# Patient Record
Sex: FEMALE | Race: WHITE | Employment: UNEMPLOYED | URBAN - METROPOLITAN AREA
[De-identification: names, ages, dates, MRNs, and addresses within clinical notes are randomized per-mention and may not be internally consistent; named-entity substitution may affect disease eponyms.]

---

## 2019-04-29 ENCOUNTER — APPOINTMENT (OUTPATIENT)
Dept: CT IMAGING | Age: 69
DRG: 853 | End: 2019-04-29
Payer: COMMERCIAL

## 2019-04-29 ENCOUNTER — APPOINTMENT (OUTPATIENT)
Dept: GENERAL RADIOLOGY | Age: 69
DRG: 853 | End: 2019-04-29
Payer: COMMERCIAL

## 2019-04-29 ENCOUNTER — HOSPITAL ENCOUNTER (INPATIENT)
Age: 69
LOS: 21 days | Discharge: HOSPICE | DRG: 853 | End: 2019-05-20
Attending: EMERGENCY MEDICINE | Admitting: INTERNAL MEDICINE
Payer: COMMERCIAL

## 2019-04-29 DIAGNOSIS — A41.9 SEPTICEMIA (HCC): ICD-10-CM

## 2019-04-29 DIAGNOSIS — I95.9 HYPOTENSION, UNSPECIFIED HYPOTENSION TYPE: ICD-10-CM

## 2019-04-29 DIAGNOSIS — T68.XXXA HYPOTHERMIA, INITIAL ENCOUNTER: ICD-10-CM

## 2019-04-29 DIAGNOSIS — E11.11 DIABETIC KETOACIDOSIS WITH COMA ASSOCIATED WITH TYPE 2 DIABETES MELLITUS (HCC): ICD-10-CM

## 2019-04-29 DIAGNOSIS — M62.82 NON-TRAUMATIC RHABDOMYOLYSIS: ICD-10-CM

## 2019-04-29 DIAGNOSIS — E86.0 DEHYDRATION: ICD-10-CM

## 2019-04-29 DIAGNOSIS — J96.00 ACUTE RESPIRATORY FAILURE, UNSPECIFIED WHETHER WITH HYPOXIA OR HYPERCAPNIA (HCC): Primary | ICD-10-CM

## 2019-04-29 DIAGNOSIS — J18.9 PNEUMONIA DUE TO ORGANISM: ICD-10-CM

## 2019-04-29 DIAGNOSIS — R41.82 ALTERED MENTAL STATUS, UNSPECIFIED ALTERED MENTAL STATUS TYPE: ICD-10-CM

## 2019-04-29 DIAGNOSIS — I46.9 CARDIAC ARREST (HCC): ICD-10-CM

## 2019-04-29 PROBLEM — E87.20 ACIDOSIS: Status: ACTIVE | Noted: 2019-04-29

## 2019-04-29 PROBLEM — E11.10 DKA (DIABETIC KETOACIDOSES): Status: ACTIVE | Noted: 2019-04-29

## 2019-04-29 PROBLEM — E11.10 DKA, TYPE 2, NOT AT GOAL (HCC): Status: ACTIVE | Noted: 2019-04-29

## 2019-04-29 LAB
A/G RATIO: 0.7 (ref 1.1–2.2)
ALBUMIN SERPL-MCNC: 3.4 G/DL (ref 3.4–5)
ALP BLD-CCNC: 180 U/L (ref 40–129)
ALT SERPL-CCNC: 6 U/L (ref 10–40)
ANION GAP SERPL CALCULATED.3IONS-SCNC: 29 MMOL/L (ref 3–16)
ANION GAP SERPL CALCULATED.3IONS-SCNC: 39 MMOL/L (ref 3–16)
ANISOCYTOSIS: ABNORMAL
AST SERPL-CCNC: 12 U/L (ref 15–37)
BACTERIA: ABNORMAL /HPF
BANDED NEUTROPHILS RELATIVE PERCENT: 25 % (ref 0–7)
BASE EXCESS ARTERIAL: -16 (ref -3–3)
BASE EXCESS ARTERIAL: -16 (ref -3–3)
BASE EXCESS ARTERIAL: -24.2 MMOL/L (ref -3–3)
BASOPHILS ABSOLUTE: 0 K/UL (ref 0–0.2)
BASOPHILS RELATIVE PERCENT: 0 %
BILIRUB SERPL-MCNC: <0.2 MG/DL (ref 0–1)
BILIRUBIN URINE: NEGATIVE
BLOOD, URINE: ABNORMAL
BUN BLDV-MCNC: 50 MG/DL (ref 7–20)
BUN BLDV-MCNC: 52 MG/DL (ref 7–20)
CALCIUM IONIZED: 1.22 MMOL/L (ref 1.12–1.32)
CALCIUM SERPL-MCNC: 8.2 MG/DL (ref 8.3–10.6)
CALCIUM SERPL-MCNC: 9.7 MG/DL (ref 8.3–10.6)
CARBOXYHEMOGLOBIN ARTERIAL: 0.8 % (ref 0–1.5)
CHLORIDE BLD-SCNC: 80 MMOL/L (ref 99–110)
CHLORIDE BLD-SCNC: 96 MMOL/L (ref 99–110)
CLARITY: ABNORMAL
CO2: 11 MMOL/L (ref 21–32)
CO2: 7 MMOL/L (ref 21–32)
COLOR: YELLOW
CREAT SERPL-MCNC: 1.7 MG/DL (ref 0.6–1.2)
CREAT SERPL-MCNC: 1.9 MG/DL (ref 0.6–1.2)
EKG ATRIAL RATE: 75 BPM
EKG DIAGNOSIS: NORMAL
EKG P AXIS: 64 DEGREES
EKG P-R INTERVAL: 142 MS
EKG Q-T INTERVAL: 452 MS
EKG QRS DURATION: 112 MS
EKG QTC CALCULATION (BAZETT): 504 MS
EKG R AXIS: 52 DEGREES
EKG T AXIS: 54 DEGREES
EKG VENTRICULAR RATE: 75 BPM
EOSINOPHILS ABSOLUTE: 0.3 K/UL (ref 0–0.6)
EOSINOPHILS RELATIVE PERCENT: 1 %
EPITHELIAL CELLS, UA: ABNORMAL /HPF
GFR AFRICAN AMERICAN: 32
GFR AFRICAN AMERICAN: 36
GFR NON-AFRICAN AMERICAN: 26
GFR NON-AFRICAN AMERICAN: 30
GLOBULIN: 5.2 G/DL
GLUCOSE BLD-MCNC: 526 MG/DL (ref 70–99)
GLUCOSE BLD-MCNC: 544 MG/DL (ref 70–99)
GLUCOSE BLD-MCNC: 613 MG/DL (ref 70–99)
GLUCOSE BLD-MCNC: 636 MG/DL (ref 70–99)
GLUCOSE BLD-MCNC: 916 MG/DL (ref 70–99)
GLUCOSE BLD-MCNC: >600 MG/DL (ref 70–99)
GLUCOSE URINE: >=1000 MG/DL
HCO3 ARTERIAL: 10.7 MMOL/L (ref 21–29)
HCO3 ARTERIAL: 11.7 MMOL/L (ref 21–29)
HCO3 ARTERIAL: 6.9 MMOL/L (ref 21–29)
HCT VFR BLD CALC: 35 % (ref 36–48)
HCT VFR BLD CALC: 38.4 % (ref 36–48)
HEMOGLOBIN, ART, EXTENDED: 5.9 G/DL (ref 12–16)
HEMOGLOBIN: 11.5 G/DL (ref 12–16)
HEMOGLOBIN: 11.8 G/DL (ref 12–16)
HEMOGLOBIN: 13.4 GM/DL (ref 12–16)
INR BLD: 1.25 (ref 0.86–1.14)
KETONES, URINE: >=80 MG/DL
LACTATE: 1.7 MMOL/L (ref 0.4–2)
LACTIC ACID: 2.3 MMOL/L (ref 0.4–2)
LACTIC ACID: 2.3 MMOL/L (ref 0.4–2)
LEUKOCYTE ESTERASE, URINE: NEGATIVE
LYMPHOCYTES ABSOLUTE: 3.1 K/UL (ref 1–5.1)
LYMPHOCYTES RELATIVE PERCENT: 10 %
MACROCYTES: ABNORMAL
MAGNESIUM: 1.8 MG/DL (ref 1.8–2.4)
MAGNESIUM: 2.7 MG/DL (ref 1.8–2.4)
MCH RBC QN AUTO: 28.2 PG (ref 26–34)
MCH RBC QN AUTO: 28.2 PG (ref 26–34)
MCHC RBC AUTO-ENTMCNC: 30.1 G/DL (ref 31–36)
MCHC RBC AUTO-ENTMCNC: 33.8 G/DL (ref 31–36)
MCV RBC AUTO: 83.4 FL (ref 80–100)
MCV RBC AUTO: 93.8 FL (ref 80–100)
METAMYELOCYTES RELATIVE PERCENT: 1 %
METHEMOGLOBIN ARTERIAL: 1.3 %
MICROSCOPIC EXAMINATION: YES
MONOCYTES ABSOLUTE: 2.2 K/UL (ref 0–1.3)
MONOCYTES RELATIVE PERCENT: 7 %
NEUTROPHILS ABSOLUTE: 25.5 K/UL (ref 1.7–7.7)
NEUTROPHILS RELATIVE PERCENT: 56 %
NITRITE, URINE: NEGATIVE
O2 CONTENT ARTERIAL: 7 ML/DL
O2 SAT, ARTERIAL: 85.4 %
O2 SAT, ARTERIAL: 94 % (ref 93–100)
O2 SAT, ARTERIAL: 94 % (ref 93–100)
O2 THERAPY: ABNORMAL
OVALOCYTES: ABNORMAL
PCO2 ARTERIAL: 21.1 MM HG (ref 35–45)
PCO2 ARTERIAL: 30.6 MM HG (ref 35–45)
PCO2 ARTERIAL: 38.1 MMHG (ref 35–45)
PDW BLD-RTO: 13.6 % (ref 12.4–15.4)
PDW BLD-RTO: 14.8 % (ref 12.4–15.4)
PERFORMED ON: ABNORMAL
PH ARTERIAL: 6.87 (ref 7.35–7.45)
PH ARTERIAL: 7.19 (ref 7.35–7.45)
PH ARTERIAL: 7.31 (ref 7.35–7.45)
PH UA: 5.5 (ref 5–8)
PLATELET # BLD: 708 K/UL (ref 135–450)
PLATELET # BLD: 767 K/UL (ref 135–450)
PMV BLD AUTO: 7.1 FL (ref 5–10.5)
PMV BLD AUTO: 8.4 FL (ref 5–10.5)
PO2 ARTERIAL: 61.9 MMHG (ref 75–108)
PO2 ARTERIAL: 74.5 MM HG (ref 75–108)
PO2 ARTERIAL: 85.5 MM HG (ref 75–108)
POC HEMATOCRIT: 39 % (ref 36–48)
POC POTASSIUM: 2.5 MMOL/L (ref 3.5–5.1)
POC SAMPLE TYPE: ABNORMAL
POC SAMPLE TYPE: ABNORMAL
POC SODIUM: 138 MMOL/L (ref 136–145)
POIKILOCYTES: ABNORMAL
POLYCHROMASIA: ABNORMAL
POTASSIUM REFLEX MAGNESIUM: 2.6 MMOL/L (ref 3.5–5.1)
POTASSIUM SERPL-SCNC: 5.1 MMOL/L (ref 3.5–5.1)
PRO-BNP: 2186 PG/ML (ref 0–124)
PROTEIN UA: 100 MG/DL
PROTHROMBIN TIME: 14.2 SEC (ref 9.8–13)
RBC # BLD: 4.09 M/UL (ref 4–5.2)
RBC # BLD: 4.2 M/UL (ref 4–5.2)
RBC UA: ABNORMAL /HPF (ref 0–2)
SODIUM BLD-SCNC: 126 MMOL/L (ref 136–145)
SODIUM BLD-SCNC: 136 MMOL/L (ref 136–145)
SPECIFIC GRAVITY UA: 1.02 (ref 1–1.03)
TCO2 ARTERIAL: 11 MMOL/L
TCO2 ARTERIAL: 13 MMOL/L
TCO2 ARTERIAL: 8 MMOL/L
TOTAL CK: 279 U/L (ref 26–192)
TOTAL PROTEIN: 8.6 G/DL (ref 6.4–8.2)
TROPONIN: 0.08 NG/ML
TROPONIN: <0.01 NG/ML
URINE TYPE: ABNORMAL
UROBILINOGEN, URINE: 0.2 E.U./DL
WBC # BLD: 31.1 K/UL (ref 4–11)
WBC # BLD: 4.8 K/UL (ref 4–11)
WBC UA: ABNORMAL /HPF (ref 0–5)
YEAST: PRESENT /HPF

## 2019-04-29 PROCEDURE — 4500000026 HC ED CRITICAL CARE PROCEDURE

## 2019-04-29 PROCEDURE — 94750 HC PULMONARY COMPLIANCE STUDY: CPT

## 2019-04-29 PROCEDURE — 94770 HC ETCO2 MONITOR DAILY: CPT

## 2019-04-29 PROCEDURE — 2580000003 HC RX 258: Performed by: INTERNAL MEDICINE

## 2019-04-29 PROCEDURE — 84295 ASSAY OF SERUM SODIUM: CPT

## 2019-04-29 PROCEDURE — 99291 CRITICAL CARE FIRST HOUR: CPT

## 2019-04-29 PROCEDURE — 6370000000 HC RX 637 (ALT 250 FOR IP): Performed by: EMERGENCY MEDICINE

## 2019-04-29 PROCEDURE — 82330 ASSAY OF CALCIUM: CPT

## 2019-04-29 PROCEDURE — 36600 WITHDRAWAL OF ARTERIAL BLOOD: CPT

## 2019-04-29 PROCEDURE — 83605 ASSAY OF LACTIC ACID: CPT

## 2019-04-29 PROCEDURE — 0BH18EZ INSERTION OF ENDOTRACHEAL AIRWAY INTO TRACHEA, VIA NATURAL OR ARTIFICIAL OPENING ENDOSCOPIC: ICD-10-PCS | Performed by: INTERNAL MEDICINE

## 2019-04-29 PROCEDURE — 82550 ASSAY OF CK (CPK): CPT

## 2019-04-29 PROCEDURE — 85025 COMPLETE CBC W/AUTO DIFF WBC: CPT

## 2019-04-29 PROCEDURE — 82947 ASSAY GLUCOSE BLOOD QUANT: CPT

## 2019-04-29 PROCEDURE — 99291 CRITICAL CARE FIRST HOUR: CPT | Performed by: INTERNAL MEDICINE

## 2019-04-29 PROCEDURE — 96361 HYDRATE IV INFUSION ADD-ON: CPT

## 2019-04-29 PROCEDURE — 72125 CT NECK SPINE W/O DYE: CPT

## 2019-04-29 PROCEDURE — 93005 ELECTROCARDIOGRAM TRACING: CPT | Performed by: EMERGENCY MEDICINE

## 2019-04-29 PROCEDURE — C9113 INJ PANTOPRAZOLE SODIUM, VIA: HCPCS | Performed by: INTERNAL MEDICINE

## 2019-04-29 PROCEDURE — 83880 ASSAY OF NATRIURETIC PEPTIDE: CPT

## 2019-04-29 PROCEDURE — 36415 COLL VENOUS BLD VENIPUNCTURE: CPT

## 2019-04-29 PROCEDURE — 85027 COMPLETE CBC AUTOMATED: CPT

## 2019-04-29 PROCEDURE — 84484 ASSAY OF TROPONIN QUANT: CPT

## 2019-04-29 PROCEDURE — 96365 THER/PROPH/DIAG IV INF INIT: CPT

## 2019-04-29 PROCEDURE — 2700000000 HC OXYGEN THERAPY PER DAY

## 2019-04-29 PROCEDURE — 96375 TX/PRO/DX INJ NEW DRUG ADDON: CPT

## 2019-04-29 PROCEDURE — 2500000003 HC RX 250 WO HCPCS: Performed by: PHYSICIAN ASSISTANT

## 2019-04-29 PROCEDURE — 94002 VENT MGMT INPAT INIT DAY: CPT

## 2019-04-29 PROCEDURE — 2500000003 HC RX 250 WO HCPCS: Performed by: INTERNAL MEDICINE

## 2019-04-29 PROCEDURE — 36592 COLLECT BLOOD FROM PICC: CPT

## 2019-04-29 PROCEDURE — 6360000002 HC RX W HCPCS: Performed by: PHYSICIAN ASSISTANT

## 2019-04-29 PROCEDURE — 2580000003 HC RX 258: Performed by: EMERGENCY MEDICINE

## 2019-04-29 PROCEDURE — 96368 THER/DIAG CONCURRENT INF: CPT

## 2019-04-29 PROCEDURE — 99292 CRITICAL CARE ADDL 30 MIN: CPT

## 2019-04-29 PROCEDURE — 36620 INSERTION CATHETER ARTERY: CPT

## 2019-04-29 PROCEDURE — 6360000002 HC RX W HCPCS: Performed by: INTERNAL MEDICINE

## 2019-04-29 PROCEDURE — 82803 BLOOD GASES ANY COMBINATION: CPT

## 2019-04-29 PROCEDURE — 81001 URINALYSIS AUTO W/SCOPE: CPT

## 2019-04-29 PROCEDURE — 51702 INSERT TEMP BLADDER CATH: CPT

## 2019-04-29 PROCEDURE — 96367 TX/PROPH/DG ADDL SEQ IV INF: CPT

## 2019-04-29 PROCEDURE — 71045 X-RAY EXAM CHEST 1 VIEW: CPT

## 2019-04-29 PROCEDURE — 83735 ASSAY OF MAGNESIUM: CPT

## 2019-04-29 PROCEDURE — 70450 CT HEAD/BRAIN W/O DYE: CPT

## 2019-04-29 PROCEDURE — 6370000000 HC RX 637 (ALT 250 FOR IP): Performed by: INTERNAL MEDICINE

## 2019-04-29 PROCEDURE — 85014 HEMATOCRIT: CPT

## 2019-04-29 PROCEDURE — 2000000000 HC ICU R&B

## 2019-04-29 PROCEDURE — 2500000003 HC RX 250 WO HCPCS: Performed by: EMERGENCY MEDICINE

## 2019-04-29 PROCEDURE — 2580000003 HC RX 258

## 2019-04-29 PROCEDURE — 87040 BLOOD CULTURE FOR BACTERIA: CPT

## 2019-04-29 PROCEDURE — 92950 HEART/LUNG RESUSCITATION CPR: CPT

## 2019-04-29 PROCEDURE — 74176 CT ABD & PELVIS W/O CONTRAST: CPT

## 2019-04-29 PROCEDURE — 2580000003 HC RX 258: Performed by: PHYSICIAN ASSISTANT

## 2019-04-29 PROCEDURE — C9113 INJ PANTOPRAZOLE SODIUM, VIA: HCPCS | Performed by: PHYSICIAN ASSISTANT

## 2019-04-29 PROCEDURE — 85610 PROTHROMBIN TIME: CPT

## 2019-04-29 PROCEDURE — 93010 ELECTROCARDIOGRAM REPORT: CPT | Performed by: INTERNAL MEDICINE

## 2019-04-29 PROCEDURE — 94761 N-INVAS EAR/PLS OXIMETRY MLT: CPT

## 2019-04-29 PROCEDURE — 6360000002 HC RX W HCPCS: Performed by: EMERGENCY MEDICINE

## 2019-04-29 PROCEDURE — 5A1955Z RESPIRATORY VENTILATION, GREATER THAN 96 CONSECUTIVE HOURS: ICD-10-PCS | Performed by: INTERNAL MEDICINE

## 2019-04-29 PROCEDURE — 84132 ASSAY OF SERUM POTASSIUM: CPT

## 2019-04-29 PROCEDURE — 80053 COMPREHEN METABOLIC PANEL: CPT

## 2019-04-29 RX ORDER — SODIUM CHLORIDE 0.9 % (FLUSH) 0.9 %
10 SYRINGE (ML) INJECTION EVERY 12 HOURS SCHEDULED
Status: DISCONTINUED | OUTPATIENT
Start: 2019-04-29 | End: 2019-05-20 | Stop reason: HOSPADM

## 2019-04-29 RX ORDER — SODIUM CHLORIDE 9 MG/ML
INJECTION, SOLUTION INTRAVENOUS
Status: COMPLETED
Start: 2019-04-29 | End: 2019-04-29

## 2019-04-29 RX ORDER — SUCCINYLCHOLINE CHLORIDE 20 MG/ML
INJECTION INTRAMUSCULAR; INTRAVENOUS DAILY PRN
Status: COMPLETED | OUTPATIENT
Start: 2019-04-29 | End: 2019-04-29

## 2019-04-29 RX ORDER — ACETAMINOPHEN 650 MG/1
650 SUPPOSITORY RECTAL EVERY 4 HOURS PRN
Status: DISCONTINUED | OUTPATIENT
Start: 2019-04-29 | End: 2019-05-20 | Stop reason: HOSPADM

## 2019-04-29 RX ORDER — PROPOFOL 10 MG/ML
INJECTION, EMULSION INTRAVENOUS
Status: DISPENSED
Start: 2019-04-29 | End: 2019-04-30

## 2019-04-29 RX ORDER — PROPOFOL 10 MG/ML
20 INJECTION, EMULSION INTRAVENOUS
Status: DISCONTINUED | OUTPATIENT
Start: 2019-04-29 | End: 2019-05-03

## 2019-04-29 RX ORDER — MAGNESIUM SULFATE 1 G/100ML
1 INJECTION INTRAVENOUS PRN
Status: DISCONTINUED | OUTPATIENT
Start: 2019-04-29 | End: 2019-04-30

## 2019-04-29 RX ORDER — LORAZEPAM 2 MG/ML
1 INJECTION INTRAMUSCULAR ONCE
Status: COMPLETED | OUTPATIENT
Start: 2019-04-29 | End: 2019-04-29

## 2019-04-29 RX ORDER — POTASSIUM CHLORIDE 29.8 MG/ML
20 INJECTION INTRAVENOUS PRN
Status: DISCONTINUED | OUTPATIENT
Start: 2019-04-29 | End: 2019-04-30

## 2019-04-29 RX ORDER — PROPOFOL 10 MG/ML
10 INJECTION, EMULSION INTRAVENOUS ONCE
Status: DISCONTINUED | OUTPATIENT
Start: 2019-04-29 | End: 2019-04-29 | Stop reason: ALTCHOICE

## 2019-04-29 RX ORDER — SODIUM CHLORIDE 9 MG/ML
INJECTION, SOLUTION INTRAVENOUS CONTINUOUS
Status: DISCONTINUED | OUTPATIENT
Start: 2019-04-29 | End: 2019-04-30

## 2019-04-29 RX ORDER — SODIUM CHLORIDE 0.9 % (FLUSH) 0.9 %
10 SYRINGE (ML) INJECTION PRN
Status: DISCONTINUED | OUTPATIENT
Start: 2019-04-29 | End: 2019-05-20 | Stop reason: HOSPADM

## 2019-04-29 RX ORDER — ETOMIDATE 2 MG/ML
INJECTION INTRAVENOUS DAILY PRN
Status: COMPLETED | OUTPATIENT
Start: 2019-04-29 | End: 2019-04-29

## 2019-04-29 RX ORDER — 0.9 % SODIUM CHLORIDE 0.9 %
2000 INTRAVENOUS SOLUTION INTRAVENOUS ONCE
Status: COMPLETED | OUTPATIENT
Start: 2019-04-29 | End: 2019-04-29

## 2019-04-29 RX ORDER — ONDANSETRON 2 MG/ML
4 INJECTION INTRAMUSCULAR; INTRAVENOUS EVERY 6 HOURS PRN
Status: DISCONTINUED | OUTPATIENT
Start: 2019-04-29 | End: 2019-04-30

## 2019-04-29 RX ADMIN — SODIUM CHLORIDE 2000 ML: 9 INJECTION, SOLUTION INTRAVENOUS at 13:04

## 2019-04-29 RX ADMIN — DEXTROSE MONOHYDRATE 40 MCG/MIN: 50 INJECTION, SOLUTION INTRAVENOUS at 20:20

## 2019-04-29 RX ADMIN — VASOPRESSIN 0.04 UNITS/MIN: 20 INJECTION INTRAVENOUS at 20:20

## 2019-04-29 RX ADMIN — SODIUM CHLORIDE 8 MG/HR: 9 INJECTION, SOLUTION INTRAVENOUS at 15:32

## 2019-04-29 RX ADMIN — PROPOFOL 10 MCG/KG/MIN: 10 INJECTION, EMULSION INTRAVENOUS at 22:13

## 2019-04-29 RX ADMIN — POTASSIUM CHLORIDE 20 MEQ: 29.8 INJECTION, SOLUTION INTRAVENOUS at 21:59

## 2019-04-29 RX ADMIN — SUCCINYLCHOLINE CHLORIDE 100 MG: 20 INJECTION, SOLUTION INTRAMUSCULAR; INTRAVENOUS at 13:32

## 2019-04-29 RX ADMIN — SODIUM CHLORIDE 80 MG: 9 INJECTION, SOLUTION INTRAVENOUS at 15:00

## 2019-04-29 RX ADMIN — ACETAMINOPHEN 650 MG: 650 SUPPOSITORY RECTAL at 20:29

## 2019-04-29 RX ADMIN — DEXTROSE MONOHYDRATE 5 MCG/MIN: 50 INJECTION, SOLUTION INTRAVENOUS at 14:20

## 2019-04-29 RX ADMIN — POTASSIUM CHLORIDE 20 MEQ: 29.8 INJECTION, SOLUTION INTRAVENOUS at 23:14

## 2019-04-29 RX ADMIN — EPINEPHRINE 1 MG: 0.1 INJECTION, SOLUTION ENDOTRACHEAL; INTRACARDIAC; INTRAVENOUS at 13:27

## 2019-04-29 RX ADMIN — SODIUM CHLORIDE 4 UNITS/HR: 9 INJECTION, SOLUTION INTRAVENOUS at 14:50

## 2019-04-29 RX ADMIN — POTASSIUM CHLORIDE 20 MEQ: 29.8 INJECTION, SOLUTION INTRAVENOUS at 20:58

## 2019-04-29 RX ADMIN — Medication 2000 ML: at 13:04

## 2019-04-29 RX ADMIN — LORAZEPAM 1 MG: 2 INJECTION, SOLUTION INTRAMUSCULAR; INTRAVENOUS at 16:48

## 2019-04-29 RX ADMIN — SODIUM CHLORIDE: 9 INJECTION, SOLUTION INTRAVENOUS at 20:23

## 2019-04-29 RX ADMIN — VANCOMYCIN HYDROCHLORIDE 1000 MG: 1 INJECTION, POWDER, LYOPHILIZED, FOR SOLUTION INTRAVENOUS at 15:08

## 2019-04-29 RX ADMIN — POTASSIUM BICARBONATE 20 MEQ: 782 TABLET, EFFERVESCENT ORAL at 22:10

## 2019-04-29 RX ADMIN — PROPOFOL 30 MCG/KG/MIN: 10 INJECTION, EMULSION INTRAVENOUS at 15:06

## 2019-04-29 RX ADMIN — SODIUM CHLORIDE 8 MG/HR: 9 INJECTION, SOLUTION INTRAVENOUS at 21:22

## 2019-04-29 RX ADMIN — SODIUM BICARBONATE 50 MEQ: 84 INJECTION, SOLUTION INTRAVENOUS at 15:02

## 2019-04-29 RX ADMIN — ETOMIDATE 20 MG: 2 INJECTION INTRAVENOUS at 13:32

## 2019-04-29 RX ADMIN — PIPERACILLIN SODIUM,TAZOBACTAM SODIUM 3.38 G: 3; .375 INJECTION, POWDER, FOR SOLUTION INTRAVENOUS at 15:08

## 2019-04-29 RX ADMIN — EPINEPHRINE 1 MG: 0.1 INJECTION, SOLUTION ENDOTRACHEAL; INTRACARDIAC; INTRAVENOUS at 13:24

## 2019-04-29 ASSESSMENT — PULMONARY FUNCTION TESTS
PIF_VALUE: 29
PIF_VALUE: 24
PIF_VALUE: 28
PIF_VALUE: 23
PIF_VALUE: 33
PIF_VALUE: 36
PIF_VALUE: 32
PIF_VALUE: 24

## 2019-04-29 ASSESSMENT — PAIN SCALES - GENERAL: PAINLEVEL_OUTOF10: 0

## 2019-04-29 NOTE — ED PROVIDER NOTES
[facility] Emergency Department    CHIEF COMPLAINT  Altered Mental Status (pt found down at home unresponsive. unknown downtime. Last seen normal 10 am. posterior ekg changes per EMS. )      HISTORY OF PRESENT ILLNESS  Nury Mirza is a 81 y.o. female who presents to the ED found down and unresponsive. Patient observed lying in bed. Patient brought in by squad. According to family patient stopped taking diabetes medications several weeks ago. States that she discontinued them because she did not like the way they made her feel. Was complaining of some shortness of breath last night to family. Lives at home alone. Found down on the ground by her son today. Does appear to have dried emesis to cheek. She is not following commands. Cold to touch. No other complaints, modifying factors or associated symptoms. Nursing notes reviewed.    Past Medical History:   Diagnosis Date    Acute renal failure (ARF) ([address] 8/20/2015    Asthma     CAD (coronary artery disease)     MI in 2013    Diabetes mellitus ([address]     Hypertension     Pneumonia     Not sure when    Seizures ([address]     She goes numb, doctor told her they are seizures    Unspecified cerebral artery occlusion with cerebral infarction     Pt reports having a lot of mini-strokes; first one was 2002; last one was a couple years ago     Past Surgical History:   Procedure Laterality Date    BLADDER REPAIR      BREAST LUMPECTOMY Left     BREAST SURGERY       Family History   Problem Relation Age of Onset    Cancer Mother     Diabetes Father     Heart Disease Father     High Blood Pressure Father     High Cholesterol Father     Cancer Sister      Social History     Socioeconomic History    Marital status:      Spouse name: Not on file    Number of children: Not on file    Years of education: Not on file    Highest education level: Not on file   Occupational History    Not on file   Social Needs    Financial resource strain: Not on file    Food insecurity:     Worry: Not on file     Inability: Not on file    Transportation needs:     Medical: Not on file     Non-medical: Not on file   Tobacco Use    Smoking status: Never Smoker    Smokeless tobacco: Never Used   Substance and Sexual Activity    Alcohol use: No     Comment: occasional    Drug use: No    Sexual activity: Never   Lifestyle    Physical activity:     Days per week: Not on file     Minutes per session: Not on file    Stress: Not on file   Relationships    Social connections:     Talks on phone: Not on file     Gets together: Not on file     Attends Scientologist service: Not on file     Active member of club or organization: Not on file     Attends meetings of clubs or organizations: Not on file     Relationship status: Not on file    Intimate partner violence:     Fear of current or ex partner: Not on file     Emotionally abused: Not on file     Physically abused: Not on file     Forced sexual activity: Not on file   Other Topics Concern    Not on file   Social History Narrative    Not on file     Current Facility-Administered Medications   Medication Dose Route Frequency Provider Last Rate Last Dose    0.9 % sodium chloride bolus  2,000 mL Intravenous Once Ish Mejia, DO         Current Outpatient Medications   Medication Sig Dispense Refill    ondansetron (ZOFRAN ODT) 4 MG disintegrating tablet Take 1 tablet by mouth every 8 hours as needed for Nausea or Vomiting 20 tablet 0    omeprazole (PRILOSEC) 10 MG capsule Take 10 mg by mouth daily      lisinopril (PRINIVIL;ZESTRIL) 10 MG tablet Take 10 mg by mouth daily.  metFORMIN (GLUCOPHAGE) 1000 MG tablet   Take 1,000 mg by mouth daily (with breakfast)       PARoxetine (PAXIL) 30 MG tablet Take 30 mg by mouth every morning.  amitriptyline (ELAVIL) 50 MG tablet Take 63 mg by mouth nightly       atorvastatin (LIPITOR) 40 MG tablet Take 40 mg by mouth daily.       insulin detemir (LEVEMIR) 100 UNIT/ML injection vial   Inject 63 Units into the skin nightly        Allergies   Allergen Reactions    Aspirin Nausea And Vomiting       REVIEW OF SYSTEMS  10 systems reviewed, pertinent positives per HPI otherwise noted to be negative    PHYSICAL EXAM  BP (!) 72/48   Pulse 74   Resp 16   SpO2 100%   GENERAL APPEARANCE: Awake. While patient alert she does not appear to have great insight onto what is going on. Has no ability to follow commands. HEAD: Normocephalic. Atraumatic. No hematomas, lesions, lacerations or abrasions. Negative for schmidt signs or raccoons eyes. EYES: PERRL. EOM's grossly intact. ENT: Mucous membranes are dry. Emesis noted to face. NECK: Supple. HEART: RRR. No murmurs. LUNGS: Respirations unlabored. CTAB. Good air exchange. Speaking comfortably in full sentences. No wheezing, rhonchi, rales. ABDOMEN: Soft. Non-distended. Non-tender. No guarding or rebound. No midline pulsatile mass. EXTREMITIES: No peripheral edema. Moves all extremities equally. All extremities neurovascularly intact. SKIN: Cool and dry. No acute rashes. NEUROLOGICAL: Patient awake although again does not follow commands and appears altered. RADIOLOGY  Xr Chest Portable    Result Date: 4/29/2019  EXAMINATION: SINGLE XRAY VIEW OF THE CHEST 4/29/2019 1:45 pm COMPARISON: 1 hour ago HISTORY: ORDERING SYSTEM PROVIDED HISTORY: post intubation TECHNOLOGIST PROVIDED HISTORY: Reason for exam:->post intubation Ordering Physician Provided Reason for Exam: ETT TUBE AND NG PLACEMENT Acuity: Acute Type of Exam: Initial FINDINGS: An endotracheal tube is been placed, with tip projecting 3 cm cephalad to the pernell. A nasogastric tube extends below the left hemidiaphragm. Cardiac silhouette is normal.  Hilar contours are stable. There is patchy opacity in the left base. No pneumothorax is identified. Supportive tubing projects in normal position.  Left basilar airspace disease, pneumonia versus aspiration pneumonitis. There may be a component of pleural effusion. Xr Chest Portable    Result Date: 4/29/2019  EXAMINATION: SINGLE XRAY VIEW OF THE CHEST 4/29/2019 12:56 pm COMPARISON: None. HISTORY: ORDERING SYSTEM PROVIDED HISTORY: fall TECHNOLOGIST PROVIDED HISTORY: Reason for exam:->fall Ordering Physician Provided Reason for Exam: altered mental status Acuity: Acute Type of Exam: Initial FINDINGS: The cardiomediastinal silhouette is of normal size. There is atelectasis at the left lung base. There is question of small left pleural effusion. There is no pneumothorax. There is no acute osseous abnormality. Atelectasis at the left lung base. Question of small left pleural effusion. Procedure Note:  Orotracheal Intubation  Indication: Cardiac Arrest  Consent: Adriane Lombard or their surrogate had an opportunity to ask questions, and the risks, benefits, and alternatives were discussed. Procedure: The patient was preoxygenated with 100% oxygen and had O2 saturations what were in the % range. Following preoxygenation, the patient was given 20mg etomidate & 100mg succincylcholine. A  4 glidescope blade was inserted into the patients mouth after fasciculations were noted and a grade 1 view of the glottis was obtained. An 8.0 ET tube was then visualized passing between the cords and was secured at 21cm at the lip. The patient had condensation in the ET tube, CO2 capnogroaphic color change and had bilateral breath sounds appreciated. Post intubation CXR ordered and interpreted by radiology showed successful intubation. No immediate complications    Procedure Note:  Central Venous Access  Indication: Septic shock  The risk and benefit of a central line placement has been explained to the patient. Adriane Lombard or their surrogate had an opportunity to ask questions, and the risks, benefits, and alternatives were discussed. A written consent was obtained.       The skin over the right femoral vein was cleansed with ChloraPrep and draped in the usual sterile fashion. After infiltration of 1.0mL 1.0% lidocaine without epinephrine for anesthesia, an 18-gauge needle was inserted under ultrasound guidance while aspirating with a 10mL syringe. After a flash of dark venous blood returned, the right femoral vein was accessed with a sterile guidewire through the needle. The needle was then removed over the guidwire. A small skin incision was made with a #11 scalpel over the guidewire. An 8.5 Emirati subcutaneous dilator was then inserted and withdrawn. Using Seldinger technique, the normal saline pre-flushed 7-Emirati triple lumen catheter was inserted over the guidewire and each port accessed for free aspiration and flushed with saline to confirm placement within the venous lumen. The catheter was then secured to the skin using sutures to prevent dislodgement. The area was then recleansed with ChloraPrep and dressed with an antibiotic-infused disc and a sterile occlusive dressing. Patient tolerated procedure well without immediate complications. ED COURSE   I have evaluated this patient in collaboration with Dr. Laura Devine. Pain control was not required while here in the emergency department. Triage vitals with blood pressure 72/48. POC glucose > 600. Given 1L IVF bolus. Placed on bear hugger as core temp was in high 80s. CBC w/leukocytosis at 31.1 w/ a 25% bandemia. Lactic acid 2.3. Blood cx x2 pending. given additional 1L IVF bolus surpassing 30mL/kg bolus and empirically initiated on vanc and zosyn. Patient coded shortly into visit and regained pulse s/p several rounds of ACLS. Intubated and central line placed. Troponin <0.01. VBG w/pH 6.8. Given 50mEq Bicarb. BNP approximately 2200. . CMP significant for DKA and LOPEZ. Decreased Na and Cl levels w/acutely elevated BUN and Cr. At 52 and 1.9 w/GFR 26.   Glucose 916 w/anion gap of 39 in addition to acidosis and ketonuria. Gr cath placed w/10-20 WBC and 1+bacteria noted to U/A. Initial CXR w/L lung atelectasis w/questionable left pleural effusion. Additional imaging and hospitalist consult pending. Please refer to Dr. Sonia Beard documentation regarding ED course, evaluation, treatment, medical decision making, and disposition for this patient. A discussion was had with Mrs. Valerio's family regarding unresponsive state, ED findings, and recommendations for admission. All questions were answered. Family in agreeement. One hour of critical care time spent, not including any separately billable procedures.     MDM  Results for orders placed or performed during the hospital encounter of 04/29/19   CBC Auto Differential   Result Value Ref Range    WBC 31.1 (H) 4.0 - 11.0 K/uL    RBC 4.09 4.00 - 5.20 M/uL    Hemoglobin 11.5 (L) 12.0 - 16.0 g/dL    Hematocrit 38.4 36.0 - 48.0 %    MCV 93.8 80.0 - 100.0 fL    MCH 28.2 26.0 - 34.0 pg    MCHC 30.1 (L) 31.0 - 36.0 g/dL    RDW 14.8 12.4 - 15.4 %    Platelets 781 (H) 290 - 450 K/uL    MPV 8.4 5.0 - 10.5 fL    Neutrophils % 56.0 %    Lymphocytes % 10.0 %    Monocytes % 7.0 %    Eosinophils % 1.0 %    Basophils % 0.0 %    Neutrophils # 25.5 (H) 1.7 - 7.7 K/uL    Lymphocytes # 3.1 1.0 - 5.1 K/uL    Monocytes # 2.2 (H) 0.0 - 1.3 K/uL    Eosinophils # 0.3 0.0 - 0.6 K/uL    Basophils # 0.0 0.0 - 0.2 K/uL    Bands Relative 25 (H) 0 - 7 %    Metamyelocytes Relative 1 (A) %    Anisocytosis Occasional (A)     Macrocytes Occasional (A)     Polychromasia Occasional (A)     Poikilocytes Occasional (A)     Ovalocytes Occasional (A)    Comprehensive Metabolic Panel   Result Value Ref Range    Sodium 126 (L) 136 - 145 mmol/L    Potassium 5.1 3.5 - 5.1 mmol/L    Chloride 80 (L) 99 - 110 mmol/L    CO2 7 (LL) 21 - 32 mmol/L    Anion Gap 39 (H) 3 - 16    Glucose 916 (HH) 70 - 99 mg/dL    BUN 52 (H) 7 - 20 mg/dL    CREATININE 1.9 (H) 0.6 - 1.2 mg/dL    GFR Non-African American 26 (A) >60    GFR  American 32 (A) >60    Calcium 9.7 8.3 - 10.6 mg/dL    Total Protein 8.6 (H) 6.4 - 8.2 g/dL    Alb 3.4 3.4 - 5.0 g/dL    Albumin/Globulin Ratio 0.7 (L) 1.1 - 2.2    Total Bilirubin <0.2 0.0 - 1.0 mg/dL    Alkaline Phosphatase 180 (H) 40 - 129 U/L    ALT 6 (L) 10 - 40 U/L    AST 12 (L) 15 - 37 U/L    Globulin 5.2 g/dL   Urinalysis   Result Value Ref Range    Color, UA Yellow Straw/Yellow    Clarity, UA SL CLOUDY (A) Clear    Glucose, Ur >=1000 (A) Negative mg/dL    Bilirubin Urine Negative Negative    Ketones, Urine >=80 (AA) Negative mg/dL    Specific Gravity, UA 1.025 1.005 - 1.030    Blood, Urine MODERATE (A) Negative    pH, UA 5.5 5.0 - 8.0    Protein,  (A) Negative mg/dL    Urobilinogen, Urine 0.2 <2.0 E.U./dL    Nitrite, Urine Negative Negative    Leukocyte Esterase, Urine Negative Negative    Microscopic Examination YES     Urine Type Not Specified    Troponin   Result Value Ref Range    Troponin <0.01 <0.01 ng/mL   Brain Natriuretic Peptide   Result Value Ref Range    Pro-BNP 2,186 (H) 0 - 124 pg/mL   Magnesium   Result Value Ref Range    Magnesium 2.70 (H) 1.80 - 2.40 mg/dL   CK   Result Value Ref Range    Total  (H) 26 - 192 U/L   Blood gas, arterial   Result Value Ref Range    pH, Arterial 6.874 (LL) 7.350 - 7.450    pCO2, Arterial 38.1 35.0 - 45.0 mmHg    pO2, Arterial 61.9 (L) 75.0 - 108.0 mmHg    HCO3, Arterial 6.9 (L) 21.0 - 29.0 mmol/L    Base Excess, Arterial -24.2 (L) -3.0 - 3.0 mmol/L    Hemoglobin, Art, Extended 5.9 (L) 12.0 - 16.0 g/dL    O2 Sat, Arterial 85.4 (L) >92 %    Carboxyhgb, Arterial 0.8 0.0 - 1.5 %    Methemoglobin, Arterial 1.3 <1.5 %    TCO2, Arterial 8.0 Not Established mmol/L    O2 Content, Arterial 7 Not Established mL/dL    O2 Therapy Unknown    Microscopic Urinalysis   Result Value Ref Range    WBC, UA 10-20 (A) 0 - 5 /HPF    RBC, UA 3-5 (A) 0 - 2 /HPF    Epi Cells 3-5 /HPF    Bacteria, UA 1+ (A) /HPF    Yeast, UA Present (A) /HPF   Lactic Acid, Plasma   Result Value Ref Range    Lactic Acid 2.3 (H) 0.4 - 2.0 mmol/L   Protime-INR   Result Value Ref Range    Protime 14.2 (H) 9.8 - 13.0 sec    INR 1.25 (H) 0.86 - 1.14   Lactic acid, plasma   Result Value Ref Range    Lactic Acid 2.3 (H) 0.4 - 2.0 mmol/L   Basic Metabolic Panel w/ Reflex to MG   Result Value Ref Range    Sodium 136 136 - 145 mmol/L    Potassium reflex Magnesium 2.6 (LL) 3.5 - 5.1 mmol/L    Chloride 96 (L) 99 - 110 mmol/L    CO2 11 (LL) 21 - 32 mmol/L    Anion Gap 29 (H) 3 - 16    Glucose 613 (HH) 70 - 99 mg/dL    BUN 50 (H) 7 - 20 mg/dL    CREATININE 1.7 (H) 0.6 - 1.2 mg/dL    GFR Non-African American 30 (A) >60    GFR  36 (A) >60    Calcium 8.2 (L) 8.3 - 10.6 mg/dL   Magnesium   Result Value Ref Range    Magnesium 1.80 1.80 - 2.40 mg/dL   POCT Glucose   Result Value Ref Range    POC Glucose >600 (AA) 70 - 99 mg/dl    Performed on ACCU-CHEK    POCT Arterial   Result Value Ref Range    POC Sodium 138 136 - 145 mmol/L    POC Potassium 2.5 (LL) 3.5 - 5.1 mmol/L    POC Glucose 636 (HH) 70 - 99 mg/dl    Calcium, Ion 1.22 1.12 - 1.32 mmol/L    pH, Arterial 7.192 (LL) 7.350 - 7.450    pCO2, Arterial 30.6 (L) 35.0 - 45.0 mm Hg    pO2, Arterial 85.5 75.0 - 108.0 mm Hg    HCO3, Arterial 11.7 (L) 21.0 - 29.0 mmol/L    Base Excess, Arterial -16 (L) -3 - 3    O2 Sat, Arterial 94 93 - 100 %    TCO2, Arterial 13 Not Established mmol/L    Lactate 1.70 0.40 - 2.00 mmol/L    POC Hematocrit 39.0 36.0 - 48.0 %    Hemoglobin 13.4 12.0 - 16.0 gm/dL    Sample Type ART     Performed on SEE BELOW    EKG 12 Lead   Result Value Ref Range    Ventricular Rate 75 BPM    Atrial Rate 75 BPM    P-R Interval 142 ms    QRS Duration 112 ms    Q-T Interval 452 ms    QTc Calculation (Bazett) 504 ms    P Axis 64 degrees    R Axis 52 degrees    T Axis 54 degrees    Diagnosis       Normal sinus rhythmProlonged QTAbnormal ECGWhen compared with ECG of 09-JUL-2015 18:10,QRS axis Shifted leftST elevation now present in Lateral leadsT wave inversion no longer evident in Anterior leadsConfirmed by [facility] SUKUMAR GRIMES, Tony Moreno ([id number] on 4/29/2019 4:49:54 PM       I spoke with Dr. Sandro Espino who spoke with nephrology and hospitalist. We thoroughly discussed the history, physical exam, laboratory and imaging studies, as well as, emergency department course. Based upon that discussion, we've decided to admit Nina Martinez to the hospital    Final Impression  1. Acute respiratory failure, unspecified whether with hypoxia or hypercapnia ([address]    2. Cardiac arrest ([address]    3. Pneumonia due to organism    4. Dehydration    5. Altered mental status, unspecified altered mental status type    6. Hypothermia, initial encounter    7. Hypotension, unspecified hypotension type    8. Diabetic ketoacidosis with coma associated with type 2 diabetes mellitus ([address]    9. Non-traumatic rhabdomyolysis    10. Septicemia ([address]      Blood pressure (!) 96/55, pulse 124, temperature 96.3 °F (35.7 °C), temperature source Core, resp. rate (!) 35, weight 105 lb 2.6 oz (47.7 kg), SpO2 100 %, not currently breastfeeding. DISPOSITION  Patient was admitted to the ICU in critical condition.          Mitch Haynes  04/29/19 [address] [place] [address]  04/29/19 2050

## 2019-04-29 NOTE — ED NOTES
Spoke to [facility] cardiology RN-tracey @[id number] re: possible STEMI enroute, & squad EKG was shown to [id number]  @1229 [id number] @bedside upon pt's arrival and deemed not a STEMI     Elliot Rascon  04/29/19 1300

## 2019-04-29 NOTE — ED PROVIDER NOTES
of acute hemorrhage, mass or extra-axial fluid collection. ORBITS: The visualized portion of the orbits demonstrate no acute abnormality. SINUSES: The visualized paranasal sinuses and mastoid air cells demonstrate no acute abnormality. SOFT TISSUES/SKULL:  No acute abnormality of the visualized skull or soft tissues. Motion degraded study with no acute intracranial abnormality. Ct Cervical Spine Wo Contrast    Result Date: 4/29/2019  EXAMINATION: CT OF THE CERVICAL SPINE WITHOUT CONTRAST 4/29/2019 4:41 pm TECHNIQUE: CT of the cervical spine was performed without the administration of intravenous contrast. Multiplanar reformatted images are provided for review. Dose modulation, iterative reconstruction, and/or weight based adjustment of the mA/kV was utilized to reduce the radiation dose to as low as reasonably achievable. COMPARISON: None. HISTORY: ORDERING SYSTEM PROVIDED HISTORY: fall TECHNOLOGIST PROVIDED HISTORY: Ordering Physician Provided Reason for Exam: Unresponsive patient, blood suctioned from patient, no other history given Acuity: Acute Type of Exam: Initial FINDINGS: BONES/ALIGNMENT: There is a mild degenerative anterolisthesis at C6-C7. Vertebral body alignment is otherwise normal.  Cervical vertebral body heights are normal.  There is no evidence of an acute fracture. DEGENERATIVE CHANGES: Multilevel degenerative disc disease and facet arthropathy. There is a mixed protrusion on the left at C5-C6 contributing to a moderate severe nerve root canal stenosis. There are moderate to severe bilateral nerve root canal stenoses at C3-C4. SOFT TISSUES: There is no prevertebral soft tissue swelling. There are endotracheal and nasogastric tubes. Multilevel degenerative changes with no acute abnormality of the cervical spine.      Xr Chest Portable    Result Date: 4/29/2019  EXAMINATION: SINGLE XRAY VIEW OF THE CHEST 4/29/2019 1:45 pm COMPARISON: 1 hour ago HISTORY: ORDERING SYSTEM PROVIDED HISTORY: post intubation TECHNOLOGIST PROVIDED HISTORY: Reason for exam:->post intubation Ordering Physician Provided Reason for Exam: ETT TUBE AND NG PLACEMENT Acuity: Acute Type of Exam: Initial FINDINGS: An endotracheal tube is been placed, with tip projecting 3 cm cephalad to the pernell. A nasogastric tube extends below the left hemidiaphragm. Cardiac silhouette is normal.  Hilar contours are stable. There is patchy opacity in the left base. No pneumothorax is identified. Supportive tubing projects in normal position. Left basilar airspace disease, pneumonia versus aspiration pneumonitis. There may be a component of pleural effusion. Xr Chest Portable    Result Date: 4/29/2019  EXAMINATION: SINGLE XRAY VIEW OF THE CHEST 4/29/2019 12:56 pm COMPARISON: None. HISTORY: ORDERING SYSTEM PROVIDED HISTORY: fall TECHNOLOGIST PROVIDED HISTORY: Reason for exam:->fall Ordering Physician Provided Reason for Exam: altered mental status Acuity: Acute Type of Exam: Initial FINDINGS: The cardiomediastinal silhouette is of normal size. There is atelectasis at the left lung base. There is question of small left pleural effusion. There is no pneumothorax. There is no acute osseous abnormality. Atelectasis at the left lung base. Question of small left pleural effusion. Ct Chest Abdomen Pelvis Wo Contrast    Result Date: 4/29/2019  EXAMINATION: CT OF THE CHEST, ABDOMEN, AND PELVIS WITHOUT CONTRAST 4/29/2019 2:20 pm TECHNIQUE: CT of the chest, abdomen and pelvis was performed without the administration of intravenous contrast. Multiplanar reformatted images are provided for review. Dose modulation, iterative reconstruction, and/or weight based adjustment of the mA/kV was utilized to reduce the radiation dose to as low as reasonably achievable.  COMPARISON: 07/16/2016 HISTORY: ORDERING SYSTEM PROVIDED HISTORY: Unresponsive TECHNOLOGIST PROVIDED HISTORY: Reason for exam:->Unresponsive Additional Contrast?->None Ordering Physician Provided Reason for Exam: Unresponsive patient, blood suctioned from patient, no other history given Acuity: Acute Type of Exam: Initial FINDINGS: Chest: Mediastinum: Endotracheal tube noted with its tip terminating in the mid trachea. Cardiac chambers grossly unremarkable. Thoracic aorta is normal in caliber on this noncontrast scan. No mediastinal lymphadenopathy identified. There is diffuse mural thickening esophagus. Esophagus otherwise unremarkable. Nasogastric tube in place. Visualized thyroid unremarkable. Lungs/pleura: There is extensive consolidation seen within the posterior left upper lobe inferiorly, and throughout the left lower lobe. Some of that consolidation has a nodular appearance, especially in the left costophrenic angle. The right lung is clear. Soft Tissues/Bones: No osteolytic or osteoblastic bone lesions. No acute bony abnormalities detected. Abdomen/Pelvis: Organs: Evaluation of the solid abdominal viscera is limited without intravenous contrast.  In addition, there is streak artifact generated by the patient's arms at her side, and there is respiratory motion artifact. That said, no gross hepatic abnormality is identified. The spleen, adrenals, and pancreas are unremarkable on this noncontrast scan. No acute renal abnormalities are identified. GI/Bowel: Mild diverticulosis of the large bowel is present without CT evidence of diverticulitis. Large bowel otherwise unremarkable. Appendix not well visualized but no asymmetric pericecal inflammation is seen. No small bowel abnormalities are found. Nasogastric tube is found with its tip terminating in the gastric body. The stomach and duodenal sweep are otherwise unremarkable. Pelvis: Gr catheter present within urinary bladder. No free pelvic fluid found. Uterus and adnexa regions unremarkable. Peritoneum/Retroperitoneum: A right femoral venous catheter is in place.  However, its tip terminates within the sacral venous branch vessel rather than the common femoral vein. No retroperitoneal lymphadenopathy is identified. The thoracic aorta is normal in caliber. Bones/Soft Tissues: No osteolytic or osteoblastic bone lesions are identified. No acute bony abnormalities are detected. Dense consolidation within the inferior aspect of left upper lobe and throughout the left lower lobe, most compatible with pneumonia and/or aspiration. That should be followed to resolution, especially given the nodular morphology within portions of the consolidation. Diffuse mural thickening of the esophagus. Correlate with clinical evidence of esophagitis. The tip of the right femoral venous catheter is malpositioned within a sacral vein. That should be repositioned for more optimal placement. This is a 72-year-old female presenting after being found on the floor. Patient was found to be in diabetic ketoacidosis requiring aggressive IV fluid resuscitation and insulin drip. Patient was started on broad-spectrum antibiotics for aspiration pneumonia. During her emergency department stay patient had cardiac arrest and required ACLS protocol including CPR and medications including epinephrine, bicarbonate, atropine. Patient was given additional bicarb for her severe acidosis. Patient was intubated and central line was placed. Please see LYNNE Chou note for procedural notes. I did speak with nephrology as well as critical care physician and hospitalist for admission. Due to the immediate potential for life-threatening deterioration due to dehydration, cardiac arrest, diabetic ketoacidosis, pneumonia, I spent 95 minutes providing critical care. This time is excluding time spent performing procedures.          Yuni Guillen DO  04/29/19 2007

## 2019-04-29 NOTE — ED NOTES
Bedside report to icu, RN at this time. Family at bedside.  Patient transported on vent via respiratory with iv infusing at this time     Joaquin White RN  04/29/19 7691

## 2019-04-29 NOTE — ED NOTES
Called nephrology consult @0729  Re: acute renal failure per [email or web address] [address] [place] [address] Road  04/29/19 8612

## 2019-04-29 NOTE — ED NOTES
Called critical care/pulm consult @3769  Re: admit.  DKA, respiratory failure per Marilyn Ramirez spoke to [address] [facility]  04/29/19 6720

## 2019-04-29 NOTE — ED NOTES
[email or web address]  Re: admit. Cardiac arrest. DKA.  needs ICU per [email or web address]  04/29/19 7823

## 2019-04-29 NOTE — PROGRESS NOTES
04/29/19 1351   Vent Information   $Ventilation $Initial Day   Vent Type 840   Vent Mode AC/VC   Rate Set 16 bmp   Peak Flow 1 L/min   FiO2  5 %   Sensitivity 3   PEEP/CPAP 5   Cuff Pressure (cm H2O) 30 cm H2O   Humidification Source HME   Vent Patient Data   Peak Inspiratory Pressure 24 cmH2O   Mean Airway Pressure 11 cmH20   Rate Measured 16 br/min   Vt Exhaled 551 mL   Minute Volume 7.19 Liters   I:E Ratio 1:2.1   Cough/Sputum   Sputum How Obtained Endotracheal   Cough Productive   Sputum Amount Small   Sputum Color Other (Comment)  (black)   Tenacity Thick   Spontaneous Breathing Trial (SBT) RT Doc   Pulse 86   SpO2 100 %   Breath Sounds   Right Upper Lobe Rhonchi   Right Middle Lobe Rhonchi   Right Lower Lobe Rhonchi   Left Upper Lobe Rhonchi   Left Lower Lobe Rhonchi   Additional Respiratory  Assessments   Resp 16   End Tidal CO2 19 (%)   Alarm Settings   High Pressure Alarm 50 cmH2O   Low Minute Volume Alarm 2 L/min   High Respiratory Rate 45 br/min   Low Exhaled Vt  200 mL   Patient Observation   Observations   (ambu @ bedside)

## 2019-04-30 ENCOUNTER — APPOINTMENT (OUTPATIENT)
Dept: GENERAL RADIOLOGY | Age: 69
DRG: 853 | End: 2019-04-30
Payer: COMMERCIAL

## 2019-04-30 LAB
ANION GAP SERPL CALCULATED.3IONS-SCNC: 11 MMOL/L (ref 3–16)
ANION GAP SERPL CALCULATED.3IONS-SCNC: 13 MMOL/L (ref 3–16)
ANION GAP SERPL CALCULATED.3IONS-SCNC: 16 MMOL/L (ref 3–16)
ANION GAP SERPL CALCULATED.3IONS-SCNC: 18 MMOL/L (ref 3–16)
ANION GAP SERPL CALCULATED.3IONS-SCNC: 19 MMOL/L (ref 3–16)
BANDED NEUTROPHILS RELATIVE PERCENT: 64 % (ref 0–7)
BASE EXCESS ARTERIAL: -10 (ref -3–3)
BASE EXCESS ARTERIAL: -12.3 MMOL/L (ref -3–3)
BASE EXCESS ARTERIAL: -15 (ref -3–3)
BASE EXCESS ARTERIAL: -15 (ref -3–3)
BASE EXCESS ARTERIAL: -16 (ref -3–3)
BASE EXCESS ARTERIAL: -17 (ref -3–3)
BASE EXCESS ARTERIAL: -9 (ref -3–3)
BASOPHILS ABSOLUTE: 0 K/UL (ref 0–0.2)
BASOPHILS RELATIVE PERCENT: 0 %
BUN BLDV-MCNC: 49 MG/DL (ref 7–20)
BUN BLDV-MCNC: 50 MG/DL (ref 7–20)
BUN BLDV-MCNC: 52 MG/DL (ref 7–20)
CALCIUM IONIZED: 1.15 MMOL/L (ref 1.12–1.32)
CALCIUM IONIZED: 1.18 MMOL/L (ref 1.12–1.32)
CALCIUM IONIZED: 1.25 MMOL/L (ref 1.12–1.32)
CALCIUM IONIZED: 1.28 MMOL/L (ref 1.12–1.32)
CALCIUM SERPL-MCNC: 7.9 MG/DL (ref 8.3–10.6)
CALCIUM SERPL-MCNC: 8.1 MG/DL (ref 8.3–10.6)
CALCIUM SERPL-MCNC: 8.3 MG/DL (ref 8.3–10.6)
CALCIUM SERPL-MCNC: 8.3 MG/DL (ref 8.3–10.6)
CALCIUM SERPL-MCNC: 8.4 MG/DL (ref 8.3–10.6)
CARBOXYHEMOGLOBIN ARTERIAL: 0.3 % (ref 0–1.5)
CHLORIDE BLD-SCNC: 104 MMOL/L (ref 99–110)
CHLORIDE BLD-SCNC: 108 MMOL/L (ref 99–110)
CHLORIDE BLD-SCNC: 108 MMOL/L (ref 99–110)
CHLORIDE BLD-SCNC: 109 MMOL/L (ref 99–110)
CHLORIDE BLD-SCNC: 111 MMOL/L (ref 99–110)
CO2: 10 MMOL/L (ref 21–32)
CO2: 11 MMOL/L (ref 21–32)
CO2: 12 MMOL/L (ref 21–32)
CO2: 14 MMOL/L (ref 21–32)
CO2: 16 MMOL/L (ref 21–32)
CREAT SERPL-MCNC: 1.9 MG/DL (ref 0.6–1.2)
CREAT SERPL-MCNC: 2 MG/DL (ref 0.6–1.2)
CREAT SERPL-MCNC: 2 MG/DL (ref 0.6–1.2)
CREAT SERPL-MCNC: 2.1 MG/DL (ref 0.6–1.2)
CREAT SERPL-MCNC: 2.3 MG/DL (ref 0.6–1.2)
EOSINOPHILS ABSOLUTE: 0 K/UL (ref 0–0.6)
EOSINOPHILS RELATIVE PERCENT: 0 %
GFR AFRICAN AMERICAN: 26
GFR AFRICAN AMERICAN: 28
GFR AFRICAN AMERICAN: 30
GFR AFRICAN AMERICAN: 30
GFR AFRICAN AMERICAN: 32
GFR NON-AFRICAN AMERICAN: 21
GFR NON-AFRICAN AMERICAN: 23
GFR NON-AFRICAN AMERICAN: 25
GFR NON-AFRICAN AMERICAN: 25
GFR NON-AFRICAN AMERICAN: 26
GLUCOSE BLD-MCNC: 102 MG/DL (ref 70–99)
GLUCOSE BLD-MCNC: 120 MG/DL (ref 70–99)
GLUCOSE BLD-MCNC: 143 MG/DL (ref 70–99)
GLUCOSE BLD-MCNC: 155 MG/DL (ref 70–99)
GLUCOSE BLD-MCNC: 168 MG/DL (ref 70–99)
GLUCOSE BLD-MCNC: 176 MG/DL (ref 70–99)
GLUCOSE BLD-MCNC: 176 MG/DL (ref 70–99)
GLUCOSE BLD-MCNC: 182 MG/DL (ref 70–99)
GLUCOSE BLD-MCNC: 237 MG/DL (ref 70–99)
GLUCOSE BLD-MCNC: 272 MG/DL (ref 70–99)
GLUCOSE BLD-MCNC: 319 MG/DL (ref 70–99)
GLUCOSE BLD-MCNC: 405 MG/DL (ref 70–99)
GLUCOSE BLD-MCNC: 407 MG/DL (ref 70–99)
GLUCOSE BLD-MCNC: 408 MG/DL (ref 70–99)
GLUCOSE BLD-MCNC: 418 MG/DL (ref 70–99)
GLUCOSE BLD-MCNC: 433 MG/DL (ref 70–99)
GLUCOSE BLD-MCNC: 484 MG/DL (ref 70–99)
GLUCOSE BLD-MCNC: 486 MG/DL (ref 70–99)
HCO3 ARTERIAL: 10.7 MMOL/L (ref 21–29)
HCO3 ARTERIAL: 10.9 MMOL/L (ref 21–29)
HCO3 ARTERIAL: 11 MMOL/L (ref 21–29)
HCO3 ARTERIAL: 14.6 MMOL/L (ref 21–29)
HCO3 ARTERIAL: 15.7 MMOL/L (ref 21–29)
HCO3 ARTERIAL: 9.2 MMOL/L (ref 21–29)
HCO3 ARTERIAL: 9.8 MMOL/L (ref 21–29)
HCT VFR BLD CALC: 32 % (ref 36–48)
HEMOGLOBIN, ART, EXTENDED: 11.7 G/DL (ref 12–16)
HEMOGLOBIN: 10.4 GM/DL (ref 12–16)
HEMOGLOBIN: 10.4 GM/DL (ref 12–16)
HEMOGLOBIN: 11 GM/DL (ref 12–16)
HEMOGLOBIN: 11.2 G/DL (ref 12–16)
HEMOGLOBIN: 9.6 GM/DL (ref 12–16)
LACTATE: 1.45 MMOL/L (ref 0.4–2)
LACTATE: 2.3 MMOL/L (ref 0.4–2)
LACTATE: 3.36 MMOL/L (ref 0.4–2)
LACTATE: 3.54 MMOL/L (ref 0.4–2)
LACTATE: 5.24 MMOL/L (ref 0.4–2)
LACTIC ACID: 5.1 MMOL/L (ref 0.4–2)
LV EF: 55 %
LVEF MODALITY: NORMAL
LYMPHOCYTES ABSOLUTE: 0.6 K/UL (ref 1–5.1)
LYMPHOCYTES RELATIVE PERCENT: 8 %
MAGNESIUM: 1.6 MG/DL (ref 1.8–2.4)
MAGNESIUM: 2.3 MG/DL (ref 1.8–2.4)
MCH RBC QN AUTO: 28.7 PG (ref 26–34)
MCHC RBC AUTO-ENTMCNC: 34.9 G/DL (ref 31–36)
MCV RBC AUTO: 82.2 FL (ref 80–100)
METAMYELOCYTES RELATIVE PERCENT: 1 %
METHEMOGLOBIN ARTERIAL: 0.4 %
MONOCYTES ABSOLUTE: 0.5 K/UL (ref 0–1.3)
MONOCYTES RELATIVE PERCENT: 6 %
NEUTROPHILS ABSOLUTE: 6.8 K/UL (ref 1.7–7.7)
NEUTROPHILS RELATIVE PERCENT: 20 %
O2 CONTENT ARTERIAL: 16 ML/DL
O2 SAT, ARTERIAL: 89 % (ref 93–100)
O2 SAT, ARTERIAL: 92 % (ref 93–100)
O2 SAT, ARTERIAL: 96 % (ref 93–100)
O2 SAT, ARTERIAL: 97 % (ref 93–100)
O2 SAT, ARTERIAL: 97.3 %
O2 THERAPY: ABNORMAL
PCO2 ARTERIAL: 16.9 MM HG (ref 35–45)
PCO2 ARTERIAL: 17.4 MM HG (ref 35–45)
PCO2 ARTERIAL: 18.6 MM HG (ref 35–45)
PCO2 ARTERIAL: 19.2 MM HG (ref 35–45)
PCO2 ARTERIAL: 20 MMHG (ref 35–45)
PCO2 ARTERIAL: 23.2 MM HG (ref 35–45)
PCO2 ARTERIAL: 23.4 MM HG (ref 35–45)
PDW BLD-RTO: 13.5 % (ref 12.4–15.4)
PERFORMED ON: ABNORMAL
PH ARTERIAL: 7.34 (ref 7.35–7.45)
PH ARTERIAL: 7.36 (ref 7.35–7.45)
PH ARTERIAL: 7.37 (ref 7.35–7.45)
PH ARTERIAL: 7.41 (ref 7.35–7.45)
PH ARTERIAL: 7.43 (ref 7.35–7.45)
PLATELET # BLD: 569 K/UL (ref 135–450)
PMV BLD AUTO: 7.4 FL (ref 5–10.5)
PO2 ARTERIAL: 57.2 MM HG (ref 75–108)
PO2 ARTERIAL: 62.9 MM HG (ref 75–108)
PO2 ARTERIAL: 77.1 MM HG (ref 75–108)
PO2 ARTERIAL: 79 MM HG (ref 75–108)
PO2 ARTERIAL: 81 MM HG (ref 75–108)
PO2 ARTERIAL: 83.5 MM HG (ref 75–108)
PO2 ARTERIAL: 95.4 MMHG (ref 75–108)
POC HEMATOCRIT: 28 % (ref 36–48)
POC HEMATOCRIT: 31 % (ref 36–48)
POC HEMATOCRIT: 31 % (ref 36–48)
POC HEMATOCRIT: 32 % (ref 36–48)
POC POTASSIUM: 3.6 MMOL/L (ref 3.5–5.1)
POC POTASSIUM: 3.7 MMOL/L (ref 3.5–5.1)
POC POTASSIUM: 3.8 MMOL/L (ref 3.5–5.1)
POC POTASSIUM: 4.6 MMOL/L (ref 3.5–5.1)
POC POTASSIUM: 4.6 MMOL/L (ref 3.5–5.1)
POC POTASSIUM: 5.1 MMOL/L (ref 3.5–5.1)
POC POTASSIUM: 5.3 MMOL/L (ref 3.5–5.1)
POC SAMPLE TYPE: ABNORMAL
POC SODIUM: 139 MMOL/L (ref 136–145)
POC SODIUM: 139 MMOL/L (ref 136–145)
POC SODIUM: 142 MMOL/L (ref 136–145)
POC SODIUM: 142 MMOL/L (ref 136–145)
POC SODIUM: 146 MMOL/L (ref 136–145)
POTASSIUM REFLEX MAGNESIUM: 3.9 MMOL/L (ref 3.5–5.1)
POTASSIUM REFLEX MAGNESIUM: 4.7 MMOL/L (ref 3.5–5.1)
POTASSIUM REFLEX MAGNESIUM: 5.2 MMOL/L (ref 3.5–5.1)
POTASSIUM REFLEX MAGNESIUM: 5.4 MMOL/L (ref 3.5–5.1)
POTASSIUM SERPL-SCNC: 3.8 MMOL/L (ref 3.5–5.1)
PROMYELOCYTES PERCENT: 1 %
RBC # BLD: 3.9 M/UL (ref 4–5.2)
RBC # BLD: NORMAL 10*6/UL
SODIUM BLD-SCNC: 133 MMOL/L (ref 136–145)
SODIUM BLD-SCNC: 134 MMOL/L (ref 136–145)
SODIUM BLD-SCNC: 137 MMOL/L (ref 136–145)
SODIUM BLD-SCNC: 138 MMOL/L (ref 136–145)
SODIUM BLD-SCNC: 138 MMOL/L (ref 136–145)
TCO2 ARTERIAL: 10 MMOL/L
TCO2 ARTERIAL: 10 MMOL/L
TCO2 ARTERIAL: 11 MMOL/L
TCO2 ARTERIAL: 11.7 MMOL/L
TCO2 ARTERIAL: 12 MMOL/L
TCO2 ARTERIAL: 15 MMOL/L
TCO2 ARTERIAL: 16 MMOL/L
WBC # BLD: 7.9 K/UL (ref 4–11)

## 2019-04-30 PROCEDURE — 6370000000 HC RX 637 (ALT 250 FOR IP): Performed by: FAMILY MEDICINE

## 2019-04-30 PROCEDURE — 94770 HC ETCO2 MONITOR DAILY: CPT

## 2019-04-30 PROCEDURE — 83605 ASSAY OF LACTIC ACID: CPT

## 2019-04-30 PROCEDURE — 83735 ASSAY OF MAGNESIUM: CPT

## 2019-04-30 PROCEDURE — 37799 UNLISTED PX VASCULAR SURGERY: CPT

## 2019-04-30 PROCEDURE — 6360000002 HC RX W HCPCS: Performed by: INTERNAL MEDICINE

## 2019-04-30 PROCEDURE — 94750 HC PULMONARY COMPLIANCE STUDY: CPT

## 2019-04-30 PROCEDURE — 6360000002 HC RX W HCPCS

## 2019-04-30 PROCEDURE — 93005 ELECTROCARDIOGRAM TRACING: CPT | Performed by: INTERNAL MEDICINE

## 2019-04-30 PROCEDURE — 36556 INSERT NON-TUNNEL CV CATH: CPT | Performed by: NURSE PRACTITIONER

## 2019-04-30 PROCEDURE — 2580000003 HC RX 258: Performed by: INTERNAL MEDICINE

## 2019-04-30 PROCEDURE — 36592 COLLECT BLOOD FROM PICC: CPT

## 2019-04-30 PROCEDURE — 2580000003 HC RX 258: Performed by: NURSE PRACTITIONER

## 2019-04-30 PROCEDURE — 82947 ASSAY GLUCOSE BLOOD QUANT: CPT

## 2019-04-30 PROCEDURE — 6370000000 HC RX 637 (ALT 250 FOR IP): Performed by: INTERNAL MEDICINE

## 2019-04-30 PROCEDURE — 99223 1ST HOSP IP/OBS HIGH 75: CPT | Performed by: INTERNAL MEDICINE

## 2019-04-30 PROCEDURE — 94761 N-INVAS EAR/PLS OXIMETRY MLT: CPT

## 2019-04-30 PROCEDURE — 94003 VENT MGMT INPAT SUBQ DAY: CPT

## 2019-04-30 PROCEDURE — 2580000003 HC RX 258: Performed by: FAMILY MEDICINE

## 2019-04-30 PROCEDURE — 82803 BLOOD GASES ANY COMBINATION: CPT

## 2019-04-30 PROCEDURE — G0480 DRUG TEST DEF 1-7 CLASSES: HCPCS

## 2019-04-30 PROCEDURE — 2500000003 HC RX 250 WO HCPCS: Performed by: INTERNAL MEDICINE

## 2019-04-30 PROCEDURE — 85025 COMPLETE CBC W/AUTO DIFF WBC: CPT

## 2019-04-30 PROCEDURE — 85014 HEMATOCRIT: CPT

## 2019-04-30 PROCEDURE — 93306 TTE W/DOPPLER COMPLETE: CPT

## 2019-04-30 PROCEDURE — 36415 COLL VENOUS BLD VENIPUNCTURE: CPT

## 2019-04-30 PROCEDURE — 2000000000 HC ICU R&B

## 2019-04-30 PROCEDURE — 99291 CRITICAL CARE FIRST HOUR: CPT | Performed by: INTERNAL MEDICINE

## 2019-04-30 PROCEDURE — 71045 X-RAY EXAM CHEST 1 VIEW: CPT

## 2019-04-30 PROCEDURE — 82693 ASSAY OF ETHYLENE GLYCOL: CPT

## 2019-04-30 PROCEDURE — C9113 INJ PANTOPRAZOLE SODIUM, VIA: HCPCS | Performed by: INTERNAL MEDICINE

## 2019-04-30 PROCEDURE — 2580000003 HC RX 258

## 2019-04-30 PROCEDURE — 2700000000 HC OXYGEN THERAPY PER DAY

## 2019-04-30 PROCEDURE — 82330 ASSAY OF CALCIUM: CPT

## 2019-04-30 PROCEDURE — 84295 ASSAY OF SERUM SODIUM: CPT

## 2019-04-30 PROCEDURE — 2500000003 HC RX 250 WO HCPCS: Performed by: NURSE PRACTITIONER

## 2019-04-30 PROCEDURE — 84132 ASSAY OF SERUM POTASSIUM: CPT

## 2019-04-30 PROCEDURE — 02HV33Z INSERTION OF INFUSION DEVICE INTO SUPERIOR VENA CAVA, PERCUTANEOUS APPROACH: ICD-10-PCS | Performed by: INTERNAL MEDICINE

## 2019-04-30 PROCEDURE — 80048 BASIC METABOLIC PNL TOTAL CA: CPT

## 2019-04-30 RX ORDER — DEXTROSE AND SODIUM CHLORIDE 5; .45 G/100ML; G/100ML
INJECTION, SOLUTION INTRAVENOUS
Status: DISPENSED
Start: 2019-04-30 | End: 2019-05-01

## 2019-04-30 RX ORDER — DEXTROSE AND SODIUM CHLORIDE 5; .45 G/100ML; G/100ML
INJECTION, SOLUTION INTRAVENOUS CONTINUOUS
Status: DISCONTINUED | OUTPATIENT
Start: 2019-04-30 | End: 2019-04-30

## 2019-04-30 RX ORDER — HALOPERIDOL 5 MG/ML
1 INJECTION INTRAMUSCULAR
Status: DISCONTINUED | OUTPATIENT
Start: 2019-04-30 | End: 2019-04-30

## 2019-04-30 RX ORDER — SODIUM CHLORIDE, SODIUM GLUCONATE, SODIUM ACETATE, POTASSIUM CHLORIDE AND MAGNESIUM CHLORIDE 526; 502; 368; 37; 30 MG/100ML; MG/100ML; MG/100ML; MG/100ML; MG/100ML
125 INJECTION, SOLUTION INTRAVENOUS CONTINUOUS
Status: DISCONTINUED | OUTPATIENT
Start: 2019-04-30 | End: 2019-05-01

## 2019-04-30 RX ORDER — HEPARIN SODIUM 5000 [USP'U]/ML
5000 INJECTION, SOLUTION INTRAVENOUS; SUBCUTANEOUS 2 TIMES DAILY
Status: DISCONTINUED | OUTPATIENT
Start: 2019-04-30 | End: 2019-05-07

## 2019-04-30 RX ORDER — CARBOXYMETHYLCELLULOSE SODIUM 10 MG/ML
1 GEL OPHTHALMIC 3 TIMES DAILY
Status: DISCONTINUED | OUTPATIENT
Start: 2019-04-30 | End: 2019-05-09

## 2019-04-30 RX ORDER — DOPAMINE HYDROCHLORIDE 160 MG/100ML
INJECTION, SOLUTION INTRAVENOUS
Status: DISPENSED
Start: 2019-04-30 | End: 2019-04-30

## 2019-04-30 RX ORDER — SODIUM CHLORIDE 9 MG/ML
INJECTION, SOLUTION INTRAVENOUS
Status: COMPLETED
Start: 2019-04-30 | End: 2019-04-30

## 2019-04-30 RX ORDER — PANTOPRAZOLE SODIUM 40 MG/10ML
40 INJECTION, POWDER, LYOPHILIZED, FOR SOLUTION INTRAVENOUS 2 TIMES DAILY
Status: DISCONTINUED | OUTPATIENT
Start: 2019-04-30 | End: 2019-05-20 | Stop reason: HOSPADM

## 2019-04-30 RX ORDER — DEXTROSE MONOHYDRATE 25 G/50ML
12.5 INJECTION, SOLUTION INTRAVENOUS PRN
Status: DISCONTINUED | OUTPATIENT
Start: 2019-04-30 | End: 2019-05-09

## 2019-04-30 RX ORDER — DOPAMINE HYDROCHLORIDE 160 MG/100ML
20 INJECTION, SOLUTION INTRAVENOUS CONTINUOUS
Status: DISCONTINUED | OUTPATIENT
Start: 2019-04-30 | End: 2019-05-01

## 2019-04-30 RX ORDER — 0.9 % SODIUM CHLORIDE 0.9 %
2000 INTRAVENOUS SOLUTION INTRAVENOUS ONCE
Status: COMPLETED | OUTPATIENT
Start: 2019-04-30 | End: 2019-04-30

## 2019-04-30 RX ORDER — CHLORHEXIDINE GLUCONATE 0.12 MG/ML
15 RINSE ORAL 2 TIMES DAILY
Status: DISCONTINUED | OUTPATIENT
Start: 2019-04-30 | End: 2019-05-09

## 2019-04-30 RX ADMIN — POTASSIUM BICARBONATE 20 MEQ: 782 TABLET, EFFERVESCENT ORAL at 00:48

## 2019-04-30 RX ADMIN — MEROPENEM 1 G: 1 INJECTION, POWDER, FOR SOLUTION INTRAVENOUS at 21:28

## 2019-04-30 RX ADMIN — HALOPERIDOL LACTATE 1 MG: 5 INJECTION INTRAMUSCULAR at 04:21

## 2019-04-30 RX ADMIN — CARBOXYMETHYLCELLULOSE SODIUM 1 DROP: 10 GEL OPHTHALMIC at 14:15

## 2019-04-30 RX ADMIN — SODIUM BICARBONATE: 84 INJECTION, SOLUTION INTRAVENOUS at 19:25

## 2019-04-30 RX ADMIN — VASOPRESSIN 0.04 UNITS/MIN: 20 INJECTION INTRAVENOUS at 02:07

## 2019-04-30 RX ADMIN — MUPIROCIN: 20 OINTMENT TOPICAL at 11:55

## 2019-04-30 RX ADMIN — SODIUM CHLORIDE 1000 ML: 9 INJECTION, SOLUTION INTRAVENOUS at 11:07

## 2019-04-30 RX ADMIN — MEROPENEM 1 G: 1 INJECTION, POWDER, FOR SOLUTION INTRAVENOUS at 09:37

## 2019-04-30 RX ADMIN — DOPAMINE HYDROCHLORIDE 2.5 MCG/KG/MIN: 160 INJECTION, SOLUTION INTRAVENOUS at 01:17

## 2019-04-30 RX ADMIN — PROPOFOL 60 MCG/KG/MIN: 10 INJECTION, EMULSION INTRAVENOUS at 22:03

## 2019-04-30 RX ADMIN — POTASSIUM CHLORIDE 20 MEQ: 29.8 INJECTION, SOLUTION INTRAVENOUS at 00:50

## 2019-04-30 RX ADMIN — VASOPRESSIN 0.04 UNITS/MIN: 20 INJECTION INTRAVENOUS at 09:32

## 2019-04-30 RX ADMIN — MAGNESIUM SULFATE HEPTAHYDRATE 1 G: 1 INJECTION, SOLUTION INTRAVENOUS at 00:53

## 2019-04-30 RX ADMIN — DEXTROSE AND SODIUM CHLORIDE: 5; 450 INJECTION, SOLUTION INTRAVENOUS at 17:16

## 2019-04-30 RX ADMIN — SODIUM CHLORIDE 19.12 UNITS/HR: 9 INJECTION, SOLUTION INTRAVENOUS at 04:55

## 2019-04-30 RX ADMIN — ACETAMINOPHEN 650 MG: 650 SUPPOSITORY RECTAL at 00:48

## 2019-04-30 RX ADMIN — SODIUM CHLORIDE 10.74 UNITS/HR: 9 INJECTION, SOLUTION INTRAVENOUS at 11:53

## 2019-04-30 RX ADMIN — HALOPERIDOL LACTATE 1 MG: 5 INJECTION INTRAMUSCULAR at 01:00

## 2019-04-30 RX ADMIN — MAGNESIUM SULFATE HEPTAHYDRATE 1 G: 1 INJECTION, SOLUTION INTRAVENOUS at 02:07

## 2019-04-30 RX ADMIN — MUPIROCIN: 20 OINTMENT TOPICAL at 21:19

## 2019-04-30 RX ADMIN — PROPOFOL 60 MCG/KG/MIN: 10 INJECTION, EMULSION INTRAVENOUS at 17:17

## 2019-04-30 RX ADMIN — Medication 10 ML: at 09:00

## 2019-04-30 RX ADMIN — DEXTROSE MONOHYDRATE 45 MCG/MIN: 50 INJECTION, SOLUTION INTRAVENOUS at 02:07

## 2019-04-30 RX ADMIN — Medication 15 ML: at 21:19

## 2019-04-30 RX ADMIN — Medication 15 ML: at 09:00

## 2019-04-30 RX ADMIN — DEXTROSE MONOHYDRATE 55 MCG/MIN: 50 INJECTION, SOLUTION INTRAVENOUS at 09:17

## 2019-04-30 RX ADMIN — SODIUM BICARBONATE: 84 INJECTION, SOLUTION INTRAVENOUS at 12:15

## 2019-04-30 RX ADMIN — SODIUM CHLORIDE: 9 INJECTION, SOLUTION INTRAVENOUS at 03:45

## 2019-04-30 RX ADMIN — SODIUM CHLORIDE 250 ML: 9 INJECTION, SOLUTION INTRAVENOUS at 21:46

## 2019-04-30 RX ADMIN — CARBOXYMETHYLCELLULOSE SODIUM 1 DROP: 10 GEL OPHTHALMIC at 21:22

## 2019-04-30 RX ADMIN — VASOPRESSIN 0.04 UNITS/MIN: 20 INJECTION INTRAVENOUS at 17:23

## 2019-04-30 RX ADMIN — DEXTROSE MONOHYDRATE 25 MCG/MIN: 50 INJECTION, SOLUTION INTRAVENOUS at 17:06

## 2019-04-30 RX ADMIN — HALOPERIDOL LACTATE 1 MG: 5 INJECTION INTRAMUSCULAR at 02:36

## 2019-04-30 RX ADMIN — PANTOPRAZOLE SODIUM 40 MG: 40 INJECTION, POWDER, FOR SOLUTION INTRAVENOUS at 21:45

## 2019-04-30 RX ADMIN — SODIUM CHLORIDE 1.26 UNITS/HR: 9 INJECTION, SOLUTION INTRAVENOUS at 13:12

## 2019-04-30 RX ADMIN — POTASSIUM CHLORIDE 20 MEQ: 29.8 INJECTION, SOLUTION INTRAVENOUS at 06:16

## 2019-04-30 RX ADMIN — POTASSIUM CHLORIDE 20 MEQ: 29.8 INJECTION, SOLUTION INTRAVENOUS at 02:40

## 2019-04-30 RX ADMIN — CARBOXYMETHYLCELLULOSE SODIUM 1 DROP: 10 GEL OPHTHALMIC at 09:00

## 2019-04-30 RX ADMIN — HEPARIN SODIUM 5000 UNITS: 5000 INJECTION INTRAVENOUS; SUBCUTANEOUS at 12:00

## 2019-04-30 RX ADMIN — HEPARIN SODIUM 5000 UNITS: 5000 INJECTION INTRAVENOUS; SUBCUTANEOUS at 22:01

## 2019-04-30 RX ADMIN — POTASSIUM CHLORIDE 20 MEQ: 29.8 INJECTION, SOLUTION INTRAVENOUS at 05:01

## 2019-04-30 RX ADMIN — Medication 10 ML: at 21:19

## 2019-04-30 ASSESSMENT — PULMONARY FUNCTION TESTS
PIF_VALUE: 32
PIF_VALUE: 26
PIF_VALUE: 32
PIF_VALUE: 24
PIF_VALUE: 33
PIF_VALUE: 25
PIF_VALUE: 33
PIF_VALUE: 29
PIF_VALUE: 28
PIF_VALUE: 27
PIF_VALUE: 30
PIF_VALUE: 30
PIF_VALUE: 28
PIF_VALUE: 27
PIF_VALUE: 30
PIF_VALUE: 31
PIF_VALUE: 29
PIF_VALUE: 29
PIF_VALUE: 28
PIF_VALUE: 30
PIF_VALUE: 33
PIF_VALUE: 24
PIF_VALUE: 31
PIF_VALUE: 31
PIF_VALUE: 28
PIF_VALUE: 32
PIF_VALUE: 30
PIF_VALUE: 31
PIF_VALUE: 27
PIF_VALUE: 25
PIF_VALUE: 27
PIF_VALUE: 24

## 2019-04-30 ASSESSMENT — PAIN SCALES - GENERAL: PAINLEVEL_OUTOF10: 0

## 2019-04-30 NOTE — PLAN OF CARE
Problem: Nutrition  Goal: Optimal nutrition therapy  Outcome: Ongoing   Nutrition Problem: Inadequate energy intake  Intervention: Food and/or Nutrient Delivery: Continue NPO, Start Tube Feeding(when medically appropriate, initiate EN)  Nutritional Goals:  Tolerate most appropriate form of nutrition therapy this admission

## 2019-04-30 NOTE — PROGRESS NOTES
Pulmonary & Critical Care Inpatient Progress Note   Vikas Headley MD     REASON FOR TODAY'S VISIT:  Assistance with critical care management    SUBJECTIVE:   Remains critically ill on vent support, multiple vasopressor medications, and an insulin gtt     Scheduled Meds:   mupirocin   Nasal BID    carboxymethylcellulose PF  1 drop Both Eyes TID    chlorhexidine  15 mL Mouth/Throat BID    meropenem  1 g Intravenous Q12H    heparin (porcine)  5,000 Units Subcutaneous BID    pantoprazole  40 mg Intravenous BID    sodium chloride flush  10 mL Intravenous 2 times per day       Continuous Infusions:   dextrose 5 % and 0.45 % NaCl      DOPamine 4 mcg/kg/min (04/30/19 1645)    electrolyte-A      insulin (HUMAN R) non-weight based infusion 3.24 Units/hr (04/30/19 1600)    sodium bicarbonate infusion      norepinephrine 25 mcg/min (04/30/19 1706)    propofol 60 mcg/kg/min (04/30/19 1717)    vasopressin (Septic Shock) infusion 0.04 Units/min (04/30/19 1723)       PRN Meds:  dextrose, sodium chloride flush, magnesium hydroxide, acetaminophen    ALLERGIES:  Patient is allergic to aspirin. Objective:   PHYSICAL EXAM:  BP (!) 85/48   Pulse 117   Temp 100.6 °F (38.1 °C)   Resp 23   Ht 5' 1.02\" (1.55 m)   Wt 105 lb 2.6 oz (47.7 kg)   SpO2 97%   BMI 19.85 kg/m²    Physical Exam   Constitutional: She appears well-developed and well-nourished. No distress. HENT:   Head: Normocephalic and atraumatic. Mouth/Throat: Oropharynx is clear and moist. No oropharyngeal exudate. Neck: Neck supple. No JVD present. Cardiovascular: Normal heart sounds. Exam reveals no gallop and no friction rub. No murmur heard. Pulmonary/Chest: Effort normal. She has no wheezes. She has no rales. Equal chest rise and expansion bilaterally   Abdominal: Soft. Bowel sounds are normal. She exhibits no distension. There is no tenderness. Musculoskeletal: She exhibits no edema. Lymphadenopathy:     She has no cervical adenopathy. Neurological: A cranial nerve deficit is present. Intubated, sedated   Skin: Skin is warm and dry. No rash noted. She is not diaphoretic. Data Reviewed:   LABS:  CBC:  Recent Labs     04/29/19  1236  04/29/19  2110 04/30/19  0417 04/30/19  0900 04/30/19  1135 04/30/19  1559   WBC 31.1*  --  4.8 7.9  --   --   --    HGB 11.5*   < > 11.8* 11.2* 10.4* 11.0* 9.6*   HCT 38.4  --  35.0* 32.0*  --   --   --    MCV 93.8  --  83.4 82.2  --   --   --    *  --  708* 569*  --   --   --     < > = values in this interval not displayed. BMP:  Recent Labs     04/30/19  0417 04/30/19  1135 04/30/19  1555    138 134*   K 3.8 4.7 5.4*    111* 109   CO2 12* 11* 14*   BUN 52* 52* 50*   CREATININE 2.0* 2.0* 2.1*     LIVER PROFILE:   Recent Labs     04/29/19  1236   AST 12*   ALT 6*   BILITOT <0.2   ALKPHOS 180*     PT/INR:  Recent Labs     04/29/19  1400   PROTIME 14.2*   INR 1.25*     APTT: No results for input(s): APTT in the last 72 hours. UA:  Recent Labs     04/29/19  1246   COLORU Yellow   PHUR 5.5   WBCUA 10-20*   RBCUA 3-5*   YEAST Present*   BACTERIA 1+*   CLARITYU SL CLOUDY*   SPECGRAV 1.025   LEUKOCYTESUR Negative   UROBILINOGEN 0.2   BILIRUBINUR Negative   BLOODU MODERATE*   GLUCOSEU >=1000*     Recent Labs     04/30/19  1135 04/30/19  1559   PHART 7.343* 7.406   PIF4NSY 16.9* 23.2*   PO2ART 81.0 83.5       Vent Information  $Ventilation: $Subsequent Day  Vent Type: 840  Vent Mode: AC/VC  Vt Ordered: 500 mL  Rate Set: 16 bmp  Peak Flow: 90 L/min  Pressure Support: 0 cmH20  FiO2 : 60 %  Sensitivity: 3  PEEP/CPAP: 5  Cuff Pressure (cm H2O): 30 cm H2O  Humidification Source: HME    CXR personally reviewed, left sided infiltrate           Assessment:     1. Acute resp failure, hypoxic              -left sided pneumonia  2. Acute hyperglycemia, DKA  3. Acute encephalopathy              -metabolic, infectious   4. LOPEZ with metabolic acidosis  5. Hypovolemic and septic shock  6.  Questionable

## 2019-04-30 NOTE — CONSULTS
Nephrology Consult Note  [email or web address]        Reason for Consult: LOPEZ and metabolic acidosis    HISTORY OF PRESENT ILLNESS:      The patient is a 81 y.o. female with significant past medical history of CAD, DM, HTN and seizures who presents after a cardiac arrest.    Most information was obtained from the patient's daughter, review of EHR and talking to the care team as the patient is sedated and intubated and unable to provide any history. Apparently, the patient has been feeling down the past few days and had refused eating and taking her medications. She started complaining of some sob the day prior to admission. Yesterday, she her son found her unresponsive on the floor and called EMS. CPR was initiated and was brought in to the hospital. On examination, she is on 3 pressors. Received about 2.5L of IVF on admission. Intubated on vent with FiO2 of 60% with PEEP of 5. She is being treated for DKA. Serum creatinine on admission was 1.9mg/dL. She had 1.5L of urine out on admission but has not made much since last night. Past Medical History:        Diagnosis Date    Acute renal failure (ARF) ([facility] [address] 8/20/2015    Asthma     CAD (coronary artery disease)     MI in 2013    Diabetes mellitus ([facility] [address]     Hypertension     Pneumonia     Not sure when    Seizures [facility])     She goes numb, doctor told her they are seizures    Unspecified cerebral artery occlusion with cerebral infarction     Pt reports having a lot of mini-strokes; first one was 2002; last one was a couple years ago       Past Surgical History:        Procedure Laterality Date    BLADDER REPAIR      BREAST LUMPECTOMY Left     BREAST SURGERY         Current Medications:    No current facility-administered medications on file prior to encounter.       Current Outpatient Medications on File Prior to Encounter   Medication Sig Dispense Refill    ondansetron (ZOFRAN ODT) 4 MG disintegrating tablet Take 1 tablet by mouth every 8 hours as needed for Nausea or Vomiting 20 tablet 0    omeprazole (PRILOSEC) 10 MG capsule Take 10 mg by mouth daily      lisinopril (PRINIVIL;ZESTRIL) 10 MG tablet Take 10 mg by mouth daily.  metFORMIN (GLUCOPHAGE) 1000 MG tablet   Take 1,000 mg by mouth daily (with breakfast)       PARoxetine (PAXIL) 30 MG tablet Take 30 mg by mouth every morning.  amitriptyline (ELAVIL) 50 MG tablet Take 63 mg by mouth nightly       atorvastatin (LIPITOR) 40 MG tablet Take 40 mg by mouth daily.       insulin detemir (LEVEMIR) 100 UNIT/ML injection vial   Inject 63 Units into the skin nightly          Allergies:  Aspirin    Social History:    Social History     Socioeconomic History    Marital status:      Spouse name: Not on file    Number of children: Not on file    Years of education: Not on file    Highest education level: Not on file   Occupational History    Not on file   Social Needs    Financial resource strain: Not on file    Food insecurity:     Worry: Not on file     Inability: Not on file    Transportation needs:     Medical: Not on file     Non-medical: Not on file   Tobacco Use    Smoking status: Never Smoker    Smokeless tobacco: Never Used   Substance and Sexual Activity    Alcohol use: No     Comment: occasional    Drug use: No    Sexual activity: Never   Lifestyle    Physical activity:     Days per week: Not on file     Minutes per session: Not on file    Stress: Not on file   Relationships    Social connections:     Talks on phone: Not on file     Gets together: Not on file     Attends Nondenominational service: Not on file     Active member of club or organization: Not on file     Attends meetings of clubs or organizations: Not on file     Relationship status: Not on file    Intimate partner violence:     Fear of current or ex partner: Not on file     Emotionally abused: Not on file     Physically abused: Not on file     Forced sexual activity: Not on file   Other Topics Concern    Not on file Social History Narrative    Not on file       Family History:       Problem Relation Age of Onset    Cancer Mother     Diabetes Father     Heart Disease Father     High Blood Pressure Father     High Cholesterol Father     Cancer Sister          REVIEW OF SYSTEMS:    Review of Systems   Unable to obtain      PHYSICAL EXAM:    Vitals:    BP (!) 85/48   Pulse 126   Temp 99.6 °F (37.6 °C) (Core)   Resp (!) 38   Ht 5' 1.02\" (1.55 m)   Wt 105 lb 2.6 oz (47.7 kg)   SpO2 95%   BMI 19.85 kg/m²   I/O last 3 completed shifts: In: 2477.9 [I.V.:2450.9; Other:27]  Out: 2270 [Urine:1570; Emesis/NG output:700]  No intake/output data recorded. Physical Exam:  Physical Exam   Constitutional: She is sedated and intubated. HENT:   Head: Normocephalic and atraumatic. Eyes: Conjunctivae are normal. No scleral icterus. Neck: No tracheal deviation present. No thyromegaly present. Cardiovascular: Regular rhythm. tachycardic   Pulmonary/Chest: She is intubated. Equal chest expansion, coarse breath sounds bilateral   Abdominal: Soft. She exhibits no distension. There is no tenderness. Musculoskeletal: She exhibits no edema. Skin: Skin is warm. Vitals reviewed.       DATA:    Recent Labs     04/29/19  1236 04/29/19 2007 04/29/19  2110 04/30/19  0417   WBC 31.1*  --  4.8 7.9   HGB 11.5* 13.4 11.8* 11.2*   HCT 38.4  --  35.0* 32.0*   MCV 93.8  --  83.4 82.2   *  --  708* 569*     Recent Labs     04/29/19 2013 04/30/19  0024 04/30/19  0417    137 138   K 2.6* 3.9 3.8   CL 96* 108 108   CO2 11* 10* 12*   GLUCOSE 613* 484* 405*   MG 1.80 1.60* 2.30   BUN 50* 49* 52*   CREATININE 1.7* 1.9* 2.0*   LABGLOM 30* 26* 25*   GFRAA 36* 32* 30*           IMPRESSION/RECOMMENDATIONS:    Ms. Thiago Hatfield is a 81year old female with PMHx of CAD, DM, HTN and seizures who presents after a cardiac arrest.    Acute Kidney Injury.  - Likely ATN in the setting of cardiac arrest.  - Last serum creatinine in 07/2016 was <0.5mg/dL. - Urinalysis with blood and protein. - Will order a renal ultrasound. - Hypotensive on 3 vasopressors. Clinically hypovolemic, will give IVF resuscitation, 2L NS IV bolus and continue with 125cc/hr mIVF. - Please keep MAP >65mmHg to ensure renal/organ perfusion.  - Please avoid any nephrotoxic agents such as NSAIDs or IV contrast unless deemed necessary.  - BMP q4H for now. - No need for emergent HD at this time but discussed with the daughter that she might need it in the next 12-24H. Severe Anion Gap Metabolic Acidosis. - Likely from DKA and lactic acidosis. R/O toxic alcohol ingestion.  - Will check methanol and ethylene glycol.  - Continue management of DKA per ICU team.  - Vasopressor/IVF for BP support. - May need RRT if worsening. Anemia.  - Had coffee-ground emesis on admission.  - Plans per primary service. S/P Cardiac Arrest.    DKA. - On IVF and insulin drip per protocol. Critical care time=45 minutes. Thank you for allowing me to participate in the care of this patient. Please do not hesitate to contact me at [phone] if with questions.     Suri Jacobs MD  4/30/2019  [facility]

## 2019-04-30 NOTE — PROGRESS NOTES
04/30/19 1557   Vent Information   Vent Type 840   Vent Mode AC/VC   Vt Ordered 500 mL   Rate Set 16 bmp   Peak Flow 90 L/min   Pressure Support 0 cmH20   FiO2  60 %   Sensitivity 3   PEEP/CPAP 5   Cuff Pressure (cm H2O) 30 cm H2O   Humidification Source HME   Vent Patient Data   Peak Inspiratory Pressure 31 cmH2O   Mean Airway Pressure 12 cmH20   Rate Measured 24 br/min   Vt Exhaled 391 mL   Minute Volume 10.5 Liters   I:E Ratio 1:4.00   Plateau Pressure 21 GBD88   Static Compliance 24 mL/cmH2O   Dynamic Compliance 15 mL/cmH2O   Cough/Sputum   Sputum How Obtained Suctioned;Endotracheal   Cough Productive   Frequency Occasional   Sputum Amount Small   Sputum Color Cloudy;Dark red   Tenacity Thick   Spontaneous Breathing Trial (SBT) RT Doc   Pulse 115   Breath Sounds   Right Upper Lobe Diminished   Right Middle Lobe Diminished   Right Lower Lobe Diminished   Left Upper Lobe Diminished   Left Lower Lobe Diminished   Additional Respiratory  Assessments   Resp 24   End Tidal CO2 17 (%)   Position Semi-Umanzor's   Alarm Settings   High Pressure Alarm 50 cmH2O   Low Minute Volume Alarm 2 L/min   Apnea (secs) 20 secs   High Respiratory Rate 45 br/min   Low Exhaled Vt  200 mL   ETT (adult)   Placement Date/Time: 04/29/19 1340   Preoxygenation: Yes  Type: Cuffed  Tube Size: 8 mm  Placement Verified By[de-identified] Capnometry  Secured at: 23 cm  Measured From: Lips  Cuff Pressure: 30 cm H2O   Secured at 22 cm   Measured From Lips   ET Placement Left   Secured By Commercial tube saavedra   Site Condition Dry   Cuff Pressure 30 cm H2O

## 2019-04-30 NOTE — PROGRESS NOTES
04/30/19 1156   Vent Information   Vent Type 840   Vent Mode AC/VC   Vt Ordered 500 mL   Rate Set 16 bmp   Peak Flow 90 L/min   Pressure Support 0 cmH20   FiO2  60 %   Sensitivity 3   PEEP/CPAP 5   Humidification Source HME   Vent Patient Data   Peak Inspiratory Pressure 32 cmH2O   Mean Airway Pressure 14 cmH20   Rate Measured 34 br/min   Vt Exhaled 460 mL   Minute Volume 15.8 Liters   I:E Ratio 1:2.10   Cough/Sputum   Sputum How Obtained Suctioned;Endotracheal   Cough Productive   Frequency Occasional   Sputum Amount Small   Sputum Color Dark red   Tenacity Thick   Spontaneous Breathing Trial (SBT) RT Doc   Pulse 121   Breath Sounds   Right Upper Lobe Rhonchi   Right Middle Lobe Diminished   Right Lower Lobe Diminished   Left Upper Lobe Rhonchi   Left Lower Lobe Diminished   Additional Respiratory  Assessments   Resp 29   End Tidal CO2 15 (%)   Position Semi-Umanzor's   Alarm Settings   High Pressure Alarm 50 cmH2O   Low Minute Volume Alarm 2 L/min   Apnea (secs) 20 secs   High Respiratory Rate 45 br/min   Low Exhaled Vt  200 mL   ETT (adult)   Placement Date/Time: 04/29/19 1340   Preoxygenation: Yes  Type: Cuffed  Tube Size: 8 mm  Placement Verified By[de-identified] Capnometry  Secured at: 23 cm  Measured From: Lips  Cuff Pressure: 30 cm H2O   Secured at 22 cm   Measured From [address] By Commercial tube saavedra   Site Condition Dry   Cuff Pressure 30 cm H2O

## 2019-04-30 NOTE — CONSULTS
[address] [facility]  [phone]      Attending Physician: Alysia Samson MD  Reason for Consultation/Chief Complaint: Cardiac arrest, ECG changes    Subjective   History of Present Illness:  King Wilfred is a 81 y.o. patient who presented to the hospital with complaints of cardaic arrest. History extremly limited due to patient critical illness. Pt reportedly found unconscious by son and brought in. Daughter bedside states that pt lives with son who is a paranoid schizophrenic. They mostly sit around house all day. Pt is on disability and did not get paperwork back in time and therfore lost medical card and has been off all meds for several months including insulin. Had recent death in family and at  Saturday and exposed to family with possible URI. All weekend pt c/o fatigue, tiredness. difficulty walking.  night son went to check on patient and stated she 'fell out of bed\" and EMS was called. Denied any cardiac hx or reports of CP         Past Medical History:   has a past medical history of Acute renal failure (ARF) ([address] Asthma, CAD (coronary artery disease), Diabetes mellitus ([address] Hypertension, Pneumonia, Seizures ([address] and Unspecified cerebral artery occlusion with cerebral infarction. Surgical History:   has a past surgical history that includes Breast surgery; bladder repair; and Breast lumpectomy (Left). Social History:   reports that she has never smoked. She has never used smokeless tobacco. She reports that she does not drink alcohol or use drugs. Family History:  family history includes Cancer in her mother and sister; Diabetes in her father; Heart Disease in her father; High Blood Pressure in her father; High Cholesterol in her father. Home Medications:  Were reviewed and are listed in nursing record and/or below  Prior to Admission medications    Medication Sig Start Date End Date Taking?  Authorizing Provider   ondansetron (ZOFRAN ODT) 4 MG disintegrating tablet Take 1 tablet by mouth every 8 hours as needed for Nausea or Vomiting 7/17/16   Trace Alba MD   omeprazole (PRILOSEC) 10 MG capsule Take 10 mg by mouth daily    Historical Provider, MD   lisinopril (PRINIVIL;ZESTRIL) 10 MG tablet Take 10 mg by mouth daily. Historical Provider, MD   metFORMIN (GLUCOPHAGE) 1000 MG tablet   Take 1,000 mg by mouth daily (with breakfast)     Historical Provider, MD   PARoxetine (PAXIL) 30 MG tablet Take 30 mg by mouth every morning. Historical Provider, MD   amitriptyline (ELAVIL) 50 MG tablet Take 63 mg by mouth nightly     Historical Provider, MD   atorvastatin (LIPITOR) 40 MG tablet Take 40 mg by mouth daily. Historical Provider, MD   insulin detemir (LEVEMIR) 100 UNIT/ML injection vial   Inject 63 Units into the skin nightly     Historical Provider, MD        CURRENT Medications:    haloperidol lactate (HALDOL) injection 1 mg Q1H PRN   DOPamine (INTROPIN) 400 mg in dextrose 5 % 250 mL infusion Continuous   mupirocin (BACTROBAN) 2 % ointment BID   carboxymethylcellulose PF (THERATEARS) 1 % ophthalmic gel 1 drop TID   chlorhexidine (PERIDEX) 0.12 % solution 15 mL BID   norepinephrine (LEVOPHED) 16 mg in dextrose 5 % 250 mL infusion Continuous   insulin regular (HUMULIN R;NOVOLIN R) 100 Units in sodium chloride 0.9 % 100 mL infusion Titrated   pantoprazole (PROTONIX) 80 mg in sodium chloride 0.9 % 100 mL infusion Continuous   propofol 1000 MG/100ML injection Titrated   sodium chloride flush 0.9 % injection 10 mL 2 times per day   sodium chloride flush 0.9 % injection 10 mL PRN   magnesium hydroxide (MILK OF MAGNESIA) 400 MG/5ML suspension 30 mL Daily PRN   0.9 % sodium chloride infusion Continuous   acetaminophen (TYLENOL) suppository 650 mg Q4H PRN   vasopressin 20 Units in dextrose 5 % 100 mL infusion Continuous       Allergies:  Aspirin     Review of Systems:   A 14 point review of symptoms completed.  Pertinent positives identified in the HPI, all other review of symptoms negative as below.       Objective   PHYSICAL EXAM:    Vitals:    04/30/19 0700   BP:    Pulse: 126   Resp: (!) 38   Temp:    SpO2:     Weight: 105 lb 2.6 oz (47.7 kg)         General Appearance:  sedated, no distress, appears stated age   Head:  Normocephalic, without obvious abnormality, atraumatic   Eyes:  PERRL, conjunctiva/corneas clear   Nose: Nares normal, no drainage or sinus tenderness   Throat: Lips, mucosa, and tongue normal   Neck: Supple, symmetrical, trachea midline, no adenopathy, thyroid: not enlarged, symmetric, no tenderness/mass/nodules, no carotid bruit or JVD   Lungs:   Clear to auscultation bilaterally, respirations unlabored   Chest Wall:  No deformity or tenderness   Heart:  Regular rate and rhythm, S1, S2 normal, no murmur, rub or gallop   Abdomen:   Soft, non-tender, bowel sounds active all four quadrants,  no masses, no organomegaly   Extremities: Extremities normal, atraumatic, no cyanosis or edema   Pulses: 2+ and symmetric   Skin: Skin color, texture, turgor normal, no rashes or lesions   Pysch: [place]IN   Neurologic: Sedated, nonpurposeful movt to exam        Labs   CBC: Lab Results   Component Value Date    WBC 7.9 04/30/2019    RBC 3.90 04/30/2019    HGB 11.2 04/30/2019    HCT 32.0 04/30/2019    MCV 82.2 04/30/2019    RDW 13.5 04/30/2019     04/30/2019     CMP:  Lab Results   Component Value Date     04/30/2019    K 3.8 04/30/2019    K 3.9 04/30/2019     04/30/2019    CO2 12 04/30/2019    BUN 52 04/30/2019    CREATININE 2.0 04/30/2019    GFRAA 30 04/30/2019    AGRATIO 0.7 04/29/2019    LABGLOM 25 04/30/2019    GLUCOSE 405 04/30/2019    PROT 8.6 04/29/2019    CALCIUM 8.3 04/30/2019    BILITOT <0.2 04/29/2019    ALKPHOS 180 04/29/2019    AST 12 04/29/2019    ALT 6 04/29/2019     PT/INR:  No results found for: PTINR  HgBA1c:  Lab Results   Component Value Date    LABA1C 5.3 08/20/2015     Lab Results   Component Value Date    CKTOTAL 279 (H) 2019    TROPONINI 0.08 (H) 2019         Cardiac Data     Last EK2019 NSR with NSST changes, no ischemic ST elevation, QTc 504  +moyer waves  2019 sinus tach at 125, qtc 513    Echo:    Stress Test:    Cath:    Studies:   CXR  Atelectasis at the left lung base.       Question of small left pleural effusion. CXR   Supportive devices are stable.       Persisting consolidations in the left lung base, consistent with pneumonia.               CT AP  Dense consolidation within the inferior aspect of left upper lobe and   throughout the left lower lobe, most compatible with pneumonia and/or   aspiration.  That should be followed to resolution, especially given the   nodular morphology within portions of the consolidation.       Diffuse mural thickening of the esophagus.  Correlate with clinical evidence   of esophagitis.       The tip of the right femoral venous catheter is malpositioned within a sacral   vein.  That should be repositioned for more optimal placement. Assessment and Plan      1. Cardiac arrest: unable to find any strips or mention of arresting rhythm  2. Prolonged QT:   3. Hypokalemia: improved  4. Elevated Trops: likely severe supply/demand mismatch  5. Hypothermia: resolved    Per GI and IM  Acute resp failure and PNA  Hyperglycemia  LOPEZ  Acute GIB- per notes but stable H/H      PLAN  1. Primary event unlikely cardiac. Given no doccumentation of initial event, hold AMio for now  2. ECHO pending   3. Keep K>4 and mag >2  4. Suggest change levo to pam to reduce tachycardia  5. Reduce pressor burden as tolerated, consider CVP monitoring for IV fluid needs   -? If needs extra fluids based on reported I/o of only +200mL  6. Avoid QTc prolonging meds.      Critical care: 36min        Patient Active Problem List   Diagnosis    Asthma    Hypertension    Diabetes mellitus ([facility]    Nausea & vomiting    Abdominal pain    [facility]    Hyperglycemia    DKA (diabetic ketoacidoses) (HCC)    Acidosis    [facility]    DKA, type 2, not at goal [facility])           Thank you for allowing us to participate in the care of Leander Meyer. Please call me with any questions [phone]. Mattie Ricks MD, [address] [facility]  [address]  [phone] [facility]  [phone] [address] [place] [address]  4/30/2019 7:54 AM    I will address the patient's cardiac risk factors and adjusted pharmacologic treatment as needed. In addition, I have reinforced the need for patient directed risk factor modification. All questions and concerns were addressed to the patient/family. Alternatives to my treatment were discussed. The note was completed using EMR. Every effort was made to ensure accuracy; however, inadvertent computerized transcription errors may be present.

## 2019-04-30 NOTE — PROGRESS NOTES
04/30/19 0845   Vent Information   $Ventilation $Subsequent Day   Vent Type 840   Vent Mode AC/VC   Vt Ordered 500 mL   Rate Set 16 bmp   Peak Flow 90 L/min   Pressure Support 0 cmH20   FiO2  60 %   Sensitivity 3   PEEP/CPAP 5   Cuff Pressure (cm H2O) 30 cm H2O   Humidification Source HME   Vent Patient Data   Peak Inspiratory Pressure 26 cmH2O   Mean Airway Pressure 14 cmH20   Rate Measured 30 br/min   Vt Exhaled 487 mL   Minute Volume 14.3 Liters   I:E Ratio 1:2.00   Plateau Pressure 21 [id number]   Static Compliance 30 mL/cmH2O   Dynamic Compliance 23 mL/cmH2O   Spontaneous Breathing Trial (SBT) RT Doc   Pulse 124   SpO2 94 %   Additional Respiratory  Assessments   Resp (!) 37   End Tidal CO2 12 (%)   Position Semi-Umanzor's   Alarm Settings   High Pressure Alarm 50 cmH2O   Low Minute Volume Alarm 2 L/min   Apnea (secs) 20 secs   High Respiratory Rate 45 br/min   Low Exhaled Vt  200 mL   ETT (adult)   Placement Date/Time: 04/29/19 1340   Preoxygenation: Yes  Type: Cuffed  Tube Size: 8 mm  Placement Verified By[de-identified] Capnometry  Secured at: 23 cm  Measured From: Lips  Cuff Pressure: 30 cm H2O   Secured at 22 cm   Measured From Lips   ET Placement Right   Secured By Commercial tube saavedra   Site Condition Dry   Cuff Pressure 0 cm H2O

## 2019-04-30 NOTE — PROGRESS NOTES
Message sent to Dr Stephen Mast due to pts increased RR    Pt is breathing 39-41 per minute, for the past 30 mins. ABG CO2 is 17, base excess is -15.7. Unable to sedate any further due to MAP of 59-62 on Levo and Vaso. Any suggestions? thank you    Order for Haldol 1 mg iv.  Q1h PRN     Ilir Corado RN

## 2019-04-30 NOTE — H&P
Hospital Medicine History & Physical      PCP: Baltazar Barriga PA-C    Date of Admission: 4/29/2019    Date of Service: Pt seen/examined on 4/29    In ICU   Admission/inpatient with more than 2 midnights stay    Chief Complaint:  S/P cardiac arrest, CPR, s/p intubation      History Of Present Illness:     81 y.o. female who presented to [facility] with DKA due to noncompliance  And severe metabolic acidosis, led to cardiac arrest, s/p CPR,    Pt is  currently pt is intubated, and remains be on Vent  In ICU     Past Medical History:          Diagnosis Date    Acute renal failure (ARF) ([facility] 8/20/2015    Asthma     CAD (coronary artery disease)     MI in 2013    Diabetes mellitus ([facility]     Hypertension     Pneumonia     Not sure when    Seizures ([facility]     She goes numb, doctor told her they are seizures    Unspecified cerebral artery occlusion with cerebral infarction     Pt reports having a lot of mini-strokes; first one was 2002; last one was a couple years ago       Past Surgical History:          Procedure Laterality Date    BLADDER REPAIR      BREAST LUMPECTOMY Left     BREAST SURGERY         Medications Prior to Admission:      Prior to Admission medications    Medication Sig Start Date End Date Taking? Authorizing Provider   ondansetron (ZOFRAN ODT) 4 MG disintegrating tablet Take 1 tablet by mouth every 8 hours as needed for Nausea or Vomiting 7/17/16   Roberto Soares MD   omeprazole (PRILOSEC) 10 MG capsule Take 10 mg by mouth daily    Historical Provider, MD   lisinopril (PRINIVIL;ZESTRIL) 10 MG tablet Take 10 mg by mouth daily. Historical Provider, MD   metFORMIN (GLUCOPHAGE) 1000 MG tablet   Take 1,000 mg by mouth daily (with breakfast)     Historical Provider, MD   PARoxetine (PAXIL) 30 MG tablet Take 30 mg by mouth every morning.     Historical Provider, MD   amitriptyline (ELAVIL) 50 MG tablet Take 63 mg by mouth nightly     Historical Provider, MD   atorvastatin (LIPITOR) Recent Labs     04/29/19  1236 04/29/19 2007 04/29/19 2110   WBC 31.1*  --  4.8   HGB 11.5* 13.4 11.8*   HCT 38.4  --  35.0*   *  --  708*     Recent Labs     04/29/19  1236 04/29/19 2013   * 136   K 5.1 2.6*   CL 80* 96*   CO2 7* 11*   BUN 52* 50*   CREATININE 1.9* 1.7*   CALCIUM 9.7 8.2*     Recent Labs     04/29/19  1236   AST 12*   ALT 6*   BILITOT <0.2   ALKPHOS 180*     Recent Labs     04/29/19  1400   INR 1.25*     Recent Labs     04/29/19  1236   CKTOTAL 279*   TROPONINI <0.01       Urinalysis:      Lab Results   Component Value Date    NITRU Negative 04/29/2019    WBCUA 10-20 04/29/2019    BACTERIA 1+ 04/29/2019    RBCUA 3-5 04/29/2019    BLOODU MODERATE 04/29/2019    SPECGRAV 1.025 04/29/2019    GLUCOSEU >=1000 04/29/2019       Radiology:       EKG:  I have reviewed the EKG with the following interpretation:     No ST elevation or depression    CT CHEST ABDOMEN PELVIS WO CONTRAST   Final Result   Dense consolidation within the inferior aspect of left upper lobe and   throughout the left lower lobe, most compatible with pneumonia and/or   aspiration. That should be followed to resolution, especially given the   nodular morphology within portions of the consolidation. Diffuse mural thickening of the esophagus. Correlate with clinical evidence   of esophagitis. The tip of the right femoral venous catheter is malpositioned within a sacral   vein. That should be repositioned for more optimal placement. CT CERVICAL SPINE WO CONTRAST   Final Result   Multilevel degenerative changes with no acute abnormality of the cervical   spine. CT HEAD WO CONTRAST   Final Result   Motion degraded study with no acute intracranial abnormality. XR CHEST PORTABLE   Final Result   Supportive tubing projects in normal position. Left basilar airspace disease, pneumonia versus aspiration pneumonitis. There may be a component of pleural effusion.          XR CHEST PORTABLE Final Result   Atelectasis at the left lung base. Question of small left pleural effusion. ASSESSMENT, plan  DKA due to noncompliance  and severe metabolic acidosis, led to cardiac arrest, s/p CPR, currently pt is intubated, sedated. On Domanine drip bto maintain MAP above 65    Cont on IV hydration  With Frequent  BMP, cont on Insulin drip protocol    Per CXR possible PNA- Pt on broad spectrum Abx,  On Zosyn,   cont f/u with Blood cultures. Hospiatist service  Will Follow up with Cardiology and Pulmonology consult     C/Neida Flores 1106 Problems    Diagnosis Date Noted    Acute renal failure (ARF) ([address] [N17.9] 08/20/2015     Priority: High     Class: Acute    DKA (diabetic ketoacidoses) ([address] [E13.10] 04/29/2019    Acidosis [E87.2] 04/29/2019    Cardiac arrest ([address] [I46.9] 04/29/2019    DKA, type 2, not at goal [facility]) [E11.10] 04/29/2019               DVT Prophylaxis:  On Lovenox sq  Diet:  NPO   Code Status: Full Code    PT/OT Eval Status:        Electronically signed by Cayetano Villa MD on 4/29/2019 at 10:04 PM    Thank you Jannet Harry PA-C for the opportunity to be involved in this patient's care. If you have any questions or concerns please feel free to contact me .

## 2019-04-30 NOTE — PROGRESS NOTES
04/30/19 1925   Vent Information   Vent Type 840   Vent Mode AC/VC   Vt Ordered 500 mL   Rate Set 16 bmp   Peak Flow 70 L/min   Pressure Support 0 cmH20   FiO2  50 %   Sensitivity 3   PEEP/CPAP 5   Cuff Pressure (cm H2O) 28 cm H2O   Humidification Source HME   Vent Patient Data   Peak Inspiratory Pressure 24 cmH2O   Mean Airway Pressure 11 cmH20   Rate Measured 22 br/min   Vt Exhaled 483 mL   Minute Volume 10.1 Liters   I:E Ratio 1:2.90   Plateau Pressure 21 [id number]   Static Compliance 30 mL/cmH2O   Dynamic Compliance 25 mL/cmH2O   Cough/Sputum   Sputum How Obtained Endotracheal;Suctioned   Cough Productive   Sputum Amount Small   Sputum Color Creamy   Tenacity Thick   Spontaneous Breathing Trial (SBT) RT Doc   Pulse 114   Breath Sounds   Right Upper Lobe Clear   Right Middle Lobe Clear   Right Lower Lobe Diminished   Left Upper Lobe Clear   Left Lower Lobe Diminished   Additional Respiratory  Assessments   Resp 26   End Tidal CO2 15 (%)   Alarm Settings   High Pressure Alarm 50 cmH2O   Low Minute Volume Alarm 2 L/min   High Respiratory Rate 45 br/min   ETT (adult)   Placement Date/Time: 04/29/19 1340   Preoxygenation: Yes  Type: Cuffed  Tube Size: 8 mm  Placement Verified By[de-identified] Capnometry  Secured at: 23 cm  Measured From: Lips  Cuff Pressure: 30 cm H2O   Secured at 22 cm   Measured From [address] By Commercial tube saavedra   Site Condition Dry

## 2019-04-30 NOTE — PROGRESS NOTES
Pt transported to [address] [facility] room 224 on vent from ER. Has Prop, fent , love and insulin infusing via fem line. NGT verified, CT all read.  Mepilex placed on sacrum, Julieth Arevalo RN

## 2019-04-30 NOTE — PROGRESS NOTES
Nutrition Assessment (Enteral Nutrition)    Type and Reason for Visit: Initial(New Vent )    Nutrition Recommendations:   1. Monitor for improvements in hemodynamic status and ability to initiate nutrition support when medically appropriate   2. Check TG  3. Monitor NG output, BG and anion gap, other pertinent labs, bowel habits, wt changes, and clinical progress    Nutrition Assessment: 80 y/o female admitted with DKA and cardiac arrest. Intubated in the ICU. Sedated on propofol at 13 ml/hr (343 kcals from lipids). Hypotensive on high dose levophed at 55 mcg and vaso at .04. IVF of bicarb to be started. Pt also with GIB. Pt to remain NPO today as discussed on MDR. Malnutrition Assessment:  · Malnutrition Status: At risk for malnutrition    Nutrition Risk Level: High    Nutrition Needs:  · Estimated Daily Total Kcal: 5255-2829  · Estimated Daily Protein (g): 58-96 gm   · Estimated Daily Fluid (ml/day): 1 mL/kcal     Nutrition Diagnosis:   · Problem: Inadequate energy intake  · Etiology: related to Impaired respiratory function-inability to consume food, Insufficient energy/nutrient consumption     Signs and symptoms:  as evidenced by Intubation, NPO status due to medical condition    Objective Information:  · Nutrition-Focused Physical Findings: NG in place to suction with brown coffee ground output. Ordered on insulin gtt  · Wound Type: None  · Current Nutrition Therapies:  · Oral Diet Orders: NPO   · Oral Diet intake: NPO  · Oral Nutrition Supplement (ONS) Orders: None  · ONS intake: NPO  · Tube Feeding (TF) Recommendation:   · Feeding Route: Nasogastric  · Goal TF & Flush Provides: When medically appropriate, consider EN initiation. Recommend Glucerna 1.5 at goal of 45 mL/hr to provide 900 mL TV, 1350 kcals, 75 gm protein, 683 mL free fluid. Fluid mgmt per nephro. · Additional Calories: Propofol calories as noted above.    · Anthropometric Measures:  · Ht: 5' 1.02\" (155 cm)   · Current Body Wt: 105 lb (47.6 kg)(bedscale )   · Usual Body Wt: ([place] )  · Ideal Body Wt: 105 lb (47.6 kg),  · BMI Classification: BMI 18.5 - 24.9 Normal Weight    Nutrition Interventions:   Continue NPO, Start Tube Feeding(when medically appropriate, initiate EN)  Continued Inpatient Monitoring    Nutrition Evaluation:   · Evaluation: Goals set   · Goals: Tolerate most appropriate form of nutrition therapy this admission   · Monitoring: Nutrition Progression, TF Intake, TF Tolerance, Weight, Pertinent Labs      Electronically signed by Brenda Green.  Neena Borrego RD, LD on 4/30/19 at 10:55 AM    Contact Number: [phone]

## 2019-04-30 NOTE — PROGRESS NOTES
Dr Melissa Cruz bedside to eval patient. Asked for Insulin gtt to be increased to 5U/hr now, then  Recheck glucose and if less than 100 decrease to keep at 5U/ hr for another hour. Also gave verbal for PO potassium replacement per NGT. Have IV currently infusing for critical K of 2.6. HGB stable, though dark output was noted from NGT.

## 2019-04-30 NOTE — PROGRESS NOTES
04/29/19 2106   Vent Information   Vent Type 840   Vent Mode AC/VC   Vt Ordered 500 mL   Rate Set 16 bmp   Peak Flow 60 L/min   Pressure Support 0 cmH20   FiO2  50 %   Sensitivity 3   PEEP/CPAP 5   Cuff Pressure (cm H2O) 28 cm H2O   Humidification Source HME   Vent Patient Data   Peak Inspiratory Pressure 24 cmH2O   Mean Airway Pressure 16 cmH20   Rate Measured 33 br/min   Vt Exhaled 501 mL   Minute Volume 16.6 Liters   I:E Ratio 1:1.30   Plateau Pressure 24 [id number]   Static Compliance 26.4 mL/cmH2O   Dynamic Compliance 26.4 mL/cmH2O   Cough/Sputum   Sputum How Obtained Endotracheal   Cough Non-productive   Spontaneous Breathing Trial (SBT) RT Doc   Pulse 130   SpO2 90 %   Breath Sounds   Right Upper Lobe Clear   Right Middle Lobe Clear   Right Lower Lobe Diminished   Left Upper Lobe Clear   Left Lower Lobe Diminished   Additional Respiratory  Assessments   Resp 29   End Tidal CO2 18 (%)   Alarm Settings   High Pressure Alarm 50 cmH2O   Low Minute Volume Alarm 2 L/min   High Respiratory Rate 45 br/min   Low Exhaled Vt  200 mL   ETT (adult)   Placement Date/Time: 04/29/19 1340   Preoxygenation: Yes  Type: Cuffed  Tube Size: 8 mm  Placement Verified By[de-identified] Capnometry  Secured at: 23 cm  Measured From: Lips  Cuff Pressure: 30 cm H2O   Secured at 22 cm   Measured From Lips   ET Placement Right   Secured By Commercial tube saavedra   Site Condition Dry   Cuff Pressure 32 cm H2O

## 2019-04-30 NOTE — PROCEDURES
Central Line Placement Procedure Note    Indication: vascular access, central venous monitoring and centrally administered medications    Consent: The family members were counseled regarding the procedure, its indications, risks, potential complications and alternatives, and any questions were answered. Consent was obtained to proceed. Procedure: The patient was positioned appropriately and the skin over the right internal jugular vein was prepped with Chloraprep and draped in a sterile fashion. Full sterile field including gown, gloves, mask, cap were utilized. Local anesthesia was obtained by infiltration using 1% Lidocaine without epinephrine. A large bore needle was used to identify the vein under ultrasound guidance. A guide wire was then inserted into the vein through the needle. A triple lumen catheter was then inserted into the vessel over the guide wire using the Seldinger technique. All ports showed good, free flowing blood return and were flushed with saline solution. The catheter was then securely fastened to the skin with sutures and with an adhesive dressing and covered with a sterile dressing. A post procedure X-ray was ordered and showed good line position. The patient tolerated the procedure well. Complications: None    Yvonne Dobson, APRN-CNP

## 2019-04-30 NOTE — PROGRESS NOTES
04/29/19 2356   Vent Information   Vent Type 840   Vent Mode AC/VC   Vt Ordered 500 mL   Rate Set 16 bmp   Peak Flow 60 L/min   Pressure Support 0 cmH20   FiO2  50 %   Sensitivity 3   PEEP/CPAP 5   Cuff Pressure (cm H2O) 30 cm H2O   Vent Patient Data   Peak Inspiratory Pressure 36 cmH2O   Mean Airway Pressure 17 cmH20   Rate Measured 35 br/min   Vt Exhaled 398 mL   Minute Volume 16.2 Liters   I:E Ratio 1.30:1   Cough/Sputum   Sputum How Obtained Oral tracheal;Suctioned   Cough Productive   Sputum Amount Moderate   Sputum Color Brown   Spontaneous Breathing Trial (SBT) RT Doc   Pulse 126   Breath Sounds   Right Upper Lobe Clear   Right Middle Lobe Clear   Right Lower Lobe Diminished   Left Upper Lobe Clear   Left Lower Lobe Diminished   Additional Respiratory  Assessments   Resp (!) 33   End Tidal CO2 12 (%)   Alarm Settings   High Pressure Alarm 50 cmH2O   Low Minute Volume Alarm 2 L/min   High Respiratory Rate 45 br/min   ETT (adult)   Placement Date/Time: 04/29/19 1340   Preoxygenation: Yes  Type: Cuffed  Tube Size: 8 mm  Placement Verified By[de-identified] Capnometry  Secured at: 23 cm  Measured From: Lips  Cuff Pressure: 30 cm H2O   Secured at 22 cm   Measured From Lips   ET Placement Left   Secured By Commercial tube saavedra   Site Condition Edema

## 2019-04-30 NOTE — CONSULTS
insulin (HUMAN R) non-weight based infusion 5 Units/hr (04/29/19 2140)    pantoprozole (PROTONIX) infusion 8 mg/hr (04/29/19 2122)    propofol 15 mcg/kg/min (04/29/19 2233)    sodium chloride 150 mL/hr at 04/29/19 2023    vasopressin (Septic Shock) infusion 0.04 Units/min (04/29/19 2020)       PRN Meds:   sodium chloride flush, magnesium hydroxide, ondansetron, acetaminophen, potassium chloride, magnesium sulfate, sodium phosphate IVPB **OR** sodium phosphate IVPB   Inpatient consult to Critical Care  Consult performed by: Shannan Finch MD  Consult ordered by: Jeremiah Seaman DO        ALLERGIES:  Patient is allergic to aspirin. REVIEW OF SYSTEMS:  Review of Systems   Unable to perform ROS: Intubated       Objective:   PHYSICAL EXAM:  BP (!) 85/48   Pulse 133   Temp 101.3 °F (38.5 °C) (Core)   Resp (!) 31   Wt 105 lb 2.6 oz (47.7 kg)   SpO2 95%   BMI 19.87 kg/m²    Physical Exam   Constitutional: She appears well-developed and well-nourished. No distress. HENT:   Head: Normocephalic and atraumatic. Mouth/Throat: Oropharynx is clear and moist. No oropharyngeal exudate. Neck: Neck supple. No JVD present. Cardiovascular: Normal heart sounds. Exam reveals no gallop and no friction rub. No murmur heard. Pulmonary/Chest: Effort normal. She has no wheezes. She has no rales. Equal chest rise and expansion bilaterally   Abdominal: Soft. Bowel sounds are normal. She exhibits no distension. There is no tenderness. Musculoskeletal: She exhibits no edema. Lymphadenopathy:     She has no cervical adenopathy. Neurological: A cranial nerve deficit is present. Intubated, sedated   Skin: Skin is warm and dry. No rash noted. She is not diaphoretic.           Data Reviewed:   LABS:  CBC:   Recent Labs     04/29/19  1236 04/29/19 2007 04/29/19 2110   WBC 31.1*  --  4.8   HGB 11.5* 13.4 11.8*   HCT 38.4  --  35.0*   MCV 93.8  --  83.4   *  --  708*     BMP:   Recent Labs     04/29/19  1236 04/29/19 2013   * 136   K 5.1 2.6*   CL 80* 96*   CO2 7* 11*   BUN 52* 50*   CREATININE 1.9* 1.7*     LIVER PROFILE:   Recent Labs     04/29/19  1236   AST 12*   ALT 6*   BILITOT <0.2   ALKPHOS 180*     PT/INR:   Recent Labs     04/29/19  1400   PROTIME 14.2*   INR 1.25*     APTT:No results for input(s): APTT in the last 72 hours. UA:  Recent Labs     04/29/19  1246   COLORU Yellow   PHUR 5.5   WBCUA 10-20*   RBCUA 3-5*   YEAST Present*   BACTERIA 1+*   CLARITYU SL CLOUDY*   SPECGRAV 1.025   LEUKOCYTESUR Negative   UROBILINOGEN 0.2   BILIRUBINUR Negative   BLOODU MODERATE*   GLUCOSEU >=1000*     Recent Labs     04/29/19 2007 04/29/19  2122   PHART 7.192* 7.314*   XDI6UCL 30.6* 21.1*   PO2ART 85.5 74.5*       Vent Information  $Ventilation: $Initial Day  Vent Type: 840  Vent Mode: AC/VC  Vt Ordered: 500 mL  Rate Set: 16 bmp  Peak Flow: 60 L/min  Pressure Support: 0 cmH20  FiO2 : 50 %  Sensitivity: 3  PEEP/CPAP: 5  Cuff Pressure (cm H2O): 28 cm H2O  Humidification Source: [facility]    CT chest personally reviewed, left sided airspace disease           Assessment:     1. Acute resp failure, hypoxic   -left sided pneumonia  2. Acute hyperglycemia   3. Acute encephalopathy   -metabolic, infectious   4. LOPEZ with metabolic acidosis  5. Acute GI bleed     Plan:      -Continue vent support, trend ABGs and CXRs daily   -Titrate sedation as needed  -IVF, insulin gtt  -Empiric broad atb, follow up on blood cultures  -Nephrology consulted, monitor urine output  -Titrate levophed support, echo in AM  -On protonix gtt, monitor H/H     Due to life threatening respiratory failure, hemodynamic instability this patient is critically ill.  Total critical care time involved in her care was 31 mins     Jessy Banuelos MD

## 2019-04-30 NOTE — CARE COORDINATION
CM went to bedside. Patient undergoing a procedure at this time. Currently on vent. Chart review completed. Patient a 81year old female, admitted for DKA. Currently in ICU level of care on vent. Per documentation patient had stopped medications weeks ago due to side effects. It does not appear that financial would be a barrier as patient has prescription coverage through [facility]. CM will follow and do initial assessment when patient medically stable.   Saadia Julien RN

## 2019-04-30 NOTE — PROGRESS NOTES
Hospitalist Progress Note      PCP: Deloris Medina PA-C    Date of Admission: 4/29/2019    Chief Complaint: pt became unresponsive    Hospital Course: Reviewed H and P    Subjective: Pt remains intubated, sedated on pressors. Receiving new central line. BS improving on insulin gtt. Medications:  Reviewed    Infusion Medications    DOPamine 3 mcg/kg/min (04/30/19 4059)    electrolyte-A      IV infusion builder 75 mL/hr at 04/30/19 1215    insulin (HUMAN R) non-weight based infusion 3.24 Units/hr (04/30/19 1600)    norepinephrine 55 mcg/min (04/30/19 0917)    propofol 40 mcg/kg/min (04/30/19 0944)    vasopressin (Septic Shock) infusion 0.04 Units/min (04/30/19 0932)     Scheduled Medications    mupirocin   Nasal BID    carboxymethylcellulose PF  1 drop Both Eyes TID    chlorhexidine  15 mL Mouth/Throat BID    meropenem  1 g Intravenous Q12H    heparin (porcine)  5,000 Units Subcutaneous BID    pantoprazole  40 mg Intravenous BID    sodium chloride flush  10 mL Intravenous 2 times per day     PRN Meds: dextrose, sodium chloride flush, magnesium hydroxide, acetaminophen      Intake/Output Summary (Last 24 hours) at 4/30/2019 1705  Last data filed at 4/30/2019 1400  Gross per 24 hour   Intake 3083.9 ml   Output 2570 ml   Net 513.9 ml       Physical Exam Performed:    BP (!) 85/48   Pulse 117   Temp 100.6 °F (38.1 °C)   Resp 23   Ht 5' 1.02\" (1.55 m)   Wt 105 lb 2.6 oz (47.7 kg)   SpO2 97%   BMI 19.85 kg/m²     General appearance: Intubated and sedated. HEENT: Conjunctivae/corneas clear. Neck: Supple  Respiratory: Vent sounds  Cardiovascular: tachycardic  Abdomen: Soft, non-tender, non-distended with normal bowel sounds. Musculoskeletal: No edema. Skin: Skin color, texture, turgor normal.  No rashes or lesions.   Neurologic:  Nonfocal  Capillary Refill: Brisk,< 3 seconds   Peripheral Pulses: +2 palpable, equal bilaterally       Labs:   Recent Labs     04/29/19  1236  04/29/19  2110 Final Result   Motion degraded study with no acute intracranial abnormality. XR CHEST PORTABLE   Final Result   Supportive tubing projects in normal position. Left basilar airspace disease, pneumonia versus aspiration pneumonitis. There may be a component of pleural effusion. XR CHEST PORTABLE   Final Result   Atelectasis at the left lung base. Question of small left pleural effusion. Assessment/Plan:    Active Hospital Problems    Diagnosis Date Noted    Acute renal failure (ARF) ([facility] [N17.9] 08/20/2015     Priority: High     Class: Acute    Acute respiratory failure (HCC) [J96.00]     Prolonged Q-T interval on ECG [R94.31]     Hypokalemia [E87.6]     Elevated troponin [R74.8]     DKA (diabetic ketoacidoses) ([facility] [facility] [E13.10] 04/29/2019    Acidosis [E87.2] 04/29/2019    [facility] arrest ([facility] [I46.9] 04/29/2019    DKA, type 2, not at goal [facility]) [E11.10] 04/29/2019     82 yo female with h/o CAD, DM2, HTN, depression admitted after being found unresponsive with acute respiratory failure due to pneumonia, DKA, metabolic encephalopathy, LOPEZ, metabolic acidosis, acute GI bleed. 1. Acute respiratory failure secondary to aspiration pneumonia - required intubation and sedation. Continue on merrem started 4/30. Blood Cx neg thus far. Pulm following. 2. DKA - pt without diabetes medications for weeks if not months. Unsure of BS at home. Last A1c on record is 5.3 in 2015. Currently on insulin gtt. Glucose on arrival 916.    3. Anion gap Metabolic acidosis and LOPEZ - secondary to above. Following BMPs. Nephrology following. Cr 2.1. IVF. Likely ATN. 4. Possible cardiac arrest? - troponin elevated but unsure if due to demand and ischemia. Follow trop. Cardiology following. ECHO. 5. Hematemesis - on admission. PPI. Hbg stable. 6. Hypotension - likely due to dehydration and severe acidosis - remains on pressors.      DVT Prophylaxis: heparin  Diet: Diet NPO Effective Now  Code Status: Full Code    PT/OT Eval Status: on hold    Dispo - ICU care    Ira Heredia MD

## 2019-05-01 ENCOUNTER — APPOINTMENT (OUTPATIENT)
Dept: GENERAL RADIOLOGY | Age: 69
DRG: 853 | End: 2019-05-01
Payer: COMMERCIAL

## 2019-05-01 LAB
ALBUMIN SERPL-MCNC: 1.2 G/DL (ref 3.4–5)
ANION GAP SERPL CALCULATED.3IONS-SCNC: 12 MMOL/L (ref 3–16)
ANION GAP SERPL CALCULATED.3IONS-SCNC: 13 MMOL/L (ref 3–16)
ANION GAP SERPL CALCULATED.3IONS-SCNC: 14 MMOL/L (ref 3–16)
ANION GAP SERPL CALCULATED.3IONS-SCNC: 15 MMOL/L (ref 3–16)
ANION GAP SERPL CALCULATED.3IONS-SCNC: 15 MMOL/L (ref 3–16)
BASE EXCESS ARTERIAL: -4.9 MMOL/L (ref -3–3)
BUN BLDV-MCNC: 48 MG/DL (ref 7–20)
BUN BLDV-MCNC: 50 MG/DL (ref 7–20)
BUN BLDV-MCNC: 50 MG/DL (ref 7–20)
BUN BLDV-MCNC: 51 MG/DL (ref 7–20)
BUN BLDV-MCNC: 51 MG/DL (ref 7–20)
CALCIUM SERPL-MCNC: 6.8 MG/DL (ref 8.3–10.6)
CALCIUM SERPL-MCNC: 7.1 MG/DL (ref 8.3–10.6)
CALCIUM SERPL-MCNC: 7.4 MG/DL (ref 8.3–10.6)
CALCIUM SERPL-MCNC: 7.5 MG/DL (ref 8.3–10.6)
CALCIUM SERPL-MCNC: 7.8 MG/DL (ref 8.3–10.6)
CARBOXYHEMOGLOBIN ARTERIAL: 0.3 % (ref 0–1.5)
CHLORIDE BLD-SCNC: 102 MMOL/L (ref 99–110)
CHLORIDE BLD-SCNC: 104 MMOL/L (ref 99–110)
CHLORIDE BLD-SCNC: 95 MMOL/L (ref 99–110)
CHLORIDE BLD-SCNC: 96 MMOL/L (ref 99–110)
CHLORIDE BLD-SCNC: 98 MMOL/L (ref 99–110)
CO2: 17 MMOL/L (ref 21–32)
CO2: 18 MMOL/L (ref 21–32)
CO2: 19 MMOL/L (ref 21–32)
CO2: 19 MMOL/L (ref 21–32)
CO2: 20 MMOL/L (ref 21–32)
CREAT SERPL-MCNC: 2.1 MG/DL (ref 0.6–1.2)
CREAT SERPL-MCNC: 2.2 MG/DL (ref 0.6–1.2)
CREAT SERPL-MCNC: 2.3 MG/DL (ref 0.6–1.2)
EKG ATRIAL RATE: 125 BPM
EKG DIAGNOSIS: NORMAL
EKG P AXIS: 57 DEGREES
EKG P-R INTERVAL: 126 MS
EKG Q-T INTERVAL: 356 MS
EKG QRS DURATION: 88 MS
EKG QTC CALCULATION (BAZETT): 513 MS
EKG R AXIS: 46 DEGREES
EKG T AXIS: 34 DEGREES
EKG VENTRICULAR RATE: 125 BPM
GFR AFRICAN AMERICAN: 26
GFR AFRICAN AMERICAN: 27
GFR AFRICAN AMERICAN: 28
GFR NON-AFRICAN AMERICAN: 21
GFR NON-AFRICAN AMERICAN: 22
GFR NON-AFRICAN AMERICAN: 23
GLUCOSE BLD-MCNC: 111 MG/DL (ref 70–99)
GLUCOSE BLD-MCNC: 137 MG/DL (ref 70–99)
GLUCOSE BLD-MCNC: 141 MG/DL (ref 70–99)
GLUCOSE BLD-MCNC: 156 MG/DL (ref 70–99)
GLUCOSE BLD-MCNC: 156 MG/DL (ref 70–99)
GLUCOSE BLD-MCNC: 160 MG/DL (ref 70–99)
GLUCOSE BLD-MCNC: 171 MG/DL (ref 70–99)
GLUCOSE BLD-MCNC: 186 MG/DL (ref 70–99)
GLUCOSE BLD-MCNC: 187 MG/DL (ref 70–99)
GLUCOSE BLD-MCNC: 192 MG/DL (ref 70–99)
GLUCOSE BLD-MCNC: 194 MG/DL (ref 70–99)
GLUCOSE BLD-MCNC: 204 MG/DL (ref 70–99)
GLUCOSE BLD-MCNC: 208 MG/DL (ref 70–99)
HCO3 ARTERIAL: 18.1 MMOL/L (ref 21–29)
HCT VFR BLD CALC: 27.5 % (ref 36–48)
HEMOGLOBIN, ART, EXTENDED: 10.3 G/DL (ref 12–16)
HEMOGLOBIN: 9.7 G/DL (ref 12–16)
MAGNESIUM: 2 MG/DL (ref 1.8–2.4)
MCH RBC QN AUTO: 28.3 PG (ref 26–34)
MCHC RBC AUTO-ENTMCNC: 35.3 G/DL (ref 31–36)
MCV RBC AUTO: 80 FL (ref 80–100)
METHEMOGLOBIN ARTERIAL: 0.7 %
O2 CONTENT ARTERIAL: 14 ML/DL
O2 SAT, ARTERIAL: 98 %
O2 THERAPY: ABNORMAL
PCO2 ARTERIAL: 27.2 MMHG (ref 35–45)
PDW BLD-RTO: 14 % (ref 12.4–15.4)
PERFORMED ON: ABNORMAL
PH ARTERIAL: 7.44 (ref 7.35–7.45)
PHOSPHORUS: 1.1 MG/DL (ref 2.5–4.9)
PLATELET # BLD: 383 K/UL (ref 135–450)
PMV BLD AUTO: 7.6 FL (ref 5–10.5)
PO2 ARTERIAL: 118.6 MMHG (ref 75–108)
POC SAMPLE TYPE: ABNORMAL
POTASSIUM SERPL-SCNC: 4.5 MMOL/L (ref 3.5–5.1)
POTASSIUM SERPL-SCNC: 4.5 MMOL/L (ref 3.5–5.1)
POTASSIUM SERPL-SCNC: 4.7 MMOL/L (ref 3.5–5.1)
POTASSIUM SERPL-SCNC: 4.7 MMOL/L (ref 3.5–5.1)
POTASSIUM SERPL-SCNC: 5 MMOL/L (ref 3.5–5.1)
RBC # BLD: 3.43 M/UL (ref 4–5.2)
REPORT: NORMAL
SODIUM BLD-SCNC: 130 MMOL/L (ref 136–145)
SODIUM BLD-SCNC: 133 MMOL/L (ref 136–145)
SODIUM BLD-SCNC: 134 MMOL/L (ref 136–145)
TCO2 ARTERIAL: 18.9 MMOL/L
TRIGL SERPL-MCNC: 864 MG/DL (ref 0–150)
WBC # BLD: 13.7 K/UL (ref 4–11)

## 2019-05-01 PROCEDURE — 80048 BASIC METABOLIC PNL TOTAL CA: CPT

## 2019-05-01 PROCEDURE — 93010 ELECTROCARDIOGRAM REPORT: CPT | Performed by: INTERNAL MEDICINE

## 2019-05-01 PROCEDURE — 6370000000 HC RX 637 (ALT 250 FOR IP): Performed by: FAMILY MEDICINE

## 2019-05-01 PROCEDURE — 3609010900 HC BRONCHOSCOPY THERAPUTIC ASPIRATION INITIAL: Performed by: INTERNAL MEDICINE

## 2019-05-01 PROCEDURE — C9113 INJ PANTOPRAZOLE SODIUM, VIA: HCPCS | Performed by: INTERNAL MEDICINE

## 2019-05-01 PROCEDURE — 0B9H8ZX DRAINAGE OF LUNG LINGULA, VIA NATURAL OR ARTIFICIAL OPENING ENDOSCOPIC, DIAGNOSTIC: ICD-10-PCS | Performed by: INTERNAL MEDICINE

## 2019-05-01 PROCEDURE — 2580000003 HC RX 258: Performed by: INTERNAL MEDICINE

## 2019-05-01 PROCEDURE — 2700000000 HC OXYGEN THERAPY PER DAY

## 2019-05-01 PROCEDURE — 2500000003 HC RX 250 WO HCPCS: Performed by: INTERNAL MEDICINE

## 2019-05-01 PROCEDURE — 82803 BLOOD GASES ANY COMBINATION: CPT

## 2019-05-01 PROCEDURE — 94761 N-INVAS EAR/PLS OXIMETRY MLT: CPT

## 2019-05-01 PROCEDURE — 87077 CULTURE AEROBIC IDENTIFY: CPT

## 2019-05-01 PROCEDURE — 87070 CULTURE OTHR SPECIMN AEROBIC: CPT

## 2019-05-01 PROCEDURE — 87205 SMEAR GRAM STAIN: CPT

## 2019-05-01 PROCEDURE — 87206 SMEAR FLUORESCENT/ACID STAI: CPT

## 2019-05-01 PROCEDURE — 94770 HC ETCO2 MONITOR DAILY: CPT

## 2019-05-01 PROCEDURE — 82947 ASSAY GLUCOSE BLOOD QUANT: CPT

## 2019-05-01 PROCEDURE — 86403 PARTICLE AGGLUT ANTBDY SCRN: CPT

## 2019-05-01 PROCEDURE — 88305 TISSUE EXAM BY PATHOLOGIST: CPT

## 2019-05-01 PROCEDURE — 2580000003 HC RX 258: Performed by: FAMILY MEDICINE

## 2019-05-01 PROCEDURE — 6360000002 HC RX W HCPCS: Performed by: INTERNAL MEDICINE

## 2019-05-01 PROCEDURE — 87015 SPECIMEN INFECT AGNT CONCNTJ: CPT

## 2019-05-01 PROCEDURE — 6370000000 HC RX 637 (ALT 250 FOR IP): Performed by: INTERNAL MEDICINE

## 2019-05-01 PROCEDURE — 71045 X-RAY EXAM CHEST 1 VIEW: CPT

## 2019-05-01 PROCEDURE — 2000000000 HC ICU R&B

## 2019-05-01 PROCEDURE — 84478 ASSAY OF TRIGLYCERIDES: CPT

## 2019-05-01 PROCEDURE — 94640 AIRWAY INHALATION TREATMENT: CPT

## 2019-05-01 PROCEDURE — 88312 SPECIAL STAINS GROUP 1: CPT

## 2019-05-01 PROCEDURE — 99291 CRITICAL CARE FIRST HOUR: CPT | Performed by: INTERNAL MEDICINE

## 2019-05-01 PROCEDURE — 80069 RENAL FUNCTION PANEL: CPT

## 2019-05-01 PROCEDURE — 31624 DX BRONCHOSCOPE/LAVAGE: CPT | Performed by: INTERNAL MEDICINE

## 2019-05-01 PROCEDURE — 37799 UNLISTED PX VASCULAR SURGERY: CPT

## 2019-05-01 PROCEDURE — 87102 FUNGUS ISOLATION CULTURE: CPT

## 2019-05-01 PROCEDURE — 2580000003 HC RX 258

## 2019-05-01 PROCEDURE — 87186 SC STD MICRODIL/AGAR DIL: CPT

## 2019-05-01 PROCEDURE — 3609010800 HC BRONCHOSCOPY ALVEOLAR LAVAGE: Performed by: INTERNAL MEDICINE

## 2019-05-01 PROCEDURE — 87116 MYCOBACTERIA CULTURE: CPT

## 2019-05-01 PROCEDURE — 94003 VENT MGMT INPAT SUBQ DAY: CPT

## 2019-05-01 PROCEDURE — 88112 CYTOPATH CELL ENHANCE TECH: CPT

## 2019-05-01 PROCEDURE — 2709999900 HC NON-CHARGEABLE SUPPLY: Performed by: INTERNAL MEDICINE

## 2019-05-01 PROCEDURE — 83735 ASSAY OF MAGNESIUM: CPT

## 2019-05-01 PROCEDURE — 94750 HC PULMONARY COMPLIANCE STUDY: CPT

## 2019-05-01 PROCEDURE — 31645 BRNCHSC W/THER ASPIR 1ST: CPT | Performed by: INTERNAL MEDICINE

## 2019-05-01 PROCEDURE — 85027 COMPLETE CBC AUTOMATED: CPT

## 2019-05-01 RX ORDER — SODIUM CHLORIDE 9 MG/ML
INJECTION, SOLUTION INTRAVENOUS
Status: COMPLETED
Start: 2019-05-01 | End: 2019-05-01

## 2019-05-01 RX ORDER — ACETYLCYSTEINE 200 MG/ML
600 SOLUTION ORAL; RESPIRATORY (INHALATION) 4 TIMES DAILY
Status: COMPLETED | OUTPATIENT
Start: 2019-05-01 | End: 2019-05-02

## 2019-05-01 RX ORDER — MAGNESIUM HYDROXIDE 1200 MG/15ML
LIQUID ORAL CONTINUOUS PRN
Status: COMPLETED | OUTPATIENT
Start: 2019-05-01 | End: 2019-05-01

## 2019-05-01 RX ORDER — INSULIN GLARGINE 100 [IU]/ML
40 INJECTION, SOLUTION SUBCUTANEOUS DAILY
Status: DISCONTINUED | OUTPATIENT
Start: 2019-05-01 | End: 2019-05-03

## 2019-05-01 RX ORDER — FENTANYL CITRATE 50 UG/ML
25 INJECTION, SOLUTION INTRAMUSCULAR; INTRAVENOUS
Status: DISCONTINUED | OUTPATIENT
Start: 2019-05-01 | End: 2019-05-07

## 2019-05-01 RX ORDER — SODIUM CHLORIDE 9 MG/ML
INJECTION, SOLUTION INTRAVENOUS CONTINUOUS
Status: DISCONTINUED | OUTPATIENT
Start: 2019-05-01 | End: 2019-05-04

## 2019-05-01 RX ORDER — ACETAMINOPHEN 160 MG/5ML
650 SOLUTION ORAL EVERY 4 HOURS PRN
Status: DISCONTINUED | OUTPATIENT
Start: 2019-05-01 | End: 2019-05-20 | Stop reason: HOSPADM

## 2019-05-01 RX ORDER — IPRATROPIUM BROMIDE AND ALBUTEROL SULFATE 2.5; .5 MG/3ML; MG/3ML
1 SOLUTION RESPIRATORY (INHALATION) EVERY 4 HOURS
Status: DISCONTINUED | OUTPATIENT
Start: 2019-05-01 | End: 2019-05-10

## 2019-05-01 RX ADMIN — VASOPRESSIN 0.04 UNITS/MIN: 20 INJECTION INTRAVENOUS at 01:32

## 2019-05-01 RX ADMIN — IPRATROPIUM BROMIDE AND ALBUTEROL SULFATE 1 AMPULE: .5; 3 SOLUTION RESPIRATORY (INHALATION) at 23:26

## 2019-05-01 RX ADMIN — PANTOPRAZOLE SODIUM 40 MG: 40 INJECTION, POWDER, FOR SOLUTION INTRAVENOUS at 21:13

## 2019-05-01 RX ADMIN — MEROPENEM 1 G: 1 INJECTION, POWDER, FOR SOLUTION INTRAVENOUS at 07:51

## 2019-05-01 RX ADMIN — ACETYLCYSTEINE 600 MG: 200 SOLUTION ORAL; RESPIRATORY (INHALATION) at 12:34

## 2019-05-01 RX ADMIN — CARBOXYMETHYLCELLULOSE SODIUM 1 DROP: 10 GEL OPHTHALMIC at 21:14

## 2019-05-01 RX ADMIN — MUPIROCIN: 20 OINTMENT TOPICAL at 21:14

## 2019-05-01 RX ADMIN — PROPOFOL 60 MCG/KG/MIN: 10 INJECTION, EMULSION INTRAVENOUS at 07:27

## 2019-05-01 RX ADMIN — SODIUM CHLORIDE 250 ML: 9 INJECTION, SOLUTION INTRAVENOUS at 09:55

## 2019-05-01 RX ADMIN — Medication 10 ML: at 07:51

## 2019-05-01 RX ADMIN — INSULIN LISPRO 3 UNITS: 100 INJECTION, SOLUTION INTRAVENOUS; SUBCUTANEOUS at 17:00

## 2019-05-01 RX ADMIN — DEXTROSE MONOHYDRATE 22 MCG/MIN: 50 INJECTION, SOLUTION INTRAVENOUS at 23:26

## 2019-05-01 RX ADMIN — INSULIN GLARGINE 40 UNITS: 100 INJECTION, SOLUTION SUBCUTANEOUS at 09:32

## 2019-05-01 RX ADMIN — SODIUM CHLORIDE: 9 INJECTION, SOLUTION INTRAVENOUS at 13:30

## 2019-05-01 RX ADMIN — MUPIROCIN: 20 OINTMENT TOPICAL at 07:52

## 2019-05-01 RX ADMIN — ACETYLCYSTEINE 600 MG: 200 SOLUTION ORAL; RESPIRATORY (INHALATION) at 16:59

## 2019-05-01 RX ADMIN — SODIUM CHLORIDE 2.88 UNITS/HR: 9 INJECTION, SOLUTION INTRAVENOUS at 04:57

## 2019-05-01 RX ADMIN — SODIUM BICARBONATE: 84 INJECTION, SOLUTION INTRAVENOUS at 06:39

## 2019-05-01 RX ADMIN — FENTANYL CITRATE 50 MCG/HR: 50 INJECTION INTRAVENOUS at 09:32

## 2019-05-01 RX ADMIN — CARBOXYMETHYLCELLULOSE SODIUM 1 DROP: 10 GEL OPHTHALMIC at 07:48

## 2019-05-01 RX ADMIN — CARBOXYMETHYLCELLULOSE SODIUM 1 DROP: 10 GEL OPHTHALMIC at 14:30

## 2019-05-01 RX ADMIN — PANTOPRAZOLE SODIUM 40 MG: 40 INJECTION, POWDER, FOR SOLUTION INTRAVENOUS at 07:48

## 2019-05-01 RX ADMIN — INSULIN LISPRO 6 UNITS: 100 INJECTION, SOLUTION INTRAVENOUS; SUBCUTANEOUS at 11:33

## 2019-05-01 RX ADMIN — ACETYLCYSTEINE 600 MG: 200 SOLUTION ORAL; RESPIRATORY (INHALATION) at 20:23

## 2019-05-01 RX ADMIN — IPRATROPIUM BROMIDE AND ALBUTEROL SULFATE 1 AMPULE: .5; 3 SOLUTION RESPIRATORY (INHALATION) at 16:59

## 2019-05-01 RX ADMIN — Medication 15 ML: at 21:14

## 2019-05-01 RX ADMIN — ACETAMINOPHEN 650 MG: 650 SOLUTION ORAL at 17:02

## 2019-05-01 RX ADMIN — PROPOFOL 60 MCG/KG/MIN: 10 INJECTION, EMULSION INTRAVENOUS at 03:13

## 2019-05-01 RX ADMIN — VASOPRESSIN 0.04 UNITS/MIN: 20 INJECTION INTRAVENOUS at 15:42

## 2019-05-01 RX ADMIN — VASOPRESSIN 0.04 UNITS/MIN: 20 INJECTION INTRAVENOUS at 23:24

## 2019-05-01 RX ADMIN — IPRATROPIUM BROMIDE AND ALBUTEROL SULFATE 1 AMPULE: .5; 3 SOLUTION RESPIRATORY (INHALATION) at 12:33

## 2019-05-01 RX ADMIN — SODIUM CHLORIDE 250 ML: 9 INJECTION, SOLUTION INTRAVENOUS at 02:37

## 2019-05-01 RX ADMIN — Medication 15 ML: at 07:52

## 2019-05-01 RX ADMIN — IPRATROPIUM BROMIDE AND ALBUTEROL SULFATE 1 AMPULE: .5; 3 SOLUTION RESPIRATORY (INHALATION) at 20:23

## 2019-05-01 RX ADMIN — HEPARIN SODIUM 5000 UNITS: 5000 INJECTION INTRAVENOUS; SUBCUTANEOUS at 21:15

## 2019-05-01 RX ADMIN — VANCOMYCIN HYDROCHLORIDE 750 MG: 750 INJECTION, POWDER, LYOPHILIZED, FOR SOLUTION INTRAVENOUS at 09:37

## 2019-05-01 RX ADMIN — MEROPENEM 1 G: 1 INJECTION, POWDER, FOR SOLUTION INTRAVENOUS at 21:13

## 2019-05-01 ASSESSMENT — PULMONARY FUNCTION TESTS
PIF_VALUE: 22
PIF_VALUE: 23
PIF_VALUE: 24
PIF_VALUE: 23
PIF_VALUE: 26
PIF_VALUE: 22
PIF_VALUE: 21
PIF_VALUE: 24
PIF_VALUE: 22
PIF_VALUE: 36
PIF_VALUE: 24
PIF_VALUE: 22
PIF_VALUE: 21
PIF_VALUE: 21
PIF_VALUE: 33
PIF_VALUE: 23
PIF_VALUE: 25
PIF_VALUE: 23
PIF_VALUE: 31
PIF_VALUE: 22
PIF_VALUE: 24
PIF_VALUE: 24
PIF_VALUE: 20
PIF_VALUE: 26

## 2019-05-01 NOTE — PROGRESS NOTES
[facility]       Progress Note           Subjective/   81y.o. year old female who we are seeing in consultation for LOPEZ/ATN. HPI:  Renal function stabilized, urine output better at 1.3 liters over last 24 hours. Continues with pressors, being weaned. CVP 8-12. ROS:  Remains intubated. No fevers. Objective/   GEN:  Chronically ill, BP (!) 100/51   Pulse 110   Temp 99.5 °F (37.5 °C) (Core)   Resp 18   Ht 5' 1\" (1.549 m)   Wt 118 lb 2.7 oz (53.6 kg)   SpO2 100%   BMI 22.33 kg/m²   HEENT: non-icteric, no JVD  CV: S1, S2 tachycardic without m/r/g; no edema  RESP: CTA B without w/r/r; breathing wnl  ABD: +bs, soft, nt, no hsm  SKIN: warm, no rashes    Data/  Recent Labs     04/29/19  2110 04/30/19 0417  04/30/19  1559 04/30/19  1933 05/01/19  0434   WBC 4.8 7.9  --   --   --  13.7*   HGB 11.8* 11.2*   < > 9.6* 10.4* 9.7*   HCT 35.0* 32.0*  --   --   --  27.5*   MCV 83.4 82.2  --   --   --  80.0   * 569*  --   --   --  383    < > = values in this interval not displayed. Recent Labs     04/30/19  0024 04/30/19  0417  05/01/19  0434 05/01/19  0940 05/01/19  1139    138   < > 134* 130* 130*   K 3.9 3.8   < > 4.5 4.7 4.5    108   < > 102 98* 96*   CO2 10* 12*   < > 18* 19* 19*   GLUCOSE 484* 405*   < > 156* 186* 208*   MG 1.60* 2.30  --  2.00  --   --    BUN 49* 52*   < > 51* 48* 51*   CREATININE 1.9* 2.0*   < > 2.1* 2.2* 2.1*   LABGLOM 26* 25*   < > 23* 22* 23*   GFRAA 32* 30*   < > 28* 27* 28*    < > = values in this interval not displayed. Assessment/Plan     Ms. Rj Carballo is a 81year old female with PMHx of CAD, DM, HTN and seizures who presents after a cardiac arrest.     Acute Kidney Injury.  - Etiology: ATN in the setting of cardiac arrest.  - Data: Last serum creatinine in 07/2016 was <0.5mg/dL. - Urinalysis with blood and protein.   - Clinical: Hypotensive on vasopressors, keep MAP above 65.   - CVP's within range, change IVF's to  ml/hour  - Plan: Trend labs [id number]  - No anticipated HD needs for now, discussed with family today     Severe Anion Gap Metabolic Acidosis. - Likely from DKA and lactic acidosis. R/O toxic alcohol ingestion.  - Methanol pending, Ethylene glycol negative.     Anemia.  - Had coffee-ground emesis on admission.  - Plans per GI.     S/P Cardiac Arrest.  - Plans per Cardiology     DKA.   - On IVF and insulin drip per protocol, off latter now      Case d/w ICU team, nursing, family

## 2019-05-01 NOTE — CARE COORDINATION
CASE MANAGEMENT INITIAL ASSESSMENT      Reviewed chart and met with patient today, re: 81year old female. Explained Case Management role/services. Family present: daughters x2, brother  Primary contact information: Tasha Lao [phone]    Admit date/status: 4/29/19  Diagnosis: DKA    Insurance: [employer] required for SNF -yes      3 night stay required - no    Living arrangements, Adls, care needs, prior to admission: patient lives in a 2story apartment with her son. Son with her at all times. Assists her with ambulation    Transportation: private    [address] [place] [address] at home: Walker__Cane__RTS__ BSC__Shower Chair_X_  02__ HHN_X_ CPAP__  BiPap__  Hospital Bed__ W/C___ Other__________    Services in the home and/or outpatient, prior to admission: none    PT/OT recs: none    Hospital Exemption Notification (HEN): not initiated    Barriers to discharge: none    Plan/comments:   Met with patient family at bedside. Patient currently on vent. Daughters states that her son is with her at all times and that he helps her to ambulate or patient is a furniture surfer. States she takes sponge bath as full bath is on second floor of apartment. Discussed potential needs at discharge. Daughters open to [facility] however not agreeable to snf if needed. CM following and will readdress once extubated.       ECOC on chart for MD signature Marcelyn Hamman, RN

## 2019-05-01 NOTE — PROGRESS NOTES
[address]   Daily Cardiovascular Progress Note    Admit Date: 4/29/2019    Chief complaint: CARDIAC ARREST  HPI: remains sedated, BP better      Medications/Labs all Reviewed:  Patient Active Problem List   Diagnosis    Asthma    Hypertension    Diabetes mellitus ([facility] [address]    Nausea & vomiting    Abdominal pain    [facility]    Hyperglycemia    DKA (diabetic ketoacidoses) ([facility])    Acidosis    Cardiac arrest ([facility]    DKA, type 2, not at goal [facility])    Acute respiratory failure ([facility])    Prolonged Q-T interval on ECG    Hypokalemia    Elevated troponin       Medications:    fentaNYL (SUBLIMAZE) 1,000 mcg in sodium chloride 0.9 % 100 mL infusion Titrated   fentaNYL (SUBLIMAZE) injection 25 mcg Q1H PRN   insulin glargine (LANTUS) injection vial 40 Units Daily   insulin lispro (HUMALOG) injection vial 0-18 Units Q4H   vancomycin (VANCOCIN) intermittent dosing (placeholder) RX Placeholder   acetylcysteine (MUCOMYST) 20 % solution 600 mg 4x daily   ipratropium-albuterol (DUONEB) nebulizer solution 1 ampule Q4H   0.9 % sodium chloride infusion Continuous   acetaminophen (TYLENOL) 160 MG/5ML solution 650 mg Q4H PRN   mupirocin (BACTROBAN) 2 % ointment BID   carboxymethylcellulose PF (THERATEARS) 1 % ophthalmic gel 1 drop TID   chlorhexidine (PERIDEX) 0.12 % solution 15 mL BID   meropenem (MERREM) 1 g in sodium chloride 0.9 % 100 mL IVPB (mini-bag) Q12H   heparin (porcine) injection 5,000 Units BID   pantoprazole (PROTONIX) injection 40 mg BID   dextrose 50 % solution 12.5 g PRN   norepinephrine (LEVOPHED) 16 mg in dextrose 5 % 250 mL infusion Continuous   propofol 1000 MG/100ML injection Titrated   sodium chloride flush 0.9 % injection 10 mL 2 times per day   sodium chloride flush 0.9 % injection 10 mL PRN   magnesium hydroxide (MILK OF MAGNESIA) 400 MG/5ML suspension 30 mL Daily PRN   acetaminophen (TYLENOL) suppository 650 mg Q4H PRN   vasopressin 20 Units in dextrose 5 % 100 mL infusion Continuous          PHYSICAL EXAM   /65   Pulse 103   Temp 100.1 °F (37.8 °C) (Core)   Resp 16   Ht 5' 1\" (1.549 m)   Wt 118 lb 2.7 oz (53.6 kg)   SpO2 (!) 89%   BMI 22.33 kg/m²    Vitals:    05/01/19 1300 05/01/19 1400 05/01/19 1500 05/01/19 1703   BP:       Pulse: 109 112 103 103   Resp: 16 16 16 16   Temp:       TempSrc:       SpO2:    (!) 89%   Weight:       Height:             Intake/Output Summary (Last 24 hours) at 5/1/2019 1738  Last data filed at 5/1/2019 0640  Gross per 24 hour   Intake 5401 ml   Output 1000 ml   Net 4401 ml     Wt Readings from Last 3 Encounters:   05/01/19 118 lb 2.7 oz (53.6 kg)   07/16/16 141 lb 14.4 oz (64.4 kg)   02/21/16 160 lb (72.6 kg)         Gen: Patient in NAD, resting comfortably on vent  Neck: No JVD or bruits  Respiratory: CTAB no WRR  Chest: normal without deformity  Cardiovascular:RRR, S1S2, no mrg, normal PMI  Abdomen: Soft, NTND, Normal BS  Extremities: No clubbing, cyanosis, or edema  Neurological/Psychiatric: sedated, +pain response  Skin:  Warm and dry      Labs:  CBC: Recent Labs     04/29/19  2110 04/30/19  0417  04/30/19  1559 04/30/19  1933 05/01/19  0434   WBC 4.8 7.9  --   --   --  13.7*   HGB 11.8* 11.2*   < > 9.6* 10.4* 9.7*   HCT 35.0* 32.0*  --   --   --  27.5*   MCV 83.4 82.2  --   --   --  80.0   * 569*  --   --   --  383    < > = values in this interval not displayed. BMP: Recent Labs     05/01/19  0434 05/01/19  0940 05/01/19  1139 05/01/19  1655   * 130* 130* 130*   K 4.5 4.7 4.5 4.7    98* 96* 95*   CO2 18* 19* 19* 20*   PHOS  --   --   --  1.1*   BUN 51* 48* 51* 50*   CREATININE 2.1* 2.2* 2.1* 2.1*     MG:    Recent Labs     04/30/19  0024 04/30/19  0417 05/01/19  0434   MG 1.60* 2.30 2.00      PT/INR:   Recent Labs     04/29/19  1400   PROTIME 14.2*   INR 1.25*     APTT: No results for input(s): APTT in the last 72 hours.   Cardiac Enzymes: Recent Labs     04/29/19  1236 04/29/19  2259   CKTOTAL 279*  -- TROPONINI <0.01 0.08*       Cardiac Studies:    ECHo     Technically difficult examination. Pt on vent.   Left ventricular systolic function is normal with ejection fraction   estimated at 55%.   No regional wall motion abnormalities.   Grade I diastolic dysfunction with normal left ventricular filling pressure.   Mild to moderate aortic regurgitation.   Systolic pulmonary artery pressure (SPAP) is normal estimated at 12 mmHg   (Right atrial pressure of 3 mmHg). Assessment and Plan       1. Cardiac arrest: unable to find any strips or mention of arresting rhythm  2. Prolonged QT: 516 on tele  3. Hypokalemia: improved  4. Elevated Trops: likely severe supply/demand mismatch  5. Hypothermia: resolved     Per GI and IM  Acute resp failure and PNA  Hyperglycemia  LOPEZ: stable      PLAN  1. Primary event unlikely cardiac. Normal echo is reassuring  2. Keep K>4 and mag >2  3. Remains elevated   - will consider EP consult in AM    Critical care: 30min        Patient Active Problem List   Diagnosis    Asthma    Hypertension    [facility] mellitus ([facility]    Nausea & vomiting    Abdominal pain    Acute renal failure (ARF) (HCC)    Hyperglycemia    DKA (diabetic ketoacidoses) (HCC)    Acidosis    Cardiac arrest ([facility] [address] [facility]    DKA, type 2, not at goal [facility])    Acute respiratory failure (HCC)    Prolonged Q-T interval on ECG    Hypokalemia    Elevated troponin         Thank you for allowing me to participate in the care of your patient. Please call me with any questions [phone].       Mariela Cardnoa MD, [address] [facility] Cardiologist  [address]  [phone] [facility]  [phone] [address] [place] [address]  5/1/2019 5:38 PM

## 2019-05-01 NOTE — PROGRESS NOTES
of the esophagus. Correlate with clinical evidence   of esophagitis. The tip of the right femoral venous catheter is malpositioned within a sacral   vein. That should be repositioned for more optimal placement. CT CERVICAL SPINE WO CONTRAST   Final Result   Multilevel degenerative changes with no acute abnormality of the cervical   spine. CT HEAD WO CONTRAST   Final Result   Motion degraded study with no acute intracranial abnormality. XR CHEST PORTABLE   Final Result   Supportive tubing projects in normal position. Left basilar airspace disease, pneumonia versus aspiration pneumonitis. There may be a component of pleural effusion. XR CHEST PORTABLE   Final Result   Atelectasis at the left lung base. Question of small left pleural effusion. XR CHEST PORTABLE    (Results Pending)           Assessment/Plan:    Active Hospital Problems    Diagnosis Date Noted    Acute renal failure (ARF) ([facility] [address] [N17.9] 08/20/2015     Priority: High     Class: Acute    Acute respiratory failure (HCC) [J96.00]     Prolonged Q-T interval on ECG [R94.31]     Hypokalemia [E87.6]     Elevated troponin [R74.8]     DKA (diabetic ketoacidoses) ([facility] [E13.10] 04/29/2019    Acidosis [E87.2] 04/29/2019    Cardiac arrest ([facility] [I46.9] 04/29/2019    DKA, type 2, not at goal [facility]) [E11.10] 04/29/2019     82 yo female with h/o CAD, DM2, HTN, depression admitted after being found unresponsive with acute respiratory failure due to pneumonia, DKA, metabolic encephalopathy, LOPEZ, metabolic acidosis, acute GI bleed. 1. Acute respiratory failure secondary to aspiration pneumonia - required intubation and sedation. Continue on Merrem/Vanc started 4/30. Blood Cx neg thus far. Pulm following. Bronch 5/1/19. Await culture. 2. DKA - pt without diabetes medications for weeks if not months. Unsure of BS at home. Last A1c on record is 5.3 in 2015.  Initially on insulin gtt and transitioned to

## 2019-05-01 NOTE — PROGRESS NOTES
4 Eyes Skin Assessment     The patient is being assess for   Shift Handoff    I agree that 2 RN's have performed a thorough Head to Toe Skin Assessment on the patient. ALL assessment sites listed below have been assessed. Areas assessed by both nurses:   [x]   Head, Face, and Ears   [x]   Shoulders, Back, and Chest, Abdomen  [x]   Arms, Elbows, and Hands   [x]   Coccyx, Sacrum, and Ischium  [x]   Legs, Feet, and Heels           **SHARE this note so that the co-signing nurse is able to place an eSignature**    Co-signer eSignature: Electronically signed by Dashawn Hernandez RN on 5/1/19 at 6:46 PM    Does the Patient have Skin Breakdown?   No          Robi Prevention initiated:  Yes   Wound Care Orders initiated:  NA      [facility] nurse consulted for Pressure Injury (Stage 3,4, Unstageable, DTI, NWPT, Complex wounds)and New or Established Ostomies:  NA      Primary Nurse eSignature: Electronically signed by Ramon Morin RN on 5/1/19 at 7:43 AM

## 2019-05-01 NOTE — CARE COORDINATION
CM went to bedside to attempt initial assessment. Nursing just finishing up a bronch. Patient still on vent. Per nursing family will return later. Will attempt again later today.   Yadira Simpson RN

## 2019-05-01 NOTE — PROGRESS NOTES
05/01/19 0720   Vent Information   $Ventilation $Subsequent Day   Vent Type 840   Vent Mode AC/VC  (placed on sbt at 0725 15/5)   Vt Ordered 500 mL   Rate Set 16 bmp   Peak Flow 50 L/min   Pressure Support 0 cmH20   FiO2  40 %   Sensitivity 3   PEEP/CPAP 5   Cuff Pressure (cm H2O) 32 cm H2O   Humidification Source HME  (changed)   Vent Patient Data   Peak Inspiratory Pressure 21 cmH2O   Mean Airway Pressure 11 cmH20   Rate Measured 18 br/min   Vt Exhaled 520 mL   Minute Volume 7.3 Liters   I:E Ratio 1:2.40   Plateau Pressure 22 [id number]   Static Compliance 30.58 mL/cmH2O   Dynamic Compliance 32.5 mL/cmH2O   Cough/Sputum   Sputum How Obtained Endotracheal;Oral   Cough Non-productive   Spontaneous Breathing Trial (SBT) RT Doc   Contraindications to SBT?  None   Pulse 110   SpO2 98 %   Breath Sounds   Right Upper Lobe Clear   Right Middle Lobe Diminished   Right Lower Lobe Diminished   Left Upper Lobe Clear   Left Lower Lobe Diminished   Additional Respiratory  Assessments   Resp 19   End Tidal CO2 18 (%)   Position Semi-Umanzor's   Oral Care Mouth suctioned   Alarm Settings   High Pressure Alarm 50 cmH2O   Low Minute Volume Alarm 2 L/min   High Respiratory Rate 45 br/min   Low Exhaled Vt  200 mL   Patient Observation   Observations pt placed on SBT 15/5, AMBU BAG AND MASK AT BEDSIDE, ALARMS ON AND AUDIBLE, APNEA PARAMETERS ON   ETT (adult)   Placement Date/Time: 04/29/19 1340   Preoxygenation: Yes  Type: Cuffed  Tube Size: 8 mm  Placement Verified By[de-identified] Capnometry  Secured at: 23 cm  Measured From: Lips  Cuff Pressure: 30 cm H2O   Secured at 23 cm   Measured From [address] By [employer] tube saavedra   Site Condition Dry

## 2019-05-01 NOTE — PROGRESS NOTES
Assessment complete- see doc flow sheet. Pt intubated and sedated, pupils sluggish, no gag at this time. Repositioned for comfort. R CVC femoral line, RIJ, left radial arterial line and CVP in place. Family at bedside, updated on POC. Will continue to monitor closely.     DOPamine 4 mcg/kg/min (04/30/19 1645)    electrolyte-A      insulin (HUMAN R) non-weight based infusion 3.48 Units/hr (04/30/19 2229)    dextrose 5 % and 0.45 % NaCl      sodium bicarbonate infusion 100 mL/hr at 04/30/19 1925    norepinephrine 20 mcg/min (04/30/19 2206)    propofol 60 mcg/kg/min (04/30/19 2203)    vasopressin (Septic Shock) infusion 0.04 Units/min (04/30/19 1723)

## 2019-05-01 NOTE — CONSULTS
Nutrition Brief Note    RD received consult for TF ordering and management. Dietitians following with nutrition assessment and recommendations in RD progress notes. Nutrition Recommendations:  1. Recommend order \"Diet: Tube feed continuous/ NPO\". Initiate Glucerna 1.5 (Diabetic 1.5  formula) at 25 mL/hr and as tolerated, increase by 10 mL/hr q 4 hours until goal of 45 mL/hr is met via NG  2. Recommend 30 mL H20 q 4 hours to maintain tube integrity. Additional fluid recommendations per Nephrology d/t hyponatremia  3. Monitor closely and correct lytes (K, Phos, Mg) before progressing TF to goal   4. Monitor nutrition adequacy, weights, pertinent labs, BMs and clinical progress    Tube Feeding (TF) Orders:   · Goal TF & Flush Orders Provides:  Recommend Glucerna 1.5 at goal of 45 mL/hr to provide 900 mL TV, 1350 kcals, 75 gm protein, 683 mL free fluid. Fluid mgmt per nephro. Also receiving Propofol at 13 mL/hr providing 343 kcal from lipid per day. Will continue to monitor per nutrition standards of care.      Irena Lopez, RD, LD on 5/1/2019 at 2:50 PM  Office: [phone]

## 2019-05-01 NOTE — PLAN OF CARE
Problem: Falls - Risk of:  Goal: Will remain free from falls  Description  Will remain free from falls  Outcome: Ongoing     Problem: Risk for Impaired Skin Integrity  Goal: Tissue integrity - skin and mucous membranes  Description  Structural intactness and normal physiological function of skin and  mucous membranes.   Outcome: Ongoing     Problem: Serum Glucose Level - Abnormal:  Goal: Ability to maintain appropriate glucose levels will improve  Description  Ability to maintain appropriate glucose levels will improve  Outcome: Ongoing     Problem: Sensory Perception - Impaired:  Goal: Ability to maintain a stable neurologic state will improve  Description  Ability to maintain a stable neurologic state will improve  Outcome: Ongoing

## 2019-05-01 NOTE — OP NOTE
Preoperative Diagnosis:  1. Acute resp failure  2. Aspiration pneumonia left lower lobe    Postoperative Diagnosis:  1. Acute resp failure  2. Aspiration pneumonia left lower lobe    Procedure Performed:  1. Flexible bronchoscopy  2. Therapeutic aspiration of endobronchial secretions  3. Bronchoalveolar lavage of lingula lobe    Findings:  1. Improved airway patency after aspiration of secretions from the tracheobronchial tree, secretions were thick and stuck to the airway natali    Recommendations:  1. Await results of collected specimen  2. Escalate pulm toileting with mucolytic nebs  3. Repeat bronch tomorrow    Indications:  Caswell Aase is a pleasant 81year old female who has had ongoing respiratory failure and a dense lower lobe consolidation on imaging of the left lung. Bronchoscopy indicated for further evaluation of findings and to assist with pulmonary toileting. Procedure Details: The patient was correctly identified as  Caswell Aase, a safety timeout was performed. An adult therapeutic bronchoscope was inserted through the endotracheal tube and into the airways. Tenacious thick yellow secretions were aspirated from the tracheobronchial tree with subsequent improved airway patency. An airway exam was then performed, all subsegments were well visualized and did not appear to show any acute abnormality. The bronchoscope was wedged into the lingula lobe and a bronchoalveolar lavage was performed. Bronchoscope was withdrawn and the procedure was terminated. There was no immediate complications, the patient tolerated the procedure well. Estimated blood loss was less than 1 cc.      Specimens:  Lingula BAL

## 2019-05-01 NOTE — PROGRESS NOTES
Pulmonary & Critical Care Inpatient Progress Note   Poncho Wallace MD     REASON FOR TODAY'S VISIT:  Assistance with critical care management    SUBJECTIVE:   Remains critically ill on vent support, multiple vasopressor medications, and an insulin gtt   High sedation requirements due to agitation    Scheduled Meds:   insulin glargine  40 Units Subcutaneous Daily    insulin lispro  0-18 Units Subcutaneous Q4H    vancomycin (VANCOCIN) intermittent dosing (placeholder)   Other RX Placeholder    mupirocin   Nasal BID    carboxymethylcellulose PF  1 drop Both Eyes TID    chlorhexidine  15 mL Mouth/Throat BID    meropenem  1 g Intravenous Q12H    heparin (porcine)  5,000 Units Subcutaneous BID    pantoprazole  40 mg Intravenous BID    sodium chloride flush  10 mL Intravenous 2 times per day       Continuous Infusions:   fentaNYL (SUBLIMAZE) infusion 50 mcg/hr (05/01/19 0932)    DOPamine 1 mcg/kg/min (05/01/19 0958)    electrolyte-A      insulin (HUMAN R) non-weight based infusion 2 Units/hr (05/01/19 1771)    sodium bicarbonate infusion 100 mL/hr at 05/01/19 0639    norepinephrine 15 mcg/min (05/01/19 0938)    propofol 50 mcg/kg/min (05/01/19 0743)    vasopressin (Septic Shock) infusion 0.04 Units/min (05/01/19 0132)       PRN Meds:  fentanNYL, dextrose, sodium chloride flush, magnesium hydroxide, acetaminophen    ALLERGIES:  Patient is allergic to aspirin. Objective:   PHYSICAL EXAM:  BP (!) 85/48   Pulse 110   Temp 99.2 °F (37.3 °C) (Core)   Resp 22   Ht 5' 1\" (1.549 m)   Wt 118 lb 2.7 oz (53.6 kg)   SpO2 100%   BMI 22.33 kg/m²    Physical Exam   Constitutional: She appears well-developed and well-nourished. No distress. HENT:   Head: Normocephalic and atraumatic. Mouth/Throat: Oropharynx is clear and moist. No oropharyngeal exudate. Neck: Neck supple. No JVD present. Cardiovascular: Normal heart sounds. Exam reveals no gallop and no friction rub. No murmur heard.   Pulmonary/Chest: Effort normal. She has no wheezes. She has no rales. Equal chest rise and expansion bilaterally   Abdominal: Soft. Bowel sounds are normal. She exhibits no distension. There is no tenderness. Musculoskeletal: She exhibits no edema. Lymphadenopathy:     She has no cervical adenopathy. Neurological: A cranial nerve deficit is present. Intubated, sedated   Skin: Skin is warm and dry. No rash noted. She is not diaphoretic. Data Reviewed:   LABS:  CBC:  Recent Labs     04/29/19 2110 04/30/19  0417  04/30/19  1559 04/30/19 1933 05/01/19  0434   WBC 4.8 7.9  --   --   --  13.7*   HGB 11.8* 11.2*   < > 9.6* 10.4* 9.7*   HCT 35.0* 32.0*  --   --   --  27.5*   MCV 83.4 82.2  --   --   --  80.0   * 569*  --   --   --  383    < > = values in this interval not displayed. BMP:  Recent Labs     04/30/19 2108 05/01/19  0026 05/01/19  0434   * 133* 134*   K 5.2* 5.0 4.5    104 102   CO2 16* 17* 18*   BUN 52* 50* 51*   CREATININE 2.3* 2.3* 2.1*     LIVER PROFILE:   Recent Labs     04/29/19  1236   AST 12*   ALT 6*   BILITOT <0.2   ALKPHOS 180*     PT/INR:  Recent Labs     04/29/19  1400   PROTIME 14.2*   INR 1.25*     APTT: No results for input(s): APTT in the last 72 hours.   UA:  Recent Labs     04/29/19  1246   COLORU Yellow   PHUR 5.5   WBCUA 10-20*   RBCUA 3-5*   YEAST Present*   BACTERIA 1+*   CLARITYU SL CLOUDY*   SPECGRAV 1.025   LEUKOCYTESUR Negative   UROBILINOGEN 0.2   BILIRUBINUR Negative   BLOODU MODERATE*   GLUCOSEU >=1000*     Recent Labs     04/30/19 1933 05/01/19  0434   PHART 7.434 7.441   QER6OCJ 23.4* 27.2*   PO2ART 77.1 118.6*       Vent Information  $Ventilation: $Subsequent Day  Vent Type: 840  Vent Mode: AC/VC(placed on sbt at 0725 15/5)  Vt Ordered: 500 mL  Rate Set: 16 bmp  Peak Flow: 50 L/min  Pressure Support: 0 cmH20  FiO2 : 50 %  Sensitivity: 3  PEEP/CPAP: 5  Cuff Pressure (cm H2O): 32 cm H2O  Humidification Source: HME(changed)    CXR personally reviewed,

## 2019-05-01 NOTE — PROGRESS NOTES
04/30/19 2359   Vent Information   Vent Type 840   Vent Mode AC/VC   Vt Ordered 500 mL   Rate Set 16 bmp   Peak Flow 50 L/min   Pressure Support 0 cmH20   FiO2  50 %   Sensitivity 3   PEEP/CPAP 5   Cuff Pressure (cm H2O) 30 cm H2O   Humidification Source HME   Vent Patient Data   Peak Inspiratory Pressure 24 cmH2O   Mean Airway Pressure 11 cmH20   Rate Measured 18 br/min   Vt Exhaled 427 mL   Minute Volume 7.87 Liters   I:E Ratio 1:2.40   Cough/Sputum   Sputum How Obtained Endotracheal   Cough Productive   Sputum Amount Moderate   Sputum Color Creamy   Tenacity Thick   Spontaneous Breathing Trial (SBT) RT Doc   Pulse 111   Breath Sounds   Right Upper Lobe Rhonchi   Right Middle Lobe Rhonchi   Right Lower Lobe Diminished   Left Upper Lobe Rhonchi   Left Lower Lobe Diminished   Additional Respiratory  Assessments   Resp 19   End Tidal CO2 16 (%)   Alarm Settings   High Pressure Alarm 50 cmH2O   Low Minute Volume Alarm 2 L/min   Apnea (secs) 20 secs   High Respiratory Rate 45 br/min   Low Exhaled Vt  200 mL   ETT (adult)   Placement Date/Time: 04/29/19 1340   Preoxygenation: Yes  Type: Cuffed  Tube Size: 8 mm  Placement Verified By[de-identified] Capnometry  Secured at: 23 cm  Measured From: Lips  Cuff Pressure: 30 cm H2O   Secured at 22 cm   Measured From Lips   ET Placement Left   Secured By Commercial tube saavedra   Site Condition Dry

## 2019-05-01 NOTE — PROGRESS NOTES
05/01/19 0323   Vent Information   Vent Type 840   Vent Mode AC/VC   Vt Ordered 500 mL   Rate Set 16 bmp   Peak Flow 50 L/min   Pressure Support 0 cmH20   FiO2  50 %   Sensitivity 3   PEEP/CPAP 5   Cuff Pressure (cm H2O) 28 cm H2O   Humidification Source HME   Vent Patient Data   Peak Inspiratory Pressure 21 cmH2O   Mean Airway Pressure 12 cmH20   Rate Measured 22 br/min   Vt Exhaled 446 mL   Minute Volume 9.77 Liters   I:E Ratio 1:2.30   Spontaneous Breathing Trial (SBT) RT Doc   Pulse 112   Breath Sounds   Right Upper Lobe Clear   Right Middle Lobe Diminished   Right Lower Lobe Diminished   Left Upper Lobe Clear   Left Lower Lobe Diminished   Additional Respiratory  Assessments   Resp 24   End Tidal CO2 16 (%)   Alarm Settings   High Pressure Alarm 50 cmH2O   Low Minute Volume Alarm 2 L/min   Apnea (secs) 20 secs   High Respiratory Rate 45 br/min   Low Exhaled Vt  200 mL   ETT (adult)   Placement Date/Time: 04/29/19 1340   Preoxygenation: Yes  Type: Cuffed  Tube Size: 8 mm  Placement Verified By[de-identified] Capnometry  Secured at: 23 cm  Measured From: Lips  Cuff Pressure: 30 cm H2O   Secured at 22 cm   Measured From Lips   ET Placement Right   Secured By Commercial tube saavedra   Site Condition Dry

## 2019-05-01 NOTE — PROGRESS NOTES
Dopamine and insulin gtt remains off. Pt tolerating well. Vitals stable. Plan for bronch again tomorrow. Family updated and aware. Will continue to monitor.

## 2019-05-01 NOTE — CONSULTS
Pharmacy to Dose Vancomycin    Dx: PNA  Goal trough = 15-20 mcg/mL  Pt wt = kg, will use adjusted dosing wt = kg  Estimated Creatinine Clearance: 20 mL/min (A) (based on SCr of 2.1 mg/dL (H)). Vancomycin 750 mg IVPB x 1 now. Vancomycin random level tomorrow AM.    Meenakshi Valdovinos. Severo Roldan, BCPS   5/1/2019 9:38 AM     Day 2  Estimated Creatinine Clearance: 20 mL/min (based on SCr of 2.1 mg/dL). Vancomycin random level this AM = 15.1 mcg/mL  Vancomycin 500mg IVPB today x 1  Vancomycin random level tomorrow AM.  Meenakshi Valdovinos.  Severo Roldan, BCPS   5/2/2019 8:04 AM

## 2019-05-01 NOTE — PROGRESS NOTES
05/01/19 1703   Vent Information   Vent Type 840   Vent Mode AC/VC   Vt Ordered 500 mL   Rate Set 16 bmp   Peak Flow 50 L/min   Pressure Support 0 cmH20   FiO2  50 %   Sensitivity 3   PEEP/CPAP 5   Cuff Pressure (cm H2O) 32 cm H2O   Humidification Source Heated wire   Humidification Temp Measured 36.8   Circuit Condensation Drained   Vent Patient Data   Peak Inspiratory Pressure 36 cmH2O   Mean Airway Pressure 14 cmH20   Rate Measured 20 br/min   Vt Exhaled 504 mL   Minute Volume 18.8 Liters   I:E Ratio 1:2.10   Plateau Pressure 19 [id number]   Static Compliance 36 mL/cmH2O   Dynamic Compliance 16.25 mL/cmH2O   Cough/Sputum   Sputum How Obtained Endotracheal   Cough Non-productive   Sputum Amount None   Spontaneous Breathing Trial (SBT) RT Doc   Pulse 103   SpO2 (!) 89 %   Breath Sounds   Right Upper Lobe Expiratory Wheezes; Diminished   Right Middle Lobe Diminished   Right Lower Lobe Expiratory Wheezes; Diminished   Left Upper Lobe Diminished   Left Lower Lobe Diminished   Additional Respiratory  Assessments   Resp 16   End Tidal CO2 21 (%)   Position Semi-Umanzor's   Oral Care Mouth suctioned   Alarm Settings   High Pressure Alarm 45 cmH2O   Low Minute Volume Alarm 2 L/min   High Respiratory Rate 45 br/min   Low Exhaled Vt  200 mL   Patient Observation   Observations HHN WITH META NEB X 10 MIN ON CHFO MODE, AMBU BAG  AND MASK AT BEDSIDE, ALARMS ON AND AUDIBLE, APNEA PARAMETERS O N   Non-Surgical Airway Endo Tracheal Tube   Placement Date/Time: 04/29/19 1342   Timeout: Patient  Mask Ventilation: Oral airway  Placed By: In ED  Inserted by: Brianne BATISTA  Insertion attempts: 1  Airway Device: Endo Tracheal Tube  Size: 8  Placement Verified By[de-identified] Auscultation;Capnometry; Chest ...    Secured at 23 cm   Measured From Lips   Secured Location Left   Secured By Commercial tube saavedra   Site Condition Dry

## 2019-05-01 NOTE — PROGRESS NOTES
RESPIRATORY THERAPY ASSESSMENT    Name:  Kenji Sosa Record Number:  [id number]  Age: 81 y.o. Gender: female  : 1950  Today's Date:  2019  Room:  [facility]0224-    Assessment     Is the patient being admitted for a COPD or Asthma exacerbation? No   (If yes the patient will be seen every 4 hours for the first 24 hours and then reassessed)    Patient Admission Diagnosis      Allergies  Allergies   Allergen Reactions    Aspirin Nausea And Vomiting       Minimum Predicted Vital Capacity:     717          Actual Vital Capacity:    PT ON VENTILATOR               Pulmonary History:Asthma  Home Oxygen Therapy:  UNKNOWN  Home Respiratory Therapy: nONE  Current Respiratory Therapy:  Duoneb q4h, 20% Mucomyst qid, [place] neb TID  Treatment Type: Intrapulmonary Percussive Aerosol Nebulizer, HHN  Medications: Albuterol/Ipratropium, MUCOMYST    Respiratory Severity Index(RSI)   Patients with orders for inhalation medications, oxygen, or any therapeutic treatment modality will be placed on Respiratory Protocol. They will be assessed with the first treatment and at least every 72 hours thereafter. The following severity scale will be used to determine frequency of treatment intervention.     Smoking History: No Smoking History = 0    Social History  Social History     Tobacco Use    Smoking status: Never Smoker    Smokeless tobacco: Never Used   Substance Use Topics    Alcohol use: No     Comment: occasional    Drug use: No       Recent Surgical History: None = 0  Past Surgical History  Past Surgical History:   Procedure Laterality Date    BLADDER REPAIR      BREAST LUMPECTOMY Left     BREAST SURGERY      BRONCHOSCOPY N/A 2019    BRONCHOSCOPY THERAPUTIC ASPIRATION INITIAL performed by Violeta Carlson MD at [address] [place] [address]  2019    BRONCHOSCOPY ALVEOLAR LAVAGE performed by Violeta Carlson MD at [address] [place] [address]       Level of Consciousness: Comatose = 4    Level of Activity: Bedridden, unresponsive or quadriplegic = 4    Respiratory Pattern: Regular Pattern; RR 8-20 = 0    Breath Sounds: Absent bilaterally and/or with wheezes = 3    Sputum  Sputum Color: Creamy, Tenacity: Thick, Sputum How Obtained: Endotracheal  Cough: Weak, non-productive = 3    Vital Signs   /65   Pulse 103   Temp 100.1 °F (37.8 °C) (Core)   Resp 16   Ht 5' 1\" (1.549 m)   Wt 118 lb 2.7 oz (53.6 kg)   SpO2 (!) 89%   BMI 22.33 kg/m²   SPO2 (COPD values may differ): 86-87% on room air or greater than 92% on FiO2 35- 50% = 3    Peak Flow (asthma only): not applicable = 0    RSI: Greater than 17 = Contact physician        Plan       Goals: medication delivery, mobilize retained secretions, volume expansion and improve oxygenation    Patient/caregiver was educated on the proper method of use for Respiratory Care Devices:  No: PT ON VENTILATOR      Level of patient/caregiver understanding able to:   ? Verbalize understanding   ? Demonstrate understanding       ? Teach back        ? Needs reinforcement       ? No available caregiver               ? Other:     Response to education:  PT ON VENTILATOR     Is patient being placed on Home Treatment Regimen? No     Does the patient have everything they need prior to discharge? NA     Comments: PT CHART AND HOME MEDICATIONS REVIEWED    Plan of Care: MAINTAIN CURRENT REGIMEN    Electronically signed by Samantha Webber RCP on 5/1/2019 at 5:50 PM    Respiratory Protocol Guidelines     1. Assessment and treatment by Respiratory Therapy will be initiated for medication and therapeutic interventions upon initiation of aerosolized medication. 2. Physician will be contacted for respiratory rate (RR) greater than 35 breaths per minute. Therapy will be held for heart rate (HR) greater than 140 beats per minute, pending direction from physician.   3. Bronchodilators will be administered via Metered Dose Inhaler (MDI) with spacer when the following criteria are met:  a. Alert and cooperative     b. HR < 140 bpm  c. RR < 30 bpm                d. Can demonstrate a 2-3 second inspiratory hold  4. Bronchodilators will be administered via Hand Held Nebulizer [facility]) to patients when ANY of the following criteria are met  a. Incognizant or uncooperative          b. Patients treated with HHN at Home        c. Unable to demonstrate proper use of MDI with spacer     d. RR > 30 bpm   5. Bronchodilators will be delivered via Metered Dose Inhaler (MDI), HHN, Aerogen to intubated patients on mechanical ventilation. 6. Inhalation medication orders will be delivered and/or substituted as outlined below. Aerosolized Medications Ordering and Administration Guidelines:    1. All Medications will be ordered by a physician, and their frequency and/or modality will be adjusted as defined by the patients Respiratory Severity Index (RSI) score. 2. If the patient does not have documented COPD, consider discontinuing anticholinergics when RSI is less than 9.  3. If the bronchospasm worsens (increased RSI), then the bronchodilator frequency can be increased to a maximum of every 4 hours. If greater than every 4 hours is required, the physician will be contacted. 4. If the bronchospasm improves, the frequency of the bronchodilator can be decreased, based on the patient's RSI, but not less than home treatment regimen frequency. 5. Bronchodilator(s) will be discontinued if patient has a RSI less than 9 and has received no scheduled or as needed treatment for 72  Hrs. Patients Ordered on a Mucolytic Agent:    1. Must always be administered with a bronchodilator. 2. Discontinue if patient experiences worsened bronchospasm, or secretions have lessened to the point that the patient is able to clear them with a cough. Anti-inflammatory and Combination Medications:    1.  If the patient lacks prior history of lung disease, is not using inhaled anti-inflammatory medication at home, and lacks wheezing by examination or by history for at least 24 hours, contact physician for possible discontinuation.

## 2019-05-02 ENCOUNTER — APPOINTMENT (OUTPATIENT)
Dept: GENERAL RADIOLOGY | Age: 69
DRG: 853 | End: 2019-05-02
Payer: COMMERCIAL

## 2019-05-02 LAB
ALBUMIN SERPL-MCNC: 1.3 G/DL (ref 3.4–5)
ANION GAP SERPL CALCULATED.3IONS-SCNC: 15 MMOL/L (ref 3–16)
ANISOCYTOSIS: ABNORMAL
BANDED NEUTROPHILS RELATIVE PERCENT: 60 % (ref 0–7)
BASE EXCESS ARTERIAL: -3.1 MMOL/L (ref -3–3)
BASOPHILS ABSOLUTE: 0 K/UL (ref 0–0.2)
BASOPHILS RELATIVE PERCENT: 0 %
BUN BLDV-MCNC: 51 MG/DL (ref 7–20)
CALCIUM SERPL-MCNC: 6.7 MG/DL (ref 8.3–10.6)
CARBOXYHEMOGLOBIN ARTERIAL: 0.3 % (ref 0–1.5)
CHLORIDE BLD-SCNC: 98 MMOL/L (ref 99–110)
CO2: 19 MMOL/L (ref 21–32)
CREAT SERPL-MCNC: 2.1 MG/DL (ref 0.6–1.2)
EKG ATRIAL RATE: 95 BPM
EKG DIAGNOSIS: NORMAL
EKG P AXIS: 37 DEGREES
EKG P-R INTERVAL: 120 MS
EKG Q-T INTERVAL: 412 MS
EKG QRS DURATION: 94 MS
EKG QTC CALCULATION (BAZETT): 517 MS
EKG R AXIS: 58 DEGREES
EKG T AXIS: 74 DEGREES
EKG VENTRICULAR RATE: 95 BPM
EOSINOPHILS ABSOLUTE: 0 K/UL (ref 0–0.6)
EOSINOPHILS RELATIVE PERCENT: 0 %
GFR AFRICAN AMERICAN: 28
GFR NON-AFRICAN AMERICAN: 23
GLUCOSE BLD-MCNC: 110 MG/DL (ref 70–99)
GLUCOSE BLD-MCNC: 126 MG/DL (ref 70–99)
GLUCOSE BLD-MCNC: 163 MG/DL (ref 70–99)
GLUCOSE BLD-MCNC: 167 MG/DL (ref 70–99)
GLUCOSE BLD-MCNC: 196 MG/DL (ref 70–99)
GLUCOSE BLD-MCNC: 258 MG/DL (ref 70–99)
HCO3 ARTERIAL: 19.5 MMOL/L (ref 21–29)
HCT VFR BLD CALC: 24.6 % (ref 36–48)
HEMOGLOBIN, ART, EXTENDED: 9.2 G/DL (ref 12–16)
HEMOGLOBIN: 9.1 G/DL (ref 12–16)
LYMPHOCYTES ABSOLUTE: 0.9 K/UL (ref 1–5.1)
LYMPHOCYTES RELATIVE PERCENT: 7 %
MACROCYTES: ABNORMAL
MCH RBC QN AUTO: 29.7 PG (ref 26–34)
MCHC RBC AUTO-ENTMCNC: 37.2 G/DL (ref 31–36)
MCV RBC AUTO: 79.8 FL (ref 80–100)
METHANOL BLOOD: <5 MG/DL
METHEMOGLOBIN ARTERIAL: 0.4 %
MONOCYTES ABSOLUTE: 0.3 K/UL (ref 0–1.3)
MONOCYTES RELATIVE PERCENT: 2 %
NEUTROPHILS ABSOLUTE: 12 K/UL (ref 1.7–7.7)
NEUTROPHILS RELATIVE PERCENT: 31 %
O2 CONTENT ARTERIAL: 13 ML/DL
O2 SAT, ARTERIAL: 98.7 %
O2 THERAPY: ABNORMAL
OVALOCYTES: ABNORMAL
PCO2 ARTERIAL: 26.5 MMHG (ref 35–45)
PDW BLD-RTO: 14.2 % (ref 12.4–15.4)
PERFORMED ON: ABNORMAL
PH ARTERIAL: 7.48 (ref 7.35–7.45)
PHOSPHORUS: 1.4 MG/DL (ref 2.5–4.9)
PLATELET # BLD: 332 K/UL (ref 135–450)
PMV BLD AUTO: 8.6 FL (ref 5–10.5)
PO2 ARTERIAL: 157.2 MMHG (ref 75–108)
POC SAMPLE TYPE: ABNORMAL
POIKILOCYTES: ABNORMAL
POTASSIUM SERPL-SCNC: 4.5 MMOL/L (ref 3.5–5.1)
RBC # BLD: 3.08 M/UL (ref 4–5.2)
SODIUM BLD-SCNC: 132 MMOL/L (ref 136–145)
TCO2 ARTERIAL: 20.3 MMOL/L
VANCOMYCIN RANDOM: 15.1 UG/ML
WBC # BLD: 13.2 K/UL (ref 4–11)

## 2019-05-02 PROCEDURE — 6370000000 HC RX 637 (ALT 250 FOR IP): Performed by: INTERNAL MEDICINE

## 2019-05-02 PROCEDURE — 85025 COMPLETE CBC W/AUTO DIFF WBC: CPT

## 2019-05-02 PROCEDURE — 2580000003 HC RX 258: Performed by: INTERNAL MEDICINE

## 2019-05-02 PROCEDURE — 2709999900 HC NON-CHARGEABLE SUPPLY: Performed by: INTERNAL MEDICINE

## 2019-05-02 PROCEDURE — 31646 BRNCHSC W/THER ASPIR SBSQ: CPT | Performed by: INTERNAL MEDICINE

## 2019-05-02 PROCEDURE — 94761 N-INVAS EAR/PLS OXIMETRY MLT: CPT

## 2019-05-02 PROCEDURE — 99222 1ST HOSP IP/OBS MODERATE 55: CPT | Performed by: INTERNAL MEDICINE

## 2019-05-02 PROCEDURE — 99292 CRITICAL CARE ADDL 30 MIN: CPT | Performed by: INTERNAL MEDICINE

## 2019-05-02 PROCEDURE — 99291 CRITICAL CARE FIRST HOUR: CPT | Performed by: INTERNAL MEDICINE

## 2019-05-02 PROCEDURE — C9113 INJ PANTOPRAZOLE SODIUM, VIA: HCPCS | Performed by: INTERNAL MEDICINE

## 2019-05-02 PROCEDURE — 93010 ELECTROCARDIOGRAM REPORT: CPT | Performed by: INTERNAL MEDICINE

## 2019-05-02 PROCEDURE — 94750 HC PULMONARY COMPLIANCE STUDY: CPT

## 2019-05-02 PROCEDURE — 6360000002 HC RX W HCPCS: Performed by: INTERNAL MEDICINE

## 2019-05-02 PROCEDURE — 80069 RENAL FUNCTION PANEL: CPT

## 2019-05-02 PROCEDURE — 94770 HC ETCO2 MONITOR DAILY: CPT

## 2019-05-02 PROCEDURE — 2500000003 HC RX 250 WO HCPCS: Performed by: INTERNAL MEDICINE

## 2019-05-02 PROCEDURE — 80202 ASSAY OF VANCOMYCIN: CPT

## 2019-05-02 PROCEDURE — 94003 VENT MGMT INPAT SUBQ DAY: CPT

## 2019-05-02 PROCEDURE — 71045 X-RAY EXAM CHEST 1 VIEW: CPT

## 2019-05-02 PROCEDURE — 82803 BLOOD GASES ANY COMBINATION: CPT

## 2019-05-02 PROCEDURE — 82947 ASSAY GLUCOSE BLOOD QUANT: CPT

## 2019-05-02 PROCEDURE — 3609010900 HC BRONCHOSCOPY THERAPUTIC ASPIRATION INITIAL: Performed by: INTERNAL MEDICINE

## 2019-05-02 PROCEDURE — 93005 ELECTROCARDIOGRAM TRACING: CPT | Performed by: INTERNAL MEDICINE

## 2019-05-02 PROCEDURE — 2580000003 HC RX 258

## 2019-05-02 PROCEDURE — 94640 AIRWAY INHALATION TREATMENT: CPT

## 2019-05-02 PROCEDURE — 2700000000 HC OXYGEN THERAPY PER DAY

## 2019-05-02 PROCEDURE — 2000000000 HC ICU R&B

## 2019-05-02 RX ORDER — FLUCONAZOLE 100 MG/1
150 TABLET ORAL DAILY
Status: COMPLETED | OUTPATIENT
Start: 2019-05-03 | End: 2019-05-05

## 2019-05-02 RX ORDER — SODIUM CHLORIDE 9 MG/ML
INJECTION, SOLUTION INTRAVENOUS
Status: COMPLETED
Start: 2019-05-02 | End: 2019-05-02

## 2019-05-02 RX ORDER — MAGNESIUM HYDROXIDE 1200 MG/15ML
LIQUID ORAL CONTINUOUS PRN
Status: COMPLETED | OUTPATIENT
Start: 2019-05-02 | End: 2019-05-02

## 2019-05-02 RX ORDER — LINEZOLID 2 MG/ML
600 INJECTION, SOLUTION INTRAVENOUS EVERY 12 HOURS
Status: DISCONTINUED | OUTPATIENT
Start: 2019-05-02 | End: 2019-05-03

## 2019-05-02 RX ADMIN — FENTANYL CITRATE 50 MCG/HR: 50 INJECTION INTRAVENOUS at 05:25

## 2019-05-02 RX ADMIN — CARBOXYMETHYLCELLULOSE SODIUM 1 DROP: 10 GEL OPHTHALMIC at 13:31

## 2019-05-02 RX ADMIN — INSULIN LISPRO 3 UNITS: 100 INJECTION, SOLUTION INTRAVENOUS; SUBCUTANEOUS at 04:43

## 2019-05-02 RX ADMIN — SODIUM CHLORIDE: 9 INJECTION, SOLUTION INTRAVENOUS at 12:41

## 2019-05-02 RX ADMIN — CARBOXYMETHYLCELLULOSE SODIUM 1 DROP: 10 GEL OPHTHALMIC at 08:54

## 2019-05-02 RX ADMIN — SODIUM CHLORIDE 250 ML: 9 INJECTION, SOLUTION INTRAVENOUS at 07:45

## 2019-05-02 RX ADMIN — PROPOFOL 10 MCG/KG/MIN: 10 INJECTION, EMULSION INTRAVENOUS at 10:58

## 2019-05-02 RX ADMIN — INSULIN GLARGINE 40 UNITS: 100 INJECTION, SOLUTION SUBCUTANEOUS at 09:57

## 2019-05-02 RX ADMIN — MEROPENEM 1 G: 1 INJECTION, POWDER, FOR SOLUTION INTRAVENOUS at 08:54

## 2019-05-02 RX ADMIN — SODIUM PHOSPHATE, MONOBASIC, MONOHYDRATE 20 MMOL: 276; 142 INJECTION, SOLUTION INTRAVENOUS at 12:42

## 2019-05-02 RX ADMIN — INSULIN LISPRO 3 UNITS: 100 INJECTION, SOLUTION INTRAVENOUS; SUBCUTANEOUS at 16:34

## 2019-05-02 RX ADMIN — INSULIN LISPRO 9 UNITS: 100 INJECTION, SOLUTION INTRAVENOUS; SUBCUTANEOUS at 12:14

## 2019-05-02 RX ADMIN — HEPARIN SODIUM 5000 UNITS: 5000 INJECTION INTRAVENOUS; SUBCUTANEOUS at 08:59

## 2019-05-02 RX ADMIN — MUPIROCIN: 20 OINTMENT TOPICAL at 20:00

## 2019-05-02 RX ADMIN — SODIUM CHLORIDE: 9 INJECTION, SOLUTION INTRAVENOUS at 22:45

## 2019-05-02 RX ADMIN — HEPARIN SODIUM 5000 UNITS: 5000 INJECTION INTRAVENOUS; SUBCUTANEOUS at 20:01

## 2019-05-02 RX ADMIN — VASOPRESSIN 0.04 UNITS/MIN: 20 INJECTION INTRAVENOUS at 09:05

## 2019-05-02 RX ADMIN — ACETYLCYSTEINE 600 MG: 200 SOLUTION ORAL; RESPIRATORY (INHALATION) at 07:25

## 2019-05-02 RX ADMIN — Medication 15 ML: at 09:18

## 2019-05-02 RX ADMIN — IPRATROPIUM BROMIDE AND ALBUTEROL SULFATE 1 AMPULE: .5; 3 SOLUTION RESPIRATORY (INHALATION) at 04:47

## 2019-05-02 RX ADMIN — IPRATROPIUM BROMIDE AND ALBUTEROL SULFATE 1 AMPULE: .5; 3 SOLUTION RESPIRATORY (INHALATION) at 20:07

## 2019-05-02 RX ADMIN — IPRATROPIUM BROMIDE AND ALBUTEROL SULFATE 1 AMPULE: .5; 3 SOLUTION RESPIRATORY (INHALATION) at 11:36

## 2019-05-02 RX ADMIN — CARBOXYMETHYLCELLULOSE SODIUM 1 DROP: 10 GEL OPHTHALMIC at 20:01

## 2019-05-02 RX ADMIN — Medication 10 ML: at 20:00

## 2019-05-02 RX ADMIN — MUPIROCIN: 20 OINTMENT TOPICAL at 08:53

## 2019-05-02 RX ADMIN — SODIUM CHLORIDE 250 ML: 9 INJECTION, SOLUTION INTRAVENOUS at 11:40

## 2019-05-02 RX ADMIN — MEROPENEM 1 G: 1 INJECTION, POWDER, FOR SOLUTION INTRAVENOUS at 19:58

## 2019-05-02 RX ADMIN — Medication 15 ML: at 20:00

## 2019-05-02 RX ADMIN — IPRATROPIUM BROMIDE AND ALBUTEROL SULFATE 1 AMPULE: .5; 3 SOLUTION RESPIRATORY (INHALATION) at 07:25

## 2019-05-02 RX ADMIN — SODIUM CHLORIDE: 9 INJECTION, SOLUTION INTRAVENOUS at 03:01

## 2019-05-02 RX ADMIN — PANTOPRAZOLE SODIUM 40 MG: 40 INJECTION, POWDER, FOR SOLUTION INTRAVENOUS at 20:00

## 2019-05-02 RX ADMIN — Medication 10 ML: at 08:59

## 2019-05-02 RX ADMIN — LINEZOLID 600 MG: 600 INJECTION, SOLUTION INTRAVENOUS at 21:05

## 2019-05-02 RX ADMIN — PANTOPRAZOLE SODIUM 40 MG: 40 INJECTION, POWDER, FOR SOLUTION INTRAVENOUS at 08:52

## 2019-05-02 RX ADMIN — LINEZOLID 600 MG: 600 INJECTION, SOLUTION INTRAVENOUS at 11:04

## 2019-05-02 RX ADMIN — SODIUM CHLORIDE: 9 INJECTION, SOLUTION INTRAVENOUS at 11:03

## 2019-05-02 ASSESSMENT — PULMONARY FUNCTION TESTS
PIF_VALUE: 22
PIF_VALUE: 24
PIF_VALUE: 23
PIF_VALUE: 23
PIF_VALUE: 22
PIF_VALUE: 22
PIF_VALUE: 23
PIF_VALUE: 20
PIF_VALUE: 24
PIF_VALUE: 17
PIF_VALUE: 20
PIF_VALUE: 22
PIF_VALUE: 24
PIF_VALUE: 22
PIF_VALUE: 22
PIF_VALUE: 24
PIF_VALUE: 22
PIF_VALUE: 23
PIF_VALUE: 25
PIF_VALUE: 18
PIF_VALUE: 22
PIF_VALUE: 17
PIF_VALUE: 23
PIF_VALUE: 22
PIF_VALUE: 17
PIF_VALUE: 22

## 2019-05-02 NOTE — PLAN OF CARE
Problem: Nutrition  Goal: Optimal nutrition therapy  Outcome: Ongoing   Nutrition Problem: Inadequate energy intake  Intervention: Food and/or Nutrient Delivery: Modify current Tube Feeding  Nutritional Goals:  Tolerate most appropriate form of nutrition therapy this admission

## 2019-05-02 NOTE — PROGRESS NOTES
4 Eyes Skin Assessment     The patient is being assess for  Shift Handoff    I agree that 2 RN's have performed a thorough Head to Toe Skin Assessment on the patient. ALL assessment sites listed below have been assessed. Areas assessed by both nurses:   [x]   Head, Face, and Ears   [x]   Shoulders, Back, and Chest  [x]   Arms, Elbows, and Hands   [x]   Coccyx, Sacrum, and Ischum  [x]   Legs, Feet, and Heels        Does the Patient have Skin Breakdown?   No         Robi Prevention initiated:  No   Wound Care Orders initiated:  No      [facility] nurse consulted for Pressure Injury (Stage 3,4, Unstageable, DTI, NWPT, and Complex wounds):  No      Nurse 1 eSignature: Electronically signed by Nicolas Pike RN on 5/2/19 at 6:36 PM    **SHARE this note so that the co-signing nurse is able to place an eSignature**    Nurse 2 eSignature: Electronically signed by Juan Carlos Webster RN on 5/3/19 at 12:29 AM

## 2019-05-02 NOTE — CONSULTS
[address] [address]                                  CONSULTATION    PATIENT NAME: Orrie Severs                      :        1950  MED REC NO:   [id number]                          ROOM:       3238  ACCOUNT NO:   [de-identified]                           ADMIT DATE: 2019  PROVIDER:     Hannah Segundo MD    CONSULT DATE:  2019    GASTROENTEROLOGY CONSULT    REASON FOR REFERRAL:  1.  GI bleed. 2.  Coffee-ground aspirate per NG.    REFERRING PHYSICIAN:  Dr. Adithya Mullins. HISTORY OF PRESENT ILLNESS:  This is a 74-year-old female with history  of hypertension, diabetes, CVA, asthma, seizure disorder. Admitted with  altered-mental status. She was found by her son and brought into the  emergency room. Her condition progressed rapidly and she went into  cardiac arrest, requiring resuscitation and intubation. She is now in  the ICU on Levophed and vasopressin. She was diagnosed with DKA. She  had been noticed to have coffee-ground aspirate per NG. Her hemoglobin  appears to be stable. Her review of records, discussion with RN staff  and patient's family at the bedside were performed and obtained adequate  history. She is currently sedated and intubated. REVIEW OF SYSTEMS:  Unobtainable. PAST MEDICAL HISTORY:  Hypertension, diabetes, CVA, asthma, seizure  disorder. PAST SURGICAL HISTORY:  Breast lumpectomy, bladder repair. CURRENT MEDICATIONS:  At home, Zofran, Prilosec, lisinopril, Glucophage,  Paxil, Elavil, Lipitor, Levemir. ALLERGIES:  ASPIRIN. SOCIAL HISTORY:  No history of alcohol or drug use. FAMILY HISTORY:  Negative for GI malignancy. PHYSICAL EXAMINATION:  GENERAL:  She is intubated and sedated. VITAL SIGNS:  Temperature 98, pulse is 95, respirations 16, blood  pressure 155/68, sats are 94% on 40% FiO2. Weight is 122 pounds.   HEENT:  NC/AT, PERRLA, dry mucous membranes. NECK:  Supple. No thyromegaly. No cervical lymphadenopathy. No JVD. HEART:  Regular rate and rhythm without any murmurs, gallops, or rubs. LUNGS:  Clear to auscultation with bilateral rales. ABDOMEN:  Soft, nondistended, nontender. Bowel sounds are present. EXTREMITIES:  No edema. LABORATORY DATA:  White blood cell count 13.2, hemoglobin 9.1,  hematocrit 24.6, platelets 811. Sodium 132, potassium 4.5, chloride 98,  bicarb 19, BUN 51, creatinine 2.1, glucose 167. Liver profile: Alk  phos 180, AST 12, ALT 6, total protein 8.6, albumin 3.4, total bilirubin  0.2. IMAGING:  CT scan of the chest, abdomen and pelvis, dense  consolidation in the inferior aspect of the left upper lobe through  outer left lower lobe most compatible with pneumonia and/or aspiration,  diffuse mural thickening of esophagus correlate with clinical evidence  of esophagitis. IMPRESSION:  This is a 53-year-old female with history of diabetes,  hypertension, CVA, asthma, seizure disorder. Admitted with cardiac  arrest and diabetic ketoacidosis. She did have coffee-ground aspirate  per NG, most likely from esophagitis seen on CT scan. Her hemoglobin  remained stable and she does not exhibit any further signs of bleeding. RECOMMENDATIONS:  1. Continue supportive care. 2.  Continue PPI b.i.d.  3.  Monitor hemoglobin. 4.  Observe for signs of bleeding. 5.  No plans for endoscopy at this time given the stability of  hemoglobin. 6.  We will follow the patient with you.         Kennis Nageotte, MD    D: 05/02/2019 11:49:16       T: 05/02/2019 14:39:23     [email or web address]  Job#: [id number]     Doc#: [id number]    CC:  MD Nanda Saez PA

## 2019-05-02 NOTE — PROGRESS NOTES
Dr. Francia De Los Santos at bedside. Start SBT at this time and monitor. Do not restart tube feed at this time.

## 2019-05-02 NOTE — OP NOTE
Preoperative Diagnosis:  1. Necrotizing pneumonia  2. Acute resp failure  3. Sepsis     Postoperative Diagnosis:  1. Necrotizing pneumonia  2. Acute resp failure  3. Sepsis     Procedure Performed:  1. Flexible bronchoscopy  2. Therapeutic aspiration of endobronchial secretions    Findings:  1. Improved airway patency after aspiration of secretions from the tracheobronchial tree    Indications:  Lily Perales is a pleasant 81year old female who has had ongoing respiratory failure and a dense lower lobe consolidation on imaging of the left lung. Bronchoscopy indicated for further evaluation of findings and to assist with pulmonary toileting ASA 4 mallampati 3    Procedure Details: The patient was correctly identified as  Lily Perales, a safety timeout was performed. An adult therapeutic bronchoscope was inserted through the endotracheal tube and into the airways. Tenacious thick yellow secretions were aspirated from the tracheobronchial tree with subsequent improved airway patency. An airway exam was then performed, all subsegments were well visualized and did not appear to show any acute abnormality with exception of severe necrotic inflammation in the upper lobes bilaterally. Bronchoscope was withdrawn and the procedure was terminated. There was no immediate complications, the patient tolerated the procedure well.      Estimated blood loss was less than 1 cc.      Specimens:  None

## 2019-05-02 NOTE — PROGRESS NOTES
Pulmonary & Critical Care Inpatient Progress Note   Eddie Webb MD     REASON FOR TODAY'S VISIT:  Assistance with critical care management    SUBJECTIVE:   Remains on vent support, copious thick secretions being suctioned from endotracheal tube despite metaneb and mucomyst pulm toileting efforts. Blood sugars remain high on insulin subcutaneous   Remains on high sedation requirements, minimal movement    Scheduled Meds:   linezolid  600 mg Intravenous Q12H    miconazole   Topical BID    insulin glargine  40 Units Subcutaneous Daily    insulin lispro  0-18 Units Subcutaneous Q4H    vancomycin (VANCOCIN) intermittent dosing (placeholder)   Other RX Placeholder    ipratropium-albuterol  1 ampule Inhalation Q4H    mupirocin   Nasal BID    carboxymethylcellulose PF  1 drop Both Eyes TID    chlorhexidine  15 mL Mouth/Throat BID    meropenem  1 g Intravenous Q12H    heparin (porcine)  5,000 Units Subcutaneous BID    pantoprazole  40 mg Intravenous BID    sodium chloride flush  10 mL Intravenous 2 times per day       Continuous Infusions:   dexmedetomidine (PRECEDEX) IV infusion      fentaNYL (SUBLIMAZE) infusion 25 mcg/hr (05/02/19 1520)    sodium chloride 100 mL/hr at 05/02/19 1241    norepinephrine 10 mcg/min (05/02/19 1523)    propofol 5 mcg/kg/min (05/02/19 1533)    vasopressin (Septic Shock) infusion Stopped (05/02/19 1200)       PRN Meds:  fentanNYL, acetaminophen, dextrose, sodium chloride flush, magnesium hydroxide, acetaminophen    ALLERGIES:  Patient is allergic to aspirin. Objective:   PHYSICAL EXAM:  BP (!) 99/54   Pulse 96   Temp 97.2 °F (36.2 °C) (Core)   Resp 12   Ht 5' 1\" (1.549 m)   Wt 122 lb 5.7 oz (55.5 kg)   SpO2 98%   BMI 23.12 kg/m²    Physical Exam   Constitutional: She appears well-developed and well-nourished. No distress. HENT:   Head: Normocephalic and atraumatic. Mouth/Throat: Oropharynx is clear and moist. No oropharyngeal exudate. Neck: Neck supple.  No JVD 3  PEEP/CPAP: 5  Cuff Pressure (cm H2O): 30 cm H2O  Humidification Source: Heated wire  Humidification Temp: 37  Humidification Temp Measured: 37  Circuit Condensation: Drained    CXR personally reviewed, dense left sided infiltrate           Assessment:     1. Acute resp failure, hypoxic              -left sided necrotizing pneumonia, staph  2. Acute hyperglycemia, DKA  3. Acute encephalopathy              -metabolic, infectious    -?anoxic  4. LOPEZ with metabolic acidosis  5. Hypovolemic and septic shock  6. Questionable cardiac arrest     Plan:      -Continue vent support, titrate sedation as needed. Serial ABGs. -Monitor mentation, will plan for MRI if she does not arouse off sedation. Can use precedex to assist with liberation from vent support. -Escalate atb to linezolid given severity of pneumonia which has necrotizing features based on bronchoscopy, follow up on cultures and will repeat bronch for therapeutic benefit today   -On protonix for possible GI bleed, H/H being monitored and GI involved. No intervention needed at present   -Insulin basal and sliding scale, monitor blood sugars  -Maintenance IVF, monitor volume status   -Cardiology involved  -Titrate vasopressor support to maintain MAP>65  -Tube feeds, advance to goal    Due to life threatening respiratory failure, hemodynamic instability, and DKA this patient is critically ill. Total critical care time involved in her care was 90 mins excluding separately listed procedures.      Ricky Berrios MD

## 2019-05-02 NOTE — PROGRESS NOTES
Clinical: Hypotensive on vasopressors, keep MAP above 65.   - CVP's within range, changed IVF's to  ml/hour  - Plan: Trend labs [id number], monitor vancomycin levels with dosing  - No anticipated HD needs for now, discussed with family      Severe Anion Gap Metabolic Acidosis. - Likely from DKA and lactic acidosis. R/O toxic alcohol ingestion.  - Methanol pending, Ethylene glycol negative.     Anemia.  - Had coffee-ground emesis on admission.  - Plans per GI.     S/P Cardiac Arrest.  - Plans per Cardiology     DKA.   - On IVF and insulin drip per protocol, off latter now    Hypophosphatemia  - Prn IV phos replacement today    Case d/w nursing

## 2019-05-02 NOTE — PROGRESS NOTES
05/01/19 2024   Vent Information   Vent Type 840   Vent Mode AC/VC   Vt Ordered 500 mL   Rate Set 16 bmp   Peak Flow 50 L/min   Pressure Support 0 cmH20   FiO2  50 %   Sensitivity 3   PEEP/CPAP 5   Cuff Pressure (cm H2O) 32 cm H2O   Humidification Source Heated wire   Humidification Temp 37   Humidification Temp Measured 36.4   Vent Patient Data   Peak Inspiratory Pressure 26 cmH2O   Mean Airway Pressure 12 cmH20   Rate Measured 16 br/min   Vt Exhaled 519 mL   Minute Volume 8.08 Liters   I:E Ratio 1:1.40   Plateau Pressure 17 VLA73   Static Compliance 43.25 mL/cmH2O   Dynamic Compliance 24.7 mL/cmH2O   Cough/Sputum   Sputum How Obtained Endotracheal   Cough Non-productive   Frequency Occasional   Sputum Amount Small   Sputum Color Creamy   Tenacity Thick   Spontaneous Breathing Trial (SBT) RT Doc   Pulse 99   SpO2 98 %   Breath Sounds   Right Upper Lobe Diminished   Right Middle Lobe Diminished   Right Lower Lobe Diminished   Left Upper Lobe Diminished   Left Lower Lobe Diminished   Additional Respiratory  Assessments   Resp 17   End Tidal CO2 21 (%)   Position Semi-Umanzor's   Alarm Settings   High Pressure Alarm 45 cmH2O   Low Minute Volume Alarm 2 L/min   High Respiratory Rate 45 br/min   Low Exhaled Vt  200 mL   ETT (adult)   Placement Date/Time: 04/29/19 1340   Preoxygenation: Yes  Type: Cuffed  Tube Size: 8 mm  Placement Verified By[de-identified] Capnometry  Secured at: 23 cm  Measured From: Lips  Cuff Pressure: 30 cm H2O   Secured at 22 cm   Measured From Lips   ET Placement Right   Secured By Commercial tube saavedra   Site Condition Dry done

## 2019-05-02 NOTE — PROGRESS NOTES
Nutrition Assessment (Enteral Nutrition)    Type and Reason for Visit: Reassess    Nutrition Recommendations:   1. Recommend order \"Diet: Tube feed continuous/ NPO\". Initiate Glucerna 1.5 (Diabetic 1.5 formula) at 10 mL/hr and as tolerated, increase by 10 mL/hr q 4 hours until goal of 45 mL/hr is met via N/G  2. Recommend 30 mL H20 flush q 4 hours. Monitor IVF infusion, Na labs and need for adjustments in water flush  3. Ensure head of bed is at least 30 - 45 degrees during continuous feeding. 4. Recommend replacing phos. 5. Monitor TF tolerance (abd distention, bowel habits, N/V, cramping)  6. Check TG q 3 days while on diprivan infusion  7. Monitor nutrition adequacy, pertinent labs, bowel habits, wt changes, and clinical progress      Nutrition Assessment: Follow up: Pt remains intubated in the ICU. Sedation lightened, propofol infusing at 3.2 mL/hr (85 lipid calories). Requiring decreased vasopressor support. Levo at 8 mcg. TF initiated 5/1 and currently infusing at 15 mL/hr. TF orders with goal rate placed 5/1. Will monitor. Note,  on 5/1, repeat TG pending. Malnutrition Assessment:  · Malnutrition Status:  At risk for malnutrition    Nutrition Risk Level: High    Nutrition Needs:  · Estimated Daily Total Kcal: 4016-4725  · Estimated Daily Protein (g): 58-96 gm   · Estimated Daily Fluid (ml/day): 1 mL/kcal     Nutrition Diagnosis:   · Problem: Inadequate energy intake  · Etiology: related to Impaired respiratory function-inability to consume food, Insufficient energy/nutrient consumption     Signs and symptoms:  as evidenced by Intubation, NPO status due to medical condition    Objective Information:  · Nutrition-Focused Physical Findings: BM 5/1  · Wound Type: None  · Current Nutrition Therapies:  · Oral Diet Orders: NPO   · Oral Diet intake: NPO  · Oral Nutrition Supplement (ONS) Orders: None  · ONS intake: NPO  · Tube Feeding (TF) Orders:   · Feeding Route: Nasogastric  · Formula: 1.5

## 2019-05-02 NOTE — PROGRESS NOTES
05/01/19 2326   Vent Information   Vent Type 840   Vent Mode AC/VC   Vt Ordered 500 mL   Rate Set 16 bmp   Peak Flow 50 L/min   Pressure Support 0 cmH20   FiO2  50 %   Sensitivity 3   PEEP/CPAP 5   Cuff Pressure (cm H2O) 30 cm H2O   Humidification Source Heated wire   Humidification Temp 37   Humidification Temp Measured 36.9   Vent Patient Data   Peak Inspiratory Pressure 25 cmH2O   Mean Airway Pressure 12 cmH20   Rate Measured 16 br/min   Vt Exhaled 519 mL   Minute Volume 8.33 Liters   I:E Ratio 1:2.40   Cough/Sputum   Sputum How Obtained Endotracheal   Cough Non-productive   Spontaneous Breathing Trial (SBT) RT Doc   Pulse 97   Breath Sounds   Right Upper Lobe Diminished   Right Middle Lobe Diminished   Right Lower Lobe Diminished   Left Upper Lobe Diminished   Left Lower Lobe Diminished   Additional Respiratory  Assessments   Resp 16   End Tidal CO2 20 (%)   Position Left Side   Alarm Settings   High Pressure Alarm 45 cmH2O   Low Minute Volume Alarm 2 L/min   High Respiratory Rate 45 br/min   Low Exhaled Vt  200 mL   ETT (adult)   Placement Date/Time: 04/29/19 1340   Preoxygenation: Yes  Type: Cuffed  Tube Size: 8 mm  Placement Verified By[de-identified] Capnometry  Secured at: 23 cm  Measured From: Lips  Cuff Pressure: 30 cm H2O   Secured at 22 cm   Measured From [address] By Commercial tube saavedra   Site Condition Dry

## 2019-05-02 NOTE — PLAN OF CARE
Problem: Falls - Risk of:  Goal: Will remain free from falls  Description  Will remain free from falls  Outcome: Ongoing     Pt free from falls this shift. Fall precautions in place at all times. Maintain bed in lowest position, wheels locked, personal items and call light  within reach. Encourage acceptance with assistance. Pt able and agreeable to contact staff for assistance/safety appropriately throughout shift. Problem: Risk for Impaired Skin Integrity  Goal: Tissue integrity - skin and mucous membranes  Description  Structural intactness and normal physiological function of skin and  mucous membranes. Outcome: Ongoing  Skin assessment complete. Pt at risk for skin breakdown. See Robi score. Pt remains on bedrest. Unable to reposition self in bed. Heels elevated off bed. Will continue to turn and reposition patient every two hours and as needed. Will continue to keep patient clean and dry, applying skin care cream as needed. Pillows used for positioning. Will continue to monitor and assess for skin breakdown. Problem: Restraint Use - Nonviolent/Non-Self-Destructive Behavior:  Goal: Absence of restraint indications  Description  Absence of restraint indications  Outcome: Ongoing    Patient still demonstrating indications of the need to be restrained at this time. Visual safety checks performed every hour, and restraint monitoring performed every two hours. Patient has no signs of injuries from restraints at this time. Educated pt on the need for the restraints, no evidence of learning. Will continue to monitor.       Problem: Sensory Perception - Impaired:  Goal: Ability to maintain a stable neurologic state will improve  Description  Ability to maintain a stable neurologic state will improve  Outcome: Ongoing

## 2019-05-02 NOTE — CONSULTS
Full consult dictated    81year old female with history of HTN, DM, CVA, asthma, Seizure do, admitted with cardiac arrest and DKA. She was also noted to have coffee ground aspirate per NG but Hgb is stable    Recommendation  1. Continue supportive care  2. PPI BID  3. Monitor Hgb  4. Observe for signs of bleeding  5. No plans for endoscopy at this time  6.  Will follow

## 2019-05-02 NOTE — PROGRESS NOTES
Shift hand off done at bedside with night shift RN. Gtts checked. Patient turned & repositioned.  Victorino Ferris

## 2019-05-03 ENCOUNTER — APPOINTMENT (OUTPATIENT)
Dept: GENERAL RADIOLOGY | Age: 69
DRG: 853 | End: 2019-05-03
Payer: COMMERCIAL

## 2019-05-03 ENCOUNTER — APPOINTMENT (OUTPATIENT)
Dept: MRI IMAGING | Age: 69
DRG: 853 | End: 2019-05-03
Payer: COMMERCIAL

## 2019-05-03 LAB
AMMONIA: 38 UMOL/L (ref 11–51)
ANION GAP SERPL CALCULATED.3IONS-SCNC: 12 MMOL/L (ref 3–16)
ATYPICAL LYMPHOCYTE RELATIVE PERCENT: 1 % (ref 0–6)
BANDED NEUTROPHILS RELATIVE PERCENT: 29 % (ref 0–7)
BASE EXCESS ARTERIAL: -4.5 MMOL/L (ref -3–3)
BASOPHILS ABSOLUTE: 0 K/UL (ref 0–0.2)
BASOPHILS RELATIVE PERCENT: 0 %
BUN BLDV-MCNC: 45 MG/DL (ref 7–20)
CALCIUM SERPL-MCNC: 6.5 MG/DL (ref 8.3–10.6)
CARBOXYHEMOGLOBIN ARTERIAL: 0.1 % (ref 0–1.5)
CHLORIDE BLD-SCNC: 102 MMOL/L (ref 99–110)
CO2: 19 MMOL/L (ref 21–32)
CREAT SERPL-MCNC: 2 MG/DL (ref 0.6–1.2)
CULTURE, RESPIRATORY: ABNORMAL
EOSINOPHILS ABSOLUTE: 0.3 K/UL (ref 0–0.6)
EOSINOPHILS RELATIVE PERCENT: 3 %
ESTIMATED AVERAGE GLUCOSE: 421.1 MG/DL
GFR AFRICAN AMERICAN: 30
GFR NON-AFRICAN AMERICAN: 25
GLUCOSE BLD-MCNC: 101 MG/DL (ref 70–99)
GLUCOSE BLD-MCNC: 105 MG/DL (ref 70–99)
GLUCOSE BLD-MCNC: 106 MG/DL (ref 70–99)
GLUCOSE BLD-MCNC: 118 MG/DL (ref 70–99)
GLUCOSE BLD-MCNC: 134 MG/DL (ref 70–99)
GLUCOSE BLD-MCNC: 60 MG/DL (ref 70–99)
GLUCOSE BLD-MCNC: 65 MG/DL (ref 70–99)
GLUCOSE BLD-MCNC: 77 MG/DL (ref 70–99)
GLUCOSE BLD-MCNC: 85 MG/DL (ref 70–99)
GRAM STAIN RESULT: ABNORMAL
HBA1C MFR BLD: 16.3 %
HCO3 ARTERIAL: 17.9 MMOL/L (ref 21–29)
HCT VFR BLD CALC: 22.1 % (ref 36–48)
HEMOGLOBIN, ART, EXTENDED: 8.3 G/DL (ref 12–16)
HEMOGLOBIN: 7.9 G/DL (ref 12–16)
LYMPHOCYTES ABSOLUTE: 2.3 K/UL (ref 1–5.1)
LYMPHOCYTES RELATIVE PERCENT: 20 %
MAGNESIUM: 1.5 MG/DL (ref 1.8–2.4)
MAGNESIUM: 2 MG/DL (ref 1.8–2.4)
MCH RBC QN AUTO: 28.7 PG (ref 26–34)
MCHC RBC AUTO-ENTMCNC: 35.6 G/DL (ref 31–36)
MCV RBC AUTO: 80.5 FL (ref 80–100)
METHEMOGLOBIN ARTERIAL: 0.7 %
MONOCYTES ABSOLUTE: 0.3 K/UL (ref 0–1.3)
MONOCYTES RELATIVE PERCENT: 3 %
NEUTROPHILS ABSOLUTE: 7.9 K/UL (ref 1.7–7.7)
NEUTROPHILS RELATIVE PERCENT: 44 %
NUCLEATED RED BLOOD CELLS: 1 /100 WBC
O2 CONTENT ARTERIAL: 11 ML/DL
O2 SAT, ARTERIAL: 96.8 %
O2 THERAPY: ABNORMAL
ORGANISM: ABNORMAL
ORGANISM: ABNORMAL
PCO2 ARTERIAL: 24.2 MMHG (ref 35–45)
PDW BLD-RTO: 14.1 % (ref 12.4–15.4)
PERFORMED ON: ABNORMAL
PERFORMED ON: NORMAL
PH ARTERIAL: 7.49 (ref 7.35–7.45)
PHOSPHORUS: 1.5 MG/DL (ref 2.5–4.9)
PHOSPHORUS: 3.1 MG/DL (ref 2.5–4.9)
PLATELET # BLD: 250 K/UL (ref 135–450)
PLATELET SLIDE REVIEW: ADEQUATE
PMV BLD AUTO: 7.7 FL (ref 5–10.5)
PO2 ARTERIAL: 87.4 MMHG (ref 75–108)
POTASSIUM SERPL-SCNC: 3.1 MMOL/L (ref 3.5–5.1)
POTASSIUM SERPL-SCNC: 3.8 MMOL/L (ref 3.5–5.1)
RBC # BLD: 2.74 M/UL (ref 4–5.2)
RBC # BLD: NORMAL 10*6/UL
SODIUM BLD-SCNC: 133 MMOL/L (ref 136–145)
TCO2 ARTERIAL: 18.7 MMOL/L
TRIGL SERPL-MCNC: 301 MG/DL (ref 0–150)
WBC # BLD: 10.8 K/UL (ref 4–11)

## 2019-05-03 PROCEDURE — 71045 X-RAY EXAM CHEST 1 VIEW: CPT

## 2019-05-03 PROCEDURE — 99291 CRITICAL CARE FIRST HOUR: CPT | Performed by: INTERNAL MEDICINE

## 2019-05-03 PROCEDURE — 94003 VENT MGMT INPAT SUBQ DAY: CPT

## 2019-05-03 PROCEDURE — 94761 N-INVAS EAR/PLS OXIMETRY MLT: CPT

## 2019-05-03 PROCEDURE — 2580000003 HC RX 258: Performed by: INTERNAL MEDICINE

## 2019-05-03 PROCEDURE — 6360000002 HC RX W HCPCS: Performed by: INTERNAL MEDICINE

## 2019-05-03 PROCEDURE — 94770 HC ETCO2 MONITOR DAILY: CPT

## 2019-05-03 PROCEDURE — 82803 BLOOD GASES ANY COMBINATION: CPT

## 2019-05-03 PROCEDURE — C9113 INJ PANTOPRAZOLE SODIUM, VIA: HCPCS | Performed by: INTERNAL MEDICINE

## 2019-05-03 PROCEDURE — 2700000000 HC OXYGEN THERAPY PER DAY

## 2019-05-03 PROCEDURE — 6370000000 HC RX 637 (ALT 250 FOR IP): Performed by: INTERNAL MEDICINE

## 2019-05-03 PROCEDURE — 94750 HC PULMONARY COMPLIANCE STUDY: CPT

## 2019-05-03 PROCEDURE — 84478 ASSAY OF TRIGLYCERIDES: CPT

## 2019-05-03 PROCEDURE — 94640 AIRWAY INHALATION TREATMENT: CPT

## 2019-05-03 PROCEDURE — 83036 HEMOGLOBIN GLYCOSYLATED A1C: CPT

## 2019-05-03 PROCEDURE — 2580000003 HC RX 258

## 2019-05-03 PROCEDURE — 2500000003 HC RX 250 WO HCPCS: Performed by: FAMILY MEDICINE

## 2019-05-03 PROCEDURE — 2500000003 HC RX 250 WO HCPCS: Performed by: INTERNAL MEDICINE

## 2019-05-03 PROCEDURE — 70551 MRI BRAIN STEM W/O DYE: CPT

## 2019-05-03 PROCEDURE — 83735 ASSAY OF MAGNESIUM: CPT

## 2019-05-03 PROCEDURE — 85025 COMPLETE CBC W/AUTO DIFF WBC: CPT

## 2019-05-03 PROCEDURE — 82140 ASSAY OF AMMONIA: CPT

## 2019-05-03 PROCEDURE — 84100 ASSAY OF PHOSPHORUS: CPT

## 2019-05-03 PROCEDURE — 84132 ASSAY OF SERUM POTASSIUM: CPT

## 2019-05-03 PROCEDURE — 2000000000 HC ICU R&B

## 2019-05-03 PROCEDURE — 6370000000 HC RX 637 (ALT 250 FOR IP): Performed by: FAMILY MEDICINE

## 2019-05-03 PROCEDURE — 36592 COLLECT BLOOD FROM PICC: CPT

## 2019-05-03 PROCEDURE — 80048 BASIC METABOLIC PNL TOTAL CA: CPT

## 2019-05-03 RX ORDER — POTASSIUM CHLORIDE 29.8 MG/ML
20 INJECTION INTRAVENOUS PRN
Status: DISCONTINUED | OUTPATIENT
Start: 2019-05-03 | End: 2019-05-20 | Stop reason: HOSPADM

## 2019-05-03 RX ORDER — MODAFINIL 200 MG/1
200 TABLET ORAL ONCE
Status: COMPLETED | OUTPATIENT
Start: 2019-05-03 | End: 2019-05-03

## 2019-05-03 RX ORDER — INSULIN GLARGINE 100 [IU]/ML
20 INJECTION, SOLUTION SUBCUTANEOUS DAILY
Status: DISCONTINUED | OUTPATIENT
Start: 2019-05-04 | End: 2019-05-06

## 2019-05-03 RX ORDER — VECURONIUM BROMIDE 1 MG/ML
10 INJECTION, POWDER, LYOPHILIZED, FOR SOLUTION INTRAVENOUS ONCE
Status: COMPLETED | OUTPATIENT
Start: 2019-05-03 | End: 2019-05-03

## 2019-05-03 RX ORDER — SODIUM CHLORIDE 9 MG/ML
INJECTION, SOLUTION INTRAVENOUS
Status: COMPLETED
Start: 2019-05-03 | End: 2019-05-03

## 2019-05-03 RX ORDER — SODIUM CHLORIDE 9 MG/ML
INJECTION, SOLUTION INTRAVENOUS
Status: DISPENSED
Start: 2019-05-03 | End: 2019-05-03

## 2019-05-03 RX ORDER — MAGNESIUM SULFATE 1 G/100ML
1 INJECTION INTRAVENOUS PRN
Status: DISCONTINUED | OUTPATIENT
Start: 2019-05-03 | End: 2019-05-20 | Stop reason: HOSPADM

## 2019-05-03 RX ORDER — INSULIN GLARGINE 100 [IU]/ML
30 INJECTION, SOLUTION SUBCUTANEOUS DAILY
Status: DISCONTINUED | OUTPATIENT
Start: 2019-05-04 | End: 2019-05-03

## 2019-05-03 RX ADMIN — DEXTROSE MONOHYDRATE 12.5 G: 25 INJECTION, SOLUTION INTRAVENOUS at 16:49

## 2019-05-03 RX ADMIN — IPRATROPIUM BROMIDE AND ALBUTEROL SULFATE 1 AMPULE: .5; 3 SOLUTION RESPIRATORY (INHALATION) at 20:03

## 2019-05-03 RX ADMIN — IPRATROPIUM BROMIDE AND ALBUTEROL SULFATE 1 AMPULE: .5; 3 SOLUTION RESPIRATORY (INHALATION) at 08:12

## 2019-05-03 RX ADMIN — SODIUM CHLORIDE: 9 INJECTION, SOLUTION INTRAVENOUS at 08:56

## 2019-05-03 RX ADMIN — SODIUM CHLORIDE 3 G: 900 INJECTION INTRAVENOUS at 20:01

## 2019-05-03 RX ADMIN — CARBOXYMETHYLCELLULOSE SODIUM 1 DROP: 10 GEL OPHTHALMIC at 20:11

## 2019-05-03 RX ADMIN — POTASSIUM CHLORIDE 20 MEQ: 29.8 INJECTION, SOLUTION INTRAVENOUS at 06:51

## 2019-05-03 RX ADMIN — SODIUM CHLORIDE 3 G: 900 INJECTION INTRAVENOUS at 10:16

## 2019-05-03 RX ADMIN — MUPIROCIN: 20 OINTMENT TOPICAL at 08:25

## 2019-05-03 RX ADMIN — Medication 15 ML: at 20:06

## 2019-05-03 RX ADMIN — MODAFINIL 200 MG: 200 TABLET ORAL at 09:27

## 2019-05-03 RX ADMIN — PANTOPRAZOLE SODIUM 40 MG: 40 INJECTION, POWDER, FOR SOLUTION INTRAVENOUS at 20:00

## 2019-05-03 RX ADMIN — DEXTROSE MONOHYDRATE 12.5 G: 25 INJECTION, SOLUTION INTRAVENOUS at 07:57

## 2019-05-03 RX ADMIN — IPRATROPIUM BROMIDE AND ALBUTEROL SULFATE 1 AMPULE: .5; 3 SOLUTION RESPIRATORY (INHALATION) at 23:48

## 2019-05-03 RX ADMIN — MAGNESIUM SULFATE HEPTAHYDRATE 1 G: 1 INJECTION, SOLUTION INTRAVENOUS at 07:47

## 2019-05-03 RX ADMIN — PANTOPRAZOLE SODIUM 40 MG: 40 INJECTION, POWDER, FOR SOLUTION INTRAVENOUS at 08:42

## 2019-05-03 RX ADMIN — IPRATROPIUM BROMIDE AND ALBUTEROL SULFATE 1 AMPULE: .5; 3 SOLUTION RESPIRATORY (INHALATION) at 11:25

## 2019-05-03 RX ADMIN — CARBOXYMETHYLCELLULOSE SODIUM 1 DROP: 10 GEL OPHTHALMIC at 13:59

## 2019-05-03 RX ADMIN — IPRATROPIUM BROMIDE AND ALBUTEROL SULFATE 1 AMPULE: .5; 3 SOLUTION RESPIRATORY (INHALATION) at 16:35

## 2019-05-03 RX ADMIN — Medication 10 ML: at 08:34

## 2019-05-03 RX ADMIN — IPRATROPIUM BROMIDE AND ALBUTEROL SULFATE 1 AMPULE: .5; 3 SOLUTION RESPIRATORY (INHALATION) at 00:23

## 2019-05-03 RX ADMIN — VECURONIUM BROMIDE 10 MG: 1 INJECTION, POWDER, LYOPHILIZED, FOR SOLUTION INTRAVENOUS at 15:06

## 2019-05-03 RX ADMIN — MUPIROCIN: 20 OINTMENT TOPICAL at 20:06

## 2019-05-03 RX ADMIN — HEPARIN SODIUM 5000 UNITS: 5000 INJECTION INTRAVENOUS; SUBCUTANEOUS at 20:02

## 2019-05-03 RX ADMIN — CARBOXYMETHYLCELLULOSE SODIUM 1 DROP: 10 GEL OPHTHALMIC at 08:42

## 2019-05-03 RX ADMIN — FLUCONAZOLE 150 MG: 100 TABLET ORAL at 08:43

## 2019-05-03 RX ADMIN — SODIUM CHLORIDE: 9 INJECTION, SOLUTION INTRAVENOUS at 20:05

## 2019-05-03 RX ADMIN — MICONAZOLE NITRATE: 2 POWDER TOPICAL at 08:25

## 2019-05-03 RX ADMIN — Medication 10 ML: at 20:14

## 2019-05-03 RX ADMIN — POTASSIUM CHLORIDE 20 MEQ: 29.8 INJECTION, SOLUTION INTRAVENOUS at 07:28

## 2019-05-03 RX ADMIN — MICONAZOLE NITRATE: 2 POWDER TOPICAL at 20:06

## 2019-05-03 RX ADMIN — SODIUM CHLORIDE 250 ML: 9 INJECTION, SOLUTION INTRAVENOUS at 20:15

## 2019-05-03 RX ADMIN — Medication 15 ML: at 08:25

## 2019-05-03 RX ADMIN — SODIUM PHOSPHATE, MONOBASIC, MONOHYDRATE 20 MMOL: 276; 142 INJECTION, SOLUTION INTRAVENOUS at 08:56

## 2019-05-03 RX ADMIN — MAGNESIUM SULFATE HEPTAHYDRATE 1 G: 1 INJECTION, SOLUTION INTRAVENOUS at 06:44

## 2019-05-03 RX ADMIN — HEPARIN SODIUM 5000 UNITS: 5000 INJECTION INTRAVENOUS; SUBCUTANEOUS at 08:44

## 2019-05-03 RX ADMIN — DEXTROSE MONOHYDRATE 4 MCG/MIN: 50 INJECTION, SOLUTION INTRAVENOUS at 05:38

## 2019-05-03 RX ADMIN — IPRATROPIUM BROMIDE AND ALBUTEROL SULFATE 1 AMPULE: .5; 3 SOLUTION RESPIRATORY (INHALATION) at 04:02

## 2019-05-03 ASSESSMENT — PULMONARY FUNCTION TESTS
PIF_VALUE: 16
PIF_VALUE: 23
PIF_VALUE: 28
PIF_VALUE: 26
PIF_VALUE: 18
PIF_VALUE: 15
PIF_VALUE: 16
PIF_VALUE: 17
PIF_VALUE: 24
PIF_VALUE: 17
PIF_VALUE: 22
PIF_VALUE: 23
PIF_VALUE: 16
PIF_VALUE: 40
PIF_VALUE: 45
PIF_VALUE: 22
PIF_VALUE: 22
PIF_VALUE: 25
PIF_VALUE: 17
PIF_VALUE: 16
PIF_VALUE: 24
PIF_VALUE: 24
PIF_VALUE: 16
PIF_VALUE: 25
PIF_VALUE: 16
PIF_VALUE: 24
PIF_VALUE: 26
PIF_VALUE: 15
PIF_VALUE: 16
PIF_VALUE: 19
PIF_VALUE: 24
PIF_VALUE: 25
PIF_VALUE: 25
PIF_VALUE: 16

## 2019-05-03 NOTE — PROGRESS NOTES
05/03/19 0359   Vent Information   Vent Type 840   Vent Mode AC/VC   Vt Ordered 500 mL   Peak Flow 50 L/min   Pressure Support 10 cmH20   FiO2  30 %   Sensitivity 3   PEEP/CPAP 5   Cuff Pressure (cm H2O) 30 cm H2O   Humidification Source Heated wire   Humidification Temp Measured 37   Circuit Condensation Drained   Vent Patient Data   Peak Inspiratory Pressure 16 cmH2O   Mean Airway Pressure 10 cmH20   Rate Measured 33 br/min   Vt Exhaled 343 mL   Minute Volume 10.6 Liters   I:E Ratio 1:1.40   Cough/Sputum   Sputum How Obtained Endotracheal;Suctioned   Cough Productive   Sputum Amount Small   Sputum Color Cloudy; Yellow;Red   Tenacity Thick   Spontaneous Breathing Trial (SBT) RT Doc   Pulse 109   SpO2 99 %   Breath Sounds   Right Upper Lobe Rhonchi   Right Middle Lobe Diminished   Right Lower Lobe Diminished   Left Upper Lobe Rhonchi   Left Lower Lobe Diminished   Additional Respiratory  Assessments   Resp (!) 32   End Tidal CO2 18 (%)   Position Semi-Umanzor's   Alarm Settings   High Pressure Alarm 45 cmH2O   Low Minute Volume Alarm 2 L/min   High Respiratory Rate 45 br/min   Low Exhaled Vt  200 mL   ETT (adult)   Placement Date/Time: 04/29/19 1340   Preoxygenation: Yes  Type: Cuffed  Tube Size: 8 mm  Placement Verified By[de-identified] Capnometry  Secured at: 23 cm  Measured From: Lips  Cuff Pressure: 30 cm H2O   Secured at 22 cm   Measured From Lips   ET Placement Right   Secured By Commercial tube saavedra   Site Condition Dry   Cuff Pressure 30 cm H2O

## 2019-05-03 NOTE — PROGRESS NOTES
0800 BS 65. 1/2 amp D50 given per prn order. BS recheck 134. Will continue to monitor. 2 wedding rings removed from right hand 4th finger and class ring removed from left hand middle finger d/t swelling and placed in cup & placed in lock box in room.  Cornell Fang

## 2019-05-03 NOTE — PROGRESS NOTES
05/03/19 0818   Vent Information   $Ventilation $Subsequent Day   Vent Type 840   Vent Mode CPAP   Vt Ordered 500 mL   Rate Set 16 bmp   Peak Flow 60 L/min   Pressure Support 10 cmH20   FiO2  30 %   Sensitivity 3   PEEP/CPAP 5   Cuff Pressure (cm H2O) 30 cm H2O   Humidification Source Heated wire   Humidification Temp 35.6   Vent Patient Data   Peak Inspiratory Pressure 19 cmH2O   Mean Airway Pressure 11 cmH20   Rate Measured 39 br/min   Vt Exhaled 347 mL   Minute Volume 11.9 Liters   I:E Ratio 1:1.60   Cough/Sputum   Sputum How Obtained Endotracheal   Cough Productive   Sputum Amount Large   Sputum Color Creamy; Red   Tenacity Thick   Spontaneous Breathing Trial (SBT) RT Doc   Pulse 110   SpO2 100 %   Breath Sounds   Right Upper Lobe Rhonchi   Right Middle Lobe Rhonchi   Right Lower Lobe Rhonchi   Left Upper Lobe Rhonchi   Left Lower Lobe Rhonchi   Additional Respiratory  Assessments   Resp (!) 42   End Tidal CO2 18 (%)   Alarm Settings   High Pressure Alarm 45 cmH2O   Low Minute Volume Alarm 2 L/min   High Respiratory Rate 45 br/min   Patient Observation   Observations   (ambu @ bedside)   ETT (adult)   Placement Date/Time: 04/29/19 1340   Preoxygenation: Yes  Type: Cuffed  Tube Size: 8 mm  Placement Verified By[de-identified] Capnometry  Secured at: 23 cm  Measured From: Lips  Cuff Pressure: 30 cm H2O   Secured at 23 cm   Measured From [address] By Commercial tube saavedra   Site Condition Dry   Cuff Pressure 30 cm H2O

## 2019-05-03 NOTE — PROGRESS NOTES
Patient only opens eyes to command. Gags at times on ETT. Daughter at bedside. Will continue to monitor.  Arjun Martinez

## 2019-05-03 NOTE — PROGRESS NOTES
Attempting to wean Levo & turned off for MRI. MAP & B/P dropped, MAP to 59 & B/p 79/47. Levo restarted. RT at bedside & transport at bedside.  Desean Mccloud

## 2019-05-03 NOTE — PROGRESS NOTES
[facility]       Progress Note           Subjective/   81y.o. year old female who we are seeing in consultation for LOPEZ/ATN. HPI:  Pt remains intubated and failed SBT this am, has been off of sedation  On levo at 2    Last 24h uop 1.2l , 725 ml this am    ROS:  Remains intubated. +fevers. Objective/   GEN:  Chronically ill, BP 96/68   Pulse 106   Temp 98.3 °F (36.8 °C) (Core)   Resp 23   Ht 5' 1\" (1.549 m)   Wt 122 lb 5.7 oz (55.5 kg)   SpO2 100%   BMI 23.12 kg/m²   HEENT: intubated   CV: S1, S2 without m/r/g; some edema on her UE  RESP: CTA B without w/r/r; breathing wnl  ABD: +bs, soft, nt, no hsm  SKIN: warm, no rashes    Data/  Recent Labs     05/01/19  0434 05/02/19  0406 05/03/19  0400   WBC 13.7* 13.2* 10.8   HGB 9.7* 9.1* 7.9*   HCT 27.5* 24.6* 22.1*   MCV 80.0 79.8* 80.5    332 250     Recent Labs     05/01/19  0434  05/01/19  1655 05/02/19  0406 05/03/19  0400   *   < > 130* 132* 133*   K 4.5   < > 4.7 4.5 3.1*      < > 95* 98* 102   CO2 18*   < > 20* 19* 19*   GLUCOSE 156*   < > 192* 167* 85   PHOS  --   --  1.1* 1.4* 1.5*   MG 2.00  --   --   --  1.50*   BUN 51*   < > 50* 51* 45*   CREATININE 2.1*   < > 2.1* 2.1* 2.0*   LABGLOM 23*   < > 23* 23* 25*   GFRAA 28*   < > 28* 28* 30*    < > = values in this interval not displayed. Assessment/Plan     Ms. Edita Orozco is a 81year old female with PMHx of CAD, DM, HTN and seizures who presents after a cardiac arrest.     Acute Kidney Injury.  - Etiology: ATN in the setting of cardiac arrest/sepsis. - Data: Last serum creatinine in 07/2016 was <0.5mg/dL. - Urinalysis with blood and protein. - Clinical: Hypotensive on vasopressors, keep MAP above 65.   - CVP's within range, cont IVF's to  ml/hour  - serial BMPs  - No anticipated HD needs for now, discussed with family      Severe Anion Gap Metabolic Acidosis. - Likely from DKA and lactic acidosis.  No toxic alcohol ingestion.  - Methanol, Ethylene glycol negative.     Anemia.  - Had coffee-ground emesis on admission.  - Plans per GI.     S/P Cardiac Arrest.  - Plans per Cardiology     DKA.   - On IVF and insulin drip per protocol, off latter now    Hypophosphatemia  - Prn IV phos replacement today    Hypomagnesemia    On prn replacement    Encephalopathy   MRI brain planned for 5/3    Case d/w nursing

## 2019-05-03 NOTE — CONSULTS
Patient Name: Florin Weathers  Date of admission: 4/29/2019 12:30 PM  Patient's age: 81 y.o., 1950  Admission Dx: DKA, type 2, not at goal [facility]) [E11.10]  DKA, type 2, not at goal [facility]) [E11.10]    Reason for Consult:  Prolonged Qt  Requesting Physician: Brice Figueroa MD  Primary Care physician: Von Botello PA-C      History Obtained From:  patient, electronic medical record, adult daughter    HISTORY OF PRESENT ILLNESS:      I was asked to see patient for prolonged Qtc. Patient is intubated and sedated. History obtained from daughter on bed side. Patient was admitted for loc with ?cardiac arrest(I could not confirm). Patient has been intubated since admission. According to daughter she has h/o dizziness and non compliance with medication    The patient is not able to give detailed information about the events of hospitalization due to their underlying medical illness. The majority of the information is taken from the chart, medical staff, and available family. Past Medical History:   has a past medical history of Acute renal failure (ARF) ([facility] [address] Asthma, CAD (coronary artery disease), Diabetes mellitus ([address] Hypertension, Pneumonia, Seizures ([address] and Unspecified cerebral artery occlusion with cerebral infarction. Past Surgical History:   has a past surgical history that includes Breast surgery; bladder repair; Breast lumpectomy (Left); bronchoscopy (N/A, 5/1/2019); bronchoscopy (5/1/2019); and bronchoscopy (N/A, 5/2/2019). Home Medications:    Prior to Admission medications    Medication Sig Start Date End Date Taking? Authorizing Provider   ondansetron (ZOFRAN ODT) 4 MG disintegrating tablet Take 1 tablet by mouth every 8 hours as needed for Nausea or Vomiting 7/17/16   Jennifer Escalante MD   omeprazole (PRILOSEC) 10 MG capsule Take 10 mg by mouth daily    Historical Provider, MD   lisinopril (PRINIVIL;ZESTRIL) 10 MG tablet Take 10 mg by mouth daily. Historical Provider, MD   metFORMIN (GLUCOPHAGE) 1000 MG tablet   Take 1,000 mg by mouth daily (with breakfast)     Historical Provider, MD   PARoxetine (PAXIL) 30 MG tablet Take 30 mg by mouth every morning. Historical Provider, MD   amitriptyline (ELAVIL) 50 MG tablet Take 25 mg by mouth nightly     Historical Provider, MD   atorvastatin (LIPITOR) 40 MG tablet Take 40 mg by mouth daily. Historical Provider, MD   insulin detemir (LEVEMIR) 100 UNIT/ML injection vial   Inject 63 Units into the skin nightly     Historical Provider, MD       Allergies:  Aspirin    Social History:   reports that she has never smoked. She has never used smokeless tobacco. She reports that she does not drink alcohol or use drugs. Family History: family history includes Cancer in her mother and sister; Diabetes in her father; Heart Disease in her father; High Blood Pressure in her father; High Cholesterol in her father. REVIEW OF SYSTEMS:  Unable to obtain      PHYSICAL EXAM:      Vital Signs:           Blood pressure (!) 132/92, pulse 110, temperature 99.1 °F (37.3 °C), temperature source Core, resp. rate 25, height 5' 1\" (1.549 m), weight 122 lb 5.7 oz (55.5 kg), SpO2 100 %, not currently breastfeeding. Admission Weight:  Weight: 120 lb (54.4 kg)      I/O:     Intake/Output Summary (Last 24 hours) at 5/3/2019 1731  Last data filed at 5/3/2019 1400  Gross per 24 hour   Intake 1175.44 ml   Output 1975 ml   Net -799.56 ml       Vent Settings:  Vent Information  Ventilation: Subsequent Day  Vent Type: 840  Vent Mode: AC/VC  Vt Ordered: 500 mL  Rate Set: 16 bmp  Peak Flow: 60 L/min  Pressure Support: 0 cmH20  FiO2 : 30 %  Sensitivity: 3  PEEP/CPAP: 5  Cuff Pressure (cm H2O): 30 cm H2O  Humidification Source: Heated wire  Humidification Temp: 36.5  Humidification Temp Measured: 37  Circuit Condensation: Drained     Constitutional and General Appearance: Patient intubated, sedated  HEENT: PERRL, no cervical lymphadenopathy.  No masses palpable. Normal oral mucosa  Respiratory:  · Normal excursion and expansion without use of accessory muscles  · Resp Auscultation: Normal breath sounds without dullness or wheezing  Cardiovascular:  · The apical impulse is not displaced  · Heart tones are crisp and normal. regular S1 and S2.  · Jugular venous pulsation Normal  · The carotid upstroke is normal in amplitude and contour without delay or bruit  · Peripheral pulses are symmetrical and full  Abdomen:  · No masses or tenderness  · Bowel sounds present  Extremities:  ·  No Cyanosis or Clubbing  ·  Lower extremity edema: No  · Skin: Warm and dry  Neurological:Intubated, sedated  DATA:    ECG: EKG: normal sinus rhythm, prolonged QT interval.  ECHO: Conclusions      Summary   Technically difficult examination. Pt on vent.   Left ventricular systolic function is normal with ejection fraction   estimated at 55%.   No regional wall motion abnormalities.   Grade I diastolic dysfunction with normal left ventricular filling pressure.   Mild to moderate aortic regurgitation.   Systolic pulmonary artery pressure (SPAP) is normal estimated at 12 mmHg   (Right atrial pressure of 3 mmHg).     Signature      ------------------------------------------------------------------   Electronically signed by Jesus Graves MD (Interpreting   physician) on 04/30/2019 at 01:32 PM  LABS:   CBC:   Recent Labs     05/01/19  0434 05/02/19  0406 05/03/19  0400   WBC 13.7* 13.2* 10.8   HGB 9.7* 9.1* 7.9*   HCT 27.5* 24.6* 22.1*    332 250     BMP:   Recent Labs     05/01/19  1655 05/02/19  0406 05/03/19  0400 05/03/19  1023   * 132* 133*  --    K 4.7 4.5 3.1* 3.8   CO2 20* 19* 19*  --    BUN 50* 51* 45*  --    CREATININE 2.1* 2.1* 2.0*  --    LABGLOM 23* 23* 25*  --    GLUCOSE 192* 167* 85  --      BNP: No results for input(s): BNP in the last 72 hours. PT/INR: No results for input(s): PROTIME, INR in the last 72 hours. APTT:No results for input(s):  APTT in the last 72 hours. CARDIAC ENZYMES:No results for input(s): CKMB, CKMBINDEX, TROPONINI in the last 72 hours. Invalid input(s): CKTOTAL;3  FASTING LIPID PANEL:  Lab Results   Component Value Date    TRIG 301 05/03/2019     LIVER PROFILE:No results for input(s): AST, ALT, ALB in the last 72 hours. IMPRESSION:    Active Problems:    Acute renal failure (ARF) (HCC)    DKA (diabetic ketoacidoses) (HCC)    Acidosis    Cardiac arrest (HCC)    DKA, type 2, not at goal [facility])    Acute respiratory failure ([facility])    Prolonged Q-T interval on ECG    Hypokalemia    Elevated troponin  Resolved Problems:    * No resolved hospital problems. *      RECOMMENDATIONS:  1. Prolonged Qtc acquired vs congenital  -EKG from 7/9/15 with prolonged Qtc  -not sure if she was on any medication which can prolong Qtc at that time  -No family h/o of sudden cardiac death  -Not sure if patient had unexplained syncope in the past  -I will recommend to avoid all Qtc prolonging medications  -Keep magnesium >2 and potassium 4 or higher        -can consider long term monitoring with implantable loop recorder as out patient to r/o arrhythmia        -if Qtc does not improve(correct electrolytes, acidosis, renal function) will recommend outpatient genetic testing    2. Respiratory failure and pneumonia   -plan per pulmonary and primary tea   -patient remains intubated and sedated    3. Diabetes   -HgA1c    EP will sign off but remains available if needed. Call once patient extubated and stable for discharge for loop recorder insertion. Thank you for asking me to assist in the care of this patient. Please contact me if you have any questions regarding her evaluation. All questions and concerns were addressed to the patient/family. Alternatives to my treatment were discussed. The note was completed using EMR. Every effort was made to ensure accuracy; however, inadvertent computerized transcription errors may be present.      Discussed with family and Nurse. CHANEL Cuevas F.A.C.C.   Electrophysiology  [facility], [place], operated by [facility]  [phone] [facility]  [phone] [address] [place] [address]  5/3/2019  5:31 PM

## 2019-05-03 NOTE — PROGRESS NOTES
PROGRESS NOTE  S:69 yrs Patient  admitted on 4/29/2019 with DKA, type 2, not at goal [facility]) [E11.10]  DKA, type 2, not at goal [facility]) [E11.10] . Today she remains on Vent. Hgb declined but no signs of bleeding    Exam:   Vitals:    05/03/19 1200   BP: 92/60   Pulse: 111   Resp: 22   Temp: 98.9 °F (37.2 °C)   SpO2: 100%      General appearance: intubated, sedated  HEENT: Oropharynx clear, no lesions  Neck: no adenopathy and supple, symmetrical, trachea midline  Lungs: clear to auscultation bilaterally  Heart: regular rate and rhythm, S1, S2 normal, no murmur, click, rub or gallop  Abdomen: soft, non-tender; bowel sounds normal; no masses,  no organomegaly  Extremities: extremities normal, atraumatic, no cyanosis or edema     Medications: Reviewed    Labs:  CBC:   Recent Labs     05/01/19  0434 05/02/19  0406 05/03/19  0400   WBC 13.7* 13.2* 10.8   HGB 9.7* 9.1* 7.9*   HCT 27.5* 24.6* 22.1*   MCV 80.0 79.8* 80.5    332 250     BMP:   Recent Labs     05/01/19  1655 05/02/19  0406 05/03/19  0400 05/03/19  1023   * 132* 133*  --    K 4.7 4.5 3.1* 3.8   CL 95* 98* 102  --    CO2 20* 19* 19*  --    PHOS 1.1* 1.4* 1.5*  --    BUN 50* 51* 45*  --    CREATININE 2.1* 2.1* 2.0*  --      Impression: 81year old female with history of HTN, DM, CVA, asthma, Seizure do, admitted with cardiac arrest and DKA. She was also noted to have coffee ground aspirate per NG but Hgb is stable    Recommendation:  1. Continue supportive care  2. Monitor Hgb  3. Continue PPI BID  4. Observe for signs of bleeding  5. Advance tubefeeds per NG  6.  Will follow      Nataliya Collins MD  1:51 PM 5/3/2019

## 2019-05-03 NOTE — PLAN OF CARE
Problem: Falls - Risk of:  Goal: Absence of physical injury  Description  Absence of physical injury  Outcome: Ongoing     Problem: Risk for Impaired Skin Integrity  Goal: Tissue integrity - skin and mucous membranes  Description  Structural intactness and normal physiological function of skin and  mucous membranes. Outcome: Ongoing     Problem: Nutrition  Goal: Optimal nutrition therapy  Outcome: Ongoing     Problem: Serum Glucose Level - Abnormal:  Goal: Ability to maintain appropriate glucose levels will improve  Description  Ability to maintain appropriate glucose levels will improve  Outcome: Ongoing  Monitor for s/s hypoglycemia &hyperglycemia & notify MD if present. Insulin coverage & accu checks as ordered. Tube feedig as ordered.      Problem: Restraint Use - Nonviolent/Non-Self-Destructive Behavior:  Goal: Absence of restraint-related injury  Description  Absence of restraint-related injury  Outcome: Ongoing

## 2019-05-03 NOTE — PROGRESS NOTES
Hospitalist Progress Note      PCP: Duran Gan PA-C    Date of Admission: 4/29/2019    Chief Complaint: pt became unresponsive    Hospital Course: Reviewed H and P    Subjective: Advancing TF. Remains intubated. Weaning off pressors. Medications:  Reviewed    Infusion Medications    sodium chloride      sodium chloride      sodium chloride 100 mL/hr at 05/03/19 0856    norepinephrine 1 mcg/min (05/03/19 1420)     Scheduled Medications    ampicillin-sulbactam  3 g Intravenous Q12H    vecuronium  10 mg Intravenous Once    [START ON 5/4/2019] insulin glargine  30 Units Subcutaneous Daily    miconazole   Topical BID    fluconazole  150 mg Oral Daily    insulin lispro  0-18 Units Subcutaneous Q4H    ipratropium-albuterol  1 ampule Inhalation Q4H    mupirocin   Nasal BID    carboxymethylcellulose PF  1 drop Both Eyes TID    chlorhexidine  15 mL Mouth/Throat BID    heparin (porcine)  5,000 Units Subcutaneous BID    pantoprazole  40 mg Intravenous BID    sodium chloride flush  10 mL Intravenous 2 times per day     PRN Meds: magnesium sulfate, potassium chloride, fentanNYL, acetaminophen, dextrose, sodium chloride flush, magnesium hydroxide, acetaminophen      Intake/Output Summary (Last 24 hours) at 5/3/2019 1442  Last data filed at 5/3/2019 1400  Gross per 24 hour   Intake 1175.44 ml   Output 1975 ml   Net -799.56 ml       Physical Exam Performed:    BP (!) 92/56   Pulse 106   Temp 99.1 °F (37.3 °C) (Core)   Resp (!) 32   Ht 5' 1\" (1.549 m)   Wt 122 lb 5.7 oz (55.5 kg)   SpO2 100%   BMI 23.12 kg/m²     General appearance: Intubated and sedated. HEENT: Conjunctivae/corneas clear. Neck: Supple  Respiratory: Vent sounds  Cardiovascular: tachycardic  Abdomen: Soft, non-tender, non-distended with normal bowel sounds. Musculoskeletal: No edema. Skin: Skin color, texture, turgor normal.  No rashes or lesions.   Neurologic:  Nonfocal  Capillary Refill: Brisk,< 3 seconds   Peripheral Pulses: +2 palpable, equal bilaterally       Labs:   Recent Labs     05/01/19  0434 05/02/19  0406 05/03/19  0400   WBC 13.7* 13.2* 10.8   HGB 9.7* 9.1* 7.9*   HCT 27.5* 24.6* 22.1*    332 250     Recent Labs     05/01/19  1655 05/02/19  0406 05/03/19  0400 05/03/19  1023   * 132* 133*  --    K 4.7 4.5 3.1* 3.8   CL 95* 98* 102  --    CO2 20* 19* 19*  --    BUN 50* 51* 45*  --    CREATININE 2.1* 2.1* 2.0*  --    CALCIUM 6.8* 6.7* 6.5*  --    PHOS 1.1* 1.4* 1.5*  --      No results for input(s): AST, ALT, BILIDIR, BILITOT, ALKPHOS in the last 72 hours. No results for input(s): INR in the last 72 hours. No results for input(s): Shadia Daniella in the last 72 hours. Urinalysis:      Lab Results   Component Value Date    NITRU Negative 04/29/2019    WBCUA 10-20 04/29/2019    BACTERIA 1+ 04/29/2019    RBCUA 3-5 04/29/2019    BLOODU MODERATE 04/29/2019    SPECGRAV 1.025 04/29/2019    GLUCOSEU >=1000 04/29/2019       Radiology:  XR CHEST PORTABLE   Final Result   Bilateral lower lobe airspace disease, left greater than right, increased   compared to prior         XR CHEST PORTABLE   Final Result   Stable appearance of the chest with dense opacification in the left mid and   lower lung zone. XR CHEST PORTABLE   Final Result   1. No significant interval change in the left-sided opacity reflecting   pneumonia better characterized on the CT of the thorax from 04/29/2019.   2. Support devices as described above. XR CHEST PORTABLE   Final Result   Catheter in good position. No postprocedure pneumothorax. XR CHEST PORTABLE   Final Result   Supportive devices are stable. Persisting consolidations in the left lung base, consistent with pneumonia. CT CHEST ABDOMEN PELVIS WO CONTRAST   Final Result   Dense consolidation within the inferior aspect of left upper lobe and   throughout the left lower lobe, most compatible with pneumonia and/or   aspiration.   That should be followed to resolution, especially given the   nodular morphology within portions of the consolidation. Diffuse mural thickening of the esophagus. Correlate with clinical evidence   of esophagitis. The tip of the right femoral venous catheter is malpositioned within a sacral   vein. That should be repositioned for more optimal placement. CT CERVICAL SPINE WO CONTRAST   Final Result   Multilevel degenerative changes with no acute abnormality of the cervical   spine. CT HEAD WO CONTRAST   Final Result   Motion degraded study with no acute intracranial abnormality. XR CHEST PORTABLE   Final Result   Supportive tubing projects in normal position. Left basilar airspace disease, pneumonia versus aspiration pneumonitis. There may be a component of pleural effusion. XR CHEST PORTABLE   Final Result   Atelectasis at the left lung base. Question of small left pleural effusion. MRI BRAIN WO CONTRAST    (Results Pending)           Assessment/Plan:    Active Hospital Problems    Diagnosis Date Noted    [facility] [N17.9] 08/20/2015     Priority: High     Class: Acute    Acute respiratory failure (HCC) [J96.00]     Prolonged Q-T interval on ECG [R94.31]     Hypokalemia [E87.6]     Elevated troponin [R74.8]     DKA (diabetic ketoacidoses) ([facility] [E13.10] 04/29/2019    Acidosis [E87.2] 04/29/2019    Cardiac arrest ([facility] [I46.9] 04/29/2019    DKA, type 2, not at goal [facility]) [E11.10] 04/29/2019     82 yo female with h/o CAD, DM2, HTN, depression admitted after being found unresponsive with acute respiratory failure due to pneumonia, DKA, metabolic encephalopathy, LOPEZ, metabolic acidosis, acute GI bleed. 1. Acute respiratory failure secondary to aspiration pneumonia and associated septic shock- POA. Required intubation and sedation. Initially on Merrem/Linezolid and changed to unasyn. Abx started 4/30. Blood Cx neg thus far. Pulm following. Bronch 5/1/19, 5/2/19 - necrotizing features of PNA. Respiratory culture with staph. 2. DKA - pt without diabetes medications for weeks if not months. Unsure of BS at home. Last A1c on record is 5.3 in 2015. New A1c 16. Initially on insulin gtt and transitioned to lantus daily, SSI. Monitor lantus dose with presumed CKD as having some hypoglycemia due to stacking. Glucose on arrival 916 now controlled. Started on TF. 3. Anion gap Metabolic acidosis and LOPEZ - secondary to above. Following BMPs. Nephrology following. Cr 2.0. IVF. Likely ATN. Slowly improving. No need for HD at this time. 4. Possible cardiac arrest? - troponin elevated but more likely due to demand and ischemia. Follow trop. Cardiology following. ECHO 55%, grade 1 DD, mild to mod AR. EP consulted for prolonged Qtc. 5. Hematemesis and anemia- on admission. PPI. Hbg stable. GI consult. Conservative management at this time. 6. Hypotension - likely due to dehydration, infection, and severe acidosis - remains on pressors. Weaning as tolerates. 7. Vaginal candidiasis - fluconazole x 3 doses. DVT Prophylaxis: heparin  Diet: DIET TUBE FEED CONTINUOUS/CYCLIC NPO; Diabetic 1.5 (Glucerna 1.5);  Nasogastric; Continuous; 25; 45; 20  Code Status: Full Code    PT/OT Eval Status: on hold    Dispo - ICU care    Janelle Johnston MD

## 2019-05-03 NOTE — PROGRESS NOTES
05/03/19 1641   Vent Information   Vent Type 840   Vent Mode AC/VC   Vt Ordered 500 mL   Rate Set 16 bmp   Peak Flow 60 L/min   Pressure Support 0 cmH20   FiO2  30 %   Sensitivity 3   PEEP/CPAP 5   Cuff Pressure (cm H2O) 30 cm H2O   Humidification Source Heated wire   Humidification Temp 36.5   Vent Patient Data   Peak Inspiratory Pressure 40 cmH2O   Mean Airway Pressure 15 cmH20   Rate Measured 30 br/min   Vt Exhaled 624 mL   Minute Volume 17.2 Liters   I:E Ratio 1:1.40   Plateau Pressure 20 QNR12   Static Compliance 42 mL/cmH2O   Dynamic Compliance 18 mL/cmH2O   Cough/Sputum   Sputum How Obtained Endotracheal   Cough Non-productive;None   Sputum Amount None   Sputum Color None   Tenacity None   Spontaneous Breathing Trial (SBT) RT Doc   Pulse 115   SpO2 100 %   Breath Sounds   Right Upper Lobe Diminished   Right Middle Lobe Diminished   Right Lower Lobe Diminished   Left Upper Lobe Diminished   Left Lower Lobe Diminished   Additional Respiratory  Assessments   Resp (!) 0   End Tidal CO2 12 (%)   Alarm Settings   High Pressure Alarm 45 cmH2O   Low Minute Volume Alarm 2 L/min   High Respiratory Rate 45 br/min   Patient Observation   Observations   (ambu @ bedside)   ETT (adult)   Placement Date/Time: 04/29/19 1340   Preoxygenation: Yes  Type: Cuffed  Tube Size: 8 mm  Placement Verified By[de-identified] Capnometry  Secured at: 23 cm  Measured From: Lips  Cuff Pressure: 30 cm H2O   Secured at 23 cm   Measured From [address] [place] [address],[address] By Commercial tube saavedra   Site Condition Dry   Cuff Pressure 30 cm H2O

## 2019-05-03 NOTE — PROGRESS NOTES
315pm patient to MRI for MRI head, back to unit 4 15 pm. Blood sugar done 60, & 1/2 amp D50 given per protocol. Levo continues to maintain MAP 65. Levo at 3. Will continue to monitor.  Adri Garcia

## 2019-05-03 NOTE — PROGRESS NOTES
05/02/19 2000   Vent Information   Vent Type 840   Vent Mode CPAP   Vt Ordered 500 mL   Peak Flow 50 L/min   Pressure Support 10 cmH20   FiO2  40 %   Sensitivity 3   PEEP/CPAP 5   Cuff Pressure (cm H2O) 30 cm H2O   Humidification Source Heated wire   Humidification Temp Measured 36.3   Circuit Condensation Drained   Vent Patient Data   Peak Inspiratory Pressure 18 cmH2O   Mean Airway Pressure 9.8 cmH20   Rate Measured 27 br/min   Vt Exhaled 364 mL   Minute Volume 9.78 Liters   I:E Ratio 1:1.70   Cough/Sputum   Sputum How Obtained None   Cough None   Sputum Amount None   Sputum Color None   Tenacity None   Spontaneous Breathing Trial (SBT) RT Doc   Pulse 101   SpO2 100 %   Breath Sounds   Right Upper Lobe Clear   Right Middle Lobe Clear   Right Lower Lobe Clear   Left Upper Lobe Clear   Left Lower Lobe Clear   Additional Respiratory  Assessments   Resp 25   End Tidal CO2 21 (%)   Position Semi-Umanzor's   Oral Care Mouth moisturizer; Lip moisturizer applied;Mouth swabbed;Mouth suctioned   Alarm Settings   High Pressure Alarm 45 cmH2O   Low Minute Volume Alarm 2 L/min   High Respiratory Rate 45 br/min   Low Exhaled Vt  200 mL   ETT (adult)   Placement Date/Time: 04/29/19 1340   Preoxygenation: Yes  Type: Cuffed  Tube Size: 8 mm  Placement Verified By[de-identified] Capnometry  Secured at: 23 cm  Measured From: Lips  Cuff Pressure: 30 cm H2O   Secured at 22 cm   Measured From Lips   Secured By Commercial tube saavedra   Site Condition Dry   Cuff Pressure 30 cm H2O

## 2019-05-03 NOTE — PROGRESS NOTES
Hospitalist Progress Note      PCP: Vaughn Camp PA-C    Date of Admission: 4/29/2019    Chief Complaint: pt became unresponsive    Hospital Course: Reviewed H and P    Subjective: Pt remains intubated, sedated on pressors. Family at bedside. Good UOP yesterday but less overnight. Nurse reports yeast in groin folds and vaginal area. Medications:  Reviewed    Infusion Medications    dexmedetomidine (PRECEDEX) IV infusion      fentaNYL (SUBLIMAZE) infusion Stopped (05/02/19 1700)    sodium chloride 100 mL/hr at 05/02/19 1241    norepinephrine 8 mcg/min (05/02/19 1836)    propofol Stopped (05/02/19 1700)    vasopressin (Septic Shock) infusion Stopped (05/02/19 1200)     Scheduled Medications    linezolid  600 mg Intravenous Q12H    miconazole   Topical BID    [START ON 5/3/2019] fluconazole  150 mg Oral Daily    insulin glargine  40 Units Subcutaneous Daily    insulin lispro  0-18 Units Subcutaneous Q4H    ipratropium-albuterol  1 ampule Inhalation Q4H    mupirocin   Nasal BID    carboxymethylcellulose PF  1 drop Both Eyes TID    chlorhexidine  15 mL Mouth/Throat BID    meropenem  1 g Intravenous Q12H    heparin (porcine)  5,000 Units Subcutaneous BID    pantoprazole  40 mg Intravenous BID    sodium chloride flush  10 mL Intravenous 2 times per day     PRN Meds: fentanNYL, acetaminophen, dextrose, sodium chloride flush, magnesium hydroxide, acetaminophen      Intake/Output Summary (Last 24 hours) at 5/2/2019 2146  Last data filed at 5/2/2019 1800  Gross per 24 hour   Intake 6421.78 ml   Output 648 ml   Net 5773.78 ml       Physical Exam Performed:    BP (!) 99/54   Pulse 101   Temp 98.4 °F (36.9 °C) (Core)   Resp 18   Ht 5' 1\" (1.549 m)   Wt 122 lb 5.7 oz (55.5 kg)   SpO2 100%   BMI 23.12 kg/m²     General appearance: Intubated and sedated. HEENT: Conjunctivae/corneas clear.   Neck: Supple  Respiratory: Vent sounds  Cardiovascular: tachycardic  Abdomen: Soft, non-tender, throughout the left lower lobe, most compatible with pneumonia and/or   aspiration. That should be followed to resolution, especially given the   nodular morphology within portions of the consolidation. Diffuse mural thickening of the esophagus. Correlate with clinical evidence   of esophagitis. The tip of the right femoral venous catheter is malpositioned within a sacral   vein. That should be repositioned for more optimal placement. CT CERVICAL SPINE WO CONTRAST   Final Result   Multilevel degenerative changes with no acute abnormality of the cervical   spine. CT HEAD WO CONTRAST   Final Result   Motion degraded study with no acute intracranial abnormality. XR CHEST PORTABLE   Final Result   Supportive tubing projects in normal position. Left basilar airspace disease, pneumonia versus aspiration pneumonitis. There may be a component of pleural effusion. XR CHEST PORTABLE   Final Result   Atelectasis at the left lung base. Question of small left pleural effusion. XR CHEST PORTABLE    (Results Pending)           Assessment/Plan:    Active Hospital Problems    Diagnosis Date Noted    [facility] ([facility]F) ([facility] [N17.9] 08/20/2015     Priority: High     Class: Acute    Acute respiratory failure (HCC) [J96.00]     Prolonged Q-T interval on ECG [R94.31]     Hypokalemia [E87.6]     Elevated troponin [R74.8]     DKA (diabetic ketoacidoses) ([facility] [E13.10] 04/29/2019    Acidosis [E87.2] 04/29/2019    Cardiac arrest ([facility] [I46.9] 04/29/2019    DKA, type 2, not at goal [facility]) [E11.10] 04/29/2019     80 yo female with h/o CAD, DM2, HTN, depression admitted after being found unresponsive with acute respiratory failure due to pneumonia, DKA, metabolic encephalopathy, LOPEZ, metabolic acidosis, acute GI bleed. 1. Acute respiratory failure secondary to aspiration pneumonia and associated septic shock- POA. Required intubation and sedation.  Continue on Merrem/Linezolid started 4/30. Blood Cx neg thus far. Pulm following. Bronch 5/1/19, 5/2/19 - necrotizing features of PNA. Respiratory culture with staph. 2. DKA - pt without diabetes medications for weeks if not months. Unsure of BS at home. Last A1c on record is 5.3 in 2015. New A1c pending. Initially on insulin gtt and transitioned to lantus 40 units daily, high SSI. Glucose on arrival 916 now controlled. Started on TF. 3. Anion gap Metabolic acidosis and LOPEZ - secondary to above. Following BMPs. Nephrology following. Cr 2.1. IVF. Likely ATN. Slowly improving. No need for HD at this time. 4. Possible cardiac arrest? - troponin elevated but more likely due to demand and ischemia. Follow trop. Cardiology following. ECHO 55%, grade 1 DD, mild to mod AR. EP consulted for prolonged Qtc. 5. Hematemesis and anemia- on admission. PPI. Hbg stable. GI consult. Conservative management at this time. 6. Hypotension - likely due to dehydration, infection, and severe acidosis - remains on pressors. Weaning as tolerates. DVT Prophylaxis: heparin  Diet: DIET TUBE FEED CONTINUOUS/CYCLIC NPO; Diabetic 1.5 (Glucerna 1.5);  Nasogastric; Continuous; 25; 45; 20  Code Status: Full Code    PT/OT Eval Status: on hold    Dispo - ICU care    Sheryl Gilbert MD

## 2019-05-03 NOTE — PROGRESS NOTES
RT at bedside 0820 & patient's RR increased to 40-44 bpm. RT put patient back to assist on vent d/t elevated RR. Will continue to monitor. Patient not responding to simple commands. No sedation at this time. Levo gtt to 4 to maintain b/p.  Victorino Ferris

## 2019-05-03 NOTE — PROGRESS NOTES
05/03/19 1129   Vent Information   Vent Type 840   Vent Mode AC/VC   Vt Ordered 500 mL   Rate Set 16 bmp   Peak Flow 60 L/min   Pressure Support 0 cmH20   FiO2  30 %   Sensitivity 3   PEEP/CPAP 5   Cuff Pressure (cm H2O) 30 cm H2O   Humidification Source Heated wire   Humidification Temp 36.4   Vent Patient Data   Peak Inspiratory Pressure 25 cmH2O   Mean Airway Pressure 15 cmH20   Rate Measured 28 br/min   Vt Exhaled 568 mL   Minute Volume 15.3 Liters   I:E Ratio 1:1.10   Cough/Sputum   Sputum How Obtained Endotracheal   Cough Productive   Sputum Amount Small   Sputum Color Creamy;Brown   Tenacity Thick   Spontaneous Breathing Trial (SBT) RT Doc   Pulse 110   SpO2 100 %   Breath Sounds   Right Upper Lobe Diminished   Right Middle Lobe Diminished   Right Lower Lobe Diminished   Left Upper Lobe Diminished   Left Lower Lobe Diminished   Additional Respiratory  Assessments   Resp 25   End Tidal CO2 18 (%)   Alarm Settings   High Pressure Alarm 45 cmH2O   Low Minute Volume Alarm 2 L/min   High Respiratory Rate 45 br/min   Patient Observation   Observations   (ambu @ bedside)   ETT (adult)   Placement Date/Time: 04/29/19 1340   Preoxygenation: Yes  Type: Cuffed  Tube Size: 8 mm  Placement Verified By[de-identified] Capnometry  Secured at: 23 cm  Measured From: Lips  Cuff Pressure: 30 cm H2O   Secured at 23 cm   Measured From Lips   ET Placement Right   Secured By Commercial tube saavedra   Site Condition Dry   Cuff Pressure 30 cm H2O

## 2019-05-03 NOTE — PROGRESS NOTES
05/03/19 0821   Vent Information   Vent Type 840   Vent Mode AC/VC   Vt Ordered 500 mL   Rate Set 16 bmp   Peak Flow 50 L/min   Pressure Support 0 cmH20   FiO2  30 %   Sensitivity 3   PEEP/CPAP 5   Vent Patient Data   Peak Inspiratory Pressure 22 cmH2O   Mean Airway Pressure 14 cmH20   Rate Measured 23 br/min   Vt Exhaled 337 mL   Minute Volume 12.1 Liters   I:E Ratio 1.30:1   Spontaneous Breathing Trial (SBT) RT Doc   Pulse 108   SpO2 100 %   Additional Respiratory  Assessments   Resp 27   End Tidal CO2 17 (%)   Alarm Settings   High Pressure Alarm 45 cmH2O   Low Minute Volume Alarm 2 L/min   High Respiratory Rate 45 br/min

## 2019-05-03 NOTE — PROGRESS NOTES
05/03/19 0019   Vent Information   Vent Type 840   Vent Mode CPAP   Vt Ordered 500 mL   Peak Flow 50 L/min   Pressure Support 10 cmH20   FiO2  30 %   Sensitivity 3   PEEP/CPAP 5   Cuff Pressure (cm H2O) 30 cm H2O   Humidification Source Heated wire   Humidification Temp Measured 37.1   Circuit Condensation Drained   Vent Patient Data   Peak Inspiratory Pressure 16 cmH2O   Mean Airway Pressure 11 cmH20   Rate Measured 33 br/min   Vt Exhaled 343 mL   Minute Volume 11.6 Liters   I:E Ratio 1:1.30   Cough/Sputum   Sputum How Obtained None   Cough None   Sputum Amount None   Sputum Color None   Tenacity None   Spontaneous Breathing Trial (SBT) RT Doc   Pulse 106   SpO2 100 %   Breath Sounds   Right Upper Lobe Clear   Right Middle Lobe Diminished   Right Lower Lobe Diminished   Left Upper Lobe Clear   Left Lower Lobe Diminished   Additional Respiratory  Assessments   Resp 30   End Tidal CO2 18 (%)   Position Semi-Umanzor's   Alarm Settings   High Pressure Alarm 45 cmH2O   Low Minute Volume Alarm 2 L/min   High Respiratory Rate 45 br/min   Low Exhaled Vt  200 mL   ETT (adult)   Placement Date/Time: 04/29/19 1340   Preoxygenation: Yes  Type: Cuffed  Tube Size: 8 mm  Placement Verified By[de-identified] Capnometry  Secured at: 23 cm  Measured From: Lips  Cuff Pressure: 30 cm H2O   Secured at 22 cm   Measured From [address] By [employer] tube saavedra   Site Condition Dry   Cuff Pressure 30 cm H2O

## 2019-05-03 NOTE — PROGRESS NOTES
05/03/19 0019   Vent Information   Vent Type 840   Vent Mode AC/VC   Vt Ordered 500 mL   Peak Flow 50 L/min   Pressure Support 10 cmH20   FiO2  30 %   Sensitivity 3   PEEP/CPAP 5   Cuff Pressure (cm H2O) 30 cm H2O   Humidification Source Heated wire   Humidification Temp Measured 37.1   Circuit Condensation Drained   Vent Patient Data   Peak Inspiratory Pressure 16 cmH2O   Mean Airway Pressure 11 cmH20   Rate Measured 33 br/min   Vt Exhaled 343 mL   Minute Volume 11.6 Liters   I:E Ratio 1:1.30   Cough/Sputum   Sputum How Obtained None   Cough None   Sputum Amount None   Sputum Color None   Tenacity None   Spontaneous Breathing Trial (SBT) RT Doc   Pulse 106   SpO2 100 %   Breath Sounds   Right Upper Lobe Clear   Right Middle Lobe Diminished   Right Lower Lobe Diminished   Left Upper Lobe Clear   Left Lower Lobe Diminished   Additional Respiratory  Assessments   Resp 30   End Tidal CO2 18 (%)   Position Semi-Umanzor's   Alarm Settings   High Pressure Alarm 45 cmH2O   Low Minute Volume Alarm 2 L/min   High Respiratory Rate 45 br/min   Low Exhaled Vt  200 mL   ETT (adult)   Placement Date/Time: 04/29/19 1340   Preoxygenation: Yes  Type: Cuffed  Tube Size: 8 mm  Placement Verified By[de-identified] Capnometry  Secured at: 23 cm  Measured From: Lips  Cuff Pressure: 30 cm H2O   Secured at 22 cm   Measured From [address] By [employer] tube saavedra   Site Condition Dry   Cuff Pressure 30 cm H2O

## 2019-05-03 NOTE — CARE COORDINATION
Chart review completed. Patient a 81year old female, admitted for DKA. Hospital day 4, currently in ICU level of care on vent. Upon initial assessment writer met with family who stated goal is for patient to return home. Open to [facility] if needed. Patient remains critically ill on vent. CM following , please advise of needs as they arise.   María Elena Lentz RN

## 2019-05-03 NOTE — PROGRESS NOTES
Hospitalist paged, low blood sugar this am- 65, 1/2 amp D50 given, recheck 134. No Tube feeding overnight. Currently holding Lantus, do you want to adjust the dose? AM H&H 7.9 & 22.1. Waiting for return call.  Shaji Vargas

## 2019-05-04 ENCOUNTER — APPOINTMENT (OUTPATIENT)
Dept: GENERAL RADIOLOGY | Age: 69
DRG: 853 | End: 2019-05-04
Payer: COMMERCIAL

## 2019-05-04 LAB
ABO/RH: NORMAL
ANION GAP SERPL CALCULATED.3IONS-SCNC: 16 MMOL/L (ref 3–16)
ANION GAP SERPL CALCULATED.3IONS-SCNC: 17 MMOL/L (ref 3–16)
ANTIBODY SCREEN: NORMAL
BANDED NEUTROPHILS RELATIVE PERCENT: 24 % (ref 0–7)
BASE EXCESS ARTERIAL: -6.1 MMOL/L (ref -3–3)
BASOPHILS ABSOLUTE: 0 K/UL (ref 0–0.2)
BASOPHILS RELATIVE PERCENT: 0 %
BLOOD BANK DISPENSE STATUS: NORMAL
BLOOD BANK PRODUCT CODE: NORMAL
BLOOD CULTURE, ROUTINE: NORMAL
BPU ID: NORMAL
BUN BLDV-MCNC: 39 MG/DL (ref 7–20)
BUN BLDV-MCNC: 42 MG/DL (ref 7–20)
CALCIUM SERPL-MCNC: 6.1 MG/DL (ref 8.3–10.6)
CALCIUM SERPL-MCNC: 6.4 MG/DL (ref 8.3–10.6)
CARBOXYHEMOGLOBIN ARTERIAL: 0.3 % (ref 0–1.5)
CHLORIDE BLD-SCNC: 106 MMOL/L (ref 99–110)
CHLORIDE BLD-SCNC: 108 MMOL/L (ref 99–110)
CO2: 16 MMOL/L (ref 21–32)
CO2: 16 MMOL/L (ref 21–32)
CREAT SERPL-MCNC: 1.5 MG/DL (ref 0.6–1.2)
CREAT SERPL-MCNC: 1.7 MG/DL (ref 0.6–1.2)
CULTURE, BLOOD 2: NORMAL
DESCRIPTION BLOOD BANK: NORMAL
DOHLE BODIES: PRESENT
EOSINOPHILS ABSOLUTE: 0 K/UL (ref 0–0.6)
EOSINOPHILS RELATIVE PERCENT: 0 %
GFR AFRICAN AMERICAN: 36
GFR AFRICAN AMERICAN: 42
GFR NON-AFRICAN AMERICAN: 30
GFR NON-AFRICAN AMERICAN: 35
GLUCOSE BLD-MCNC: 111 MG/DL (ref 70–99)
GLUCOSE BLD-MCNC: 112 MG/DL (ref 70–99)
GLUCOSE BLD-MCNC: 113 MG/DL (ref 70–99)
GLUCOSE BLD-MCNC: 114 MG/DL (ref 70–99)
GLUCOSE BLD-MCNC: 125 MG/DL (ref 70–99)
GLUCOSE BLD-MCNC: 135 MG/DL (ref 70–99)
GLUCOSE BLD-MCNC: 95 MG/DL (ref 70–99)
HCO3 ARTERIAL: 16.3 MMOL/L (ref 21–29)
HCT VFR BLD CALC: 21.4 % (ref 36–48)
HCT VFR BLD CALC: 22.2 % (ref 36–48)
HEMOGLOBIN, ART, EXTENDED: 8 G/DL (ref 12–16)
HEMOGLOBIN: 7.5 G/DL (ref 12–16)
HEMOGLOBIN: 7.7 G/DL (ref 12–16)
LYMPHOCYTES ABSOLUTE: 3.1 K/UL (ref 1–5.1)
LYMPHOCYTES RELATIVE PERCENT: 25 %
MAGNESIUM: 2 MG/DL (ref 1.8–2.4)
MCH RBC QN AUTO: 28.2 PG (ref 26–34)
MCHC RBC AUTO-ENTMCNC: 35 G/DL (ref 31–36)
MCV RBC AUTO: 80.6 FL (ref 80–100)
METHEMOGLOBIN ARTERIAL: 1.1 %
MONOCYTES ABSOLUTE: 0.5 K/UL (ref 0–1.3)
MONOCYTES RELATIVE PERCENT: 4 %
NEUTROPHILS ABSOLUTE: 8.7 K/UL (ref 1.7–7.7)
NEUTROPHILS RELATIVE PERCENT: 47 %
O2 CONTENT ARTERIAL: 11 ML/DL
O2 SAT, ARTERIAL: 98.4 %
O2 THERAPY: ABNORMAL
PCO2 ARTERIAL: 22.3 MMHG (ref 35–45)
PDW BLD-RTO: 14.4 % (ref 12.4–15.4)
PERFORMED ON: ABNORMAL
PERFORMED ON: NORMAL
PH ARTERIAL: 7.48 (ref 7.35–7.45)
PHOSPHORUS: 3.2 MG/DL (ref 2.5–4.9)
PLATELET # BLD: 220 K/UL (ref 135–450)
PLATELET SLIDE REVIEW: ADEQUATE
PMV BLD AUTO: 8.1 FL (ref 5–10.5)
PO2 ARTERIAL: 137.2 MMHG (ref 75–108)
POTASSIUM SERPL-SCNC: 2.9 MMOL/L (ref 3.5–5.1)
POTASSIUM SERPL-SCNC: 3.6 MMOL/L (ref 3.5–5.1)
RBC # BLD: 2.66 M/UL (ref 4–5.2)
RBC # BLD: NORMAL 10*6/UL
SODIUM BLD-SCNC: 138 MMOL/L (ref 136–145)
SODIUM BLD-SCNC: 141 MMOL/L (ref 136–145)
TCO2 ARTERIAL: 17 MMOL/L
TOXIC GRANULATION: PRESENT
WBC # BLD: 12.3 K/UL (ref 4–11)

## 2019-05-04 PROCEDURE — 94640 AIRWAY INHALATION TREATMENT: CPT

## 2019-05-04 PROCEDURE — 71045 X-RAY EXAM CHEST 1 VIEW: CPT

## 2019-05-04 PROCEDURE — 2000000000 HC ICU R&B

## 2019-05-04 PROCEDURE — 99223 1ST HOSP IP/OBS HIGH 75: CPT | Performed by: PSYCHIATRY & NEUROLOGY

## 2019-05-04 PROCEDURE — 80048 BASIC METABOLIC PNL TOTAL CA: CPT

## 2019-05-04 PROCEDURE — 84100 ASSAY OF PHOSPHORUS: CPT

## 2019-05-04 PROCEDURE — 94761 N-INVAS EAR/PLS OXIMETRY MLT: CPT

## 2019-05-04 PROCEDURE — 83735 ASSAY OF MAGNESIUM: CPT

## 2019-05-04 PROCEDURE — 86900 BLOOD TYPING SEROLOGIC ABO: CPT

## 2019-05-04 PROCEDURE — 37799 UNLISTED PX VASCULAR SURGERY: CPT

## 2019-05-04 PROCEDURE — P9016 RBC LEUKOCYTES REDUCED: HCPCS

## 2019-05-04 PROCEDURE — 2580000003 HC RX 258: Performed by: INTERNAL MEDICINE

## 2019-05-04 PROCEDURE — 86901 BLOOD TYPING SEROLOGIC RH(D): CPT

## 2019-05-04 PROCEDURE — 94750 HC PULMONARY COMPLIANCE STUDY: CPT

## 2019-05-04 PROCEDURE — 6370000000 HC RX 637 (ALT 250 FOR IP): Performed by: INTERNAL MEDICINE

## 2019-05-04 PROCEDURE — 2700000000 HC OXYGEN THERAPY PER DAY

## 2019-05-04 PROCEDURE — 6360000002 HC RX W HCPCS: Performed by: INTERNAL MEDICINE

## 2019-05-04 PROCEDURE — C9113 INJ PANTOPRAZOLE SODIUM, VIA: HCPCS | Performed by: INTERNAL MEDICINE

## 2019-05-04 PROCEDURE — 82803 BLOOD GASES ANY COMBINATION: CPT

## 2019-05-04 PROCEDURE — 86923 COMPATIBILITY TEST ELECTRIC: CPT

## 2019-05-04 PROCEDURE — 85025 COMPLETE CBC W/AUTO DIFF WBC: CPT

## 2019-05-04 PROCEDURE — 85014 HEMATOCRIT: CPT

## 2019-05-04 PROCEDURE — 6370000000 HC RX 637 (ALT 250 FOR IP): Performed by: FAMILY MEDICINE

## 2019-05-04 PROCEDURE — 2500000003 HC RX 250 WO HCPCS: Performed by: INTERNAL MEDICINE

## 2019-05-04 PROCEDURE — 86850 RBC ANTIBODY SCREEN: CPT

## 2019-05-04 PROCEDURE — 36430 TRANSFUSION BLD/BLD COMPNT: CPT

## 2019-05-04 PROCEDURE — 85018 HEMOGLOBIN: CPT

## 2019-05-04 PROCEDURE — 94770 HC ETCO2 MONITOR DAILY: CPT

## 2019-05-04 PROCEDURE — 99291 CRITICAL CARE FIRST HOUR: CPT | Performed by: INTERNAL MEDICINE

## 2019-05-04 PROCEDURE — 94003 VENT MGMT INPAT SUBQ DAY: CPT

## 2019-05-04 RX ORDER — 0.9 % SODIUM CHLORIDE 0.9 %
250 INTRAVENOUS SOLUTION INTRAVENOUS ONCE
Status: COMPLETED | OUTPATIENT
Start: 2019-05-04 | End: 2019-05-05

## 2019-05-04 RX ADMIN — IPRATROPIUM BROMIDE AND ALBUTEROL SULFATE 1 AMPULE: .5; 3 SOLUTION RESPIRATORY (INHALATION) at 19:41

## 2019-05-04 RX ADMIN — INSULIN GLARGINE 20 UNITS: 100 INJECTION, SOLUTION SUBCUTANEOUS at 09:46

## 2019-05-04 RX ADMIN — POTASSIUM CHLORIDE 20 MEQ: 29.8 INJECTION, SOLUTION INTRAVENOUS at 06:45

## 2019-05-04 RX ADMIN — FLUCONAZOLE 150 MG: 100 TABLET ORAL at 09:45

## 2019-05-04 RX ADMIN — MUPIROCIN: 20 OINTMENT TOPICAL at 20:38

## 2019-05-04 RX ADMIN — MICONAZOLE NITRATE: 2 POWDER TOPICAL at 09:51

## 2019-05-04 RX ADMIN — Medication 10 ML: at 20:38

## 2019-05-04 RX ADMIN — IPRATROPIUM BROMIDE AND ALBUTEROL SULFATE 1 AMPULE: .5; 3 SOLUTION RESPIRATORY (INHALATION) at 15:26

## 2019-05-04 RX ADMIN — Medication 10 ML: at 09:40

## 2019-05-04 RX ADMIN — PANTOPRAZOLE SODIUM 40 MG: 40 INJECTION, POWDER, FOR SOLUTION INTRAVENOUS at 09:45

## 2019-05-04 RX ADMIN — POTASSIUM CHLORIDE 20 MEQ: 29.8 INJECTION, SOLUTION INTRAVENOUS at 04:43

## 2019-05-04 RX ADMIN — HEPARIN SODIUM 5000 UNITS: 5000 INJECTION INTRAVENOUS; SUBCUTANEOUS at 20:38

## 2019-05-04 RX ADMIN — HEPARIN SODIUM 5000 UNITS: 5000 INJECTION INTRAVENOUS; SUBCUTANEOUS at 09:46

## 2019-05-04 RX ADMIN — MUPIROCIN: 20 OINTMENT TOPICAL at 09:39

## 2019-05-04 RX ADMIN — DEXTROSE MONOHYDRATE 1 MCG/MIN: 50 INJECTION, SOLUTION INTRAVENOUS at 04:39

## 2019-05-04 RX ADMIN — SODIUM CHLORIDE: 9 INJECTION, SOLUTION INTRAVENOUS at 04:40

## 2019-05-04 RX ADMIN — SODIUM CHLORIDE 250 ML: 900 INJECTION, SOLUTION INTRAVENOUS at 19:00

## 2019-05-04 RX ADMIN — Medication 15 ML: at 09:39

## 2019-05-04 RX ADMIN — CARBOXYMETHYLCELLULOSE SODIUM 1 DROP: 10 GEL OPHTHALMIC at 09:45

## 2019-05-04 RX ADMIN — PANTOPRAZOLE SODIUM 40 MG: 40 INJECTION, POWDER, FOR SOLUTION INTRAVENOUS at 20:38

## 2019-05-04 RX ADMIN — IPRATROPIUM BROMIDE AND ALBUTEROL SULFATE 1 AMPULE: .5; 3 SOLUTION RESPIRATORY (INHALATION) at 11:19

## 2019-05-04 RX ADMIN — POTASSIUM CHLORIDE 20 MEQ: 29.8 INJECTION, SOLUTION INTRAVENOUS at 05:42

## 2019-05-04 RX ADMIN — IPRATROPIUM BROMIDE AND ALBUTEROL SULFATE 1 AMPULE: .5; 3 SOLUTION RESPIRATORY (INHALATION) at 03:44

## 2019-05-04 RX ADMIN — Medication 15 ML: at 20:38

## 2019-05-04 RX ADMIN — CARBOXYMETHYLCELLULOSE SODIUM 1 DROP: 10 GEL OPHTHALMIC at 20:49

## 2019-05-04 RX ADMIN — SODIUM CHLORIDE 3 G: 900 INJECTION INTRAVENOUS at 20:38

## 2019-05-04 RX ADMIN — MICONAZOLE NITRATE: 2 POWDER TOPICAL at 20:51

## 2019-05-04 RX ADMIN — IPRATROPIUM BROMIDE AND ALBUTEROL SULFATE 1 AMPULE: .5; 3 SOLUTION RESPIRATORY (INHALATION) at 07:40

## 2019-05-04 RX ADMIN — SODIUM CHLORIDE 3 G: 900 INJECTION INTRAVENOUS at 09:45

## 2019-05-04 RX ADMIN — CARBOXYMETHYLCELLULOSE SODIUM 1 DROP: 10 GEL OPHTHALMIC at 14:53

## 2019-05-04 ASSESSMENT — PULMONARY FUNCTION TESTS
PIF_VALUE: 24
PIF_VALUE: 25
PIF_VALUE: 31
PIF_VALUE: 27
PIF_VALUE: 31
PIF_VALUE: 26
PIF_VALUE: 31
PIF_VALUE: 40
PIF_VALUE: 32
PIF_VALUE: 24
PIF_VALUE: 29
PIF_VALUE: 24
PIF_VALUE: 36
PIF_VALUE: 22
PIF_VALUE: 45
PIF_VALUE: 31
PIF_VALUE: 25
PIF_VALUE: 28
PIF_VALUE: 35
PIF_VALUE: 24
PIF_VALUE: 22
PIF_VALUE: 27
PIF_VALUE: 22
PIF_VALUE: 24

## 2019-05-04 ASSESSMENT — PAIN SCALES - GENERAL
PAINLEVEL_OUTOF10: 0

## 2019-05-04 NOTE — PROGRESS NOTES
This note also relates to the following rows which could not be included:  Vt Ordered - Cannot attach notes to unvalidated device data  Rate Set - Cannot attach notes to unvalidated device data  Peak Flow - Cannot attach notes to unvalidated device data  Pressure Support - Cannot attach notes to unvalidated device data  FiO2  - Cannot attach notes to unvalidated device data  Sensitivity - Cannot attach notes to unvalidated device data  PEEP/CPAP - Cannot attach notes to unvalidated device data  Peak Inspiratory Pressure - Cannot attach notes to unvalidated device data  Mean Airway Pressure - Cannot attach notes to unvalidated device data  Rate Measured - Cannot attach notes to unvalidated device data  Vt Exhaled - Cannot attach notes to unvalidated device data  Minute Volume - Cannot attach notes to unvalidated device data  I:E Ratio - Cannot attach notes to unvalidated device data  Pulse - Cannot attach notes to unvalidated device data  SpO2 - Cannot attach notes to unvalidated device data  Resp - Cannot attach notes to unvalidated device data  End Tidal CO2 - Cannot attach notes to unvalidated device data  High Pressure Alarm - Cannot attach notes to unvalidated device data  Low Minute Volume Alarm - Cannot attach notes to unvalidated device data  High Respiratory Rate - Cannot attach notes to unvalidated device data    @FLONOTEDATA(<SUBSCRIPT> error)@

## 2019-05-04 NOTE — PROGRESS NOTES
Reassessment complete and documented on flowsheets, patient now following verbal commands to squeeze hand. VSS. Repositioned for comfort. Will continue to monitor.

## 2019-05-04 NOTE — PROGRESS NOTES
Assessment complete. Resting in bed. No s/s distress. Family at bedside. CPOT 0 . RASS -2 . Pt opens eyes to verbal stimuli. Pt unable to follow commands. Tele ST. Tolerating vent at current settings. Lines and gtt verified. Will monitor.

## 2019-05-04 NOTE — PROGRESS NOTES
Shift assessment complete and documented on flowsheets. VSS. Four eyes skin assessment and MAR handoff completed with previous RN. Repositioned for comfort. Will continue to monitor.

## 2019-05-04 NOTE — PROGRESS NOTES
05/04/19 1530   Vent Information   Vent Type 840   Vent Mode AC/VC   Vt Ordered 500 mL   Rate Set 16 bmp   Peak Flow 60 L/min   Pressure Support 0 cmH20   FiO2  30 %   Sensitivity 3   PEEP/CPAP 5   Humidification Source Heated wire   Humidification Temp 37   Humidification Temp Measured 38.2   Circuit Condensation Not drained   Vent Patient Data   Peak Inspiratory Pressure 45 cmH2O   Mean Airway Pressure 13 cmH20   Rate Measured 23 br/min   Vt Exhaled 890 mL   Minute Volume 12.4 Liters   I:E Ratio 1:3.20   Plateau Pressure 19 RAR59   Static Compliance 35 mL/cmH2O   Dynamic Compliance 12.5 mL/cmH2O   Cough/Sputum   Sputum How Obtained Spontaneous cough; Suctioned   Cough Productive   Frequency Infrequent   Sputum Amount Small   Sputum Color Brown   Tenacity Thick   Spontaneous Breathing Trial (SBT) RT Doc   Pulse 107   SpO2 100 %   Breath Sounds   Right Upper Lobe Diminished   Right Middle Lobe Diminished   Right Lower Lobe Diminished   Left Upper Lobe Diminished   Left Lower Lobe Diminished   Additional Respiratory  Assessments   Resp 24   End Tidal CO2 17 (%)   Position Semi-Umanzor's   Alarm Settings   High Pressure Alarm 45 cmH2O   Low Minute Volume Alarm 2 L/min   High Respiratory Rate 45 br/min   ETT (adult)   Placement Date/Time: 04/29/19 1340   Preoxygenation: Yes  Type: Cuffed  Tube Size: 8 mm  Placement Verified By[de-identified] Capnometry  Secured at: 23 cm  Measured From: Lips  Cuff Pressure: 30 cm H2O   Secured at 23 cm   Measured From Lips   ET Placement Right   Secured By Commercial tube saavedra   Site Condition Dry

## 2019-05-04 NOTE — PROGRESS NOTES
Hospitalist Progress Note      PCP: Jyoti Bernard PA-C    Date of Admission: 4/29/2019    Chief Complaint: pt became unresponsive    Hospital Course: Reviewed H and P    Subjective: Advancing TF. Remains intubated. Weaning off pressors. Medications:  Reviewed    Infusion Medications    norepinephrine 6 mcg/min (05/04/19 0917)     Scheduled Medications    ampicillin-sulbactam  3 g Intravenous Q12H    insulin glargine  20 Units Subcutaneous Daily    insulin lispro  0-12 Units Subcutaneous Q4H    miconazole   Topical BID    fluconazole  150 mg Oral Daily    ipratropium-albuterol  1 ampule Inhalation Q4H    mupirocin   Nasal BID    carboxymethylcellulose PF  1 drop Both Eyes TID    chlorhexidine  15 mL Mouth/Throat BID    heparin (porcine)  5,000 Units Subcutaneous BID    pantoprazole  40 mg Intravenous BID    sodium chloride flush  10 mL Intravenous 2 times per day     PRN Meds: magnesium sulfate, potassium chloride, fentanNYL, acetaminophen, dextrose, sodium chloride flush, magnesium hydroxide, acetaminophen      Intake/Output Summary (Last 24 hours) at 5/4/2019 1402  Last data filed at 5/4/2019 1215  Gross per 24 hour   Intake 4329 ml   Output 1540 ml   Net 2789 ml       Physical Exam Performed:    BP 99/62   Pulse 108   Temp 98.4 °F (36.9 °C) (Core)   Resp 27   Ht 5' 1\" (1.549 m)   Wt 134 lb 14.7 oz (61.2 kg)   SpO2 100%   BMI 25.49 kg/m²     General appearance: Intubated and sedated. HEENT: Conjunctivae/corneas clear. Neck: Supple  Respiratory: Vent sounds  Cardiovascular: tachycardic  Abdomen: Soft, non-tender, non-distended with normal bowel sounds. Musculoskeletal: No edema. Skin: Skin color, texture, turgor normal.  No rashes or lesions.   Neurologic:  Nonfocal  Capillary Refill: Brisk,< 3 seconds   Peripheral Pulses: +2 palpable, equal bilaterally       Labs:   Recent Labs     05/02/19  0406 05/03/19  0400 05/04/19  0409   WBC 13.2* 10.8 12.3*   HGB 9.1* 7.9* 7.5*   HCT 24.6* 22.1* 21.4*    250 220     Recent Labs     05/02/19  0406 05/03/19  0400 05/03/19  1023 05/03/19  1420 05/04/19  0409   * 133*  --   --  138   K 4.5 3.1* 3.8  --  2.9*   CL 98* 102  --   --  106   CO2 19* 19*  --   --  16*   BUN 51* 45*  --   --  42*   CREATININE 2.1* 2.0*  --   --  1.7*   CALCIUM 6.7* 6.5*  --   --  6.1*   PHOS 1.4* 1.5*  --  3.1 3.2       Urinalysis:      Lab Results   Component Value Date    NITRU Negative 04/29/2019    WBCUA 10-20 04/29/2019    BACTERIA 1+ 04/29/2019    RBCUA 3-5 04/29/2019    BLOODU MODERATE 04/29/2019    SPECGRAV 1.025 04/29/2019    GLUCOSEU >=1000 04/29/2019       Radiology:  XR CHEST PORTABLE   Final Result   1. No significant change. XR CHEST PORTABLE   Final Result   Bilateral lower lobe airspace disease, left greater than right, increased   compared to prior         XR CHEST PORTABLE   Final Result   Stable appearance of the chest with dense opacification in the left mid and   lower lung zone. XR CHEST PORTABLE   Final Result   1. No significant interval change in the left-sided opacity reflecting   pneumonia better characterized on the CT of the thorax from 04/29/2019.   2. Support devices as described above. XR CHEST PORTABLE   Final Result   Catheter in good position. No postprocedure pneumothorax. XR CHEST PORTABLE   Final Result   Supportive devices are stable. Persisting consolidations in the left lung base, consistent with pneumonia. CT CHEST ABDOMEN PELVIS WO CONTRAST   Final Result   Dense consolidation within the inferior aspect of left upper lobe and   throughout the left lower lobe, most compatible with pneumonia and/or   aspiration. That should be followed to resolution, especially given the   nodular morphology within portions of the consolidation. Diffuse mural thickening of the esophagus. Correlate with clinical evidence   of esophagitis.       The tip of the right femoral venous for CNS status evaluation. MRI of brain done and result is pending. 2. DKA - pt without diabetes medications for weeks if not months. Unsure of BS at home. Last A1c on record is 5.3 in 2015. New A1c 16. Initially on insulin gtt and transitioned to lantus daily, SSI. Monitor lantus dose with presumed CKD as having some hypoglycemia due to stacking. Glucose on arrival 916 now controlled. Started on TF. 3. Anion gap Metabolic acidosis and LOPEZ - secondary to above. Following BMPs. Nephrology following. Cr 2.0. IVF. Likely ATN. Slowly improving. No need for HD at this time. 4. Possible cardiac arrest? - troponin elevated but more likely due to demand and ischemia. Follow trop. Cardiology following. ECHO 55%, grade 1 DD, mild to mod AR. EP consulted for prolonged Qtc. 5. Hematemesis and anemia- on admission. PPI. Hbg stable. GI consult. Conservative management at this time. 6. Hypotension - likely due to dehydration, infection, and severe acidosis - remains on pressors. Weaning as tolerates. 7. Vaginal candidiasis - fluconazole x 3 doses. DVT Prophylaxis: heparin  Diet: DIET TUBE FEED CONTINUOUS/CYCLIC NPO; Diabetic 1.5 (Glucerna 1.5);  Nasogastric; Continuous; 25; 45; 20  Code Status: Full Code    PT/OT Eval Status: on hold    Dispo - ICU care    Kasie Kohler MD

## 2019-05-04 NOTE — PROGRESS NOTES
05/04/19 1949   Vent Information   Vent Type 840   Vent Mode AC/VC   Vt Ordered 500 mL   Rate Set 16 bmp   Peak Flow 60 L/min   Pressure Support 0 cmH20   FiO2  30 %   Sensitivity 3   PEEP/CPAP 5   Cuff Pressure (cm H2O) 30 cm H2O   Humidification Source HME   Humidification Temp 36.6   Vent Patient Data   Peak Inspiratory Pressure 27 cmH2O   Mean Airway Pressure 15 cmH20   Rate Measured 26 br/min   Vt Exhaled 508 mL   Minute Volume 13.3 Liters   I:E Ratio 1:2.2   Spontaneous Breathing Trial (SBT) RT Doc   Pulse 107   SpO2 100 %   Breath Sounds   Right Upper Lobe Clear   Right Middle Lobe Clear   Right Lower Lobe Clear;Diminished   Left Upper Lobe Clear   Left Lower Lobe Clear;Diminished   Additional Respiratory  Assessments   Resp 26   End Tidal CO2 16 (%)   Position Semi-Umanzor's   Alarm Settings   High Pressure Alarm 45 cmH2O   Low Minute Volume Alarm 2 L/min   Apnea (secs) 20 secs   High Respiratory Rate 45 br/min   Low Exhaled Vt  200 mL   ETT (adult)   Placement Date/Time: 04/29/19 1340   Preoxygenation: Yes  Type: Cuffed  Tube Size: 8 mm  Placement Verified By[de-identified] Capnometry  Secured at: 23 cm  Measured From: Lips  Cuff Pressure: 30 cm H2O   Secured at 23 cm   Measured From [address] By [employer] tube saavedra   Site Condition Cool;Dry

## 2019-05-04 NOTE — PROGRESS NOTES
g at 05/03/19 1649    norepinephrine (LEVOPHED) 16 mg in dextrose 5 % 250 mL infusion  2 mcg/min Intravenous Continuous Jorge Luis Guillaume MD 5.6 mL/hr at 05/04/19 0917 6 mcg/min at 05/04/19 0367    sodium chloride flush 0.9 % injection 10 mL  10 mL Intravenous 2 times per day Jorge Luis Guillaume MD   10 mL at 05/04/19 0940    sodium chloride flush 0.9 % injection 10 mL  10 mL Intravenous PRN Jorge Luis Guillaume MD        magnesium hydroxide (MILK OF MAGNESIA) 400 MG/5ML suspension 30 mL  30 mL Oral Daily PRN Jorge Luis Guillaume MD        acetaminophen (TYLENOL) suppository 650 mg  650 mg Rectal Q4H PRN Jorge Luis Guillaume MD   650 mg at 04/30/19 0048     Allergies   Allergen Reactions    Aspirin Nausea And Vomiting     Active Problems:    Acute renal failure (ARF) ([facility])    DKA (diabetic ketoacidoses) ([facility])    Acidosis    Cardiac arrest ([facility]    DKA, type 2, not at goal [facility])    Acute respiratory failure ([facility])    Prolonged Q-T interval on ECG    Hypokalemia    Elevated troponin  Resolved Problems:    * No resolved hospital problems. *    Blood pressure 99/62, pulse 108, temperature 98.4 °F (36.9 °C), temperature source Core, resp. rate 27, height 5' 1\" (1.549 m), weight 134 lb 14.7 oz (61.2 kg), SpO2 100 %, not currently breastfeeding. Subjective:  Symptoms:  Stable. Pain:  She reports no pain. Objective:  General Appearance:  Not in pain. Vital signs: (most recent): Blood pressure 99/62, pulse 108, temperature 98.4 °F (36.9 °C), temperature source Core, resp. rate 27, height 5' 1\" (1.549 m), weight 134 lb 14.7 oz (61.2 kg), SpO2 100 %, not currently breastfeeding. Heart: Normal rate. Regular rhythm. S1 normal and S2 normal.    Abdomen: Abdomen is soft. Bowel sounds are normal.   There is no abdominal tenderness. Assessment & Plan  81year old female with history of HTN, DM, CVA, asthma, Seizure do, admitted with cardiac arrest and DKA.  She was also noted to have coffee ground aspirate per NG, resolved.     Recommendation:  1. Continue supportive care  2. Daily H/H  3. Continue PPI BID  4. Observe for signs of bleeding  5. Needs EGD at some point  6.  Will follow    Debbie Tinsley MD       ([phone]) [phone]          Debbie Tinsley MD  5/4/2019

## 2019-05-04 NOTE — PROGRESS NOTES
4 Eyes Skin Assessment     The patient is being assess for   Shift Handoff    I agree that 2 RN's have performed a thorough Head to Toe Skin Assessment on the patient. ALL assessment sites listed below have been assessed. Areas assessed by both nurses:   [x]   Head, Face, and Ears   [x]   Shoulders, Back, and Chest, Abdomen  [x]   Arms, Elbows, and Hands   [x]   Coccyx, Sacrum, and Ischium  [x]   Legs, Feet, and Heels        ****    **SHARE this note so that the co-signing nurse is able to place an eSignature**    Co-signer eSignature: Electronically signed by Robert Dalal RN on 5/4/19 at 7:07 AM    Does the Patient have Skin Breakdown?   Yes          Robi Prevention initiated:  Yes   Wound Care Orders initiated:  No      [facility] nurse consulted for Pressure Injury (Stage 3,4, Unstageable, DTI, NWPT, Complex wounds)and New or Established Ostomies:  No      Primary Nurse eSignature: {Esignature:[id number]}

## 2019-05-04 NOTE — PROGRESS NOTES
05/03/19 2007   Vent Information   Vt Ordered 500 mL   Rate Set 16 bmp   Peak Flow 60 L/min   Pressure Support 0 cmH20   FiO2  30 %   Sensitivity 3   PEEP/CPAP 5   Cuff Pressure (cm H2O) 30 cm H2O   Humidification Temp Measured 37.3   Vent Patient Data   Peak Inspiratory Pressure 25 cmH2O   Mean Airway Pressure 12 cmH20   Rate Measured 17 br/min   Vt Exhaled 592 mL   Minute Volume 9.4 Liters   I:E Ratio 1:2.70   Plateau Pressure 22 HJE96   Static Compliance 35 mL/cmH2O   Dynamic Compliance 30 mL/cmH2O   Cough/Sputum   Sputum How Obtained Endotracheal   Cough Productive   Sputum Amount Small   Sputum Color Creamy   Tenacity Thick   Spontaneous Breathing Trial (SBT) RT Doc   Pulse 105   SpO2 100 %   Breath Sounds   Right Upper Lobe Rhonchi   Right Middle Lobe Diminished   Right Lower Lobe Diminished   Left Upper Lobe Rhonchi   Left Lower Lobe Diminished   Additional Respiratory  Assessments   Resp 22   End Tidal CO2 18 (%)   Alarm Settings   High Pressure Alarm 45 cmH2O   Low Minute Volume Alarm 2 L/min   High Respiratory Rate 45 br/min   ETT (adult)   Placement Date/Time: 04/29/19 1340   Preoxygenation: Yes  Type: Cuffed  Tube Size: 8 mm  Placement Verified By[de-identified] Capnometry  Secured at: 23 cm  Measured From: Lips  Cuff Pressure: 30 cm H2O   Secured at 23 cm   Measured From Lips   ET Placement Left   Secured By Commercial tube saavedra   Site Condition Dry   Cuff Pressure 30 cm H2O

## 2019-05-04 NOTE — PROGRESS NOTES
[facility]       Progress Note           Subjective/   81y.o. year old female who we are seeing in consultation for LOPEZ/ATN. HPI:  Pt remains intubated and failed SBT this am, has been off of sedation  Cr trending down  cvp 9    Last 24h uop 2l     ROS:  Remains intubated. No fevers. Objective/   GEN:  Chronically ill, BP 99/62   Pulse 108   Temp 98.4 °F (36.9 °C) (Core)   Resp 27   Ht 5' 1\" (1.549 m)   Wt 134 lb 14.7 oz (61.2 kg)   SpO2 100%   BMI 25.49 kg/m²   HEENT: intubated   CV: S1, S2 without m/r/g; some edema on her UE  RESP: CTA B without w/r/r; breathing wnl  ABD: +bs, soft, nt, no hsm  SKIN: warm, no rashes    Data/  Recent Labs     05/02/19  0406 05/03/19  0400 05/04/19  0409   WBC 13.2* 10.8 12.3*   HGB 9.1* 7.9* 7.5*   HCT 24.6* 22.1* 21.4*   MCV 79.8* 80.5 80.6    250 220     Recent Labs     05/02/19  0406 05/03/19  0400 05/03/19  1023 05/03/19  1420 05/04/19  0409   * 133*  --   --  138   K 4.5 3.1* 3.8  --  2.9*   CL 98* 102  --   --  106   CO2 19* 19*  --   --  16*   GLUCOSE 167* 85  --   --  111*   PHOS 1.4* 1.5*  --  3.1 3.2   MG  --  1.50*  --  2.00 2.00   BUN 51* 45*  --   --  42*   CREATININE 2.1* 2.0*  --   --  1.7*   LABGLOM 23* 25*  --   --  30*   GFRAA 28* 30*  --   --  36*       Assessment/Plan     Ms. Rj Carballo is a 81year old female with PMHx of CAD, DM, HTN and seizures who presents after a cardiac arrest.     Acute Kidney Injury.  - Etiology: ATN in the setting of cardiac arrest/sepsis. - Data: Last serum creatinine in 07/2016 was <0.5mg/dL. - Urinalysis with blood and protein. - Clinical: Hypotensive on vasopressors, keep MAP above 65.   - CVP's within range  -ok to stop IVF  - serial BMPs      Severe Anion Gap Metabolic Acidosis. - Likely from DKA and lactic acidosis.  No toxic alcohol ingestion.  - Methanol, Ethylene glycol negative.     Anemia.  - Had coffee-ground emesis on admission.  - Plans per GI.     S/P Cardiac Arrest.  - Plans per Cardiology     DKA.   - On IVF and insulin drip per protocol, off latter now    Hypophosphatemia  - Prn IV phos replacement today    Hypomagnesemia    On prn replacement    Encephalopathy   MRI brain 5/3, neurology seeing

## 2019-05-04 NOTE — PROGRESS NOTES
Pulmonary & Critical Care Inpatient Progress Note   Jesusita Sidhu MD     REASON FOR TODAY'S VISIT:  Assistance with critical care management    SUBJECTIVE:   Continued need for vent support due to mentation issues  Tolerated an SBT briefly but became tachypneic    Scheduled Meds:   ampicillin-sulbactam  3 g Intravenous Q12H    insulin glargine  20 Units Subcutaneous Daily    insulin lispro  0-12 Units Subcutaneous Q4H    miconazole   Topical BID    fluconazole  150 mg Oral Daily    ipratropium-albuterol  1 ampule Inhalation Q4H    mupirocin   Nasal BID    carboxymethylcellulose PF  1 drop Both Eyes TID    chlorhexidine  15 mL Mouth/Throat BID    heparin (porcine)  5,000 Units Subcutaneous BID    pantoprazole  40 mg Intravenous BID    sodium chloride flush  10 mL Intravenous 2 times per day       Continuous Infusions:   norepinephrine 6 mcg/min (05/04/19 0917)       PRN Meds:  magnesium sulfate, potassium chloride, fentanNYL, acetaminophen, dextrose, sodium chloride flush, magnesium hydroxide, acetaminophen    ALLERGIES:  Patient is allergic to aspirin. Objective:   PHYSICAL EXAM:  BP 99/62   Pulse 108   Temp 98.4 °F (36.9 °C) (Core)   Resp 27   Ht 5' 1\" (1.549 m)   Wt 134 lb 14.7 oz (61.2 kg)   SpO2 100%   BMI 25.49 kg/m²    Physical Exam   Constitutional: She appears well-developed and well-nourished. No distress. HENT:   Head: Normocephalic and atraumatic. Mouth/Throat: Oropharynx is clear and moist. No oropharyngeal exudate. Neck: Neck supple. No JVD present. Cardiovascular: Normal heart sounds. Exam reveals no gallop and no friction rub. No murmur heard. Pulmonary/Chest: Effort normal. She has no wheezes. She has no rales. Equal chest rise and expansion bilaterally   Abdominal: Soft. Bowel sounds are normal. She exhibits no distension. There is no tenderness. Musculoskeletal: She exhibits no edema. Lymphadenopathy:     She has no cervical adenopathy.    Neurological: A cranial nerve deficit is present. Intubated, sedated   Skin: Skin is warm and dry. No rash noted. She is not diaphoretic. Data Reviewed:   LABS:  CBC:  Recent Labs     05/02/19  0406 05/03/19  0400 05/04/19  0409   WBC 13.2* 10.8 12.3*   HGB 9.1* 7.9* 7.5*   HCT 24.6* 22.1* 21.4*   MCV 79.8* 80.5 80.6    250 220     BMP:  Recent Labs     05/02/19  0406 05/03/19  0400 05/03/19  1023 05/03/19  1420 05/04/19  0409   * 133*  --   --  138   K 4.5 3.1* 3.8  --  2.9*   CL 98* 102  --   --  106   CO2 19* 19*  --   --  16*   PHOS 1.4* 1.5*  --  3.1 3.2   BUN 51* 45*  --   --  42*   CREATININE 2.1* 2.0*  --   --  1.7*     LIVER PROFILE:   No results for input(s): AST, ALT, LIPASE, ALB, BILIDIR, BILITOT, ALKPHOS in the last 72 hours. Invalid input(s): AMYLASE  PT/INR:  No results for input(s): PROTIME, INR in the last 72 hours. APTT: No results for input(s): APTT in the last 72 hours. UA:  No results for input(s): NITRITE, COLORU, PHUR, LABCAST, WBCUA, RBCUA, MUCUS, TRICHOMONAS, YEAST, BACTERIA, CLARITYU, SPECGRAV, LEUKOCYTESUR, UROBILINOGEN, BILIRUBINUR, BLOODU, GLUCOSEU, AMORPHOUS in the last 72 hours. Invalid input(s): KETONESU  Recent Labs     05/03/19  0400 05/04/19  0410   PHART 7.488* 7.482*   [id number] 24.2* 22.3*   PO2ART 87.4 137.2*       Vent Information  $Ventilation: $Subsequent Day  Vent Type: 840  Vent Mode: AC/VC  Vt Ordered: 500 mL  Rate Set: 16 bmp  Peak Flow: 60 L/min  Pressure Support: 0 cmH20  FiO2 : 30 %  Sensitivity: 3  PEEP/CPAP: 5  Cuff Pressure (cm H2O): 30 cm H2O  Humidification Source: Heated wire  Humidification Temp: 37  Humidification Temp Measured: 36.4  Circuit Condensation: Drained    CXR personally reviewed, dense left sided infiltrate           Assessment:     1. Acute resp failure, hypoxic              -left sided necrotizing pneumonia, staph  2. Acute hyperglycemia, DKA  3. Acute encephalopathy              -metabolic, infectious    -?anoxic  4.  LOPEZ with metabolic acidosis  5. Hypovolemic and septic shock  6. Questionable cardiac arrest     Plan:      -Continue vent support, holding sedation with plan to extubate if mentation allows. -MRI completed but read still pending, will ask neurology to provide input  -Atb treatment simplified for MSSA with unasyn, still with significant bandemia/sepsis   -Monitor H/H, repeat later today  -Insulin basal and sliding scale, monitor blood sugars  -Stop IVF  -Cardiology involved  -Titrate vasopressor support to maintain MAP>65  -Tube feeds    Due to life threatening respiratory failure, hemodynamic instability, and DKA this patient is critically ill.  Total critical care time involved in her care was 40 mins    Eddie Webb MD

## 2019-05-04 NOTE — CONSULTS
In patient Neurology consult        [facility] Neurology      Fallon Pedersen MD      Tolu Roberson  1950    Date of Service: 5/4/2019    Referring Physician: Shawn Kim MD      Reason for the consult: acute encephalopathy. HPI:   Most of the history was obtained from chart reviewing and discussion with the patient's nurse and her family as the patient is currently intubated. The patient is a 81y.o. years old female with history of diabetes, hypertension and CAD who was admitted to [facility] about 5 days ago with acute encephalopathy. Unknown duration. She was found by her family at home unresponsive. Degree was severe. No witnessed seizure or convulsion. No triggers or other associated symptoms. Paramedics arrived and she was found to be in DKA and had few minutes of cardiac arrest.  ACLS protocol was performed. She was admitted to [facility]. The patient was initially intubated and sedated in the ICU. She developed multiple complications including respiratory failure with hypoxemia and pneumonia. Sedation was stopped about 2 days ago. The patient remained however unresponsive. Neurology has been consulted. The patient now can open her eyes to voice but does not follow directions. According to nurse, no witnessed seizure or focal weakness. No recent fever. Initial CT head showed no acute stroke. The patient had her MRI brain yesterday which I reviewed today. MRI showed acute left temporal acute DWI restrictions. Other ROS was limited.        Past Medical History:   Diagnosis Date    Acute renal failure (ARF) ([facility] [address] 8/20/2015    Asthma     CAD (coronary artery disease)     MI in 2013    Diabetes mellitus ([facility] [address]     Hypertension     Pneumonia     Not sure when    Seizures [facility])     She goes numb, doctor told her they are seizures    Unspecified cerebral artery occlusion with cerebral infarction     Pt reports having a lot of mini-strokes; first one was 2002; last one was a couple years ago     Family History   Problem Relation Age of Onset    Cancer Mother     Diabetes Father     Heart Disease Father     High Blood Pressure Father     High Cholesterol Father     Cancer Sister      Past Surgical History:   Procedure Laterality Date    BLADDER REPAIR      BREAST LUMPECTOMY Left     BREAST SURGERY      BRONCHOSCOPY N/A 5/1/2019    BRONCHOSCOPY THERAPUTIC ASPIRATION INITIAL performed by Jake Sotomayor MD at [address] [place] [address]  5/1/2019    BRONCHOSCOPY ALVEOLAR LAVAGE performed by Jake Sotomayor MD at [address] [place] [address] N/A 5/2/2019    BRONCHOSCOPY THERAPUTIC ASPIRATION INITIAL performed by Jake Sotomayor MD at [address] [place] [address]     Past Surgical History:   Procedure Laterality Date    BLADDER REPAIR      BREAST LUMPECTOMY Left     BREAST SURGERY      BRONCHOSCOPY N/A 5/1/2019    BRONCHOSCOPY THERAPUTIC ASPIRATION INITIAL performed by Jake Sotomayor MD at [address] [place] [address]  5/1/2019    BRONCHOSCOPY ALVEOLAR LAVAGE performed by Jake Sotomayor MD at [address] [place] [address] N/A 5/2/2019    BRONCHOSCOPY THERAPUTIC ASPIRATION INITIAL performed by Jake Sotomayor MD at [address] [place] [address]     Family History   Problem Relation Age of Onset    Cancer Mother     Diabetes Father     Heart Disease Father     High Blood Pressure Father     High Cholesterol Father     Cancer Sister      Social History     Tobacco Use    Smoking status: Never Smoker    Smokeless tobacco: Never Used   Substance Use Topics    Alcohol use: No     Comment: occasional    Drug use: No     Allergies   Allergen Reactions    Aspirin Nausea And Vomiting     Current Facility-Administered Medications   Medication Dose Route Frequency Provider Last Rate Last Dose    magnesium sulfate 1 g in dextrose 5% 100 mL IVPB  1 g Intravenous PRN Sean Butler MD   Stopped at 05/03/19 0852    potassium chloride 20 mEq/50 mL IVPB (Central Line)  20 sodium chloride flush 0.9 % injection 10 mL  10 mL Intravenous PRN Landry Garcia MD        magnesium hydroxide (MILK OF MAGNESIA) 400 MG/5ML suspension 30 mL  30 mL Oral Daily PRN Landry Garcia MD        acetaminophen (TYLENOL) suppository 650 mg  650 mg Rectal Q4H PRN Landry Garcia MD   650 mg at 04/30/19 0048       ROS: Limited due to intubation. Exam:   Vitals:    05/04/19 0500 05/04/19 0600 05/04/19 0850 05/04/19 1121   BP: 113/69 134/76 99/62    Pulse: 105 107 105 108   Resp: 24 27 25 27   Temp:   98.4 °F (36.9 °C)    TempSrc:   Core    SpO2: 100% 100%  100%   Weight:       Height:         General appearance: intubated  Eye: No icterus  Fundus: Funduscopic examination could not be performed due to patient's poor cooperation and intubation. Neck: supple  Cardiovascular: No lower leg edema with good pulsation. Mental Status: intubated. Open her eyes to voice but does not follow directions  Cranial Nerves:   Pupil: RRR  No gaze preference  OCR: present  Corneal: yes  Cough: yes  Gag: yes  Spontaneous breathing: yes  II: Visual fields: NT due to intubation. Pupils: equal, round, reactive to light  III,IV,VI: No gaze preference. V: Facial sensation: NT due to intubation  VII: Facial symmetry: symmetric   VIII: Hearing: NT due to intubation  IX: Palate elevation: NT due to intubation   XI: Shoulder shrug: NT due to intubation  XII: Tongue movements: NT due to intubation  Musculoskeletal: no motor response  Tone: normal  Reflexes: diminished but symmetric   Planters: mute  Coordination: NT due to intubation.   Sensation and Gait/Posture: NT due to intubation    Data:  LABS:   Lab Results   Component Value Date     05/04/2019    K 2.9 05/04/2019    K 5.2 04/30/2019     05/04/2019    CO2 16 05/04/2019    BUN 42 05/04/2019    CREATININE 1.7 05/04/2019    GFRAA 36 05/04/2019    LABGLOM 30 05/04/2019    GLUCOSE 111 05/04/2019    PHOS 3.2 05/04/2019    MG 2.00 05/04/2019 CALCIUM 6.1 05/04/2019     Lab Results   Component Value Date    WBC 12.3 05/04/2019    RBC 2.66 05/04/2019    HGB 7.5 05/04/2019    HCT 21.4 05/04/2019    MCV 80.6 05/04/2019    RDW 14.4 05/04/2019     05/04/2019     Lab Results   Component Value Date    INR 1.25 (H) 04/29/2019    PROTIME 14.2 (H) 04/29/2019     Neuroimaging and labs reviewed by me and DW her family    Impression:  Acute encephalopathy, severe. Likely multifactorial.  Possible acute metabolic encephalopathy from DKA, hypoxic- ischemic insult and cannot rule out possibility of new stroke. Reviewed MRI of the brain which was somewhat limited and showed abnormal DWI restriction on the left temporal region which can be seen in a patient with focal stroke or focal encephalitis. I wasn't able to review the rest of neuro imaging sequences. DKA  Diabetes, poorly controlled  Acute respiratory failure with hypoxemia  Acute kidney injury  Pneumonia  GIB     Recommendation:  Continue current respiratory support  Insulin sliding scale and blood sugar monitor  Avoid sedation  EEG on Monday  Follow CBC  PPI  Follow MRI results  Aspirin when medically stable  Continue current blood pressure monitor and control  Hydration and follow creatinine  Continue antibiotics  Long discussion with patient's family regarding outcome and prognosis. Would like the patient to be at least off sedation for 3-4 days. Will follow on Monday. Thank you for referring such patient. If you have any questions regarding my consult note, please don't hesitate to call me. Reza Samson MD  [phone]    This dictation was generated by voice recognition computer software.  Although all attempts are made to edit the dictation for accuracy, there may be errors in the  transcription that are not intended

## 2019-05-04 NOTE — PROGRESS NOTES
Pulmonary & Critical Care Inpatient Progress Note   Clint Pina MD     REASON FOR TODAY'S VISIT:  Assistance with critical care management    SUBJECTIVE:   Remains on vent support, tolerated SBT overnight and this AM but mentation still poor. .Sedation held  Decreased secretions from endotracheal tube    Scheduled Meds:   ampicillin-sulbactam  3 g Intravenous Q12H    [START ON 5/4/2019] insulin glargine  20 Units Subcutaneous Daily    insulin lispro  0-12 Units Subcutaneous Q4H    miconazole   Topical BID    fluconazole  150 mg Oral Daily    ipratropium-albuterol  1 ampule Inhalation Q4H    mupirocin   Nasal BID    carboxymethylcellulose PF  1 drop Both Eyes TID    chlorhexidine  15 mL Mouth/Throat BID    heparin (porcine)  5,000 Units Subcutaneous BID    pantoprazole  40 mg Intravenous BID    sodium chloride flush  10 mL Intravenous 2 times per day       Continuous Infusions:   sodium chloride 100 mL/hr at 05/03/19 2005    norepinephrine 4 mcg/min (05/03/19 2018)       PRN Meds:  magnesium sulfate, potassium chloride, fentanNYL, acetaminophen, dextrose, sodium chloride flush, magnesium hydroxide, acetaminophen    ALLERGIES:  Patient is allergic to aspirin. Objective:   PHYSICAL EXAM:  /67   Pulse 105   Temp 97.6 °F (36.4 °C) (Core)   Resp 18   Ht 5' 1\" (1.549 m)   Wt 122 lb 5.7 oz (55.5 kg)   SpO2 100%   BMI 23.12 kg/m²    Physical Exam   Constitutional: She appears well-developed and well-nourished. No distress. HENT:   Head: Normocephalic and atraumatic. Mouth/Throat: Oropharynx is clear and moist. No oropharyngeal exudate. Neck: Neck supple. No JVD present. Cardiovascular: Normal heart sounds. Exam reveals no gallop and no friction rub. No murmur heard. Pulmonary/Chest: Effort normal. She has no wheezes. She has no rales. Equal chest rise and expansion bilaterally   Abdominal: Soft. Bowel sounds are normal. She exhibits no distension. There is no tenderness. Musculoskeletal: She exhibits no edema. Lymphadenopathy:     She has no cervical adenopathy. Neurological: A cranial nerve deficit is present. Intubated, sedated   Skin: Skin is warm and dry. No rash noted. She is not diaphoretic. Data Reviewed:   LABS:  CBC:  Recent Labs     05/01/19  0434 05/02/19  0406 05/03/19  0400   WBC 13.7* 13.2* 10.8   HGB 9.7* 9.1* 7.9*   HCT 27.5* 24.6* 22.1*   MCV 80.0 79.8* 80.5    332 250     BMP:  Recent Labs     05/01/19  1655 05/02/19  0406 05/03/19  0400 05/03/19  1023 05/03/19  1420   * 132* 133*  --   --    K 4.7 4.5 3.1* 3.8  --    CL 95* 98* 102  --   --    CO2 20* 19* 19*  --   --    PHOS 1.1* 1.4* 1.5*  --  3.1   BUN 50* 51* 45*  --   --    CREATININE 2.1* 2.1* 2.0*  --   --      LIVER PROFILE:   No results for input(s): AST, ALT, LIPASE, ALB, BILIDIR, BILITOT, ALKPHOS in the last 72 hours. Invalid input(s): AMYLASE  PT/INR:  No results for input(s): PROTIME, INR in the last 72 hours. APTT: No results for input(s): APTT in the last 72 hours. UA:  No results for input(s): NITRITE, COLORU, PHUR, LABCAST, WBCUA, RBCUA, MUCUS, TRICHOMONAS, YEAST, BACTERIA, CLARITYU, SPECGRAV, LEUKOCYTESUR, UROBILINOGEN, BILIRUBINUR, BLOODU, GLUCOSEU, AMORPHOUS in the last 72 hours. Invalid input(s): KETONESU  Recent Labs     05/02/19  0406 05/03/19  0400   PHART 7.484* 7.488*   [id number] 26.5* 24.2*   PO2ART 157.2* 87.4       Vent Information  $Ventilation: $Subsequent Day  Vent Type: 840  Vent Mode: AC/VC  Vt Ordered: 500 mL  Rate Set: 16 bmp  Peak Flow: 60 L/min  Pressure Support: 0 cmH20  FiO2 : 30 %  Sensitivity: 3  PEEP/CPAP: 5  Cuff Pressure (cm H2O): 30 cm H2O  Humidification Source: Heated wire  Humidification Temp: 36.5  Humidification Temp Measured: 37.3  Circuit Condensation: Drained    CXR personally reviewed, dense left sided infiltrate           Assessment:     1.  Acute resp failure, hypoxic              -left sided necrotizing pneumonia, staph  2. Acute hyperglycemia, DKA  3. Acute encephalopathy              -metabolic, infectious    -?anoxic  4. LOPEZ with metabolic acidosis  5. Hypovolemic and septic shock  6. Questionable cardiac arrest     Plan:      -Continue vent support, holding sedation with plan to extubate if mentation allows. -MRI ordered for today, unclear reason for ongoing mentation issues. Ammonia checked and normal  -Atb treatment simplified for MSSA, continue extended course for necrotizing pneumonia  -Dropping H/H without obvious signs of bleeding, monitor. GI input noted, conservative management at this time  -Insulin basal and sliding scale, monitor blood sugars  -Maintenance IVF, monitor volume status   -Cardiology involved  -Titrate vasopressor support to maintain MAP>65  -Tube feeds    Due to life threatening respiratory failure, hemodynamic instability, and DKA this patient is critically ill.  Total critical care time involved in her care was 40 mins    Aubrie Maki MD

## 2019-05-04 NOTE — PROGRESS NOTES
RESPIRATORY THERAPY ASSESSMENT    Name:  Kenji Sosa Record Number:  [id number]  Age: 81 y.o. Gender: female  : 1950  Today's Date:  2019  Room:  Pending sale to [facility]0224-01    Assessment     Is the patient being admitted for a COPD or Asthma exacerbation? No   (If yes the patient will be seen every 4 hours for the first 24 hours and then reassessed)    Patient Admission Diagnosis      Allergies  Allergies   Allergen Reactions    Aspirin Nausea And Vomiting       Minimum Predicted Vital Capacity:     717         Actual Vital Capacity:      unable              Pulmonary History:Asthma  Home Oxygen Therapy:  unknown  Home Respiratory Therapy:None   Current Respiratory Therapy:  Duoneb Q4H  Treatment Type: HHN  Medications: Albuterol/Ipratropium    Respiratory Severity Index(RSI)   Patients with orders for inhalation medications, oxygen, or any therapeutic treatment modality will be placed on Respiratory Protocol. They will be assessed with the first treatment and at least every 72 hours thereafter. The following severity scale will be used to determine frequency of treatment intervention.     Smoking History: No Smoking History = 0    Social History  Social History     Tobacco Use    Smoking status: Never Smoker    Smokeless tobacco: Never Used   Substance Use Topics    Alcohol use: No     Comment: occasional    Drug use: No       Recent Surgical History: None = 0  Past Surgical History  Past Surgical History:   Procedure Laterality Date    BLADDER REPAIR      BREAST LUMPECTOMY Left     BREAST SURGERY      BRONCHOSCOPY N/A 2019    BRONCHOSCOPY THERAPUTIC ASPIRATION INITIAL performed by Venessa Fuchs MD at  [facility]  2019    BRONCHOSCOPY ALVEOLAR LAVAGE performed by Venessa Fuchs MD at  [facility] N/A 2019    BRONCHOSCOPY THERAPUTIC ASPIRATION INITIAL performed by Venessa Fuchs MD at [address] [place] [address]       Level of Consciousness: Comatose = 4    Level of Activity: Bedridden, unresponsive or quadriplegic = 4    Respiratory Pattern: Regular Pattern; RR 8-20 = 0    Breath Sounds: Diminshed bilaterally and/or crackles = 2    Sputum  Sputum Color: Min John: Thick, Sputum How Obtained: Spontaneous cough, Suctioned  Cough: Strong, productive = 1    Vital Signs   BP 99/62   Pulse 107   Temp 100.2 °F (37.9 °C) (Core)   Resp 24   Ht 5' 1\" (1.549 m)   Wt 134 lb 14.7 oz (61.2 kg)   SpO2 100%   BMI 25.49 kg/m²   SPO2 (COPD values may differ): 88-89% on room air or greater than 92% on FiO2 28- 35% = 2    Peak Flow (asthma only): not applicable = 0    RSI: 97-21 = Q6H or QID and Q4HPRN for dyspnea        Plan       Goals: medication delivery, mobilize retained secretions, volume expansion and improve oxygenation    Patient/caregiver was educated on the proper method of use for Respiratory Care Devices:  Yes      Level of patient/caregiver understanding able to:   ? Verbalize understanding   ? Demonstrate understanding       ? Teach back        ? Needs reinforcement       ? No available caregiver               ? Other:     Response to education:  Good     Is patient being placed on Home Treatment Regimen? No     Does the patient have everything they need prior to discharge? NA     Comments: cont as ordered    Plan of Care: after review of chart will cont as ordered    Electronically signed by Raffaele Ron RCP on 5/4/2019 at 3:35 PM    Respiratory Protocol Guidelines     1. Assessment and treatment by Respiratory Therapy will be initiated for medication and therapeutic interventions upon initiation of aerosolized medication. 2. Physician will be contacted for respiratory rate (RR) greater than 35 breaths per minute. Therapy will be held for heart rate (HR) greater than 140 beats per minute, pending direction from physician.   3. Bronchodilators will be administered via Metered Dose Inhaler (MDI) with spacer when the following criteria are met:  a. Alert and cooperative     b. HR < 140 bpm  c. RR < 30 bpm                d. Can demonstrate a 2-3 second inspiratory hold  4. Bronchodilators will be administered via Hand Held Nebulizer [facility]) to patients when ANY of the following criteria are met  a. Incognizant or uncooperative          b. Patients treated with HHN at Home        c. Unable to demonstrate proper use of MDI with spacer     d. RR > 30 bpm   5. Bronchodilators will be delivered via Metered Dose Inhaler (MDI), HHN, Aerogen to intubated patients on mechanical ventilation. 6. Inhalation medication orders will be delivered and/or substituted as outlined below. Aerosolized Medications Ordering and Administration Guidelines:    1. All Medications will be ordered by a physician, and their frequency and/or modality will be adjusted as defined by the patients Respiratory Severity Index (RSI) score. 2. If the patient does not have documented COPD, consider discontinuing anticholinergics when RSI is less than 9.  3. If the bronchospasm worsens (increased RSI), then the bronchodilator frequency can be increased to a maximum of every 4 hours. If greater than every 4 hours is required, the physician will be contacted. 4. If the bronchospasm improves, the frequency of the bronchodilator can be decreased, based on the patient's RSI, but not less than home treatment regimen frequency. 5. Bronchodilator(s) will be discontinued if patient has a RSI less than 9 and has received no scheduled or as needed treatment for 72  Hrs. Patients Ordered on a Mucolytic Agent:    1. Must always be administered with a bronchodilator. 2. Discontinue if patient experiences worsened bronchospasm, or secretions have lessened to the point that the patient is able to clear them with a cough. Anti-inflammatory and Combination Medications:    1.  If the patient lacks prior history of lung disease, is not using inhaled anti-inflammatory medication at home, and lacks wheezing by examination or by history for at least 24 hours, contact physician for possible discontinuation.

## 2019-05-04 NOTE — PLAN OF CARE
Problem: Falls - Risk of:  Goal: Will remain free from falls  Description  Will remain free from falls  Outcome: Ongoing  Note:   Fall risk assessment complete, fall precautions in place. Fall visuals posted, bed alarm on, bed in lowest position with wheels locked. Patient has been free of falls this shift, will continue to monitor. Goal: Absence of physical injury  Description  Absence of physical injury  5/3/2019 2131 by Sandee Robin RN  Outcome: Ongoing  5/3/2019 1746 by Maurisio Sprague RN  Outcome: Ongoing     Problem: Risk for Impaired Skin Integrity  Goal: Tissue integrity - skin and mucous membranes  Description  Structural intactness and normal physiological function of skin and  mucous membranes.   5/3/2019 2131 by Sandee Robin RN  Outcome: Ongoing  5/3/2019 1746 by Maurisio Sprague RN  Outcome: Ongoing     Problem: Nutrition  Goal: Optimal nutrition therapy  5/3/2019 2131 by Sandee Robin RN  Outcome: Ongoing  5/3/2019 1746 by Maurisio Sprague RN  Outcome: Ongoing     Problem: Serum Glucose Level - Abnormal:  Goal: Ability to maintain appropriate glucose levels will improve  Description  Ability to maintain appropriate glucose levels will improve  5/3/2019 2131 by Sandee Robin RN  Outcome: Ongoing  5/3/2019 1746 by Maurisio Sprague RN  Outcome: Ongoing     Problem: Sensory Perception - Impaired:  Goal: Ability to maintain a stable neurologic state will improve  Description  Ability to maintain a stable neurologic state will improve  Outcome: Ongoing     Problem: Restraint Use - Nonviolent/Non-Self-Destructive Behavior:  Goal: Absence of restraint indications  Description  Absence of restraint indications  Outcome: Ongoing  Goal: Absence of restraint-related injury  Description  Absence of restraint-related injury  5/3/2019 2131 by Sandee Robin RN  Outcome: Ongoing  5/3/2019 1746 by Maurisio Sprague RN  Outcome: Ongoing

## 2019-05-04 NOTE — PROGRESS NOTES
05/04/19 0351   Vent Information   Vt Ordered 500 mL   Rate Set 16 bmp   Peak Flow 60 L/min   Pressure Support 0 cmH20   FiO2  30 %   Sensitivity 3   PEEP/CPAP 5   Cuff Pressure (cm H2O) 30 cm H2O   Humidification Temp Measured 37   Circuit Condensation Drained   Vent Patient Data   Peak Inspiratory Pressure 24 cmH2O   Mean Airway Pressure 13 cmH20   Rate Measured 23 br/min   Vt Exhaled 532 mL   Minute Volume 12 Liters   I:E Ratio 1:2.40   Cough/Sputum   Sputum How Obtained Endotracheal   Cough Productive   Sputum Amount Moderate   Sputum Color Tan   Tenacity Thick   Spontaneous Breathing Trial (SBT) RT Doc   Pulse 100   SpO2 100 %   Breath Sounds   Right Upper Lobe Rhonchi   Right Middle Lobe Rhonchi   Right Lower Lobe Diminished   Left Upper Lobe Rhonchi   Left Lower Lobe Diminished   Additional Respiratory  Assessments   Resp 27   End Tidal CO2 16 (%)   Alarm Settings   High Pressure Alarm 45 cmH2O   Low Minute Volume Alarm 2 L/min   High Respiratory Rate 45 br/min   ETT (adult)   Placement Date/Time: 04/29/19 1340   Preoxygenation: Yes  Type: Cuffed  Tube Size: 8 mm  Placement Verified By[de-identified] Capnometry  Secured at: 23 cm  Measured From: Lips  Cuff Pressure: 30 cm H2O   Secured at 23 cm   Measured From Lips   ET Placement Left   Secured By Commercial tube saavedra   Site Condition Dry

## 2019-05-05 LAB
ANION GAP SERPL CALCULATED.3IONS-SCNC: 14 MMOL/L (ref 3–16)
BANDED NEUTROPHILS RELATIVE PERCENT: 24 % (ref 0–7)
BASE EXCESS ARTERIAL: -4.6 MMOL/L (ref -3–3)
BASOPHILS ABSOLUTE: 0 K/UL (ref 0–0.2)
BASOPHILS RELATIVE PERCENT: 0 %
BUN BLDV-MCNC: 37 MG/DL (ref 7–20)
CALCIUM SERPL-MCNC: 6.9 MG/DL (ref 8.3–10.6)
CARBOXYHEMOGLOBIN ARTERIAL: 0.2 % (ref 0–1.5)
CHLORIDE BLD-SCNC: 110 MMOL/L (ref 99–110)
CO2: 18 MMOL/L (ref 21–32)
CREAT SERPL-MCNC: 1.4 MG/DL (ref 0.6–1.2)
EOSINOPHILS ABSOLUTE: 0 K/UL (ref 0–0.6)
EOSINOPHILS RELATIVE PERCENT: 0 %
GFR AFRICAN AMERICAN: 45
GFR NON-AFRICAN AMERICAN: 37
GLUCOSE BLD-MCNC: 152 MG/DL (ref 70–99)
GLUCOSE BLD-MCNC: 153 MG/DL (ref 70–99)
GLUCOSE BLD-MCNC: 155 MG/DL (ref 70–99)
GLUCOSE BLD-MCNC: 166 MG/DL (ref 70–99)
GLUCOSE BLD-MCNC: 175 MG/DL (ref 70–99)
GLUCOSE BLD-MCNC: 184 MG/DL (ref 70–99)
GLUCOSE BLD-MCNC: 203 MG/DL (ref 70–99)
HCO3 ARTERIAL: 17 MMOL/L (ref 21–29)
HCT VFR BLD CALC: 27.6 % (ref 36–48)
HCT VFR BLD CALC: 28.2 % (ref 36–48)
HEMOGLOBIN, ART, EXTENDED: 10.5 G/DL (ref 12–16)
HEMOGLOBIN: 9.4 G/DL (ref 12–16)
HEMOGLOBIN: 9.7 G/DL (ref 12–16)
LYMPHOCYTES ABSOLUTE: 2.1 K/UL (ref 1–5.1)
LYMPHOCYTES RELATIVE PERCENT: 12 %
MAGNESIUM: 1.9 MG/DL (ref 1.8–2.4)
MCH RBC QN AUTO: 28.5 PG (ref 26–34)
MCHC RBC AUTO-ENTMCNC: 34 G/DL (ref 31–36)
MCV RBC AUTO: 83.8 FL (ref 80–100)
METAMYELOCYTES RELATIVE PERCENT: 2 %
METHEMOGLOBIN ARTERIAL: 0.4 %
MONOCYTES ABSOLUTE: 1.2 K/UL (ref 0–1.3)
MONOCYTES RELATIVE PERCENT: 7 %
NEUTROPHILS ABSOLUTE: 14.3 K/UL (ref 1.7–7.7)
NEUTROPHILS RELATIVE PERCENT: 55 %
NUCLEATED RED BLOOD CELLS: 1 /100 WBC
O2 CONTENT ARTERIAL: 15 ML/DL
O2 SAT, ARTERIAL: 97.5 %
O2 THERAPY: ABNORMAL
PCO2 ARTERIAL: 21.9 MMHG (ref 35–45)
PDW BLD-RTO: 15 % (ref 12.4–15.4)
PERFORMED ON: ABNORMAL
PH ARTERIAL: 7.51 (ref 7.35–7.45)
PHOSPHORUS: 3 MG/DL (ref 2.5–4.9)
PLATELET # BLD: 220 K/UL (ref 135–450)
PLATELET SLIDE REVIEW: ADEQUATE
PMV BLD AUTO: 8.1 FL (ref 5–10.5)
PO2 ARTERIAL: 106.2 MMHG (ref 75–108)
POTASSIUM SERPL-SCNC: 3.3 MMOL/L (ref 3.5–5.1)
RBC # BLD: 3.29 M/UL (ref 4–5.2)
RBC # BLD: NORMAL 10*6/UL
SODIUM BLD-SCNC: 142 MMOL/L (ref 136–145)
TCO2 ARTERIAL: 17.7 MMOL/L
WBC # BLD: 17.7 K/UL (ref 4–11)

## 2019-05-05 PROCEDURE — 6360000002 HC RX W HCPCS: Performed by: INTERNAL MEDICINE

## 2019-05-05 PROCEDURE — 2580000003 HC RX 258: Performed by: INTERNAL MEDICINE

## 2019-05-05 PROCEDURE — 82803 BLOOD GASES ANY COMBINATION: CPT

## 2019-05-05 PROCEDURE — 84100 ASSAY OF PHOSPHORUS: CPT

## 2019-05-05 PROCEDURE — 6370000000 HC RX 637 (ALT 250 FOR IP): Performed by: INTERNAL MEDICINE

## 2019-05-05 PROCEDURE — 2700000000 HC OXYGEN THERAPY PER DAY

## 2019-05-05 PROCEDURE — 94750 HC PULMONARY COMPLIANCE STUDY: CPT

## 2019-05-05 PROCEDURE — 85025 COMPLETE CBC W/AUTO DIFF WBC: CPT

## 2019-05-05 PROCEDURE — 94761 N-INVAS EAR/PLS OXIMETRY MLT: CPT

## 2019-05-05 PROCEDURE — C9113 INJ PANTOPRAZOLE SODIUM, VIA: HCPCS | Performed by: INTERNAL MEDICINE

## 2019-05-05 PROCEDURE — 94640 AIRWAY INHALATION TREATMENT: CPT

## 2019-05-05 PROCEDURE — 85018 HEMOGLOBIN: CPT

## 2019-05-05 PROCEDURE — 99291 CRITICAL CARE FIRST HOUR: CPT | Performed by: INTERNAL MEDICINE

## 2019-05-05 PROCEDURE — 6370000000 HC RX 637 (ALT 250 FOR IP): Performed by: FAMILY MEDICINE

## 2019-05-05 PROCEDURE — 94003 VENT MGMT INPAT SUBQ DAY: CPT

## 2019-05-05 PROCEDURE — 36592 COLLECT BLOOD FROM PICC: CPT

## 2019-05-05 PROCEDURE — 2000000000 HC ICU R&B

## 2019-05-05 PROCEDURE — 85014 HEMATOCRIT: CPT

## 2019-05-05 PROCEDURE — 83735 ASSAY OF MAGNESIUM: CPT

## 2019-05-05 PROCEDURE — 80048 BASIC METABOLIC PNL TOTAL CA: CPT

## 2019-05-05 PROCEDURE — 94770 HC ETCO2 MONITOR DAILY: CPT

## 2019-05-05 RX ADMIN — IPRATROPIUM BROMIDE AND ALBUTEROL SULFATE 1 AMPULE: .5; 3 SOLUTION RESPIRATORY (INHALATION) at 00:30

## 2019-05-05 RX ADMIN — HEPARIN SODIUM 5000 UNITS: 5000 INJECTION INTRAVENOUS; SUBCUTANEOUS at 08:53

## 2019-05-05 RX ADMIN — INSULIN LISPRO 2 UNITS: 100 INJECTION, SOLUTION INTRAVENOUS; SUBCUTANEOUS at 21:42

## 2019-05-05 RX ADMIN — CARBOXYMETHYLCELLULOSE SODIUM 1 DROP: 10 GEL OPHTHALMIC at 21:32

## 2019-05-05 RX ADMIN — Medication 10 ML: at 08:53

## 2019-05-05 RX ADMIN — Medication 15 ML: at 21:43

## 2019-05-05 RX ADMIN — INSULIN LISPRO 2 UNITS: 100 INJECTION, SOLUTION INTRAVENOUS; SUBCUTANEOUS at 09:03

## 2019-05-05 RX ADMIN — MICONAZOLE NITRATE: 2 POWDER TOPICAL at 08:53

## 2019-05-05 RX ADMIN — CARBOXYMETHYLCELLULOSE SODIUM 1 DROP: 10 GEL OPHTHALMIC at 14:31

## 2019-05-05 RX ADMIN — PANTOPRAZOLE SODIUM 40 MG: 40 INJECTION, POWDER, FOR SOLUTION INTRAVENOUS at 21:31

## 2019-05-05 RX ADMIN — INSULIN LISPRO 2 UNITS: 100 INJECTION, SOLUTION INTRAVENOUS; SUBCUTANEOUS at 00:29

## 2019-05-05 RX ADMIN — IPRATROPIUM BROMIDE AND ALBUTEROL SULFATE 1 AMPULE: .5; 3 SOLUTION RESPIRATORY (INHALATION) at 03:39

## 2019-05-05 RX ADMIN — PANTOPRAZOLE SODIUM 40 MG: 40 INJECTION, POWDER, FOR SOLUTION INTRAVENOUS at 08:52

## 2019-05-05 RX ADMIN — INSULIN LISPRO 2 UNITS: 100 INJECTION, SOLUTION INTRAVENOUS; SUBCUTANEOUS at 05:20

## 2019-05-05 RX ADMIN — IPRATROPIUM BROMIDE AND ALBUTEROL SULFATE 1 AMPULE: .5; 3 SOLUTION RESPIRATORY (INHALATION) at 19:05

## 2019-05-05 RX ADMIN — HEPARIN SODIUM 5000 UNITS: 5000 INJECTION INTRAVENOUS; SUBCUTANEOUS at 21:32

## 2019-05-05 RX ADMIN — IPRATROPIUM BROMIDE AND ALBUTEROL SULFATE 1 AMPULE: .5; 3 SOLUTION RESPIRATORY (INHALATION) at 11:58

## 2019-05-05 RX ADMIN — CARBOXYMETHYLCELLULOSE SODIUM 1 DROP: 10 GEL OPHTHALMIC at 08:53

## 2019-05-05 RX ADMIN — SODIUM CHLORIDE 3 G: 900 INJECTION INTRAVENOUS at 21:31

## 2019-05-05 RX ADMIN — Medication 10 ML: at 21:31

## 2019-05-05 RX ADMIN — IPRATROPIUM BROMIDE AND ALBUTEROL SULFATE 1 AMPULE: .5; 3 SOLUTION RESPIRATORY (INHALATION) at 07:18

## 2019-05-05 RX ADMIN — MICONAZOLE NITRATE: 2 POWDER TOPICAL at 21:31

## 2019-05-05 RX ADMIN — FLUCONAZOLE 150 MG: 100 TABLET ORAL at 08:52

## 2019-05-05 RX ADMIN — INSULIN LISPRO 2 UNITS: 100 INJECTION, SOLUTION INTRAVENOUS; SUBCUTANEOUS at 18:06

## 2019-05-05 RX ADMIN — POTASSIUM CHLORIDE 20 MEQ: 29.8 INJECTION, SOLUTION INTRAVENOUS at 05:58

## 2019-05-05 RX ADMIN — IPRATROPIUM BROMIDE AND ALBUTEROL SULFATE 1 AMPULE: .5; 3 SOLUTION RESPIRATORY (INHALATION) at 23:22

## 2019-05-05 RX ADMIN — IPRATROPIUM BROMIDE AND ALBUTEROL SULFATE 1 AMPULE: .5; 3 SOLUTION RESPIRATORY (INHALATION) at 15:49

## 2019-05-05 RX ADMIN — SODIUM CHLORIDE 3 G: 900 INJECTION INTRAVENOUS at 08:52

## 2019-05-05 RX ADMIN — Medication 15 ML: at 08:54

## 2019-05-05 RX ADMIN — INSULIN GLARGINE 20 UNITS: 100 INJECTION, SOLUTION SUBCUTANEOUS at 09:14

## 2019-05-05 RX ADMIN — INSULIN LISPRO 4 UNITS: 100 INJECTION, SOLUTION INTRAVENOUS; SUBCUTANEOUS at 14:33

## 2019-05-05 ASSESSMENT — PULMONARY FUNCTION TESTS
PIF_VALUE: 25
PIF_VALUE: 28
PIF_VALUE: 27
PIF_VALUE: 31
PIF_VALUE: 36
PIF_VALUE: 22
PIF_VALUE: 31
PIF_VALUE: 22
PIF_VALUE: 30
PIF_VALUE: 29
PIF_VALUE: 29
PIF_VALUE: 21
PIF_VALUE: 41
PIF_VALUE: 27
PIF_VALUE: 40
PIF_VALUE: 45
PIF_VALUE: 45
PIF_VALUE: 27
PIF_VALUE: 23
PIF_VALUE: 27
PIF_VALUE: 33
PIF_VALUE: 32
PIF_VALUE: 23
PIF_VALUE: 28
PIF_VALUE: 28
PIF_VALUE: 25
PIF_VALUE: 27
PIF_VALUE: 45

## 2019-05-05 ASSESSMENT — PAIN SCALES - GENERAL
PAINLEVEL_OUTOF10: 0

## 2019-05-05 NOTE — PROGRESS NOTES
05/05/19 1909   Vent Information   Vent Type 840   Vent Mode AC/VC   Vt Ordered 500 mL   Rate Set 16 bmp   Peak Flow 60 L/min   Pressure Support 0 cmH20   FiO2  30 %   Sensitivity 3   PEEP/CPAP 5   Cuff Pressure (cm H2O) 28 cm H2O   Humidification Source Heated wire   Humidification Temp 36.3   Circuit Condensation Drained   Vent Patient Data   Peak Inspiratory Pressure 25 cmH2O   Mean Airway Pressure 16 cmH20   Rate Measured 28 br/min   Vt Exhaled 488 mL   Minute Volume 14.8 Liters   I:E Ratio 1:1.50   Plateau Pressure 25 KOU32   Static Compliance 24 mL/cmH2O   Dynamic Compliance 24 mL/cmH2O   Spontaneous Breathing Trial (SBT) RT Doc   Pulse 107   SpO2 99 %   Breath Sounds   Right Upper Lobe Rhonchi   Right Middle Lobe Diminished   Right Lower Lobe Diminished   Left Upper Lobe Rhonchi   Left Lower Lobe Diminished   Additional Respiratory  Assessments   Resp (!) 41   End Tidal CO2 21 (%)   Alarm Settings   High Pressure Alarm 45 cmH2O   Low Minute Volume Alarm 2 L/min   High Respiratory Rate 45 br/min   Low Exhaled Vt  200 mL   ETT (adult)   Placement Date/Time: 04/29/19 1340   Preoxygenation: Yes  Type: Cuffed  Tube Size: 8 mm  Placement Verified By[de-identified] Capnometry  Secured at: 23 cm  Measured From: Lips  Cuff Pressure: 30 cm H2O   Secured at 23 cm   Measured From [address] sa[address]ra   Site Condition Dry

## 2019-05-05 NOTE — PROGRESS NOTES
[facility]       Progress Note           Subjective/   81y.o. year old female who we are seeing in consultation for LOPEZ/ATN. HPI:  Multiple cvas noted on mri  Cr trending down    Last 24h uop 2l     ROS:  Remains intubated. No fevers. Objective/   GEN:  Chronically ill, BP (!) 144/70   Pulse 106   Temp 99.9 °F (37.7 °C) (Core)   Resp 13   Ht 5' 1\" (1.549 m)   Wt 140 lb 14 oz (63.9 kg)   SpO2 100%   BMI 26.62 kg/m²   HEENT: intubated   CV: S1, S2 without m/r/g; some edema on her UE  RESP: CTA B without w/r/r; breathing wnl  ABD: +bs, soft, nt, no hsm  SKIN: warm, no rashes    Data/  Recent Labs     05/03/19  0400 05/04/19  0409 05/04/19  1450 05/05/19  0023 05/05/19  0443   WBC 10.8 12.3*  --   --  17.7*   HGB 7.9* 7.5* 7.7* 9.7* 9.4*   HCT 22.1* 21.4* 22.2* 28.2* 27.6*   MCV 80.5 80.6  --   --  83.8    220  --   --  220     Recent Labs     05/03/19  1420 05/04/19  0409 05/04/19  1450 05/05/19  0443   NA  --  138 141 142   K  --  2.9* 3.6 3.3*   CL  --  106 108 110   CO2  --  16* 16* 18*   GLUCOSE  --  111* 114* 166*   PHOS 3.1 3.2  --  3.0   MG 2.00 2.00  --  1.90   BUN  --  42* 39* 37*   CREATININE  --  1.7* 1.5* 1.4*   LABGLOM  --  30* 35* 37*   GFRAA  --  36* 42* 45*       Assessment/Plan     Ms. Ezio Gomez is a 81year old female with PMHx of CAD, DM, HTN and seizures who presents after a cardiac arrest.     Acute Kidney Injury.  - Etiology: ATN in the setting of cardiac arrest/sepsis. - Data: Last serum creatinine in 07/2016 was <0.5mg/dL. - Urinalysis with blood and protein. - Clinical: Hypotensive on vasopressors, keep MAP above 65.   - CVP's within range  - serial BMPs      Severe Anion Gap Metabolic Acidosis. - Likely from DKA and lactic acidosis. No toxic alcohol ingestion.  - Methanol, Ethylene glycol negative.     Anemia.  - Had coffee-ground emesis on admission.  - Plans per GI.     S/P Cardiac Arrest.  - Plans per Cardiology     DKA.   - On IVF and insulin drip per protocol, off latter now    Hypophosphatemia  - Prn IV phos replacement today    Hypomagnesemia    On prn replacement    Multiple CVAs   MRI brain 5/3, neurology seeing

## 2019-05-05 NOTE — PROGRESS NOTES
05/05/19 0037   Vent Information   Vent Type 840   Vent Mode AC/VC   Vt Ordered 500 mL   Rate Set 16 bmp   Peak Flow 60 L/min   Pressure Support 0 cmH20   FiO2  30 %   Sensitivity 3   PEEP/CPAP 5   Cuff Pressure (cm H2O) 28 cm H2O   Humidification Source Heated wire   Humidification Temp 37   Circuit Condensation Drained   Vent Patient Data   Peak Inspiratory Pressure 27 cmH2O   Mean Airway Pressure 14 cmH20   Rate Measured 23 br/min   Vt Exhaled 538 mL   Minute Volume 11 Liters   I:E Ratio 1:1.9   Spontaneous Breathing Trial (SBT) RT Doc   Pulse 104   SpO2 100 %   Breath Sounds   Right Upper Lobe Rhonchi   Right Middle Lobe Rhonchi   Right Lower Lobe Rhonchi   Left Upper Lobe Diminished;Rhonchi   Left Lower Lobe Diminished   Additional Respiratory  Assessments   Resp 23   End Tidal CO2 17 (%)   Position Semi-Umanzor's   Alarm Settings   High Pressure Alarm 45 cmH2O   Low Minute Volume Alarm 2 L/min   Apnea (secs) 20 secs   High Respiratory Rate 45 br/min   Low Exhaled Vt  200 mL   Patient Observation   Observations ambu bag/mask at bedside   ETT (adult)   Placement Date/Time: 04/29/19 1340   Preoxygenation: Yes  Type: Cuffed  Tube Size: 8 mm  Placement Verified By[de-identified] Capnometry  Secured at: 23 cm  Measured From: Lips  Cuff Pressure: 30 cm H2O   Secured at 23 cm   Measured From Lips   ET Placement Left   Secured By Commercial tube saavedra   Site Condition Cool;Dry

## 2019-05-05 NOTE — PROGRESS NOTES
05/05/19 0719   Vent Information   $Ventilation $Subsequent Day   Vent Type 840   Vent Mode AC/VC   Vt Ordered 500 mL   Rate Set 16 bmp   Peak Flow 60 L/min   Pressure Support 0 cmH20   FiO2  30 %   Sensitivity 3   PEEP/CPAP 5   Cuff Pressure (cm H2O) 30 cm H2O   Humidification Source Heated wire   Humidification Temp 36.8   Vent Patient Data   Peak Inspiratory Pressure 27 cmH2O   Mean Airway Pressure 15 cmH20   Rate Measured 29 br/min   Vt Exhaled 501 mL   Minute Volume 14.8 Liters   I:E Ratio 1:1.20   Plateau Pressure 18 [id number]   Static Compliance 38.54 mL/cmH2O   Dynamic Compliance 22.77 mL/cmH2O   Cough/Sputum   Sputum How Obtained Endotracheal;Suctioned   Cough Productive   Sputum Amount Small   Sputum Color Creamy   Tenacity Thick   Spontaneous Breathing Trial (SBT) RT Doc   Pulse 105   SpO2 100 %   Breath Sounds   Right Upper Lobe Rhonchi   Right Middle Lobe Diminished   Right Lower Lobe Diminished   Left Upper Lobe Rhonchi   Left Lower Lobe Diminished   Additional Respiratory  Assessments   Resp 23   End Tidal CO2 16 (%)   Position Semi-Umanzor's   Alarm Settings   High Pressure Alarm 45 cmH2O   Low Minute Volume Alarm 2 L/min   High Respiratory Rate 45 br/min   Low Exhaled Vt  200 mL   Patient Observation   Observations 8 ETT, ambu bag @ bedside   ETT (adult)   Placement Date/Time: 04/29/19 1340   Preoxygenation: Yes  Type: Cuffed  Tube Size: 8 mm  Placement Verified By[de-identified] Capnometry  Secured at: 23 cm  Measured From: Lips  Cuff Pressure: 30 cm H2O   Secured at 23 cm   Measured From [address] By Commercial tube saavedra

## 2019-05-05 NOTE — PROGRESS NOTES
05/05/19 0341   Vent Information   Vent Type 840   Vent Mode AC/VC   Vt Ordered 500 mL   Rate Set 16 bmp   Peak Flow 60 L/min   Pressure Support 0 cmH20   FiO2  30 %   Sensitivity 3   PEEP/CPAP 5   Cuff Pressure (cm H2O) 30 cm H2O   Humidification Source Heated wire   Humidification Temp 36.9   Circuit Condensation Drained   Vent Patient Data   Peak Inspiratory Pressure 29 cmH2O   Mean Airway Pressure 14 cmH20   Rate Measured 24 br/min   Vt Exhaled 549 mL   Minute Volume 10.8 Liters   I:E Ratio 1:2.10   Spontaneous Breathing Trial (SBT) RT Doc   Pulse 104   SpO2 100 %   Breath Sounds   Right Upper Lobe Rhonchi   Right Middle Lobe Rhonchi   Right Lower Lobe Rhonchi   Left Upper Lobe Diminished;Rhonchi   Left Lower Lobe Diminished   Additional Respiratory  Assessments   Resp 23   End Tidal CO2 19 (%)   Position Semi-Umanzor's   Alarm Settings   High Pressure Alarm 45 cmH2O   Low Minute Volume Alarm 2 L/min   Apnea (secs) 20 secs   High Respiratory Rate 45 br/min   Low Exhaled Vt  200 mL   Patient Observation   Observations ambu bag/mask at bedside   ETT (adult)   Placement Date/Time: 04/29/19 1340   Preoxygenation: Yes  Type: Cuffed  Tube Size: 8 mm  Placement Verified By[de-identified] Capnometry  Secured at: 23 cm  Measured From: Lips  Cuff Pressure: 30 cm H2O   Secured at 23 cm   Measured From Lips   ET Placement Right   Secured By Commercial tube saavedra   Site Condition Cool;Dry

## 2019-05-05 NOTE — PROGRESS NOTES
Assessment complete. VSS. Resting quietly in bed. No s/s distress. Family at bedside. CPOT 0 . RASS -2. Pt opens eyes to verbal stimuli and moves to painful stimuli. Pupils round, brisk and reactive bilaterally. Pt unable to follow commands. Pt remains off all sedation. Pt coughing/gagging intermittently, but tolerating vent at current settings. TF @ 45 (goal) and tolerating. Lines and gtts verified. Q2T. Will monitor.

## 2019-05-05 NOTE — PLAN OF CARE
be restrained at this time. Visual safety checks performed every hour, and restraint monitoring performed every two hours. Patient has no signs of injuries from restraints at this time. Educated pt on the need for the restraints, no evidence of learning. Will continue to monitor.    Goal: Absence of restraint-related injury  Description  Absence of restraint-related injury  5/4/2019 2155 by Winsome Salcedo RN  Outcome: Ongoing

## 2019-05-05 NOTE — PROGRESS NOTES
Pulmonary & Critical Care Inpatient Progress Note   Jessy Banuelos MD     REASON FOR TODAY'S VISIT:  Assistance with critical care management    SUBJECTIVE:   Remains on vent support due to depressed mentation, has a gag reflex but unable to arouse enough or follow commands. Tolerating tube feeds  Requiring a very low dose of levophed for hemodynamic support  No acute events overnight  Has remained off all sedation for over 72 hours at this point. Scheduled Meds:   ampicillin-sulbactam  3 g Intravenous Q12H    insulin glargine  20 Units Subcutaneous Daily    insulin lispro  0-12 Units Subcutaneous Q4H    miconazole   Topical BID    ipratropium-albuterol  1 ampule Inhalation Q4H    carboxymethylcellulose PF  1 drop Both Eyes TID    chlorhexidine  15 mL Mouth/Throat BID    heparin (porcine)  5,000 Units Subcutaneous BID    pantoprazole  40 mg Intravenous BID    sodium chloride flush  10 mL Intravenous 2 times per day       Continuous Infusions:   norepinephrine 4 mcg/min (05/04/19 1933)       PRN Meds:  magnesium sulfate, potassium chloride, fentanNYL, acetaminophen, dextrose, sodium chloride flush, magnesium hydroxide, acetaminophen    ALLERGIES:  Patient is allergic to aspirin. Objective:   PHYSICAL EXAM:  BP (!) 144/70   Pulse 106   Temp 99.9 °F (37.7 °C) (Core)   Resp 25   Ht 5' 1\" (1.549 m)   Wt 140 lb 14 oz (63.9 kg)   SpO2 100%   BMI 26.62 kg/m²    Physical Exam   Constitutional: She appears well-developed and well-nourished. No distress. HENT:   Head: Normocephalic and atraumatic. Mouth/Throat: Oropharynx is clear and moist. No oropharyngeal exudate. Neck: Neck supple. No JVD present. Cardiovascular: Normal heart sounds. Exam reveals no gallop and no friction rub. No murmur heard. Pulmonary/Chest: Effort normal. She has no wheezes. She has no rales. Equal chest rise and expansion bilaterally   Abdominal: Soft. Bowel sounds are normal. She exhibits no distension.  There is no tenderness. Musculoskeletal: She exhibits no edema. Lymphadenopathy:     She has no cervical adenopathy. Neurological: A cranial nerve deficit is present. Intubated, sedated   Skin: Skin is warm and dry. No rash noted. She is not diaphoretic. Data Reviewed:   LABS:  CBC:  Recent Labs     05/03/19  0400 05/04/19  0409 05/04/19  1450 05/05/19  0023 05/05/19  0443   WBC 10.8 12.3*  --   --  17.7*   HGB 7.9* 7.5* 7.7* 9.7* 9.4*   HCT 22.1* 21.4* 22.2* 28.2* 27.6*   MCV 80.5 80.6  --   --  83.8    220  --   --  220     BMP:  Recent Labs     05/03/19  1420 05/04/19  0409 05/04/19  1450 05/05/19  0443   NA  --  138 141 142   K  --  2.9* 3.6 3.3*   CL  --  106 108 110   CO2  --  16* 16* 18*   PHOS 3.1 3.2  --  3.0   BUN  --  42* 39* 37*   CREATININE  --  1.7* 1.5* 1.4*     LIVER PROFILE:   No results for input(s): AST, ALT, LIPASE, ALB, BILIDIR, BILITOT, ALKPHOS in the last 72 hours. Invalid input(s): AMYLASE  PT/INR:  No results for input(s): PROTIME, INR in the last 72 hours. APTT: No results for input(s): APTT in the last 72 hours. UA:  No results for input(s): NITRITE, COLORU, PHUR, LABCAST, WBCUA, RBCUA, MUCUS, TRICHOMONAS, YEAST, BACTERIA, CLARITYU, SPECGRAV, LEUKOCYTESUR, UROBILINOGEN, BILIRUBINUR, BLOODU, GLUCOSEU, AMORPHOUS in the last 72 hours. Invalid input(s): KETONESU  Recent Labs     05/04/19 0410 05/05/19 0449   PHART 7.482* 7.508*   YIT1PBC 22.3* 21.9*   PO2ART 137.2* 106.2       Vent Information  $Ventilation: $Subsequent Day  Vent Type: 840  Vent Mode: AC/VC  Vt Ordered: 500 mL  Rate Set: 16 bmp  Peak Flow: 60 L/min  Pressure Support: 0 cmH20  FiO2 : 30 %  Sensitivity: 3  PEEP/CPAP: 5  Cuff Pressure (cm H2O): 30 cm H2O  Humidification Source: Heated wire  Humidification Temp: 37.5  Humidification Temp Measured: 37.5  Circuit Condensation: Drained    CXR personally reviewed, dense left sided infiltrate           Assessment:     1.  Acute resp failure, hypoxic

## 2019-05-05 NOTE — PROGRESS NOTES
05/04/19 1933 4 mcg/min at 05/04/19 1933    sodium chloride flush 0.9 % injection 10 mL  10 mL Intravenous 2 times per day Brice Figueroa MD   10 mL at 05/05/19 8272    sodium chloride flush 0.9 % injection 10 mL  10 mL Intravenous PRN Brice Figueroa MD        magnesium hydroxide (MILK OF MAGNESIA) 400 MG/5ML suspension 30 mL  30 mL Oral Daily PRN Brice Figueroa MD        acetaminophen (TYLENOL) suppository 650 mg  650 mg Rectal Q4H PRN Brice Figueroa MD   650 mg at 04/30/19 0048     Allergies   Allergen Reactions    Aspirin Nausea And Vomiting     Active Problems:    Acute renal failure (ARF) ([facility])    DKA (diabetic ketoacidoses) ([facility])    Acidosis    Cardiac arrest ([facility]    DKA, type 2, not at goal [facility])    Acute respiratory failure ([facility])    Prolonged Q-T interval on ECG    Hypokalemia    Elevated troponin    Acute encephalopathy    Arterial ischemic stroke, ICA, left, acute ([facility]    Pneumonia due to organism  Resolved Problems:    * No resolved hospital problems. *    Blood pressure 120/61, pulse 106, temperature 99.8 °F (37.7 °C), temperature source Core, resp. rate 26, height 5' 1\" (1.549 m), weight 140 lb 14 oz (63.9 kg), SpO2 100 %, not currently breastfeeding. Subjective:  Symptoms:  Stable. Pain:  She reports no pain. Objective:  General Appearance:  Not in pain. Vital signs: (most recent): Blood pressure 120/61, pulse 106, temperature 99.8 °F (37.7 °C), temperature source Core, resp. rate 26, height 5' 1\" (1.549 m), weight 140 lb 14 oz (63.9 kg), SpO2 100 %, not currently breastfeeding. Abdomen: Abdomen is soft. Bowel sounds are normal.   There is no abdominal tenderness. Assessment & Plan  81year old female with history of HTN, DM, CVA, asthma, Seizure do, admitted with cardiac arrest and DKA. She was also noted to have coffee ground aspirate per NG, resolved.     Recommendation:  1. Continue supportive care  2. Daily H/H  3. Continue PPI BID  4.  Needs EGD at some point (is tolerating NGT feeding)  5.  Will follow     Steve Pelaez MD       [phone]) [phone]      Steve Pelaez MD  5/5/2019

## 2019-05-05 NOTE — PROGRESS NOTES
05/05/19  0023 05/05/19  0443   WBC 10.8 12.3*  --   --  17.7*   HGB 7.9* 7.5* 7.7* 9.7* 9.4*   HCT 22.1* 21.4* 22.2* 28.2* 27.6*    220  --   --  220     Recent Labs     05/03/19  1420 05/04/19  0409 05/04/19  1450 05/05/19  0443   NA  --  138 141 142   K  --  2.9* 3.6 3.3*   CL  --  106 108 110   CO2  --  16* 16* 18*   BUN  --  42* 39* 37*   CREATININE  --  1.7* 1.5* 1.4*   CALCIUM  --  6.1* 6.4* 6.9*   PHOS 3.1 3.2  --  3.0       Urinalysis:      Lab Results   Component Value Date    NITRU Negative 04/29/2019    WBCUA 10-20 04/29/2019    BACTERIA 1+ 04/29/2019    RBCUA 3-5 04/29/2019    BLOODU MODERATE 04/29/2019    SPECGRAV 1.025 04/29/2019    GLUCOSEU >=1000 04/29/2019       Radiology:  XR CHEST PORTABLE   Final Result   1. No significant change. MRI BRAIN WO CONTRAST   Preliminary Result   1. Acute/early subacute infarction involving the left hippocampus. Additional punctate infarctions within the frontal lobes and posterior   temporal lobes bilaterally. This raises the possibility for thromboembolic   phenomenon from a central source. No associated hemorrhage. 2. Diffuse parenchymal volume loss and sequela of chronic microvascular   ischemic changes. The findings were sent to the Radiology Results [address] at 8:34   am on 5/5/2019 to be communicated to a licensed caregiver. XR CHEST PORTABLE   Final Result   Bilateral lower lobe airspace disease, left greater than right, increased   compared to prior         XR CHEST PORTABLE   Final Result   Stable appearance of the chest with dense opacification in the left mid and   lower lung zone. XR CHEST PORTABLE   Final Result   1. No significant interval change in the left-sided opacity reflecting   pneumonia better characterized on the CT of the thorax from 04/29/2019.   2. Support devices as described above. XR CHEST PORTABLE   Final Result   Catheter in good position. No postprocedure pneumothorax. XR CHEST PORTABLE   Final Result   Supportive devices are stable. Persisting consolidations in the left lung base, consistent with pneumonia. CT CHEST ABDOMEN PELVIS WO CONTRAST   Final Result   Dense consolidation within the inferior aspect of left upper lobe and   throughout the left lower lobe, most compatible with pneumonia and/or   aspiration. That should be followed to resolution, especially given the   nodular morphology within portions of the consolidation. Diffuse mural thickening of the esophagus. Correlate with clinical evidence   of esophagitis. The tip of the right femoral venous catheter is malpositioned within a sacral   vein. That should be repositioned for more optimal placement. CT CERVICAL SPINE WO CONTRAST   Final Result   Multilevel degenerative changes with no acute abnormality of the cervical   spine. CT HEAD WO CONTRAST   Final Result   Motion degraded study with no acute intracranial abnormality. XR CHEST PORTABLE   Final Result   Supportive tubing projects in normal position. Left basilar airspace disease, pneumonia versus aspiration pneumonitis. There may be a component of pleural effusion. XR CHEST PORTABLE   Final Result   Atelectasis at the left lung base. Question of small left pleural effusion.                  Assessment/Plan:    Active Hospital Problems    Diagnosis Date Noted    Acute renal failure (ARF) ([facility] [N17.9] 08/20/2015     Priority: High     Class: Acute    Acute encephalopathy [G93.40]     Arterial ischemic stroke, ICA, left, acute (HCC) [I63.232]     Pneumonia due to organism [J18.9]     Acute respiratory failure (HCC) [J96.00]     Prolonged Q-T interval on ECG [R94.31]     Hypokalemia [E87.6]     Elevated troponin [R74.8]     DKA (diabetic ketoacidoses) ([facility] [E13.10] 04/29/2019    Acidosis [E87.2] 04/29/2019    Cardiac arrest ([facility] [I46.9] 04/29/2019    DKA, type 2, not at goal [facility]) hold    Dispo - ICU care    Layne Painting MD

## 2019-05-06 ENCOUNTER — ANESTHESIA EVENT (OUTPATIENT)
Dept: CARDIAC CATH/INVASIVE PROCEDURES | Age: 69
DRG: 853 | End: 2019-05-06
Payer: COMMERCIAL

## 2019-05-06 ENCOUNTER — APPOINTMENT (OUTPATIENT)
Dept: CARDIAC CATH/INVASIVE PROCEDURES | Age: 69
DRG: 853 | End: 2019-05-06
Payer: COMMERCIAL

## 2019-05-06 ENCOUNTER — APPOINTMENT (OUTPATIENT)
Dept: GENERAL RADIOLOGY | Age: 69
DRG: 853 | End: 2019-05-06
Payer: COMMERCIAL

## 2019-05-06 ENCOUNTER — ANESTHESIA (OUTPATIENT)
Dept: CARDIAC CATH/INVASIVE PROCEDURES | Age: 69
DRG: 853 | End: 2019-05-06
Payer: COMMERCIAL

## 2019-05-06 ENCOUNTER — APPOINTMENT (OUTPATIENT)
Dept: ULTRASOUND IMAGING | Age: 69
DRG: 853 | End: 2019-05-06
Payer: COMMERCIAL

## 2019-05-06 PROBLEM — J15.211 STAPHYLOCOCCUS AUREUS PNEUMONIA (HCC): Status: ACTIVE | Noted: 2019-05-06

## 2019-05-06 LAB
ALBUMIN SERPL-MCNC: 1.7 G/DL (ref 3.4–5)
ALP BLD-CCNC: 1199 U/L (ref 40–129)
ALT SERPL-CCNC: 40 U/L (ref 10–40)
AMMONIA: 24 UMOL/L (ref 11–51)
ANION GAP SERPL CALCULATED.3IONS-SCNC: 11 MMOL/L (ref 3–16)
AST SERPL-CCNC: 84 U/L (ref 15–37)
BANDED NEUTROPHILS RELATIVE PERCENT: 8 % (ref 0–7)
BASE EXCESS ARTERIAL: -3 (ref -3–3)
BASOPHILS ABSOLUTE: 0 K/UL (ref 0–0.2)
BASOPHILS RELATIVE PERCENT: 0 %
BILIRUB SERPL-MCNC: 1.7 MG/DL (ref 0–1)
BILIRUBIN DIRECT: 1.4 MG/DL (ref 0–0.3)
BILIRUBIN, INDIRECT: 0.3 MG/DL (ref 0–1)
BUN BLDV-MCNC: 37 MG/DL (ref 7–20)
CALCIUM IONIZED: 1.06 MMOL/L (ref 1.12–1.32)
CALCIUM SERPL-MCNC: 7.1 MG/DL (ref 8.3–10.6)
CHLORIDE BLD-SCNC: 113 MMOL/L (ref 99–110)
CO2: 22 MMOL/L (ref 21–32)
CORTISOL TOTAL: 30.7 UG/DL
CREAT SERPL-MCNC: 1.2 MG/DL (ref 0.6–1.2)
DOHLE BODIES: PRESENT
EOSINOPHILS ABSOLUTE: 0 K/UL (ref 0–0.6)
EOSINOPHILS RELATIVE PERCENT: 0 %
FOLATE: 6.08 NG/ML (ref 4.78–24.2)
GFR AFRICAN AMERICAN: 54
GFR NON-AFRICAN AMERICAN: 45
GLUCOSE BLD-MCNC: 100 MG/DL (ref 70–99)
GLUCOSE BLD-MCNC: 144 MG/DL (ref 70–99)
GLUCOSE BLD-MCNC: 152 MG/DL (ref 70–99)
GLUCOSE BLD-MCNC: 153 MG/DL (ref 70–99)
GLUCOSE BLD-MCNC: 153 MG/DL (ref 70–99)
GLUCOSE BLD-MCNC: 161 MG/DL (ref 70–99)
GLUCOSE BLD-MCNC: 184 MG/DL (ref 70–99)
GLUCOSE BLD-MCNC: 190 MG/DL (ref 70–99)
GLUCOSE BLD-MCNC: 211 MG/DL (ref 70–99)
HCO3 ARTERIAL: 20 MMOL/L (ref 21–29)
HCT VFR BLD CALC: 27.1 % (ref 36–48)
HEMOGLOBIN: 8.6 GM/DL (ref 12–16)
HEMOGLOBIN: 9.1 G/DL (ref 12–16)
LACTATE: 0.63 MMOL/L (ref 0.4–2)
LACTIC ACID: 1.1 MMOL/L (ref 0.4–2)
LV EF: 55 %
LVEF MODALITY: NORMAL
LYMPHOCYTES ABSOLUTE: 1.3 K/UL (ref 1–5.1)
LYMPHOCYTES RELATIVE PERCENT: 7 %
MAGNESIUM: 1.7 MG/DL (ref 1.8–2.4)
MCH RBC QN AUTO: 28 PG (ref 26–34)
MCHC RBC AUTO-ENTMCNC: 33.7 G/DL (ref 31–36)
MCV RBC AUTO: 83 FL (ref 80–100)
MONOCYTES ABSOLUTE: 1.3 K/UL (ref 0–1.3)
MONOCYTES RELATIVE PERCENT: 7 %
NEUTROPHILS ABSOLUTE: 15.4 K/UL (ref 1.7–7.7)
NEUTROPHILS RELATIVE PERCENT: 78 %
O2 SAT, ARTERIAL: 98 % (ref 93–100)
PCO2 ARTERIAL: 25.7 MM HG (ref 35–45)
PDW BLD-RTO: 14.5 % (ref 12.4–15.4)
PERFORMED ON: ABNORMAL
PH ARTERIAL: 7.5 (ref 7.35–7.45)
PHOSPHORUS: 3 MG/DL (ref 2.5–4.9)
PLATELET # BLD: 154 K/UL (ref 135–450)
PLATELET SLIDE REVIEW: ADEQUATE
PMV BLD AUTO: 8.3 FL (ref 5–10.5)
PO2 ARTERIAL: 86.2 MM HG (ref 75–108)
POC HEMATOCRIT: 25 % (ref 36–48)
POC POTASSIUM: 3.1 MMOL/L (ref 3.5–5.1)
POC SAMPLE TYPE: ABNORMAL
POC SODIUM: 151 MMOL/L (ref 136–145)
POLYCHROMASIA: ABNORMAL
POTASSIUM SERPL-SCNC: 3.3 MMOL/L (ref 3.5–5.1)
RBC # BLD: 3.26 M/UL (ref 4–5.2)
SODIUM BLD-SCNC: 146 MMOL/L (ref 136–145)
TCO2 ARTERIAL: 21 MMOL/L
TOTAL PROTEIN: 5.7 G/DL (ref 6.4–8.2)
TOXIC GRANULATION: PRESENT
TSH REFLEX: 1.9 UIU/ML (ref 0.27–4.2)
VITAMIN B-12: >2000 PG/ML (ref 211–911)
WBC # BLD: 17.9 K/UL (ref 4–11)

## 2019-05-06 PROCEDURE — 80048 BASIC METABOLIC PNL TOTAL CA: CPT

## 2019-05-06 PROCEDURE — 82746 ASSAY OF FOLIC ACID SERUM: CPT

## 2019-05-06 PROCEDURE — 84443 ASSAY THYROID STIM HORMONE: CPT

## 2019-05-06 PROCEDURE — 80076 HEPATIC FUNCTION PANEL: CPT

## 2019-05-06 PROCEDURE — 95822 EEG COMA OR SLEEP ONLY: CPT | Performed by: PSYCHIATRY & NEUROLOGY

## 2019-05-06 PROCEDURE — 94761 N-INVAS EAR/PLS OXIMETRY MLT: CPT

## 2019-05-06 PROCEDURE — 83605 ASSAY OF LACTIC ACID: CPT

## 2019-05-06 PROCEDURE — 6360000002 HC RX W HCPCS: Performed by: INTERNAL MEDICINE

## 2019-05-06 PROCEDURE — 93312 ECHO TRANSESOPHAGEAL: CPT

## 2019-05-06 PROCEDURE — B24BZZ4 ULTRASONOGRAPHY OF HEART WITH AORTA, TRANSESOPHAGEAL: ICD-10-PCS | Performed by: INTERNAL MEDICINE

## 2019-05-06 PROCEDURE — 84295 ASSAY OF SERUM SODIUM: CPT

## 2019-05-06 PROCEDURE — 2580000003 HC RX 258: Performed by: INTERNAL MEDICINE

## 2019-05-06 PROCEDURE — 85014 HEMATOCRIT: CPT

## 2019-05-06 PROCEDURE — 83735 ASSAY OF MAGNESIUM: CPT

## 2019-05-06 PROCEDURE — 99291 CRITICAL CARE FIRST HOUR: CPT | Performed by: INTERNAL MEDICINE

## 2019-05-06 PROCEDURE — 93306 TTE W/DOPPLER COMPLETE: CPT

## 2019-05-06 PROCEDURE — 94640 AIRWAY INHALATION TREATMENT: CPT

## 2019-05-06 PROCEDURE — 82330 ASSAY OF CALCIUM: CPT

## 2019-05-06 PROCEDURE — 95819 EEG AWAKE AND ASLEEP: CPT

## 2019-05-06 PROCEDURE — 85025 COMPLETE CBC W/AUTO DIFF WBC: CPT

## 2019-05-06 PROCEDURE — 6360000002 HC RX W HCPCS: Performed by: NURSE PRACTITIONER

## 2019-05-06 PROCEDURE — 82140 ASSAY OF AMMONIA: CPT

## 2019-05-06 PROCEDURE — 93306 TTE W/DOPPLER COMPLETE: CPT | Performed by: INTERNAL MEDICINE

## 2019-05-06 PROCEDURE — 94770 HC ETCO2 MONITOR DAILY: CPT

## 2019-05-06 PROCEDURE — 6370000000 HC RX 637 (ALT 250 FOR IP): Performed by: FAMILY MEDICINE

## 2019-05-06 PROCEDURE — 2700000000 HC OXYGEN THERAPY PER DAY

## 2019-05-06 PROCEDURE — 99233 SBSQ HOSP IP/OBS HIGH 50: CPT | Performed by: PSYCHIATRY & NEUROLOGY

## 2019-05-06 PROCEDURE — 6370000000 HC RX 637 (ALT 250 FOR IP): Performed by: INTERNAL MEDICINE

## 2019-05-06 PROCEDURE — 3700000001 HC ADD 15 MINUTES (ANESTHESIA)

## 2019-05-06 PROCEDURE — 82533 TOTAL CORTISOL: CPT

## 2019-05-06 PROCEDURE — 94750 HC PULMONARY COMPLIANCE STUDY: CPT

## 2019-05-06 PROCEDURE — 74018 RADEX ABDOMEN 1 VIEW: CPT

## 2019-05-06 PROCEDURE — 76705 ECHO EXAM OF ABDOMEN: CPT

## 2019-05-06 PROCEDURE — 2000000000 HC ICU R&B

## 2019-05-06 PROCEDURE — 82803 BLOOD GASES ANY COMBINATION: CPT

## 2019-05-06 PROCEDURE — 3700000000 HC ANESTHESIA ATTENDED CARE

## 2019-05-06 PROCEDURE — 2580000003 HC RX 258

## 2019-05-06 PROCEDURE — 82607 VITAMIN B-12: CPT

## 2019-05-06 PROCEDURE — C9113 INJ PANTOPRAZOLE SODIUM, VIA: HCPCS | Performed by: INTERNAL MEDICINE

## 2019-05-06 PROCEDURE — 84132 ASSAY OF SERUM POTASSIUM: CPT

## 2019-05-06 PROCEDURE — 82947 ASSAY GLUCOSE BLOOD QUANT: CPT

## 2019-05-06 PROCEDURE — 94003 VENT MGMT INPAT SUBQ DAY: CPT

## 2019-05-06 PROCEDURE — 84100 ASSAY OF PHOSPHORUS: CPT

## 2019-05-06 RX ORDER — ATORVASTATIN CALCIUM 40 MG/1
40 TABLET, FILM COATED ORAL NIGHTLY
Status: DISCONTINUED | OUTPATIENT
Start: 2019-05-06 | End: 2019-05-20 | Stop reason: HOSPADM

## 2019-05-06 RX ORDER — SODIUM CHLORIDE 0.9 % (FLUSH) 0.9 %
10 SYRINGE (ML) INJECTION PRN
Status: CANCELLED | OUTPATIENT
Start: 2019-05-06

## 2019-05-06 RX ORDER — MAGNESIUM SULFATE IN WATER 40 MG/ML
2 INJECTION, SOLUTION INTRAVENOUS ONCE
Status: COMPLETED | OUTPATIENT
Start: 2019-05-06 | End: 2019-05-06

## 2019-05-06 RX ORDER — ASPIRIN 325 MG
325 TABLET ORAL DAILY
Status: DISCONTINUED | OUTPATIENT
Start: 2019-05-06 | End: 2019-05-20 | Stop reason: HOSPADM

## 2019-05-06 RX ORDER — SODIUM CHLORIDE 9 MG/ML
INJECTION, SOLUTION INTRAVENOUS
Status: COMPLETED
Start: 2019-05-06 | End: 2019-05-06

## 2019-05-06 RX ORDER — INSULIN GLARGINE 100 [IU]/ML
25 INJECTION, SOLUTION SUBCUTANEOUS DAILY
Status: DISCONTINUED | OUTPATIENT
Start: 2019-05-07 | End: 2019-05-13

## 2019-05-06 RX ORDER — FUROSEMIDE 10 MG/ML
20 INJECTION INTRAMUSCULAR; INTRAVENOUS ONCE
Status: COMPLETED | OUTPATIENT
Start: 2019-05-06 | End: 2019-05-06

## 2019-05-06 RX ORDER — SODIUM CHLORIDE 0.9 % (FLUSH) 0.9 %
10 SYRINGE (ML) INJECTION EVERY 12 HOURS SCHEDULED
Status: CANCELLED | OUTPATIENT
Start: 2019-05-06

## 2019-05-06 RX ORDER — SODIUM CHLORIDE 9 MG/ML
INJECTION, SOLUTION INTRAVENOUS
Status: DISPENSED
Start: 2019-05-06 | End: 2019-05-06

## 2019-05-06 RX ADMIN — INSULIN LISPRO 4 UNITS: 100 INJECTION, SOLUTION INTRAVENOUS; SUBCUTANEOUS at 16:55

## 2019-05-06 RX ADMIN — Medication 10 ML: at 20:08

## 2019-05-06 RX ADMIN — POTASSIUM CHLORIDE 20 MEQ: 29.8 INJECTION, SOLUTION INTRAVENOUS at 07:33

## 2019-05-06 RX ADMIN — IPRATROPIUM BROMIDE AND ALBUTEROL SULFATE 1 AMPULE: .5; 3 SOLUTION RESPIRATORY (INHALATION) at 11:26

## 2019-05-06 RX ADMIN — CARBOXYMETHYLCELLULOSE SODIUM 1 DROP: 10 GEL OPHTHALMIC at 13:59

## 2019-05-06 RX ADMIN — FUROSEMIDE 20 MG: 10 INJECTION, SOLUTION INTRAMUSCULAR; INTRAVENOUS at 09:42

## 2019-05-06 RX ADMIN — Medication 10 ML: at 08:43

## 2019-05-06 RX ADMIN — CARBOXYMETHYLCELLULOSE SODIUM 1 DROP: 10 GEL OPHTHALMIC at 08:42

## 2019-05-06 RX ADMIN — INSULIN LISPRO 2 UNITS: 100 INJECTION, SOLUTION INTRAVENOUS; SUBCUTANEOUS at 12:59

## 2019-05-06 RX ADMIN — CARBOXYMETHYLCELLULOSE SODIUM 1 DROP: 10 GEL OPHTHALMIC at 20:08

## 2019-05-06 RX ADMIN — ATORVASTATIN CALCIUM 40 MG: 40 TABLET, FILM COATED ORAL at 20:07

## 2019-05-06 RX ADMIN — IPRATROPIUM BROMIDE AND ALBUTEROL SULFATE 1 AMPULE: .5; 3 SOLUTION RESPIRATORY (INHALATION) at 23:38

## 2019-05-06 RX ADMIN — CALCIUM GLUCONATE 1 G: 98 INJECTION, SOLUTION INTRAVENOUS at 09:46

## 2019-05-06 RX ADMIN — MICONAZOLE NITRATE: 2 POWDER TOPICAL at 08:43

## 2019-05-06 RX ADMIN — POTASSIUM CHLORIDE 20 MEQ: 29.8 INJECTION, SOLUTION INTRAVENOUS at 05:42

## 2019-05-06 RX ADMIN — SODIUM CHLORIDE 3 G: 900 INJECTION INTRAVENOUS at 20:07

## 2019-05-06 RX ADMIN — POTASSIUM CHLORIDE: 2 INJECTION, SOLUTION, CONCENTRATE INTRAVENOUS at 21:30

## 2019-05-06 RX ADMIN — MAGNESIUM SULFATE HEPTAHYDRATE 2 G: 40 INJECTION, SOLUTION INTRAVENOUS at 09:54

## 2019-05-06 RX ADMIN — SODIUM CHLORIDE 250 ML: 9 INJECTION, SOLUTION INTRAVENOUS at 09:56

## 2019-05-06 RX ADMIN — HEPARIN SODIUM 5000 UNITS: 5000 INJECTION INTRAVENOUS; SUBCUTANEOUS at 20:08

## 2019-05-06 RX ADMIN — IPRATROPIUM BROMIDE AND ALBUTEROL SULFATE 1 AMPULE: .5; 3 SOLUTION RESPIRATORY (INHALATION) at 08:15

## 2019-05-06 RX ADMIN — Medication 15 ML: at 08:43

## 2019-05-06 RX ADMIN — INSULIN LISPRO 2 UNITS: 100 INJECTION, SOLUTION INTRAVENOUS; SUBCUTANEOUS at 20:08

## 2019-05-06 RX ADMIN — MICONAZOLE NITRATE: 2 POWDER TOPICAL at 20:07

## 2019-05-06 RX ADMIN — IPRATROPIUM BROMIDE AND ALBUTEROL SULFATE 1 AMPULE: .5; 3 SOLUTION RESPIRATORY (INHALATION) at 15:37

## 2019-05-06 RX ADMIN — PANTOPRAZOLE SODIUM 40 MG: 40 INJECTION, POWDER, FOR SOLUTION INTRAVENOUS at 20:07

## 2019-05-06 RX ADMIN — SODIUM CHLORIDE 3 G: 900 INJECTION INTRAVENOUS at 08:42

## 2019-05-06 RX ADMIN — INSULIN LISPRO 2 UNITS: 100 INJECTION, SOLUTION INTRAVENOUS; SUBCUTANEOUS at 01:29

## 2019-05-06 RX ADMIN — Medication 15 ML: at 20:07

## 2019-05-06 RX ADMIN — INSULIN LISPRO 2 UNITS: 100 INJECTION, SOLUTION INTRAVENOUS; SUBCUTANEOUS at 04:02

## 2019-05-06 RX ADMIN — SODIUM CHLORIDE 3 G: 900 INJECTION INTRAVENOUS at 14:25

## 2019-05-06 RX ADMIN — IPRATROPIUM BROMIDE AND ALBUTEROL SULFATE 1 AMPULE: .5; 3 SOLUTION RESPIRATORY (INHALATION) at 18:57

## 2019-05-06 RX ADMIN — IPRATROPIUM BROMIDE AND ALBUTEROL SULFATE 1 AMPULE: .5; 3 SOLUTION RESPIRATORY (INHALATION) at 03:11

## 2019-05-06 RX ADMIN — INSULIN LISPRO 2 UNITS: 100 INJECTION, SOLUTION INTRAVENOUS; SUBCUTANEOUS at 23:56

## 2019-05-06 RX ADMIN — PANTOPRAZOLE SODIUM 40 MG: 40 INJECTION, POWDER, FOR SOLUTION INTRAVENOUS at 08:42

## 2019-05-06 RX ADMIN — POTASSIUM CHLORIDE: 2 INJECTION, SOLUTION, CONCENTRATE INTRAVENOUS at 11:09

## 2019-05-06 RX ADMIN — HEPARIN SODIUM 5000 UNITS: 5000 INJECTION INTRAVENOUS; SUBCUTANEOUS at 08:42

## 2019-05-06 ASSESSMENT — PULMONARY FUNCTION TESTS
PIF_VALUE: 27
PIF_VALUE: 18
PIF_VALUE: 26
PIF_VALUE: 28
PIF_VALUE: 25
PIF_VALUE: 28
PIF_VALUE: 28
PIF_VALUE: 34
PIF_VALUE: 21
PIF_VALUE: 27
PIF_VALUE: 37
PIF_VALUE: 21
PIF_VALUE: 35
PIF_VALUE: 27
PIF_VALUE: 37
PIF_VALUE: 27
PIF_VALUE: 14
PIF_VALUE: 27
PIF_VALUE: 23
PIF_VALUE: 22
PIF_VALUE: 15
PIF_VALUE: 37
PIF_VALUE: 26
PIF_VALUE: 27
PIF_VALUE: 29
PIF_VALUE: 26
PIF_VALUE: 31
PIF_VALUE: 21
PIF_VALUE: 27
PIF_VALUE: 40
PIF_VALUE: 31
PIF_VALUE: 29
PIF_VALUE: 27

## 2019-05-06 ASSESSMENT — PAIN SCALES - GENERAL: PAINLEVEL_OUTOF10: 0

## 2019-05-06 NOTE — PROGRESS NOTES
05/06/19 1902   Vent Information   Vent Type 840   Vent Mode AC/VC   Vt Ordered 500 mL   Rate Set 16 bmp   Peak Flow 65 L/min   Pressure Support 0 cmH20   FiO2  30 %   Sensitivity 3   PEEP/CPAP 5   Cuff Pressure (cm H2O) 30 cm H2O   Humidification Source Heated wire   Humidification Temp 36.1   Circuit Condensation Drained   Vent Patient Data   Peak Inspiratory Pressure 26 cmH2O   Mean Airway Pressure 12 cmH20   Rate Measured 20 br/min   Vt Exhaled 556 mL   Minute Volume 9.93 Liters   I:E Ratio 1:2.30   Plateau Pressure 19 [id number]   Static Compliance 40 mL/cmH2O   Dynamic Compliance 26 mL/cmH2O   Spontaneous Breathing Trial (SBT) RT Doc   Pulse 101   SpO2 100 %   Breath Sounds   Right Upper Lobe Rhonchi   Right Middle Lobe Rhonchi   Right Lower Lobe Rhonchi   Left Upper Lobe Rhonchi   Left Lower Lobe Rhonchi   Additional Respiratory  Assessments   Resp (!) 0   End Tidal CO2 21 (%)   Alarm Settings   High Pressure Alarm 45 cmH2O   Low Minute Volume Alarm 3 L/min   Apnea (secs) 20 secs   High Respiratory Rate 40 br/min   Low Exhaled Vt  300 mL   ETT (adult)   Placement Date/Time: 04/29/19 1340   Preoxygenation: Yes  Type: Cuffed  Tube Size: 8 mm  Placement Verified By[de-identified] Capnometry  Secured at: 23 cm  Measured From: Lips  Cuff Pressure: 30 cm H2O   Secured at 23 cm   Measured From Lips   ET Placement Right   Secured By Commercial tube saavedra   Site Condition Dry

## 2019-05-06 NOTE — PROGRESS NOTES
Hubert Vázquez CNP at bedside, patient switched back to SBT mode on the ventilator.  Continuing to monitor, Missy Solis RN

## 2019-05-06 NOTE — PROGRESS NOTES
05/05/19 2326   Vent Information   Vent Type 840   Vent Mode AC/VC   Vt Ordered 500 mL   Rate Set 16 bmp   Peak Flow 60 L/min   Pressure Support 0 cmH20   FiO2  30 %   Sensitivity 3   PEEP/CPAP 5   Cuff Pressure (cm H2O) 30 cm H2O   Humidification Source Heated wire   Humidification Temp 36.3   Circuit Condensation Drained   Vent Patient Data   Peak Inspiratory Pressure 31 cmH2O   Mean Airway Pressure 16 cmH20   Rate Measured 30 br/min   Vt Exhaled 538 mL   Minute Volume 15.9 Liters   I:E Ratio 1.00:1   Spontaneous Breathing Trial (SBT) RT Doc   Pulse 104   SpO2 99 %   Breath Sounds   Right Upper Lobe Rhonchi   Right Middle Lobe Rhonchi   Right Lower Lobe Rhonchi   Left Upper Lobe Rhonchi   Left Lower Lobe Rhonchi   Additional Respiratory  Assessments   Resp 20   End Tidal CO2 22 (%)   Alarm Settings   High Pressure Alarm 45 cmH2O   Low Minute Volume Alarm 2 L/min   Apnea (secs) 20 secs   High Respiratory Rate 45 br/min   Low Exhaled Vt  200 mL   ETT (adult)   Placement Date/Time: 04/29/19 1340   Preoxygenation: Yes  Type: Cuffed  Tube Size: 8 mm  Placement Verified By[de-identified] Capnometry  Secured at: 23 cm  Measured From: Lips  Cuff Pressure: 30 cm H2O   Secured at 23 cm   Measured From Lips   ET Placement Right   Secured By Commercial tube saavedra   Site Condition Dry

## 2019-05-06 NOTE — PROGRESS NOTES
Spoke with Dr. Nitish Linder on the phone regarding patient's intolerance to aspirin, family members state in familial history causes hives, this RN wanted to clarify since patient has listed intolerance- nausea and vomiting, received verbal order to hold aspirin for tonight.  Jose Luis Escalante RN

## 2019-05-06 NOTE — PROGRESS NOTES
05/06/19 1127   Vent Information   Vent Type 840   Vent Mode AC/VC   Vt Ordered 500 mL   Rate Set 16 bmp   Peak Flow 65 L/min   Pressure Support 0 cmH20   FiO2  30 %   Sensitivity 3   PEEP/CPAP 5   Cuff Pressure (cm H2O) 30 cm H2O   Humidification Source Heated wire   Humidification Temp 37   Humidification Temp Measured 40   Vent Patient Data   Peak Inspiratory Pressure 27 cmH2O   Mean Airway Pressure 11 cmH20   Rate Measured 16 br/min   Vt Exhaled 511 mL   Minute Volume 8.16 Liters   I:E Ratio 1:3.50   Cough/Sputum   Sputum How Obtained None   Spontaneous Breathing Trial (SBT) RT Doc   Pulse 92   SpO2 99 %   Breath Sounds   Right Upper Lobe Diminished   Right Middle Lobe Diminished   Right Lower Lobe Diminished   Left Upper Lobe Diminished   Left Lower Lobe Diminished   Additional Respiratory  Assessments   Resp 16   End Tidal CO2 21 (%)   Alarm Settings   High Pressure Alarm 45 cmH2O   Low Minute Volume Alarm 3 L/min   Apnea (secs) 20 secs   High Respiratory Rate 40 br/min   Low Exhaled Vt  300 mL   Patient Observation   Observations ambu @ bedside   ETT (adult)   Placement Date/Time: 04/29/19 1340   Preoxygenation: Yes  Type: Cuffed  Tube Size: 8 mm  Placement Verified By[de-identified] Capnometry  Secured at: 23 cm  Measured From: Lips  Cuff Pressure: 30 cm H2O   Secured at 23 cm   Measured From Lips   ET Placement Right  (moved to the center at this time.)   Secured By Commercial tube saavedra   Site Condition Dry

## 2019-05-06 NOTE — PROGRESS NOTES
Spoke with Dr. Lidia Rose on the phone regarding patient tidal volumes and rate on SBT, was told per Dr. Lidia Rose to put the patient back on A/C mode on the ventilator. Relayed this to respiratory therapy.  Isaias Izquierdo RN

## 2019-05-06 NOTE — PROGRESS NOTES
05/06/19 0819   Vent Information   $Ventilation $Subsequent Day   Vent Type 840   Vent Mode PS  (Changed to PS by Dr. Irasema Carter)   Vt Ordered 500 mL   Peak Flow 65 L/min   Pressure Support 15 cmH20   FiO2  30 %   Sensitivity 3   PEEP/CPAP 5   Cuff Pressure (cm H2O) 30 cm H2O   Humidification Source Heated wire   Humidification Temp 37.4   Humidification Temp Measured 40.4   Vent Patient Data   Peak Inspiratory Pressure 21 cmH2O   Mean Airway Pressure 12 cmH20   Rate Measured 25 br/min   Vt Exhaled 417 mL   Minute Volume 10 Liters   I:E Ratio 1:1.70   Plateau Pressure 20 [id number]   Static Compliance 83.4 mL/cmH2O   Dynamic Compliance 26.06 mL/cmH2O   Cough/Sputum   Sputum How Obtained None   Spontaneous Breathing Trial (SBT) RT Doc   Pulse 97   SpO2 100 %   Breath Sounds   Right Upper Lobe Clear   Right Middle Lobe Clear   Right Lower Lobe Diminished   Left Upper Lobe Clear   Left Lower Lobe Diminished   Additional Respiratory  Assessments   Resp (!) 31   End Tidal CO2 18 (%)   Alarm Settings   High Pressure Alarm 45 cmH2O   Low Minute Volume Alarm 3 L/min   Apnea (secs) 20 secs   High Respiratory Rate 40 br/min   Low Exhaled Vt  300 mL   Adult IBW   Height 5' 1\" (1.549 m)   IBW/kg (Calculated) 47.8   Patient Observation   Observations ambu @ bedside   ETT (adult)   Placement Date/Time: 04/29/19 1340   Preoxygenation: Yes  Type: Cuffed  Tube Size: 8 mm  Placement Verified By[de-identified] Capnometry  Secured at: 23 cm  Measured From: Lips  Cuff Pressure: 30 cm H2O   Secured at 23 cm   Measured From Lips   ET Placement Left  (moved to the right at this time.)   Secured By Commercial tube saavedra   Site Condition Dry

## 2019-05-06 NOTE — PROGRESS NOTES
[facility]       Progress Note           Subjective/   81y.o. year old female who we are seeing in consultation for LOPEZ/ATN. HPI:    Remains in the ICU. She is more awake. Following commands  Remains on vent, FiO2 is 30%  Sodium high and K low. Kidney function continues to improve     ROS:  Remains intubated. No fevers. Objective/   GEN:  Chronically ill, /68   Pulse 97   Temp 98.9 °F (37.2 °C) (Core)   Resp (!) 31   Ht 5' 1\" (1.549 m)   Wt 141 lb 1.5 oz (64 kg)   SpO2 100%   BMI 26.66 kg/m²   HEENT: intubated   CV: S1, S2 without m/r/g; some edema on her UE  RESP:  Respirations mechanically assisted, FiO2 is 30%  ABD: +bs, soft, nt, no hsm  SKIN: warm, no rashes    Data/  Recent Labs     05/04/19  0409  05/05/19  0023 05/05/19  0443 05/06/19  0350 05/06/19  0359   WBC 12.3*  --   --  17.7* 17.9*  --    HGB 7.5*   < > 9.7* 9.4* 9.1* 8.6*   HCT 21.4*   < > 28.2* 27.6* 27.1*  --    MCV 80.6  --   --  83.8 83.0  --      --   --  220 154  --     < > = values in this interval not displayed. Recent Labs     05/04/19  0409 05/04/19  1450 05/05/19  0443 05/06/19  0350    141 142 146*   K 2.9* 3.6 3.3* 3.3*    108 110 113*   CO2 16* 16* 18* 22   GLUCOSE 111* 114* 166* 152*   PHOS 3.2  --  3.0 3.0   MG 2.00  --  1.90 1.70*   BUN 42* 39* 37* 37*   CREATININE 1.7* 1.5* 1.4* 1.2   LABGLOM 30* 35* 37* 45*   GFRAA 36* 42* 45* 54*       Assessment/Plan     Ms. Reddy Almanzar is a 81year old female with PMHx of CAD, DM, HTN and seizures who presents after a cardiac arrest.     Acute Kidney Injury.  - Etiology: ATN in the setting of cardiac arrest/sepsis. - Data: Last serum creatinine in 07/2016 was <0.5mg/dL. - Urinalysis with blood and protein. - Clinical: Scr improving, now down to 1.2, following ATN recovery pattern      Severe Anion Gap Metabolic Acidosis. - Likely from DKA and lactic acidosis.  No toxic alcohol ingestion.  - Methanol, Ethylene glycol negative  - Resolved      Anemia.  - Had coffee-ground emesis on admission.  - Plans per GI.     S/P Cardiac Arrest.  - Plans per Cardiology     Hypophosphatemia  - Prn IV phos replacement today    Hypokalemia   Nutritional with total K deficit     Multiple CVAs   MRI brain 5/3, neurology seeing    Plan:  - add D5W at 100 cc/hr with 40 mEq of KCl to replace free water and potassium  - follow labs

## 2019-05-06 NOTE — PROGRESS NOTES
05/06/19 1540   Vent Information   Vent Type 840   Vent Mode AC/VC   Vt Ordered 500 mL   Rate Set 16 bmp   Peak Flow 65 L/min   Pressure Support 0 cmH20   FiO2  30 %   Sensitivity 3   PEEP/CPAP 5   Cuff Pressure (cm H2O) 30 cm H2O   Humidification Source Heated wire   Humidification Temp 36.9   Humidification Temp Measured 39.8   Vent Patient Data   Peak Inspiratory Pressure 31 cmH2O   Mean Airway Pressure 13 cmH20   Rate Measured 22 br/min   Vt Exhaled 477 mL   Minute Volume 8.96 Liters   I:E Ratio 1:2.20   Plateau Pressure 16 [id number]   Static Compliance 43.36 mL/cmH2O   Dynamic Compliance 18.34 mL/cmH2O   Cough/Sputum   Sputum How Obtained Endotracheal;Suctioned   Cough Productive   Sputum Amount Small   Sputum Color Cloudy;Creamy; Yellow   Tenacity Thick   Spontaneous Breathing Trial (SBT) RT Doc   Pulse 99   SpO2 99 %   Breath Sounds   Right Upper Lobe Expiratory Wheezes; Rhonchi   Right Middle Lobe Expiratory Wheezes; Rhonchi   Right Lower Lobe Expiratory Wheezes   Left Upper Lobe Expiratory Wheezes; Rhonchi   Left Lower Lobe Expiratory Wheezes   Additional Respiratory  Assessments   Resp 24   End Tidal CO2 18 (%)   Alarm Settings   High Pressure Alarm 45 cmH2O   Low Minute Volume Alarm 3 L/min   Apnea (secs) 20 secs   High Respiratory Rate 40 br/min   Low Exhaled Vt  300 mL   Patient Observation   Observations ambu @ bedside   ETT (adult)   Placement Date/Time: 04/29/19 1340   Preoxygenation: Yes  Type: Cuffed  Tube Size: 8 mm  Placement Verified By[de-identified] Capnometry  Secured at: 23 cm  Measured From: Lips  Cuff Pressure: 30 cm H2O   Secured at 23 cm   Measured From [address]  (moved to the left at this time.)   Secured By Commercial tube saavedra   Site Condition Dry

## 2019-05-06 NOTE — PLAN OF CARE
Problem: Nutrition  Goal: Optimal nutrition therapy  Outcome: Ongoing   Nutrition Problem: Inadequate energy intake  Intervention: Food and/or Nutrient Delivery: Continue current Tube Feeding  Nutritional Goals:  Tolerate most appropriate form of nutrition therapy this admission

## 2019-05-06 NOTE — PROGRESS NOTES
Hospitalist Progress Note      PCP: Deloris Medina PA-C    Date of Admission: 4/29/2019    Chief Complaint: pt became unresponsive    Hospital Course: Reviewed H and P    Subjective:   Patient is up in bed, not in distress. Patient is having nonpurposeful activity appropriate lower extremity, does not follow command. No new event overnight noted. Medications:  Reviewed    Infusion Medications    IV infusion builder 100 mL/hr at 05/06/19 1109    sodium chloride       Scheduled Medications    ampicillin-sulbactam  3 g Intravenous Q6H    insulin glargine  20 Units Subcutaneous Daily    insulin lispro  0-12 Units Subcutaneous Q4H    miconazole   Topical BID    ipratropium-albuterol  1 ampule Inhalation Q4H    carboxymethylcellulose PF  1 drop Both Eyes TID    chlorhexidine  15 mL Mouth/Throat BID    heparin (porcine)  5,000 Units Subcutaneous BID    pantoprazole  40 mg Intravenous BID    sodium chloride flush  10 mL Intravenous 2 times per day     PRN Meds: magnesium sulfate, potassium chloride, fentanNYL, acetaminophen, dextrose, sodium chloride flush, magnesium hydroxide, acetaminophen      Intake/Output Summary (Last 24 hours) at 5/6/2019 1327  Last data filed at 5/6/2019 1225  Gross per 24 hour   Intake 1327 ml   Output 2725 ml   Net -1398 ml       Physical Exam Performed:    /61   Pulse 98   Temp 99 °F (37.2 °C) (Core)   Resp 18   Ht 5' 1\" (1.549 m)   Wt 141 lb 1.5 oz (64 kg)   SpO2 100%   BMI 26.66 kg/m²     General appearance: Intubated and sedated. HEENT: Conjunctivae/corneas clear. Neck: Supple  Respiratory: Vent sounds  Cardiovascular: tachycardic  Abdomen: Soft, non-tender, non-distended with normal bowel sounds. Musculoskeletal: No edema. Skin: Skin color, texture, turgor normal.  No rashes or lesions.   Neurologic:  Nonfocal  Capillary Refill: Brisk,< 3 seconds   Peripheral Pulses: +2 palpable, equal bilaterally       Labs:   Recent Labs     05/04/19  1181 05/05/19  0023 05/05/19  0443 05/06/19  0350 05/06/19  0359   WBC 12.3*  --   --  17.7* 17.9*  --    HGB 7.5*   < > 9.7* 9.4* 9.1* 8.6*   HCT 21.4*   < > 28.2* 27.6* 27.1*  --      --   --  220 154  --     < > = values in this interval not displayed. Recent Labs     05/04/19  0409 05/04/19  1450 05/05/19  0443 05/06/19  0350    141 142 146*   K 2.9* 3.6 3.3* 3.3*    108 110 113*   CO2 16* 16* 18* 22   BUN 42* 39* 37* 37*   CREATININE 1.7* 1.5* 1.4* 1.2   CALCIUM 6.1* 6.4* 6.9* 7.1*   PHOS 3.2  --  3.0 3.0       Urinalysis:      Lab Results   Component Value Date    NITRU Negative 04/29/2019    WBCUA 10-20 04/29/2019    BACTERIA 1+ 04/29/2019    RBCUA 3-5 04/29/2019    BLOODU MODERATE 04/29/2019    SPECGRAV 1.025 04/29/2019    GLUCOSEU >=1000 04/29/2019       Radiology:  XR CHEST PORTABLE   Final Result   1. No significant change. MRI BRAIN WO CONTRAST   Final Result   1. Acute/early subacute infarction involving the left hippocampus. Additional punctate infarctions within the frontal lobes and posterior   temporal lobes bilaterally. This raises the possibility for thromboembolic   phenomenon from a central source. No associated hemorrhage. 2. Diffuse parenchymal volume loss and sequela of chronic microvascular   ischemic changes. The findings were sent to the Radiology Results [address] at 8:34   am on 5/5/2019 to be communicated to a licensed caregiver. XR CHEST PORTABLE   Final Result   Bilateral lower lobe airspace disease, left greater than right, increased   compared to prior         XR CHEST PORTABLE   Final Result   Stable appearance of the chest with dense opacification in the left mid and   lower lung zone. XR CHEST PORTABLE   Final Result   1. No significant interval change in the left-sided opacity reflecting   pneumonia better characterized on the CT of the thorax from 04/29/2019.   2. Support devices as described above.          XR CHEST PORTABLE   Final Result   Catheter in good position. No postprocedure pneumothorax. XR CHEST PORTABLE   Final Result   Supportive devices are stable. Persisting consolidations in the left lung base, consistent with pneumonia. CT CHEST ABDOMEN PELVIS WO CONTRAST   Final Result   Dense consolidation within the inferior aspect of left upper lobe and   throughout the left lower lobe, most compatible with pneumonia and/or   aspiration. That should be followed to resolution, especially given the   nodular morphology within portions of the consolidation. Diffuse mural thickening of the esophagus. Correlate with clinical evidence   of esophagitis. The tip of the right femoral venous catheter is malpositioned within a sacral   vein. That should be repositioned for more optimal placement. CT CERVICAL SPINE WO CONTRAST   Final Result   Multilevel degenerative changes with no acute abnormality of the cervical   spine. CT HEAD WO CONTRAST   Final Result   Motion degraded study with no acute intracranial abnormality. XR CHEST PORTABLE   Final Result   Supportive tubing projects in normal position. Left basilar airspace disease, pneumonia versus aspiration pneumonitis. There may be a component of pleural effusion. XR CHEST PORTABLE   Final Result   Atelectasis at the left lung base. Question of small left pleural effusion.          US GALLBLADDER RUQ    (Results Pending)           Assessment/Plan:    Active Hospital Problems    Diagnosis Date Noted    Acute renal failure (ARF) ([facility] [N17.9] 08/20/2015     Priority: High     Class: Acute    Acute encephalopathy [G93.40]     Arterial ischemic stroke, ICA, left, acute (HCC) [I63.232]     Pneumonia due to organism [J18.9]     Acute respiratory failure (HCC) [J96.00]     Prolonged Q-T interval on ECG [R94.31]     Hypokalemia [E87.6]     Elevated troponin [R74.8]     DKA (diabetic ketoacidoses) ([facility]

## 2019-05-06 NOTE — PROGRESS NOTES
Time out completed at 1050 hours. Verified patients name,  for consented procedure Transesophageal Echocardiogrm. Pts allergies and code status have been verified. Medical providers present in room include Dr Hilda Tapia, Anesthesia provider, Echo tech, ICU RN x2. All providers in agreement.

## 2019-05-06 NOTE — PROGRESS NOTES
MDR complete with Dr René Tejeda. Updated on Pt status and plans for today. Discussed Alk Phos and Direct Bilirubin values, to consider US. CC team to address diuresis and electrolyte replacement.

## 2019-05-06 NOTE — PROGRESS NOTES
Nutrition Assessment (Enteral Nutrition)    Type and Reason for Visit: Reassess    Nutrition Recommendations:   1. Recommend resume TF order after SERVANDO today. Glucerna 1.5 at goal of 45 mL/hr is met via N/G  2. Recommend 100 mL H20 flush q 4 hours if No IVF infusing. Monitor IVF infusion, Na labs and need for adjustments in water flush  3. Ensure head of bed is at least 30 - 45 degrees during continuous feeding. 4. Monitor TF tolerance (abd distention, bowel habits, N/V, cramping)  5. Monitor nutrition adequacy, pertinent labs, bowel habits, wt changes, and clinical progress      Nutrition Assessment: Follow up: Remains intubated, off propofol now. TG down to 301. TF held since midnight for SERVANDO procedure today. Rectal tube placed for loose BMs. Malnutrition Assessment:  · Malnutrition Status: At risk for malnutrition    Nutrition Risk Level: High    Nutrition Needs:  · Estimated Daily Total Kcal: 3577-1229  · Estimated Daily Protein (g): 58-96 gm   · Estimated Daily Fluid (ml/day): 1 mL/kcal     Nutrition Diagnosis:   · Problem: Inadequate energy intake  · Etiology: related to Impaired respiratory function-inability to consume food, Insufficient energy/nutrient consumption     Signs and symptoms:  as evidenced by Intubation, NPO status due to medical condition    Objective Information:  · Nutrition-Focused Physical Findings: Rectal tube in place. +12.9L since admission. · Wound Type: None  · Current Nutrition Therapies:  · Oral Diet Orders: NPO   · Oral Diet intake: NPO  · Oral Nutrition Supplement (ONS) Orders: None  · ONS intake: NPO  · Tube Feeding (TF) Orders:   · Feeding Route: Nasogastric  · Formula: 1.5 Diabetic  · Goal TF & Flush Orders Provides: When medically appropriate, consider EN initiation. Recommend Glucerna 1.5 at goal of 45 mL/hr to provide 900 mL TV, 1350 kcals, 75 gm protein, 683 mL free fluid. Fluid mgmt per nephro.    · Anthropometric Measures:  · Ht: 5' 1\" (154.9 cm) · Current Body Wt: 141 lb (64 kg)  · Usual Body Wt: (AUSTIN )  · Ideal Body Wt: 105 lb (47.6 kg),  · BMI Classification: BMI 18.5 - 24.9 Normal Weight    Nutrition Interventions:   Continue current Tube Feeding  Continued Inpatient Monitoring    Nutrition Evaluation:   · Evaluation: Progressing toward goals   · Goals: Tolerate most appropriate form of nutrition therapy this admission   · Monitoring: Nutrition Progression, TF Tolerance, TF Intake, Pertinent Labs      Electronically signed by Anival Zavala.  Abdias Garcia RD, LD on 5/6/19 at 11:10 AM    Contact Number: [phone]

## 2019-05-06 NOTE — PROGRESS NOTES
father; High Blood Pressure in her father; High Cholesterol in her father. reports that she has never smoked. She has never used smokeless tobacco. She reports that she does not drink alcohol or use drugs. Objective:  Constitutional:   Vitals:    05/06/19 1145 05/06/19 1200 05/06/19 1215 05/06/19 1300   BP: (!) 100/54 (!) 107/53 (!) 114/57 115/61   Pulse: 92 95 98 98   Resp: 16 16 17 18   Temp:  99 °F (37.2 °C)     TempSrc:  Core     SpO2: 99% 100% 100% 100%   Weight:       Height:         General appearance: intubated  Eye: No icterus   Neck: supple  Cardiovascular: No lower leg edema with good pulsation. Mental Status:  Open her eyes to voice and can follow directions  Cranial Nerves:   Pupil: RRR  No gaze preference  OCR: present  Corneal: yes  Cough: yes  Gag: yes  Spontaneous breathing: yes  II: Visual fields: NT due to intubation. Pupils: equal, round, reactive to light  III,IV,VI: No gaze preference. V: Facial sensation: NT due to intubation  VII: Facial symmetry: symmetric   Musculoskeletal: no motor response but can WD to pain from left and right  Tone: normal  Reflexes: diminished but symmetric   Planters: mute  Coordination: NT due to intubation.   Sensation and Gait/Posture: NT due to intubation              Data:  LABS:   Lab Results   Component Value Date     05/06/2019    K 3.3 05/06/2019    K 5.2 04/30/2019     05/06/2019    CO2 22 05/06/2019    BUN 37 05/06/2019    CREATININE 1.2 05/06/2019    GFRAA 54 05/06/2019    LABGLOM 45 05/06/2019    GLUCOSE 152 05/06/2019    PHOS 3.0 05/06/2019    MG 1.70 05/06/2019    CALCIUM 7.1 05/06/2019     Lab Results   Component Value Date    WBC 17.9 05/06/2019    RBC 3.26 05/06/2019    HGB 8.6 05/06/2019    HCT 27.1 05/06/2019    MCV 83.0 05/06/2019    RDW 14.5 05/06/2019     05/06/2019     Lab Results   Component Value Date    INR 1.25 (H) 04/29/2019    PROTIME 14.2 (H) 04/29/2019       Neuroimaging and/or  labs reviewed by me and LUIS her family    Impression:  Acute encephalopathy, severe. Likely multifactorial.  Possible acute metabolic encephalopathy from DKA and hypoxic- ischemic insult. New ischemic bilateral hemispheric strokes affecting more left temporal region. Possible embolic. DKA  Diabetes, poorly controlled  Acute respiratory failure with hypoxemia  Acute kidney injury  Pneumonia  GIB       Recommendation  SBT trials when off sedation  ISS  Continue the current respiratory support and care  Blood sugar monitoring  Continue antibiotics  Aspirin  Statin  Aspiration precautions  Follow Cr. Prognosis is still guarded  Family agreed to [place] and PEG by the end of the week if encephalopathy does not improve  DVT and GI prophylaxis  Will follow           Marilyn Higginbotham MD   [phone]      This dictation was generated by voice recognition computer software. Although all attempts are made to edit the dictation for accuracy, there may be errors in the transcription that are not intended.

## 2019-05-06 NOTE — PROCEDURES
5/6/2019     Patient: Isma Varela    MR Number: [id number]  YOB: 1950  Date of Visit: 5/6/2019    Clinical History:    The patient is a 81y.o. years old female with acute encephalopathy and possible seizure. Method: The EEG was performed utilizing the international 10/20 of electrode placements of both referential and bipolar montages. The patient is in coma through out the recording. Findings: The background of the EEG showed generalized diffuse slowing through out the recording in the theta range mixed with higher beta frequency. This rhythm was symmetric, non rhythmical, and continuous. No spike or sharp waves were seen. No EEG seizures or periodic pattern. Impression: This EEG is abnormal. The generalized diffuse slowing is suggestive of moderate diffuse encephalopathy. There is no evidence of epileptiform discharges, focal, or lateralizing abnormalities.         Lorena Fermin MD      Board certified in clinical neurophysiology

## 2019-05-06 NOTE — PROGRESS NOTES
Handoff report given to Matt Cha RN.  Lines and medications verified, patient in bed, bed in lowest position, wheels locked, family present and updated on POC, signing off care, Refchase Gayle RN

## 2019-05-06 NOTE — ANESTHESIA PRE PROCEDURE
Department of Anesthesiology  Preprocedure Note       Name:  Wicho Suárez   Age:  81 y.o.  :  1950                                          MRN:  [id number]         Date:  2019      Surgeon: Yajaira Kumari):  Smooth Che MD    Procedure: TRANSESOPHAGEAL ECHO    Medications prior to admission:   Prior to Admission medications    Medication Sig Start Date End Date Taking? Authorizing Provider   ondansetron (ZOFRAN ODT) 4 MG disintegrating tablet Take 1 tablet by mouth every 8 hours as needed for Nausea or Vomiting 16   Ronnie Clayton MD   omeprazole (PRILOSEC) 10 MG capsule Take 10 mg by mouth daily    Historical Provider, MD   lisinopril (PRINIVIL;ZESTRIL) 10 MG tablet Take 10 mg by mouth daily. Historical Provider, MD   metFORMIN (GLUCOPHAGE) 1000 MG tablet   Take 1,000 mg by mouth daily (with breakfast)     Historical Provider, MD   PARoxetine (PAXIL) 30 MG tablet Take 30 mg by mouth every morning. Historical Provider, MD   amitriptyline (ELAVIL) 50 MG tablet Take 25 mg by mouth nightly     Historical Provider, MD   atorvastatin (LIPITOR) 40 MG tablet Take 40 mg by mouth daily.     Historical Provider, MD   insulin detemir (LEVEMIR) 100 UNIT/ML injection vial   Inject 63 Units into the skin nightly     Historical Provider, MD       Current medications:    Current Facility-Administered Medications   Medication Dose Route Frequency Provider Last Rate Last Dose    ampicillin-sulbactam (UNASYN) 3 g ivpb minibag  3 g Intravenous Ernie Infante MD   Stopped at 19 0912    potassium chloride 40 mEq in dextrose 5 % 1,000 mL infusion   Intravenous Continuous Allison Hernandez  mL/hr at 19 1109      sodium chloride 0.9 % infusion             magnesium sulfate 1 g in dextrose 5% 100 mL IVPB  1 g Intravenous PRN Bella Phelps MD   Stopped at 19 0852    potassium chloride 20 mEq/50 mL IVPB (Central Line)  20 mEq Intravenous PRN Bella Phelps MD 50 mL/hr at 05/06/19 0733 20 mEq at 05/06/19 0733    insulin glargine (LANTUS) injection vial 20 Units  20 Units Subcutaneous Daily Annie Ruiz MD   Stopped at 05/06/19 1001    insulin lispro (HUMALOG) injection vial 0-12 Units  0-12 Units Subcutaneous Q4H Annie Ruiz MD   2 Units at 05/06/19 0402    miconazole (MICOTIN) 2 % powder   Topical BID Annie Ruiz MD        fentaNYL (SUBLIMAZE) injection 25 mcg  25 mcg Intravenous Q1H PRN Rachna Cortés MD        ipratropium-albuterol (DUONEB) nebulizer solution 1 ampule  1 ampule Inhalation Q4H Rachna Cortés MD   1 ampule at 05/06/19 1126    acetaminophen (TYLENOL) 160 MG/5ML solution 650 mg  650 mg Oral Q4H PRN Rachna Cortés MD   650 mg at 05/01/19 1702    carboxymethylcellulose PF (THERATEARS) 1 % ophthalmic gel 1 drop  1 drop Both Eyes TID Rachna Cortés MD   1 drop at 05/06/19 0842    chlorhexidine (PERIDEX) 0.12 % solution 15 mL  15 mL Mouth/Throat BID Rachna Cortés MD   15 mL at 05/06/19 0843    heparin (porcine) injection 5,000 Units  5,000 Units Subcutaneous BID Rachna Cortés MD   5,000 Units at 05/06/19 0842    pantoprazole (PROTONIX) injection 40 mg  40 mg Intravenous BID Rachna Cortés MD   40 mg at 05/06/19 0842    dextrose 50 % solution 12.5 g  12.5 g Intravenous PRN Rachna Cortés MD   12.5 g at 05/03/19 1649    norepinephrine (LEVOPHED) 16 mg in dextrose 5 % 250 mL infusion  2 mcg/min Intravenous Continuous Fahad Harding MD   Stopped at 05/05/19 2200    sodium chloride flush 0.9 % injection 10 mL  10 mL Intravenous 2 times per day Fahad Harding MD   10 mL at 05/06/19 0843    sodium chloride flush 0.9 % injection 10 mL  10 mL Intravenous PRN Fahad Harding MD        magnesium hydroxide (MILK OF MAGNESIA) 400 MG/5ML suspension 30 mL  30 mL Oral Daily PRN Fahad Harding MD        acetaminophen (TYLENOL) suppository 650 mg  650 mg Rectal Q4H PRN Fahad Harding MD   650 mg at 04/30/19 0048       Allergies: Allergies   Allergen Reactions    Aspirin Nausea And Vomiting       Problem List:    Patient Active Problem List   Diagnosis Code    Asthma J45.909    Hypertension I10    Diabetes mellitus ([facility] E11.9    Nausea & vomiting R11.2    Abdominal pain R10.9    Acute renal failure (ARF) ([facility]) N17.9    Hyperglycemia R73.9    DKA (diabetic ketoacidoses) ([facility]) E13.10    Acidosis E87.2    Cardiac arrest ([facility]) I46.9    DKA, type 2, not at goal [facility]) E11.10    Acute respiratory failure ([facility]) J96.00    Prolonged Q-T interval on ECG R94.31    Hypokalemia E87.6    Elevated troponin R74.8    Acute encephalopathy G93.40    Arterial ischemic stroke, ICA, left, acute ([facility]) N22.417    Pneumonia due to organism J18.9       Past Medical History:        Diagnosis Date    Acute renal failure (ARF) ([facility] 8/20/2015    Asthma     CAD (coronary artery disease)     MI in 2013    Diabetes mellitus ([facility]     Hypertension     Pneumonia     Not sure when    Seizures ([facility]     She goes numb, doctor told her they are seizures    Unspecified cerebral artery occlusion with cerebral infarction     Pt reports having a lot of mini-strokes; first one was 2002; last one was a couple years ago       Past Surgical History:        Procedure Laterality Date    BLADDER REPAIR      BREAST LUMPECTOMY Left     BREAST SURGERY      BRONCHOSCOPY N/A 5/1/2019    BRONCHOSCOPY THERAPUTIC ASPIRATION INITIAL performed by Venessa Fuchs MD at [address] [place] [address]  5/1/2019    [address] performed by Venessa Fuchs MD at [address] [place] [address] N/A 5/2/2019    [address] [place] [address] performed by Venessa Fuchs MD at [address] [place] [address] History:    Social History     Tobacco Use    Smoking status: Never Smoker    Smokeless tobacco: Never Used   Substance Use Topics    Alcohol use: No     Comment: occasional                                Counseling given: Not Value Date    PHART 7.499 05/06/2019    PO2ART 86.2 05/06/2019    FIG9DUR 25.7 05/06/2019    KUJ3JXD 20.0 05/06/2019    BEART -3 05/06/2019    V5CEKTRN 98 05/06/2019        Type & Screen (If Applicable):  No results found for: LABABO, [address]    Anesthesia Evaluation  Patient summary reviewed and Nursing notes reviewed no history of anesthetic complications:   Airway: Mallampati: II     Neck ROM: limited  Comment: ETT, ventilated   Dental:          Pulmonary:   (+) pneumonia:  asthma:                            Cardiovascular:    (+) hypertension:, CAD:,                   Neuro/Psych:   (+) CVA:,             GI/Hepatic/Renal:             Endo/Other:    (+) Diabetes, . Abdominal:           Vascular:                                        Anesthesia Plan      MAC     ASA 4     (Medications & allergies reviewed  All available lab & EKG data reviewed)  Induction: intravenous. Anesthetic plan and risks discussed with patient and healthcare power of . Plan discussed with CRNA.                   Carol Mohr MD   5/6/2019

## 2019-05-06 NOTE — PROGRESS NOTES
Patient buttocks red and macerated. Patient having loose stool, new orders for flexiseal. Placed, patient tolerated well. Wound consult placed   Will continue to monitor.

## 2019-05-07 ENCOUNTER — APPOINTMENT (OUTPATIENT)
Dept: GENERAL RADIOLOGY | Age: 69
DRG: 853 | End: 2019-05-07
Payer: COMMERCIAL

## 2019-05-07 PROBLEM — R91.8 PULMONARY INFILTRATE: Status: ACTIVE | Noted: 2019-05-07

## 2019-05-07 PROBLEM — J81.0 ACUTE PULMONARY EDEMA (HCC): Status: ACTIVE | Noted: 2019-05-07

## 2019-05-07 PROBLEM — J98.11 ATELECTASIS: Status: ACTIVE | Noted: 2019-05-07

## 2019-05-07 LAB
ALBUMIN SERPL-MCNC: 1.9 G/DL (ref 3.4–5)
ANION GAP SERPL CALCULATED.3IONS-SCNC: 13 MMOL/L (ref 3–16)
BASE EXCESS ARTERIAL: -3 (ref -3–3)
BASOPHILS ABSOLUTE: 0.1 K/UL (ref 0–0.2)
BASOPHILS RELATIVE PERCENT: 0.5 %
BUN BLDV-MCNC: 33 MG/DL (ref 7–20)
CALCIUM IONIZED: 1.09 MMOL/L (ref 1.12–1.32)
CALCIUM SERPL-MCNC: 7 MG/DL (ref 8.3–10.6)
CHLORIDE BLD-SCNC: 106 MMOL/L (ref 99–110)
CO2: 22 MMOL/L (ref 21–32)
CREAT SERPL-MCNC: 1.2 MG/DL (ref 0.6–1.2)
EOSINOPHILS ABSOLUTE: 0.1 K/UL (ref 0–0.6)
EOSINOPHILS RELATIVE PERCENT: 0.4 %
GFR AFRICAN AMERICAN: 54
GFR NON-AFRICAN AMERICAN: 45
GLUCOSE BLD-MCNC: 119 MG/DL (ref 70–99)
GLUCOSE BLD-MCNC: 121 MG/DL (ref 70–99)
GLUCOSE BLD-MCNC: 194 MG/DL (ref 70–99)
GLUCOSE BLD-MCNC: 228 MG/DL (ref 70–99)
GLUCOSE BLD-MCNC: 249 MG/DL (ref 70–99)
GLUCOSE BLD-MCNC: 252 MG/DL (ref 70–99)
HCO3 ARTERIAL: 21 MMOL/L (ref 21–29)
HCT VFR BLD CALC: 25.3 % (ref 36–48)
HEMOGLOBIN: 8.5 G/DL (ref 12–16)
HEMOGLOBIN: 8.6 GM/DL (ref 12–16)
LACTATE: 0.86 MMOL/L (ref 0.4–2)
LYMPHOCYTES ABSOLUTE: 2 K/UL (ref 1–5.1)
LYMPHOCYTES RELATIVE PERCENT: 13.1 %
MAGNESIUM: 1.9 MG/DL (ref 1.8–2.4)
MCH RBC QN AUTO: 28.3 PG (ref 26–34)
MCHC RBC AUTO-ENTMCNC: 33.6 G/DL (ref 31–36)
MCV RBC AUTO: 84.3 FL (ref 80–100)
MONOCYTES ABSOLUTE: 0.9 K/UL (ref 0–1.3)
MONOCYTES RELATIVE PERCENT: 5.6 %
NEUTROPHILS ABSOLUTE: 12.3 K/UL (ref 1.7–7.7)
NEUTROPHILS RELATIVE PERCENT: 80.4 %
O2 SAT, ARTERIAL: 89 % (ref 93–100)
PCO2 ARTERIAL: 30 MM HG (ref 35–45)
PDW BLD-RTO: 15 % (ref 12.4–15.4)
PERFORMED ON: ABNORMAL
PH ARTERIAL: 7.45 (ref 7.35–7.45)
PHOSPHORUS: 3.2 MG/DL (ref 2.5–4.9)
PLATELET # BLD: 101 K/UL (ref 135–450)
PMV BLD AUTO: 8.4 FL (ref 5–10.5)
PO2 ARTERIAL: 51.8 MM HG (ref 75–108)
POC HEMATOCRIT: 25 % (ref 36–48)
POC POTASSIUM: 3.6 MMOL/L (ref 3.5–5.1)
POC SAMPLE TYPE: ABNORMAL
POC SODIUM: 147 MMOL/L (ref 136–145)
POTASSIUM SERPL-SCNC: 3.7 MMOL/L (ref 3.5–5.1)
RBC # BLD: 3 M/UL (ref 4–5.2)
SODIUM BLD-SCNC: 141 MMOL/L (ref 136–145)
TCO2 ARTERIAL: 22 MMOL/L
WBC # BLD: 15.3 K/UL (ref 4–11)

## 2019-05-07 PROCEDURE — 94003 VENT MGMT INPAT SUBQ DAY: CPT

## 2019-05-07 PROCEDURE — 3609010900 HC BRONCHOSCOPY THERAPUTIC ASPIRATION INITIAL: Performed by: INTERNAL MEDICINE

## 2019-05-07 PROCEDURE — 82330 ASSAY OF CALCIUM: CPT

## 2019-05-07 PROCEDURE — 6370000000 HC RX 637 (ALT 250 FOR IP): Performed by: INTERNAL MEDICINE

## 2019-05-07 PROCEDURE — 2580000003 HC RX 258: Performed by: INTERNAL MEDICINE

## 2019-05-07 PROCEDURE — 94770 HC ETCO2 MONITOR DAILY: CPT

## 2019-05-07 PROCEDURE — 31624 DX BRONCHOSCOPE/LAVAGE: CPT | Performed by: INTERNAL MEDICINE

## 2019-05-07 PROCEDURE — 3609010800 HC BRONCHOSCOPY ALVEOLAR LAVAGE: Performed by: INTERNAL MEDICINE

## 2019-05-07 PROCEDURE — 0B9F8ZX DRAINAGE OF RIGHT LOWER LUNG LOBE, VIA NATURAL OR ARTIFICIAL OPENING ENDOSCOPIC, DIAGNOSTIC: ICD-10-PCS | Performed by: INTERNAL MEDICINE

## 2019-05-07 PROCEDURE — 6360000002 HC RX W HCPCS: Performed by: INTERNAL MEDICINE

## 2019-05-07 PROCEDURE — 99152 MOD SED SAME PHYS/QHP 5/>YRS: CPT | Performed by: INTERNAL MEDICINE

## 2019-05-07 PROCEDURE — 94750 HC PULMONARY COMPLIANCE STUDY: CPT

## 2019-05-07 PROCEDURE — 87205 SMEAR GRAM STAIN: CPT

## 2019-05-07 PROCEDURE — 93880 EXTRACRANIAL BILAT STUDY: CPT

## 2019-05-07 PROCEDURE — 87070 CULTURE OTHR SPECIMN AEROBIC: CPT

## 2019-05-07 PROCEDURE — 0B9J8ZX DRAINAGE OF LEFT LOWER LUNG LOBE, VIA NATURAL OR ARTIFICIAL OPENING ENDOSCOPIC, DIAGNOSTIC: ICD-10-PCS | Performed by: INTERNAL MEDICINE

## 2019-05-07 PROCEDURE — 6370000000 HC RX 637 (ALT 250 FOR IP): Performed by: FAMILY MEDICINE

## 2019-05-07 PROCEDURE — 83605 ASSAY OF LACTIC ACID: CPT

## 2019-05-07 PROCEDURE — 85014 HEMATOCRIT: CPT

## 2019-05-07 PROCEDURE — 85025 COMPLETE CBC W/AUTO DIFF WBC: CPT

## 2019-05-07 PROCEDURE — 82947 ASSAY GLUCOSE BLOOD QUANT: CPT

## 2019-05-07 PROCEDURE — 31646 BRNCHSC W/THER ASPIR SBSQ: CPT | Performed by: INTERNAL MEDICINE

## 2019-05-07 PROCEDURE — 94640 AIRWAY INHALATION TREATMENT: CPT

## 2019-05-07 PROCEDURE — 83735 ASSAY OF MAGNESIUM: CPT

## 2019-05-07 PROCEDURE — 2709999900 HC NON-CHARGEABLE SUPPLY: Performed by: INTERNAL MEDICINE

## 2019-05-07 PROCEDURE — 2000000000 HC ICU R&B

## 2019-05-07 PROCEDURE — 99233 SBSQ HOSP IP/OBS HIGH 50: CPT | Performed by: PSYCHIATRY & NEUROLOGY

## 2019-05-07 PROCEDURE — 82803 BLOOD GASES ANY COMBINATION: CPT

## 2019-05-07 PROCEDURE — 99291 CRITICAL CARE FIRST HOUR: CPT | Performed by: INTERNAL MEDICINE

## 2019-05-07 PROCEDURE — 71045 X-RAY EXAM CHEST 1 VIEW: CPT

## 2019-05-07 PROCEDURE — 94761 N-INVAS EAR/PLS OXIMETRY MLT: CPT

## 2019-05-07 PROCEDURE — 80069 RENAL FUNCTION PANEL: CPT

## 2019-05-07 PROCEDURE — C9113 INJ PANTOPRAZOLE SODIUM, VIA: HCPCS | Performed by: INTERNAL MEDICINE

## 2019-05-07 PROCEDURE — 0B9D8ZX DRAINAGE OF RIGHT MIDDLE LUNG LOBE, VIA NATURAL OR ARTIFICIAL OPENING ENDOSCOPIC, DIAGNOSTIC: ICD-10-PCS | Performed by: INTERNAL MEDICINE

## 2019-05-07 PROCEDURE — 84132 ASSAY OF SERUM POTASSIUM: CPT

## 2019-05-07 PROCEDURE — 84295 ASSAY OF SERUM SODIUM: CPT

## 2019-05-07 PROCEDURE — 2700000000 HC OXYGEN THERAPY PER DAY

## 2019-05-07 RX ORDER — SACCHAROMYCES BOULARDII 250 MG
250 CAPSULE ORAL 2 TIMES DAILY
Status: DISCONTINUED | OUTPATIENT
Start: 2019-05-07 | End: 2019-05-20 | Stop reason: HOSPADM

## 2019-05-07 RX ORDER — ACETYLCYSTEINE 200 MG/ML
SOLUTION ORAL; RESPIRATORY (INHALATION) PRN
Status: DISCONTINUED | OUTPATIENT
Start: 2019-05-07 | End: 2019-05-07 | Stop reason: ALTCHOICE

## 2019-05-07 RX ORDER — MAGNESIUM HYDROXIDE 1200 MG/15ML
LIQUID ORAL CONTINUOUS PRN
Status: COMPLETED | OUTPATIENT
Start: 2019-05-07 | End: 2019-05-07

## 2019-05-07 RX ORDER — FENTANYL CITRATE 50 UG/ML
INJECTION, SOLUTION INTRAMUSCULAR; INTRAVENOUS PRN
Status: DISCONTINUED | OUTPATIENT
Start: 2019-05-07 | End: 2019-05-07 | Stop reason: ALTCHOICE

## 2019-05-07 RX ORDER — MIDAZOLAM HYDROCHLORIDE 5 MG/ML
INJECTION INTRAMUSCULAR; INTRAVENOUS PRN
Status: DISCONTINUED | OUTPATIENT
Start: 2019-05-07 | End: 2019-05-07 | Stop reason: ALTCHOICE

## 2019-05-07 RX ADMIN — ATORVASTATIN CALCIUM 40 MG: 40 TABLET, FILM COATED ORAL at 20:52

## 2019-05-07 RX ADMIN — IPRATROPIUM BROMIDE AND ALBUTEROL SULFATE 1 AMPULE: .5; 3 SOLUTION RESPIRATORY (INHALATION) at 15:47

## 2019-05-07 RX ADMIN — POTASSIUM CHLORIDE: 2 INJECTION, SOLUTION, CONCENTRATE INTRAVENOUS at 04:52

## 2019-05-07 RX ADMIN — ASPIRIN 325 MG: 325 TABLET, COATED ORAL at 07:59

## 2019-05-07 RX ADMIN — Medication 15 ML: at 21:00

## 2019-05-07 RX ADMIN — MICONAZOLE NITRATE: 2 POWDER TOPICAL at 08:01

## 2019-05-07 RX ADMIN — Medication 15 ML: at 07:59

## 2019-05-07 RX ADMIN — Medication 250 MG: at 09:23

## 2019-05-07 RX ADMIN — IPRATROPIUM BROMIDE AND ALBUTEROL SULFATE 1 AMPULE: .5; 3 SOLUTION RESPIRATORY (INHALATION) at 19:48

## 2019-05-07 RX ADMIN — IPRATROPIUM BROMIDE AND ALBUTEROL SULFATE 1 AMPULE: .5; 3 SOLUTION RESPIRATORY (INHALATION) at 03:34

## 2019-05-07 RX ADMIN — IPRATROPIUM BROMIDE AND ALBUTEROL SULFATE 1 AMPULE: .5; 3 SOLUTION RESPIRATORY (INHALATION) at 23:59

## 2019-05-07 RX ADMIN — Medication 250 MG: at 20:51

## 2019-05-07 RX ADMIN — CARBOXYMETHYLCELLULOSE SODIUM 1 DROP: 10 GEL OPHTHALMIC at 21:00

## 2019-05-07 RX ADMIN — PANTOPRAZOLE SODIUM 40 MG: 40 INJECTION, POWDER, FOR SOLUTION INTRAVENOUS at 07:59

## 2019-05-07 RX ADMIN — CEFAZOLIN 1 G: 1 INJECTION, POWDER, FOR SOLUTION INTRAMUSCULAR; INTRAVENOUS at 09:24

## 2019-05-07 RX ADMIN — INSULIN LISPRO 4 UNITS: 100 INJECTION, SOLUTION INTRAVENOUS; SUBCUTANEOUS at 08:00

## 2019-05-07 RX ADMIN — INSULIN LISPRO 4 UNITS: 100 INJECTION, SOLUTION INTRAVENOUS; SUBCUTANEOUS at 04:15

## 2019-05-07 RX ADMIN — SODIUM CHLORIDE 3 G: 900 INJECTION INTRAVENOUS at 01:53

## 2019-05-07 RX ADMIN — MICONAZOLE NITRATE: 2 POWDER TOPICAL at 20:52

## 2019-05-07 RX ADMIN — INSULIN LISPRO 2 UNITS: 100 INJECTION, SOLUTION INTRAVENOUS; SUBCUTANEOUS at 13:20

## 2019-05-07 RX ADMIN — INSULIN GLARGINE 25 UNITS: 100 INJECTION, SOLUTION SUBCUTANEOUS at 08:06

## 2019-05-07 RX ADMIN — CARBOXYMETHYLCELLULOSE SODIUM 1 DROP: 10 GEL OPHTHALMIC at 08:00

## 2019-05-07 RX ADMIN — Medication 10 ML: at 21:01

## 2019-05-07 RX ADMIN — IPRATROPIUM BROMIDE AND ALBUTEROL SULFATE 1 AMPULE: .5; 3 SOLUTION RESPIRATORY (INHALATION) at 11:43

## 2019-05-07 RX ADMIN — CEFAZOLIN 1 G: 1 INJECTION, POWDER, FOR SOLUTION INTRAMUSCULAR; INTRAVENOUS at 17:26

## 2019-05-07 RX ADMIN — PANTOPRAZOLE SODIUM 40 MG: 40 INJECTION, POWDER, FOR SOLUTION INTRAVENOUS at 20:51

## 2019-05-07 RX ADMIN — CARBOXYMETHYLCELLULOSE SODIUM 1 DROP: 10 GEL OPHTHALMIC at 13:22

## 2019-05-07 RX ADMIN — IPRATROPIUM BROMIDE AND ALBUTEROL SULFATE 1 AMPULE: .5; 3 SOLUTION RESPIRATORY (INHALATION) at 07:44

## 2019-05-07 ASSESSMENT — PULMONARY FUNCTION TESTS
PIF_VALUE: 23
PIF_VALUE: 20
PIF_VALUE: 20
PIF_VALUE: 22
PIF_VALUE: 19
PIF_VALUE: 19
PIF_VALUE: 22
PIF_VALUE: 20
PIF_VALUE: 22
PIF_VALUE: 20
PIF_VALUE: 22
PIF_VALUE: 20
PIF_VALUE: 21
PIF_VALUE: 22
PIF_VALUE: 21
PIF_VALUE: 31
PIF_VALUE: 22
PIF_VALUE: 15
PIF_VALUE: 21
PIF_VALUE: 22
PIF_VALUE: 22
PIF_VALUE: 14
PIF_VALUE: 22
PIF_VALUE: 23
PIF_VALUE: 23
PIF_VALUE: 18
PIF_VALUE: 21
PIF_VALUE: 20
PIF_VALUE: 20
PIF_VALUE: 18
PIF_VALUE: 22

## 2019-05-07 ASSESSMENT — PAIN SCALES - GENERAL
PAINLEVEL_OUTOF10: 0

## 2019-05-07 NOTE — PROGRESS NOTES
MDR complete, addressed platelets Dr. Nanette Brantley said to hold heparin at this time, keeping patient in SBT mode on ventilator, prepping patient for bronchoscopy, and placed call to Dr. Kurt muñiz to address stopping IV fluids.  Sam Dominguez RN

## 2019-05-07 NOTE — PLAN OF CARE
Taught patient's family and patient about need to use the call light for assistance if needed, bed in lowest position, wheels locked, call light within family members reach, family verbalized understanding.

## 2019-05-07 NOTE — PROGRESS NOTES
Sent perfect serve note to [address] or Facility: [facility] From: Dandre Pike RE: miri marc RM: [id number] Restraint renewal order. Thanks Need Callback: NO CALLBACK REQ CCU FYI  Read 12:19 am       Awaiting new orders.  Will continue to monitor

## 2019-05-07 NOTE — PROGRESS NOTES
MCV 83.8 83.0  --   --  84.3    154  --   --  101*     Recent Labs     05/05/19  0443 05/06/19  0350 05/07/19  0415    146* 141   K 3.3* 3.3* 3.7    113* 106   CO2 18* 22 22   GLUCOSE 166* 152* 252*   PHOS 3.0 3.0 3.2   MG 1.90 1.70* 1.90   BUN 37* 37* 33*   CREATININE 1.4* 1.2 1.2   LABGLOM 37* 45* 45*   GFRAA 45* 54* 54*           Assessment/Plan:  Ms. Evelyn Moser is a 81year old female with PMHx of CAD, DM, HTN and seizures who presents after a cardiac arrest.     Acute Kidney Injury.  - Etiology: ATN in the setting of cardiac arrest/sepsis. - Data: Last serum creatinine in 07/2016 was <0.5mg/dL. - Urinalysis with blood and protein. - Clinical: Scr improving and stable, now down to 1.2, following ATN recovery pattern      Severe Anion Gap Metabolic Acidosis. - Likely from DKA and lactic acidosis. No toxic alcohol ingestion.  - Methanol, Ethylene glycol negative  - Resolved      Anemia.  - Had coffee-ground emesis on admission.  - Plans per GI.     S/P Cardiac Arrest.  - Plans per Cardiology      Hypophosphatemia  - Prn IV phos replacement today     Hypokalemia  - Improved with replacement.      Multiple CVAs  - MRI brain 5/3, neurology seeing    Hypernatremia.  - Improved with hypotonic IVF. - Will D/C IVF now especially with chest xray findings of pulmonary edema. Please do not hesitate to contact me at [phone] if with questions. Thank you!     Mitchell Osuna MD  5/7/2019  [facility]

## 2019-05-07 NOTE — PROGRESS NOTES
Handoff report given to Deshaun Schwartz RN. Patient skin assessed, and lines/drains verified.  Family updated, patient in bed, bed in lowest position, wheels locked, signing off care, Alyx Bryant RN

## 2019-05-07 NOTE — CARE COORDINATION
Chart review completed. Patient remains in ICU level of care on vent. [facility]. Should patient meet criteria and MD feel appropriate writer will discuss with family.   Irene Diamond RN

## 2019-05-07 NOTE — PLAN OF CARE
Patient is at high risk for falls. See ROMERO score. Bed in lowest position, side rails up x 4, bed alarm on. Patient remains free from injury. Will continue fall prevention strategies. Pt at risk for skin breakdown. See Robi score. Pt remains on bedrest. Unable to reposition self in bed. Heels elevated off bed. Sacral heart mepilex intact to protect,  site inspected and intact underneath. Will continue to turn and reposition patient every two hours and as needed. Will continue to keep patient clean and dry, applying skin care cream as needed. Pillows used for repositioning q2hs. Will continue to monitor and assess for skin breakdown.

## 2019-05-07 NOTE — PROGRESS NOTES
Assessment complete, patient currently on the ventilator, following some commands such as squeezing hands, and raising feet, patient currently in bed, bed in lowest position, wheels locked, family present and taught upon need to call RN if any needs were to arise, Lisa Acosta RN

## 2019-05-07 NOTE — PROGRESS NOTES
INPATIENT PULMONARY CRITICAL CARE PROGRESS NOTE      Reason for visit     assistance with critical care management        SUBJECTIVE:  Patient continues to be critically ill on mechanical vent support, patient has moderate respiratory secretions in the endotracheal tube, patient when seen was on no sedation, patient has been on 30% oxygen when seen on the ventilator, patient has a T-max of 100°F, patient is blood sugars are not controlled, patient has normal sinus rhythm on the monitor, patient has had adequate urine output, patient's cumulative fluid balance is +12.5 L, no other pertinent review of system of concern     Physical Exam:  Blood pressure 133/76, pulse 101, temperature 99 °F (37.2 °C), temperature source Core, resp. rate (!) 0, height 5' 1\" (1.549 m), weight 141 lb 1.5 oz (64 kg), SpO2 100 %, not currently breastfeeding. ' RR-26  Constitutional:  No acute distress. on mechanical ventilatory support  HENT:  ETT (+). No thyromegaly. Eyes:  Conjunctivae are normal. Pupils equal, round, and reactive to light. No scleral icterus. Neck: . No tracheal deviation present. No obvious thyroid mass. Cardiovascular: Normal rate, regular rhythm, normal heart sounds. No right ventricular heave. No lower extremity edema. Pulmonary/Chest: No wheezes. Scattered rales. Chest wall is not dull to percussion. No accessory muscle usage or stridor. decreased BSI    Abdominal: Soft. Bowel sounds present. No distension or hernia. No tenderness. Musculoskeletal: No cyanosis. No clubbing. No obvious joint deformity. Lymphadenopathy: No cervical or supraclavicular adenopathy. Skin: Skin is warm and dry. No rash or nodules on the exposed extremities.   Neurologic: Intubated but communicative to some extent to simple commands         Results:  CBC:   Recent Labs     05/04/19  0409  05/05/19  0023 05/05/19  0443 05/06/19  0350 05/06/19  0359   WBC 12.3*  --   --  17.7* 17.9*  --    HGB 7.5*   < > 9.7* 9.4* 9.1* 8.6* HCT 21.4*   < > 28.2* 27.6* 27.1*  --    MCV 80.6  --   --  83.8 83.0  --      --   --  220 154  --     < > = values in this interval not displayed. BMP:   Recent Labs     05/04/19  0409 05/04/19  1450 05/05/19  0443 05/06/19  0350    141 142 146*   K 2.9* 3.6 3.3* 3.3*    108 110 113*   CO2 16* 16* 18* 22   PHOS 3.2  --  3.0 3.0   BUN 42* 39* 37* 37*   CREATININE 1.7* 1.5* 1.4* 1.2     LIVER PROFILE:   Recent Labs     05/06/19  0350   AST 84*   ALT 40   BILIDIR 1.4*   BILITOT 1.7*   ALKPHOS 1,199*       Imaging:  I have reviewed radiology images personally. XR ABDOMEN (KUB) (SINGLE AP VIEW)   Final Result   Nasogastric tube in good position. Persistent left lower lobe airspace disease         US GALLBLADDER RUQ   Final Result   Smaller free fluid seen in the right upper quadrant in the region of the   gallbladder. The gallbladder appears unremarkable. No gallstones. XR CHEST PORTABLE   Final Result   1. No significant change. MRI BRAIN WO CONTRAST   Final Result   1. Acute/early subacute infarction involving the left hippocampus. Additional punctate infarctions within the frontal lobes and posterior   temporal lobes bilaterally. This raises the possibility for thromboembolic   phenomenon from a central source. No associated hemorrhage. 2. Diffuse parenchymal volume loss and sequela of chronic microvascular   ischemic changes. The findings were sent to the Radiology Results [address] at 8:34   am on 5/5/2019 to be communicated to a licensed caregiver. XR CHEST PORTABLE   Final Result   Bilateral lower lobe airspace disease, left greater than right, increased   compared to prior         XR CHEST PORTABLE   Final Result   Stable appearance of the chest with dense opacification in the left mid and   lower lung zone. XR CHEST PORTABLE   Final Result   1.  No significant interval change in the left-sided opacity reflecting (L)        Chloride Latest Ref Range: 99 - 110 mmol/L  113 (H)        CO2 Latest Ref Range: 21 - 32 mmol/L  22        BUN Latest Ref Range: 7 - 20 mg/dL  37 (H)        Creatinine Latest Ref Range: 0.6 - 1.2 mg/dL  1.2        Anion Gap Latest Ref Range: 3 - 16   11        Calcium, Ion Latest Ref Range: 1.12 - 1.32 mmol/L   1.06 (L)       GFR Non- Latest Ref Range: >60   45 (A)        GFR  Latest Ref Range: >60   54 (A)        Magnesium Latest Ref Range: 1.80 - 2.40 mg/dL  1.70 (L)        Lactic Acid Latest Ref Range: 0.4 - 2.0 mmol/L  1.1        Lactate, ser/plas Latest Ref Range: 0.40 - 2.00 mmol/L   0.63       Glucose Latest Ref Range: 70 - 99 mg/dL  152 (H)        POC Glucose Latest Ref Range: 70 - 99 mg/dl 153 (H)  153 (H) 144 (H) 100 (H) 161 (H) 211 (H)   Calcium Latest Ref Range: 8.3 - 10.6 mg/dL  7.1 (L)        Phosphorus Latest Ref Range: 2.5 - 4.9 mg/dL  3.0        Total Protein Latest Ref Range: 6.4 - 8.2 g/dL  5.7 (L)        POC Sodium Latest Ref Range: 136 - 145 mmol/L   151 (H)       POC Potassium Latest Ref Range: 3.5 - 5.1 mmol/L   3.1 (L)       POC Hematocrit Latest Ref Range: 36.0 - 48.0 %   25.0 (L)       Albumin Latest Ref Range: 3.4 - 5.0 g/dL  1.7 (L)        Alk Phos Latest Ref Range: 40 - 129 U/L  1,199 (H)        ALT Latest Ref Range: 10 - 40 U/L  40        Ammonia Latest Ref Range: 11 - 51 umol/L  24        AST Latest Ref Range: 15 - 37 U/L  84 (H)        Bilirubin Latest Ref Range: 0.0 - 1.0 mg/dL  1.7 (H)        Bilirubin, Direct Latest Ref Range: 0.0 - 0.3 mg/dL  1.4 (H)        Bilirubin, Indirect Latest Ref Range: 0.0 - 1.0 mg/dL  0.3          Gram Stain Result 05/01/2019 11:11 AM - [facility]   4+ WBC's (Polymorphonuclear)   1+ RBC's   2+ Budding yeast   3+ Gram positive cocci    Organism Abnormal  05/01/2019 11:11 AM [address] [facility]   Traci albicans    CULTURE, RESPIRATORY 05/01/2019 11:11 AM [address] [facility]   10,000-100,000 CFU/mL   No further workup    Organism Abnormal  05/01/2019 11:11 AM [facility]   Staphylococcus aureus    CULTURE, RESPIRATORY 05/01/2019 11:11 AM [facility]   >100,000 CFU/mL   PBP2= Negative    Testing Performed By     Lab - Abbreviation Name Director Address Valid Date Range   19-[facility] Herbert Hodge M.D., Ph.D. [id number] [facility] [address] 08/30/17 0817-Present   25-- Yanet Wade [address]  [facility] [place] [address] 08/30/17 0807-Present   Narrative   Performed by: [facility]   ORDER#: [id number]                          ORDERED BY: Delmi Phipps  SOURCE: BAL Quantitative Count LINGULA     COLLECTED:  05/01/19 11:11  ANTIBIOTICS AT [place]. :                      RECEIVED :  05/01/19 13:05  Performed at:  [address] [address]   Phone [phone]   Susceptibility     Staphylococcus aureus (2)     Antibiotic Interpretation VENKATA Status    ceFAZolin Sensitive <=4 mcg/mL     ciprofloxacin Sensitive <=1 mcg/mL     clindamycin Sensitive <=0.5 mcg/mL     erythromycin Sensitive <=0.5 mcg/mL     oxacillin Sensitive <=0.25 mcg/mL     tetracycline Sensitive <=4 mcg/mL     trimethoprim-sulfamethoxazole Sensitive <=0.5/9.5 mcg/mL       Results for Tricia Veronica (MRN [id number]) as of 5/6/2019 20:17   Ref.  Range 5/2/2019 04:06 5/2/2019 16:12 5/3/2019 04:00 5/4/2019 04:10 5/5/2019 04:49 5/6/2019 03:59   Hemoglobin, Art, Extended Latest Ref Range: 12.0 - 16.0 g/dL 9.2 (L)  8.3 (L) 8.0 (L) 10.5 (L)    pH, Arterial Latest Ref Range: 7.350 - 7.450  7.484 (H)  7.488 (H) 7.482 (H) 7.508 (H) 7.499 (H)   pCO2, Arterial Latest Ref Range: 35.0 - 45.0 mm Hg 26.5 (L)  24.2 (L) 22.3 (L) 21.9 (L) 25.7 (L)   pO2, Arterial Latest Ref Range: 75.0 - 108.0 mm Hg 157.2 (H)  87.4 137.2 (H) 106.2 86.2   HCO3, Arterial Latest Ref Range: 21.0 - 29.0 mmol/L 19.5 (L)  17.9 (L) 16.3 (L) 17.0 (L) 20.0 (L)   TCO2 (calc), Art Latest Ref Range: Not Established mmol/L 20.3  18.7 17.0 17.7 21   Base Excess, Arterial Latest Ref Range: -3 - 3  -3.1 (L)  -4.5 (L) -6.1 (L) -4.6 (L) -3   O2 Sat, Arterial Latest Ref Range: 93 - 100 % 98.7  96.8 98.4 97.5 98   O2 Content, Arterial Latest Ref Range: Not Established mL/dL 13  11 11 15    Methemoglobin, Arterial Latest Ref Range: <1.5 % 0.4  0.7 1.1 0.4    Carboxyhgb, Arterial Latest Ref Range: 0.0 - 1.5 % 0.3  0.1 0.3 0.2    Sample Type Unknown  ART    ART         Assessment:  Active Problems:    Acute renal failure (ARF) (HCC)    DKA (diabetic ketoacidoses) ([facility])    Acidosis    Cardiac arrest (HCC)    DKA, type 2, not at goal [facility])    Acute respiratory failure ([facility])    Prolonged Q-T interval on ECG    Hypokalemia    Elevated troponin    Acute encephalopathy    Arterial ischemic stroke, ICA, left, acute (HCC)    Pneumonia due to organism    Staphylococcus aureus pneumonia ([facility])  Resolved Problems:    * No resolved hospital problems.  *          Plan:   · Ventilatory support to keep saturation between 90-94%  · Ventilator settings and waveforms are reviewed  · Ventilatory changes made  · Patient was tried on CPAP with pressure support but failed  · IV sedation, if required, to keep patient ventilator synchrony  · IV Unasyn to continue  · Bronchodilators  · Will repeat an x-ray chest and if the x-ray chest is not improving we'll consider a repeat bronchoscopy for therapeutic purposes  · Patient has a +12.9 L positive as given a fluid balance-consider diuresis if deemed appropriate as her renal function has also improved  · Fluid management as per nephrology  · Neurochecks  · Monitor hemodynamics closely  · Increased Lantus insulin 24 units at nighttime-to be reassessed as per blood glucose monitoring  · Blood glucose monitoring with slight scale insulin  · Monitor input and output and BMP  · Correct

## 2019-05-07 NOTE — PROGRESS NOTES
05/07/19 0337   Vent Information   Vent Type 840   Vent Mode AC/VC   Vt Ordered 400 mL   Rate Set 15 bmp   Peak Flow 65 L/min   Pressure Support 0 cmH20   FiO2  30 %   Sensitivity 3   PEEP/CPAP 5   Cuff Pressure (cm H2O) 30 cm H2O   Humidification Source HME   Humidification Temp 36   Circuit Condensation Drained   Vent Patient Data   Peak Inspiratory Pressure 20 cmH2O   Mean Airway Pressure 13 cmH20   Rate Measured 30 br/min   Vt Exhaled 559 mL   Minute Volume 12.7 Liters   I:E Ratio 1:3.20   Spontaneous Breathing Trial (SBT) RT Doc   Pulse 94   Breath Sounds   Right Upper Lobe Rhonchi   Right Middle Lobe Rhonchi   Right Lower Lobe Rhonchi   Left Upper Lobe Rhonchi   Left Lower Lobe Rhonchi   Additional Respiratory  Assessments   Resp 14   End Tidal CO2 24 (%)   Alarm Settings   High Pressure Alarm 45 cmH2O   Low Minute Volume Alarm 3 L/min   Apnea (secs) 20 secs   High Respiratory Rate 40 br/min   Low Exhaled Vt  300 mL   ETT (adult)   Placement Date/Time: 04/29/19 1340   Preoxygenation: Yes  Type: Cuffed  Tube Size: 8 mm  Placement Verified By[de-identified] Capnometry  Secured at: 23 cm  Measured From: Lips  Cuff Pressure: 30 cm H2O   Secured at 23 cm   Measured From Lips   ET Placement Left   Secured By Commercial tube saavedra   Site Condition Dry

## 2019-05-07 NOTE — PROGRESS NOTES
Margret Sweet  Neurology Follow-up  [facility] Neurology    Date of Service: 5/7/2019    Subjective:   CC: Follow up today regarding: acute encephalopathy    Events noted. Chart and lab reviewed. The patient looks better today. She is more awake and alert. Can follow directions. Still intubated. Discussed with family. Other review of system was noted.       ROS : A 10-12 system review could not be obtained due to intubation       Past Medical History:   Diagnosis Date    Acute renal failure (ARF) ([facility] 8/20/2015    Asthma     CAD (coronary artery disease)     MI in 2013    Diabetes mellitus ([facility]     Hypertension     Pneumonia     Not sure when    Seizures ([facility]     She goes numb, doctor told her they are seizures    Unspecified cerebral artery occlusion with cerebral infarction     Pt reports having a lot of mini-strokes; first one was 2002; last one was a couple years ago     Current Facility-Administered Medications   Medication Dose Route Frequency Provider Last Rate Last Dose    saccharomyces boulardii (FLORASTOR) capsule 250 mg  250 mg Oral BID Poppy Vasquez MD   250 mg at 05/07/19 0923    ceFAZolin (ANCEF) 1 g in dextrose 5 % 50 mL IVPB (mini-bag)  1 g Intravenous Q8H Poppy Vasquez MD   Stopped at 05/07/19 0954    atorvastatin (LIPITOR) tablet 40 mg  40 mg Oral Nightly Shabbir Jones MD   40 mg at 05/06/19 2007    aspirin tablet 325 mg  325 mg Oral Daily Shabbir Jones MD   325 mg at 05/07/19 0759    insulin glargine (LANTUS) injection vial 25 Units  25 Units Subcutaneous Daily Poppy Vasquez MD   25 Units at 05/07/19 0806    magnesium sulfate 1 g in dextrose 5% 100 mL IVPB  1 g Intravenous PRN Gunjan Willis MD   Stopped at 05/03/19 0852    potassium chloride 20 mEq/50 mL IVPB (Central Line)  20 mEq Intravenous PRN Gunjan Willis MD 50 mL/hr at 05/06/19 0733 20 mEq at 05/06/19 0733    insulin lispro (HUMALOG) injection vial 0-12 Units  0-12 Units Subcutaneous Q4H Danica Domingo DAMON Marcum MD   4 Units at 05/07/19 0800    miconazole (MICOTIN) 2 % powder   Topical BID Marjorie Barnes MD        ipratropium-albuterol (DUONEB) nebulizer solution 1 ampule  1 ampule Inhalation Q4H Smooth Che MD   1 ampule at 05/07/19 1143    acetaminophen (TYLENOL) 160 MG/5ML solution 650 mg  650 mg Oral Q4H PRN Smooth Che MD   650 mg at 05/01/19 1702    carboxymethylcellulose PF (THERATEARS) 1 % ophthalmic gel 1 drop  1 drop Both Eyes TID Smooth Che MD   1 drop at 05/07/19 0800    chlorhexidine (PERIDEX) 0.12 % solution 15 mL  15 mL Mouth/Throat BID Smooth Che MD   15 mL at 05/07/19 0759    pantoprazole (PROTONIX) injection 40 mg  40 mg Intravenous BID Smooth Che MD   40 mg at 05/07/19 0759    dextrose 50 % solution 12.5 g  12.5 g Intravenous PRN Smooth Che MD   12.5 g at 05/03/19 1649    sodium chloride flush 0.9 % injection 10 mL  10 mL Intravenous 2 times per day Suzie Guerin MD   10 mL at 05/06/19 2008    sodium chloride flush 0.9 % injection 10 mL  10 mL Intravenous PRN Suzie Guerin MD        magnesium hydroxide (MILK OF MAGNESIA) 400 MG/5ML suspension 30 mL  30 mL Oral Daily PRN Suzie Guerin MD        acetaminophen (TYLENOL) suppository 650 mg  650 mg Rectal Q4H PRN Suzie Guerin MD   650 mg at 04/30/19 0048     Allergies   Allergen Reactions    Aspirin Nausea And Vomiting     family history includes Cancer in her mother and sister; Diabetes in her father; Heart Disease in her father; High Blood Pressure in her father; High Cholesterol in her father. reports that she has never smoked. She has never used smokeless tobacco. She reports that she does not drink alcohol or use drugs.          Objective:  Constitutional:   Vitals:    05/07/19 0900 05/07/19 1000 05/07/19 1143 05/07/19 1144   BP: (!) 105/58 125/65     Pulse: 98 100  97   Resp: 28 (!) 36 17 24   Temp:       TempSrc:       SpO2: 99% 100% 100% 100%   Weight:       Height:         General appearance: intubated  Eye: No icterus   Neck: supple  Cardiovascular: No lower leg edema with good pulsation. Mental Status: She is awake and alert. She can follow direction to squeeze my hands. Cranial Nerves:   Pupil: RRR  No gaze preference  OCR: present  Corneal: yes  Cough: yes  Gag: yes  Spontaneous breathing: yes  II:  Pupils: equal, round, reactive to light  III,IV,VI: No gaze preference. EOMI  V: Facial sensation: NT due to intubation  VII: Facial symmetry: symmetric   Musculoskeletal: Can move her arms and legs spontaneously. Symmetric. No focal weakness. Tone: normal  Reflexes: diminished but symmetric   Planters: mute  Coordination: NT due to intubation. Sensation and Gait/Posture: NT due to intubation              Data:  LABS:   Lab Results   Component Value Date     05/07/2019    K 3.7 05/07/2019    K 5.2 04/30/2019     05/07/2019    CO2 22 05/07/2019    BUN 33 05/07/2019    CREATININE 1.2 05/07/2019    GFRAA 54 05/07/2019    LABGLOM 45 05/07/2019    GLUCOSE 252 05/07/2019    PHOS 3.2 05/07/2019    MG 1.90 05/07/2019    CALCIUM 7.0 05/07/2019     Lab Results   Component Value Date    WBC 15.3 05/07/2019    RBC 3.00 05/07/2019    HGB 8.5 05/07/2019    HCT 25.3 05/07/2019    MCV 84.3 05/07/2019    RDW 15.0 05/07/2019     05/07/2019     Lab Results   Component Value Date    INR 1.25 (H) 04/29/2019    PROTIME 14.2 (H) 04/29/2019       Neuroimaging and/or  labs reviewed by me and DW her family    Impression:  Acute encephalopathy, severe. Likely multifactorial.  Possible acute metabolic encephalopathy from DKA and hypoxic- ischemic insult. New ischemic bilateral hemispheric strokes affecting more left temporal region. Possible embolic.    DKA  Diabetes, poorly controlled  Acute respiratory failure with hypoxemia  Acute kidney injury  Pneumonia  GIB   The same    Recommendation  Continue current supportive care and spontaneous breathing trials  Aspiration precautions  Limit any sedation  Aspirin for stroke prevention  Statin  Insulin sliding scale and BS monitor  Tele   Blood pressure monitor  abx and follow CBC  Discussed with her family  Will follow after she is exubated. Fallon Pedersen MD   [phone]      This dictation was generated by voice recognition computer software. Although all attempts are made to edit the dictation for accuracy, there may be errors in the transcription that are not intended.

## 2019-05-07 NOTE — PROGRESS NOTES
Hospitalist Progress Note      PCP: Mayo Chin PA-C    Date of Admission: 4/29/2019    Chief Complaint: pt became unresponsive    Hospital Course: Reviewed H and P    Subjective:   Patient is up in bed, not in distress. Patient is having nonpurposeful activity appropriate lower extremity, does not follow command. No new event overnight noted. Medications:  Reviewed    Infusion Medications     Scheduled Medications    saccharomyces boulardii  250 mg Oral BID    ceFAZolin  1 g Intravenous Q8H    atorvastatin  40 mg Oral Nightly    aspirin  325 mg Oral Daily    insulin glargine  25 Units Subcutaneous Daily    insulin lispro  0-12 Units Subcutaneous Q4H    miconazole   Topical BID    ipratropium-albuterol  1 ampule Inhalation Q4H    carboxymethylcellulose PF  1 drop Both Eyes TID    chlorhexidine  15 mL Mouth/Throat BID    pantoprazole  40 mg Intravenous BID    sodium chloride flush  10 mL Intravenous 2 times per day     PRN Meds: magnesium sulfate, potassium chloride, acetaminophen, dextrose, sodium chloride flush, magnesium hydroxide, acetaminophen      Intake/Output Summary (Last 24 hours) at 5/7/2019 1247  Last data filed at 5/7/2019 1020  Gross per 24 hour   Intake 5164 ml   Output 3010 ml   Net 2154 ml       Physical Exam Performed:    /65   Pulse 97   Temp 98.6 °F (37 °C) (Core)   Resp 24   Ht 5' 1\" (1.549 m)   Wt 138 lb 14.2 oz (63 kg)   SpO2 100%   BMI 26.24 kg/m²     General appearance: Intubated and sedated. HEENT: Conjunctivae/corneas clear. Neck: Supple  Respiratory: Vent sounds  Cardiovascular: tachycardic  Abdomen: Soft, non-tender, non-distended with normal bowel sounds. Musculoskeletal: No edema. Skin: Skin color, texture, turgor normal.  No rashes or lesions.   Neurologic:  Nonfocal  Capillary Refill: Brisk,< 3 seconds   Peripheral Pulses: +2 palpable, equal bilaterally       Labs:   Recent Labs     05/05/19  0443 05/06/19  0350 05/06/19  0359 Result   Stable appearance of the chest with dense opacification in the left mid and   lower lung zone. XR CHEST PORTABLE   Final Result   1. No significant interval change in the left-sided opacity reflecting   pneumonia better characterized on the CT of the thorax from 04/29/2019.   2. Support devices as described above. XR CHEST PORTABLE   Final Result   Catheter in good position. No postprocedure pneumothorax. XR CHEST PORTABLE   Final Result   Supportive devices are stable. Persisting consolidations in the left lung base, consistent with pneumonia. CT CHEST ABDOMEN PELVIS WO CONTRAST   Final Result   Dense consolidation within the inferior aspect of left upper lobe and   throughout the left lower lobe, most compatible with pneumonia and/or   aspiration. That should be followed to resolution, especially given the   nodular morphology within portions of the consolidation. Diffuse mural thickening of the esophagus. Correlate with clinical evidence   of esophagitis. The tip of the right femoral venous catheter is malpositioned within a sacral   vein. That should be repositioned for more optimal placement. CT CERVICAL SPINE WO CONTRAST   Final Result   Multilevel degenerative changes with no acute abnormality of the cervical   spine. CT HEAD WO CONTRAST   Final Result   Motion degraded study with no acute intracranial abnormality. XR CHEST PORTABLE   Final Result   Supportive tubing projects in normal position. Left basilar airspace disease, pneumonia versus aspiration pneumonitis. There may be a component of pleural effusion. XR CHEST PORTABLE   Final Result   Atelectasis at the left lung base. Question of small left pleural effusion.                  Assessment/Plan:    Active Hospital Problems    Diagnosis Date Noted    Acute renal failure (ARF) ([facility] [N17.9] 08/20/2015     Priority: High     Class: Acute    Staphylococcus aureus pneumonia [facility]) [J15.211] 05/06/2019    Altered mental status [R41.82]     Non-traumatic rhabdomyolysis [M62.82]     Acute encephalopathy [G93.40]     Arterial ischemic stroke, ICA, left, acute ([facility] [I63.232]     Pneumonia due to organism [J18.9]     Acute respiratory failure (HCC) [J96.00]     Prolonged Q-T interval on ECG [R94.31]     Hypokalemia [E87.6]     Elevated troponin [R74.8]     DKA (diabetic ketoacidoses) ([facility] [E13.10] 04/29/2019    Acidosis [E87.2] 04/29/2019    Cardiac arrest ([facility] [I46.9] 04/29/2019    DKA, type 2, not at goal [facility]) [E11.10] 04/29/2019     80 yo female with h/o CAD, DM2, HTN, depression admitted after being found unresponsive with acute respiratory failure due to pneumonia, DKA, metabolic encephalopathy, LOPEZ, metabolic acidosis, acute GI bleed. Acute respiratory failure secondary to aspiration pneumonia and associated septic shock- POA. Required intubation and sedation. Initially on Merrem/Linezolid and changed to unasyn. Abx started 4/30. Blood Cx neg thus far. Pulm following. Bronch 5/1/19, 5/2/19 - necrotizing features of PNA. Respiratory culture with staph. Awaiting neuro input for CNS status evaluation. MRI of brain done -showing multiple infarct area bilaterally raising possibility of thromboembolic infarcts. Acute multiple bilateral  thromboembolic infarcts, noted an MRI, source unknown, s/p  SERVANDO- unremarkable, continue aspirin and statin, monitor. DKA - pt without diabetes medications for weeks if not months. Unsure of BS at home. Last A1c on record is 5.3 in 2015. New A1c 16. Initially on insulin gtt and transitioned to lantus daily, SSI. Monitor lantus dose with presumed CKD as having some hypoglycemia due to stacking. Glucose on arrival 916 now controlled. Started on TF. Clinically remained stable. Anion gap Metabolic acidosis and LOPEZ - secondary to above. Following BMPs. Nephrology following. Cr 2.0. IVF. Likely ATN. Slowly improving. No need for HD at this time. Possible cardiac arrest? - troponin elevated but more likely due to demand and ischemia. Follow trop. Cardiology following. ECHO 55%, grade 1 DD, mild to mod AR. EP consulted for prolonged Qtc. Hematemesis and anemia- on admission. PPI. Hbg stable. GI consult. Conservative management at this time. Hypotension - likely due to dehydration, infection, and severe acidosis - remains on pressors. Weaning as tolerates. Vaginal candidiasis - fluconazole x 3 doses. DVT Prophylaxis: heparin  Diet: DIET TUBE FEED CONTINUOUS/CYCLIC NPO; Diabetic 1.5 (Glucerna 1.5);  Nasogastric; Continuous; 25; 45; 20  Code Status: Full Code    PT/OT Eval Status: on hold    Dispo - ICU care    Destinee Green MD

## 2019-05-07 NOTE — PROGRESS NOTES
05/07/19 1946   Vent Information   Vent Type 840   Vent Mode AC/VC   Vt Ordered 400 mL   Rate Set 15 bmp   Peak Flow 65 L/min   Pressure Support 0 cmH20   FiO2  30 %   Sensitivity 3   PEEP/CPAP 5   Cuff Pressure (cm H2O) 30 cm H2O   Humidification Source Heated wire   Humidification Temp Measured 36   Circuit Condensation Drained   Vent Patient Data   Peak Inspiratory Pressure 20 cmH2O   Mean Airway Pressure 11 cmH20   Rate Measured 28 br/min   Vt Exhaled 423 mL   Minute Volume 11.8 Liters   I:E Ratio 1:2.40   Plateau Pressure 16 [id number]   Static Compliance 38 mL/cmH2O   Dynamic Compliance 28 mL/cmH2O   Cough/Sputum   Sputum How Obtained None   Cough None   Sputum Amount None   Sputum Color None   Tenacity None   Spontaneous Breathing Trial (SBT) RT Doc   Pulse 96   SpO2 100 %   Breath Sounds   Right Upper Lobe Expiratory Wheezes; Diminished   Right Middle Lobe Diminished   Right Lower Lobe Diminished   Left Upper Lobe Expiratory Wheezes; Diminished   Left Lower Lobe Diminished   Additional Respiratory  Assessments   Resp 18   End Tidal CO2 21 (%)   Position Semi-Umanzor's   Alarm Settings   High Pressure Alarm 45 cmH2O   Low Minute Volume Alarm 3 L/min   High Respiratory Rate 40 br/min   Low Exhaled Vt  300 mL   ETT (adult)   Placement Date/Time: 04/29/19 1340   Preoxygenation: Yes  Type: Cuffed  Tube Size: 8 mm  Placement Verified By[de-identified] Capnometry  Secured at: 23 cm  Measured From: Lips  Cuff Pressure: 30 cm H2O   Secured at 23 cm   Measured From [address] By [employer] tube saavedra   Site Condition Dry   Cuff Pressure 30 cm H2O

## 2019-05-07 NOTE — PROGRESS NOTES
Called Dr. Alma Turner, patient respirations on SBT were sustaining above 40 respirations/minute, Dr. Alma Turner said to place patient back into A/C mode on the ventilator, Francesco Houston RN

## 2019-05-07 NOTE — PLAN OF CARE
Problem: Infection - Central Venous Catheter-Associated Bloodstream Infection:  Goal: Will show no infection signs and symptoms  Will show no infection signs and symptoms   Outcome: Ongoing  Pt w/ RIJ CVC. Both dressing CDI, biopatch in place, no s/s of infection to site at this time. Will con't to monitor CVC site closely. Problem: Falls - Risk of  Goal: Absence of falls  Outcome: Ongoing  Pt remains a fall risk. See Seleta Peel Fall Score. Fall risk bundle in place. Pt bed is in low position with bed alarm on, side rails up, fall risk bracelet and non-skid footwear in use. Will continue to monitor and reassess [place] fall risk. Problem: Risk for Impaired Skin Integrity  Goal: Tissue integrity - skin and mucous membranes  Structural intactness and normal physiological function of skin and  mucous membranes. Outcome: Ongoing  Pt remains at risk for skin breakdown- see Robi score. Pt turned q2h, preventative sacral heart mepilex in place, and heels elevated off bed. Skin is kept clean and dry. Pt has rees and rectal tube in place to prevent skin breakdown. Will continue to assess skin and monitor for any signs of breakdown.        Problem: Infection - Ventilator-Associated Pneumonia:  Goal: Prevent a ventilator associated event (VAE)  Prevent a ventilator associated event (VAE)   Outcome: Ongoing  Pt remains on ventilator. To decrease risk of VAP oral care performed q4 hrs and PRN. Head of bed kept >30 degrees. Hand hygiene performed before and after suctioning. ET tube rotated q2 hrs to prevent skin breakdown.

## 2019-05-07 NOTE — PROGRESS NOTES
Spoke with Dr. Yumiko Romero on the phone, received telephone order with readback to stop IV fluids at this time, and she would address any orders of Lasix, with her morning rounds, Albina Quan RN

## 2019-05-07 NOTE — PROGRESS NOTES
PROGRESS NOTE  S:69 yrs Patient  admitted on 4/29/2019 with DKA, type 2, not at goal [facility]) [E11.10]  DKA, type 2, not at goal [facility]) [E11.10] . Today she remains intubated. She has brown liquid stool per rectal tube. Exam:   Vitals:    05/07/19 1144   BP:    Pulse: 97   Resp: 24   Temp:    SpO2: 100%      General appearance: intubated  HEENT: Oropharynx clear, no lesions  Neck: no carotid bruit, no JVD and supple, symmetrical, trachea midline  Lungs: clear to auscultation bilaterally  Heart: regular rate and rhythm, S1, S2 normal, no murmur, click, rub or gallop  Abdomen: soft, non-tender; bowel sounds normal; no masses,  no organomegaly  Extremities: extremities normal, atraumatic, no cyanosis or edema        Medications: Reviewed    Labs:  CBC:   Recent Labs     05/05/19  0443 05/06/19  0350 05/06/19  0359 05/07/19  0414 05/07/19  0415   WBC 17.7* 17.9*  --   --  15.3*   HGB 9.4* 9.1* 8.6* 8.6* 8.5*   HCT 27.6* 27.1*  --   --  25.3*   MCV 83.8 83.0  --   --  84.3    154  --   --  101*     BMP:   Recent Labs     05/05/19  0443 05/06/19  0350 05/07/19  0415    146* 141   K 3.3* 3.3* 3.7    113* 106   CO2 18* 22 22   PHOS 3.0 3.0 3.2   BUN 37* 37* 33*   CREATININE 1.4* 1.2 1.2     LIVER PROFILE:   Recent Labs     05/06/19  0350   AST 84*   ALT 40   PROT 5.7*   BILIDIR 1.4*   BILITOT 1.7*   ALKPHOS 1,199*       Impression: 81year old female with history of HTN, DM, CVA, asthma, Seizure do, admitted with cardiac arrest and DKA. She was also noted to have coffee ground aspirate per NG but Hgb is stable    Recommendation:  1. Continue supportive care  2. Continue PPI BID  3. Monitor Hgb  4. Observe for signs of bleeding  5. Will consider EGD once stable  6.  Will follows      Raciel Dougherty MD  11:49 AM 5/7/2019

## 2019-05-07 NOTE — PROGRESS NOTES
05/07/19 1144   Vent Information   Vent Type 840   Vent Mode AC/VC   Vt Ordered 400 mL   Rate Set 15 bmp   Peak Flow 65 L/min   Pressure Support 0 cmH20   FiO2  30 %   Sensitivity 3   PEEP/CPAP 5   Humidification Source Heated wire   Humidification Temp 37   Vent Patient Data   Peak Inspiratory Pressure 22 cmH2O   Mean Airway Pressure 12 cmH20   Rate Measured 29 br/min   Vt Exhaled 411 mL   Minute Volume 12.1 Liters   I:E Ratio 1:1.90   Cough/Sputum   Sputum How Obtained Endotracheal;Suctioned   Cough Productive   Sputum Amount Moderate   Sputum Color Creamy; Yellow   Tenacity Thick   Spontaneous Breathing Trial (SBT) RT Doc   Pulse 97   SpO2 100 %   Breath Sounds   Right Upper Lobe Diminished   Right Middle Lobe Diminished   Right Lower Lobe Diminished   Left Upper Lobe Diminished   Left Lower Lobe Diminished   Additional Respiratory  Assessments   Resp 24   End Tidal CO2 19 (%)   Position Semi-Umanzor's   Alarm Settings   High Pressure Alarm 45 cmH2O   Low Minute Volume Alarm 3 L/min   High Respiratory Rate 40 br/min   Low Exhaled Vt  300 mL   Patient Observation   Observations 8 ETT, ambu bag @ bedside   ETT (adult)   Placement Date/Time: 04/29/19 1340   Preoxygenation: Yes  Type: Cuffed  Tube Size: 8 mm  Placement Verified By[de-identified] Capnometry  Secured at: 23 cm  Measured From: Lips  Cuff Pressure: 30 cm H2O   Secured at 23 cm   Measured From Lips   ET Placement Left   Secured By [employer] tube saavedra

## 2019-05-07 NOTE — PROGRESS NOTES
Patient reassessment complete, endoscopy team preparing room for bronchoscopy, continuing to monitor, Blossom Kanner, RN

## 2019-05-07 NOTE — PROGRESS NOTES
05/06/19 2341   Vent Information   Vent Type 840   Vent Mode AC/VC   Vt Ordered 400 mL   Rate Set 15 bmp   Peak Flow 65 L/min   Pressure Support 0 cmH20   FiO2  30 %   Sensitivity 3   PEEP/CPAP 5   Cuff Pressure (cm H2O) 30 cm H2O   Humidification Source Heated wire   Humidification Temp 35   Circuit Condensation Drained   Vent Patient Data   Peak Inspiratory Pressure 18 cmH2O   Mean Airway Pressure 11 cmH20   Rate Measured 34 br/min   Vt Exhaled 419 mL   Minute Volume 14.2 Liters   I:E Ratio 1:1.40   Spontaneous Breathing Trial (SBT) RT Doc   Pulse 95   SpO2 100 %   Breath Sounds   Right Upper Lobe Rhonchi   Right Middle Lobe Rhonchi   Right Lower Lobe Rhonchi   Left Upper Lobe Rhonchi   Left Lower Lobe Rhonchi   Additional Respiratory  Assessments   Resp 21   End Tidal CO2 19 (%)   Alarm Settings   High Pressure Alarm 45 cmH2O   Low Minute Volume Alarm 3 L/min   Apnea (secs) 20 secs   High Respiratory Rate 40 br/min   Low Exhaled Vt  300 mL   ETT (adult)   Placement Date/Time: 04/29/19 1340   Preoxygenation: Yes  Type: Cuffed  Tube Size: 8 mm  Placement Verified By[de-identified] Capnometry  Secured at: 23 cm  Measured From: Lips  Cuff Pressure: 30 cm H2O   Secured at 23 cm   Measured From [address] By [employer] tube saavedra   Site Condition Dry

## 2019-05-07 NOTE — PROGRESS NOTES
05/07/19 0746   Vent Information   Vent Type 840   Vent Mode AC/VC   Vt Ordered 400 mL   Rate Set 15 bmp   Peak Flow 65 L/min   Pressure Support 0 cmH20   FiO2  30 %   Sensitivity 3   PEEP/CPAP 5   Cuff Pressure (cm H2O) 30 cm H2O   Humidification Source Heated wire   Humidification Temp 35.9   Vent Patient Data   Peak Inspiratory Pressure 22 cmH2O   Mean Airway Pressure 12 cmH20   Rate Measured 23 br/min   Vt Exhaled 376 mL   Minute Volume 9.26 Liters   I:E Ratio 1:4.60   Plateau Pressure 19 RET83   Static Compliance 26.86 mL/cmH2O   Dynamic Compliance 22.12 mL/cmH2O   Cough/Sputum   Sputum How Obtained Endotracheal;Suctioned   Cough Productive   Sputum Amount Small   Sputum Color Creamy   Tenacity Thick   Spontaneous Breathing Trial (SBT) RT Doc   Pulse 94   SpO2 100 %   Breath Sounds   Right Upper Lobe Diminished   Right Middle Lobe Diminished   Right Lower Lobe Diminished   Left Upper Lobe Diminished   Left Lower Lobe Diminished   Additional Respiratory  Assessments   Resp 26   End Tidal CO2 19 (%)   Position Semi-Umanzor's   Alarm Settings   High Pressure Alarm 45 cmH2O   Low Minute Volume Alarm 3 L/min   High Respiratory Rate 40 br/min   Low Exhaled Vt  300 mL   Patient Observation   Observations 8 ETT, ambu bag @ bedside   ETT (adult)   Placement Date/Time: 04/29/19 1340   Preoxygenation: Yes  Type: Cuffed  Tube Size: 8 mm  Placement Verified By[de-identified] Capnometry  Secured at: 23 cm  Measured From: Lips  Cuff Pressure: 30 cm H2O   Secured at 23 cm   Measured From [address] By [employer] tube saavedra

## 2019-05-08 ENCOUNTER — APPOINTMENT (OUTPATIENT)
Dept: GENERAL RADIOLOGY | Age: 69
DRG: 853 | End: 2019-05-08
Payer: COMMERCIAL

## 2019-05-08 LAB
ANION GAP SERPL CALCULATED.3IONS-SCNC: 10 MMOL/L (ref 3–16)
BASE EXCESS ARTERIAL: 1 MMOL/L (ref -3–3)
BASE EXCESS ARTERIAL: 2 (ref -3–3)
BASOPHILS ABSOLUTE: 0.1 K/UL (ref 0–0.2)
BASOPHILS RELATIVE PERCENT: 0.4 %
BUN BLDV-MCNC: 29 MG/DL (ref 7–20)
CALCIUM SERPL-MCNC: 7.5 MG/DL (ref 8.3–10.6)
CARBOXYHEMOGLOBIN ARTERIAL: 0.2 % (ref 0–1.5)
CHLORIDE BLD-SCNC: 112 MMOL/L (ref 99–110)
CO2: 24 MMOL/L (ref 21–32)
CREAT SERPL-MCNC: 1.1 MG/DL (ref 0.6–1.2)
EOSINOPHILS ABSOLUTE: 0.1 K/UL (ref 0–0.6)
EOSINOPHILS RELATIVE PERCENT: 0.7 %
GFR AFRICAN AMERICAN: 60
GFR NON-AFRICAN AMERICAN: 50
GLUCOSE BLD-MCNC: 111 MG/DL (ref 70–99)
GLUCOSE BLD-MCNC: 126 MG/DL (ref 70–99)
GLUCOSE BLD-MCNC: 139 MG/DL (ref 70–99)
GLUCOSE BLD-MCNC: 139 MG/DL (ref 70–99)
GLUCOSE BLD-MCNC: 161 MG/DL (ref 70–99)
GLUCOSE BLD-MCNC: 63 MG/DL (ref 70–99)
GLUCOSE BLD-MCNC: 93 MG/DL (ref 70–99)
HCO3 ARTERIAL: 23.6 MMOL/L (ref 21–29)
HCO3 ARTERIAL: 24.5 MMOL/L (ref 21–29)
HCT VFR BLD CALC: 24.9 % (ref 36–48)
HEMOGLOBIN, ART, EXTENDED: 9.6 G/DL (ref 12–16)
HEMOGLOBIN: 8.5 G/DL (ref 12–16)
LYMPHOCYTES ABSOLUTE: 1.8 K/UL (ref 1–5.1)
LYMPHOCYTES RELATIVE PERCENT: 12.2 %
MAGNESIUM: 1.8 MG/DL (ref 1.8–2.4)
MCH RBC QN AUTO: 28.7 PG (ref 26–34)
MCHC RBC AUTO-ENTMCNC: 34.1 G/DL (ref 31–36)
MCV RBC AUTO: 84.3 FL (ref 80–100)
METHEMOGLOBIN ARTERIAL: 0.7 %
MONOCYTES ABSOLUTE: 0.5 K/UL (ref 0–1.3)
MONOCYTES RELATIVE PERCENT: 3.4 %
NEUTROPHILS ABSOLUTE: 12.4 K/UL (ref 1.7–7.7)
NEUTROPHILS RELATIVE PERCENT: 83.3 %
O2 CONTENT ARTERIAL: 13 ML/DL
O2 SAT, ARTERIAL: 95.8 %
O2 SAT, ARTERIAL: 98 % (ref 93–100)
O2 THERAPY: ABNORMAL
PCO2 ARTERIAL: 29.3 MM HG (ref 35–45)
PCO2 ARTERIAL: 30.3 MMHG (ref 35–45)
PDW BLD-RTO: 15 % (ref 12.4–15.4)
PERFORMED ON: ABNORMAL
PERFORMED ON: NORMAL
PH ARTERIAL: 7.51 (ref 7.35–7.45)
PH ARTERIAL: 7.53 (ref 7.35–7.45)
PHOSPHORUS: 3.7 MG/DL (ref 2.5–4.9)
PLATELET # BLD: 114 K/UL (ref 135–450)
PMV BLD AUTO: 8.8 FL (ref 5–10.5)
PO2 ARTERIAL: 76.9 MMHG (ref 75–108)
PO2 ARTERIAL: 89.2 MM HG (ref 75–108)
POC SAMPLE TYPE: ABNORMAL
POTASSIUM SERPL-SCNC: 3.6 MMOL/L (ref 3.5–5.1)
RBC # BLD: 2.96 M/UL (ref 4–5.2)
SODIUM BLD-SCNC: 146 MMOL/L (ref 136–145)
TCO2 ARTERIAL: 24.5 MMOL/L
TCO2 ARTERIAL: 25 MMOL/L
WBC # BLD: 14.9 K/UL (ref 4–11)

## 2019-05-08 PROCEDURE — 2580000003 HC RX 258

## 2019-05-08 PROCEDURE — 6370000000 HC RX 637 (ALT 250 FOR IP): Performed by: INTERNAL MEDICINE

## 2019-05-08 PROCEDURE — 71045 X-RAY EXAM CHEST 1 VIEW: CPT

## 2019-05-08 PROCEDURE — 84100 ASSAY OF PHOSPHORUS: CPT

## 2019-05-08 PROCEDURE — 85025 COMPLETE CBC W/AUTO DIFF WBC: CPT

## 2019-05-08 PROCEDURE — 94640 AIRWAY INHALATION TREATMENT: CPT

## 2019-05-08 PROCEDURE — 2700000000 HC OXYGEN THERAPY PER DAY

## 2019-05-08 PROCEDURE — 2500000003 HC RX 250 WO HCPCS: Performed by: INTERNAL MEDICINE

## 2019-05-08 PROCEDURE — 82947 ASSAY GLUCOSE BLOOD QUANT: CPT

## 2019-05-08 PROCEDURE — 2580000003 HC RX 258: Performed by: INTERNAL MEDICINE

## 2019-05-08 PROCEDURE — 31720 CLEARANCE OF AIRWAYS: CPT

## 2019-05-08 PROCEDURE — 99291 CRITICAL CARE FIRST HOUR: CPT | Performed by: INTERNAL MEDICINE

## 2019-05-08 PROCEDURE — 6370000000 HC RX 637 (ALT 250 FOR IP): Performed by: FAMILY MEDICINE

## 2019-05-08 PROCEDURE — 82803 BLOOD GASES ANY COMBINATION: CPT

## 2019-05-08 PROCEDURE — 94770 HC ETCO2 MONITOR DAILY: CPT

## 2019-05-08 PROCEDURE — 80048 BASIC METABOLIC PNL TOTAL CA: CPT

## 2019-05-08 PROCEDURE — 6360000002 HC RX W HCPCS: Performed by: INTERNAL MEDICINE

## 2019-05-08 PROCEDURE — 2000000000 HC ICU R&B

## 2019-05-08 PROCEDURE — 94003 VENT MGMT INPAT SUBQ DAY: CPT

## 2019-05-08 PROCEDURE — 94761 N-INVAS EAR/PLS OXIMETRY MLT: CPT

## 2019-05-08 PROCEDURE — 83735 ASSAY OF MAGNESIUM: CPT

## 2019-05-08 PROCEDURE — C9113 INJ PANTOPRAZOLE SODIUM, VIA: HCPCS | Performed by: INTERNAL MEDICINE

## 2019-05-08 RX ORDER — FUROSEMIDE 10 MG/ML
20 INJECTION INTRAMUSCULAR; INTRAVENOUS ONCE
Status: COMPLETED | OUTPATIENT
Start: 2019-05-08 | End: 2019-05-08

## 2019-05-08 RX ORDER — SODIUM CHLORIDE 9 MG/ML
INJECTION, SOLUTION INTRAVENOUS
Status: COMPLETED
Start: 2019-05-08 | End: 2019-05-08

## 2019-05-08 RX ADMIN — DEXTROSE MONOHYDRATE 12.5 G: 25 INJECTION, SOLUTION INTRAVENOUS at 17:17

## 2019-05-08 RX ADMIN — Medication 10 ML: at 20:00

## 2019-05-08 RX ADMIN — SODIUM CHLORIDE 0.4 MCG/KG/HR: 9 INJECTION, SOLUTION INTRAVENOUS at 10:09

## 2019-05-08 RX ADMIN — IPRATROPIUM BROMIDE AND ALBUTEROL SULFATE 1 AMPULE: .5; 3 SOLUTION RESPIRATORY (INHALATION) at 23:25

## 2019-05-08 RX ADMIN — Medication 250 MG: at 20:00

## 2019-05-08 RX ADMIN — INSULIN GLARGINE 25 UNITS: 100 INJECTION, SOLUTION SUBCUTANEOUS at 07:38

## 2019-05-08 RX ADMIN — INSULIN LISPRO 2 UNITS: 100 INJECTION, SOLUTION INTRAVENOUS; SUBCUTANEOUS at 01:07

## 2019-05-08 RX ADMIN — Medication 250 MG: at 07:39

## 2019-05-08 RX ADMIN — PANTOPRAZOLE SODIUM 40 MG: 40 INJECTION, POWDER, FOR SOLUTION INTRAVENOUS at 20:01

## 2019-05-08 RX ADMIN — CEFAZOLIN 1 G: 1 INJECTION, POWDER, FOR SOLUTION INTRAMUSCULAR; INTRAVENOUS at 10:09

## 2019-05-08 RX ADMIN — SODIUM CHLORIDE 250 ML: 9 INJECTION, SOLUTION INTRAVENOUS at 10:10

## 2019-05-08 RX ADMIN — ACETAMINOPHEN 650 MG: 650 SOLUTION ORAL at 10:24

## 2019-05-08 RX ADMIN — IPRATROPIUM BROMIDE AND ALBUTEROL SULFATE 1 AMPULE: .5; 3 SOLUTION RESPIRATORY (INHALATION) at 16:19

## 2019-05-08 RX ADMIN — MICONAZOLE NITRATE: 2 POWDER TOPICAL at 20:01

## 2019-05-08 RX ADMIN — FUROSEMIDE 20 MG: 10 INJECTION, SOLUTION INTRAMUSCULAR; INTRAVENOUS at 10:09

## 2019-05-08 RX ADMIN — ATORVASTATIN CALCIUM 40 MG: 40 TABLET, FILM COATED ORAL at 20:00

## 2019-05-08 RX ADMIN — IPRATROPIUM BROMIDE AND ALBUTEROL SULFATE 1 AMPULE: .5; 3 SOLUTION RESPIRATORY (INHALATION) at 03:54

## 2019-05-08 RX ADMIN — MICONAZOLE NITRATE: 2 POWDER TOPICAL at 07:52

## 2019-05-08 RX ADMIN — PANTOPRAZOLE SODIUM 40 MG: 40 INJECTION, POWDER, FOR SOLUTION INTRAVENOUS at 07:39

## 2019-05-08 RX ADMIN — CARBOXYMETHYLCELLULOSE SODIUM 1 DROP: 10 GEL OPHTHALMIC at 07:39

## 2019-05-08 RX ADMIN — ASPIRIN 325 MG: 325 TABLET, COATED ORAL at 07:39

## 2019-05-08 RX ADMIN — Medication 10 ML: at 07:39

## 2019-05-08 RX ADMIN — Medication 15 ML: at 20:01

## 2019-05-08 RX ADMIN — CEFAZOLIN 1 G: 1 INJECTION, POWDER, FOR SOLUTION INTRAMUSCULAR; INTRAVENOUS at 17:17

## 2019-05-08 RX ADMIN — CEFAZOLIN 1 G: 1 INJECTION, POWDER, FOR SOLUTION INTRAMUSCULAR; INTRAVENOUS at 01:42

## 2019-05-08 RX ADMIN — IPRATROPIUM BROMIDE AND ALBUTEROL SULFATE 1 AMPULE: .5; 3 SOLUTION RESPIRATORY (INHALATION) at 19:12

## 2019-05-08 RX ADMIN — CARBOXYMETHYLCELLULOSE SODIUM 1 DROP: 10 GEL OPHTHALMIC at 20:00

## 2019-05-08 RX ADMIN — IPRATROPIUM BROMIDE AND ALBUTEROL SULFATE 1 AMPULE: .5; 3 SOLUTION RESPIRATORY (INHALATION) at 12:18

## 2019-05-08 RX ADMIN — IPRATROPIUM BROMIDE AND ALBUTEROL SULFATE 1 AMPULE: .5; 3 SOLUTION RESPIRATORY (INHALATION) at 08:25

## 2019-05-08 RX ADMIN — Medication 15 ML: at 07:52

## 2019-05-08 ASSESSMENT — PULMONARY FUNCTION TESTS
PIF_VALUE: 26
PIF_VALUE: 23
PIF_VALUE: 25
PIF_VALUE: 30
PIF_VALUE: 22
PIF_VALUE: 23
PIF_VALUE: 25
PIF_VALUE: 24
PIF_VALUE: 25
PIF_VALUE: 21
PIF_VALUE: 28
PIF_VALUE: 45
PIF_VALUE: 25
PIF_VALUE: 22
PIF_VALUE: 26
PIF_VALUE: 25
PIF_VALUE: 20
PIF_VALUE: 23

## 2019-05-08 ASSESSMENT — PAIN SCALES - GENERAL
PAINLEVEL_OUTOF10: 0

## 2019-05-08 NOTE — PROCEDURES
Bronchoscopy note    Patient with acute respiratory failure, pulmonary infiltrates, persistent left lower lobe lung collapse, atelectasis and inability to extubate the patient, given the patient's clinical status and radiology picture it was felt to do a bronchoscopy for diagnostic and therapeutic purposes and for that reason after informed consent, the endoscopy team was requested to come to the bedside, patient has a ASA of 4, patient was given 2 mg of Versed and 25 mg of fentanyl to maintain patient ventilator synchrony with the procedure, after timeout, the bronchoscopy was introduced through the endotracheal tube using a T piece adapter, patient was found to have ET tube in place, patient's entire left tracheobronchial tree was full of thick mucous plugs which were quite tenacious and also patient was having profound erythema of the tracheal bronchial tree epithelial lining, patient also had increased thick mucous plugs in the right lower lobe and right middle lobe bronchi; the bronchoscopy was wedged into the left lower lobe bronchus and BAL was done from the area which was sent for culture; the rest of the tracheobronchial tree was therapeutically aspirated out using Mucomyst and saline; patient tolerated the procedure well and did not have any apparent complications  Patient's further treatment depending on patient's clinical status and the bronchoscopy results    Ace Soria MD

## 2019-05-08 NOTE — PROGRESS NOTES
INPATIENT PULMONARY CRITICAL CARE PROGRESS NOTE      Reason for visit     assistance with critical care management        SUBJECTIVE:  Patient continues to be critically ill on mechanical vent support, patient has moderate respiratory secretions in the endotracheal tube; patient failed CPAP trial yesterday, patient when seen was on no sedation, patient has been on 30% oxygen when seen on the ventilator, patient has a T-max of 99 °F, patient is blood sugars are not controlled but improving, patient has normal sinus rhythm on the monitor, patient has had adequate urine output,, patient was started on IV fluids when nephrology yesterday. His sodium levels patient's cumulative fluid balance is +14.2 L, no other pertinent review of system of concern     Physical Exam:  Blood pressure 125/65, pulse 99, temperature 99 °F (37.2 °C), temperature source Core, resp. rate 25, height 5' 1\" (1.549 m), weight 138 lb 14.2 oz (63 kg), SpO2 99 %, not currently breastfeeding. ' RR-26  Constitutional:  No acute distress. on mechanical ventilatory support  HENT:  ETT (+). No thyromegaly. Eyes:  Conjunctivae are normal. Pupils equal, round, and reactive to light. No scleral icterus. Neck: . No tracheal deviation present. No obvious thyroid mass. Cardiovascular: Normal rate, regular rhythm, normal heart sounds. No right ventricular heave. No lower extremity edema. Pulmonary/Chest: No wheezes. Worsening rales. Chest wall is not dull to percussion. No accessory muscle usage or stridor. decreased BSI    Abdominal: Soft. Bowel sounds present. No distension or hernia. No tenderness. Musculoskeletal: No cyanosis. No clubbing. No obvious joint deformity. Lymphadenopathy: No cervical or supraclavicular adenopathy. Skin: Skin is warm and dry. No rash or nodules on the exposed extremities.   Neurologic: Intubated but communicative to some extent to simple commands         Results:  CBC:   Recent Labs     05/05/19  0443 05/06/19  0350 05/06/19  0359 05/07/19  0414 05/07/19 0415   WBC 17.7* 17.9*  --   --  15.3*   HGB 9.4* 9.1* 8.6* 8.6* 8.5*   HCT 27.6* 27.1*  --   --  25.3*   MCV 83.8 83.0  --   --  84.3    154  --   --  101*     BMP:   Recent Labs     05/05/19  0443 05/06/19  0350 05/07/19 0415    146* 141   K 3.3* 3.3* 3.7    113* 106   CO2 18* 22 22   PHOS 3.0 3.0 3.2   BUN 37* 37* 33*   CREATININE 1.4* 1.2 1.2     LIVER PROFILE:   Recent Labs     05/06/19 0350   AST 84*   ALT 40   BILIDIR 1.4*   BILITOT 1.7*   ALKPHOS 1,199*       Imaging:  I have reviewed radiology images personally. VL DUP CAROTID BILATERAL   Final Result      XR CHEST PORTABLE   Final Result   Worsening pulmonary edema with grossly stable left basilar atelectasis and/or   pneumonia. XR ABDOMEN (KUB) (SINGLE AP VIEW)   Final Result   Nasogastric tube in good position. Persistent left lower lobe airspace disease         US GALLBLADDER RUQ   Final Result   Smaller free fluid seen in the right upper quadrant in the region of the   gallbladder. The gallbladder appears unremarkable. No gallstones. XR CHEST PORTABLE   Final Result   1. No significant change. MRI BRAIN WO CONTRAST   Final Result   1. Acute/early subacute infarction involving the left hippocampus. Additional punctate infarctions within the frontal lobes and posterior   temporal lobes bilaterally. This raises the possibility for thromboembolic   phenomenon from a central source. No associated hemorrhage. 2. Diffuse parenchymal volume loss and sequela of chronic microvascular   ischemic changes. The findings were sent to the Radiology Results [id number] at 8:34   am on 5/5/2019 to be communicated to a licensed caregiver.          XR CHEST PORTABLE   Final Result   Bilateral lower lobe airspace disease, left greater than right, increased   compared to prior         XR CHEST PORTABLE   Final Result   Stable appearance of the chest with dense opacification in the left mid and   lower lung zone. XR CHEST PORTABLE   Final Result   1. No significant interval change in the left-sided opacity reflecting   pneumonia better characterized on the CT of the thorax from 04/29/2019.   2. Support devices as described above. XR CHEST PORTABLE   Final Result   Catheter in good position. No postprocedure pneumothorax. XR CHEST PORTABLE   Final Result   Supportive devices are stable. Persisting consolidations in the left lung base, consistent with pneumonia. CT CHEST ABDOMEN PELVIS WO CONTRAST   Final Result   Dense consolidation within the inferior aspect of left upper lobe and   throughout the left lower lobe, most compatible with pneumonia and/or   aspiration. That should be followed to resolution, especially given the   nodular morphology within portions of the consolidation. Diffuse mural thickening of the esophagus. Correlate with clinical evidence   of esophagitis. The tip of the right femoral venous catheter is malpositioned within a sacral   vein. That should be repositioned for more optimal placement. CT CERVICAL SPINE WO CONTRAST   Final Result   Multilevel degenerative changes with no acute abnormality of the cervical   spine. CT HEAD WO CONTRAST   Final Result   Motion degraded study with no acute intracranial abnormality. XR CHEST PORTABLE   Final Result   Supportive tubing projects in normal position. Left basilar airspace disease, pneumonia versus aspiration pneumonitis. There may be a component of pleural effusion. XR CHEST PORTABLE   Final Result   Atelectasis at the left lung base. Question of small left pleural effusion.              Mri Brain Wo Contrast    Result Date: 5/5/2019  EXAMINATION: MRI OF THE BRAIN WITHOUT CONTRAST,  5/3/2019 3:42 pm TECHNIQUE: Multiplanar multisequence MRI of the brain was performed without the administration of intravenous contrast.  For technical reasons, this is made available for review on 05/05/2019. COMPARISON: Head CT 04/29/2019 HISTORY: ORDERING SYSTEM PROVIDED HISTORY: Encephalopathy TECHNOLOGIST PROVIDED HISTORY: Ordering Physician Provided Reason for Exam: Encephalopathy FINDINGS: INTRACRANIAL STRUCTURES/VENTRICLES: The examination is degraded by patient motion artifact. There is diffusion restriction within the left hippocampus as well as multifocal areas of diffusion restriction within the frontal lobe corona radiata and centrum semiovale bilaterally as well as bilateral posterior temporal lobes. No mass effect or midline shift. No acute intracranial hemorrhage. Diffuse parenchymal volume loss with enlargement of the ventricles and cerebral sulci. Periventricular and subcortical white matter T2/FLAIR hyperintense signal.  The sellar/suprasellar regions appear unremarkable. The normal signal voids within the major intracranial vessels appear maintained. ORBITS: The visualized portion of the orbits demonstrate no acute abnormality. SINUSES: Fluid and opacification within the sphenoid sinus and ethmoid sinuses bilaterally. There is fluid in the mastoid air cells bilaterally as well. BONES/SOFT TISSUES: The bone marrow signal intensity appears normal. The soft tissues demonstrate no acute abnormality. 1. Acute/early subacute infarction involving the left hippocampus. Additional punctate infarctions within the frontal lobes and posterior temporal lobes bilaterally. This raises the possibility for thromboembolic phenomenon from a central source. No associated hemorrhage. 2. Diffuse parenchymal volume loss and sequela of chronic microvascular ischemic changes. The findings were sent to the Radiology Results [id number] at 8:34 am on 5/5/2019 to be communicated to a licensed caregiver.          Gram Stain Result 05/01/2019 11:11 AM [facility]   4+ WBC's (Polymorphonuclear)   1+ RBC's   2+ Budding yeast   3+ Gram positive cocci    Organism Abnormal  05/01/2019 11:11 AM  - [facility]   Candida albicans    CULTURE, RESPIRATORY 05/01/2019 11:11 AM [address] Lab   10,000-100,000 CFU/mL   No further workup    Organism Abnormal  05/01/2019 11:11 AM [address] [facility]   Staphylococcus aureus    CULTURE, RESPIRATORY 05/01/2019 11:11 AM  - [facility]   >100,000 CFU/mL   PBP2= Negative    Testing Performed By     Lab - Abbreviation Name Director Address Valid Date Range   19- - Aretha Jill Goddard M.D., Ph.D. Arjun Gibbs. [facility] [address] 08/30/17 0817-Present   25-- Yanet Wade [address] [place] [address] NAHEED Richards [address] 08/30/17 0807-Present   Narrative   Performed by: [address] [facility]   ORDER#: [id number]                          ORDERED BY: Rolan Bueno  SOURCE: BAL Quantitative Count LINGULA     COLLECTED:  05/01/19 11:11  ANTIBIOTICS AT [place]. :                      RECEIVED :  05/01/19 13:05  Performed at:  [place] [address] [address] [facility] [place] [facility] [address]   Phone [phone]   Susceptibility     Staphylococcus aureus (2)     Antibiotic Interpretation VENKATA Status    ceFAZolin Sensitive <=4 mcg/mL     ciprofloxacin Sensitive <=1 mcg/mL     clindamycin Sensitive <=0.5 mcg/mL     erythromycin Sensitive <=0.5 mcg/mL     oxacillin Sensitive <=0.25 mcg/mL     tetracycline Sensitive <=4 mcg/mL     trimethoprim-sulfamethoxazole Sensitive <=0.5/9.5 mcg/mL       Results for Isis Bia (MRN [id number]) as of 5/7/2019 21:20   Ref.  Range 5/6/2019 03:50 5/6/2019 03:59 5/6/2019 04:01 5/6/2019 08:34 5/6/2019 12:57 5/6/2019 16:54 5/6/2019 20:02 5/6/2019 23:56 5/7/2019 04:14 5/7/2019 04:15   Sodium Latest Ref Range: 136 - 145 mmol/L 146 (H)         141   Potassium Latest Ref Range: 3.5 - 5.1 mmol/L 3.3 (L)         3.7   Chloride mm.       RIGHT KIDNEY: The right kidney is grossly unremarkable without evidence of   hydronephrosis.       PANCREAS:  Visualized portions of the pancreas are unremarkable.       OTHER: Small amount of free fluid seen in the right upper quadrant.           Impression   Smaller free fluid seen in the right upper quadrant in the region of the   gallbladder.       The gallbladder appears unremarkable.  No gallstones.         SINGLE XRAY VIEW OF THE CHEST       5/7/2019 4:57 am       COMPARISON:   05/04/2019       HISTORY:   ORDERING SYSTEM PROVIDED HISTORY: respiratory failure   TECHNOLOGIST PROVIDED HISTORY:   Reason for exam:->respiratory failure   Ordering Physician Provided Reason for Exam: respiratory failure       FINDINGS:   Tubes and lines remain in place.  Pulmonary edema has progressed.  Left   basilar consolidation appears unchanged.  The heart size is normal.  There is   no discernible pneumothorax.           Impression   Worsening pulmonary edema with grossly stable left basilar atelectasis and/or   pneumonia. Assessment:  Active Problems:    Acute renal failure (ARF) (HCC)    DKA (diabetic ketoacidoses) (HCC)    Acidosis    Cardiac arrest (HCC)    DKA, type 2, not at goal [facility])    Acute respiratory failure (HCC)    Prolonged Q-T interval on ECG    Hypokalemia    Elevated troponin    Acute encephalopathy    Arterial ischemic stroke, ICA, left, acute ([facility]    Pneumonia due to organism    Staphylococcus aureus pneumonia (HCC)    Altered mental status    Non-traumatic rhabdomyolysis    Acute pulmonary edema (HCC)    Pulmonary infiltrate    Atelectasis  Resolved Problems:    * No resolved hospital problems.  *          Plan:   · Ventilatory support to keep saturation between 90-94%  · Ventilator settings and waveforms are reviewed  · Ventilatory changes made  · Patient was tried on CPAP with pressure support but failed  · IV sedation, if required, to keep patient ventilator synchrony  · IV Unasyn to continue  · Bronchodilators  · Patient's x-ray chest is worsened and patient has acute pulmonary edema with Persistent pulmonary infiltrates lung collapse and atelectasis-patient's case was discussed with nephrology and was requested to discontinue IV fluids if deemed appropriate and give a dose of diuretic-after discussion patient's IV fluids were discontinued but nephrology wanted to hold off on diuretics to tomorrow  · Will do a bronchoscopy for diagnostic and therapeutic purposes  · Neurochecks  · Monitor hemodynamics closely  ·  Lantus insulin 25 units at nighttime-to be reassessed as per blood glucose monitoring  · Blood glucose monitoring with slight scale insulin  · Monitor input and output and BMP  · Correct electrolytes on PRN basis   · Enteral feeds as per metabolic support  · PUD and DVT prophylaxis    Case discussed with ICU team and family    Critical care time spent on the patient was 40 minutes exclusive of any  procedures            Electronically signed by:  Abigail Davidson MD    5/7/2019    9:22 PM.

## 2019-05-08 NOTE — PROGRESS NOTES
4 Eyes Skin Assessment     The patient is being assess for   Shift Handoff    I agree that 2 RN's have performed a thorough Head to Toe Skin Assessment on the patient. ALL assessment sites listed below have been assessed. Areas assessed by both nurses:   [x]   Head, Face, and Ears   [x]   Shoulders, Back, and Chest, Abdomen  [x]   Arms, Elbows, and Hands   [x]   Coccyx, Sacrum, and Ischium  [x]   Legs, Feet, and Heels          **SHARE this note so that the co-signing nurse is able to place an eSignature**    Co-signer eSignature: Electronically signed by Javed Raza RN on 5/9/19 at 6:18 AM    Does the Patient have Skin Breakdown?   Yes LDA WOUND CARE was Initiated documentation include the Connie-wound, Wound Assessment, Measurements, Dressing Treatment, Drainage, and Color\",          Robi Prevention initiated:  Yes   Wound Care Orders initiated:  Yes      [address]  nurse consulted for Pressure Injury (Stage 3,4, Unstageable, DTI, NWPT, Complex wounds)and New or Established Ostomies:  Yes      Primary Nurse eSignature: Electronically signed by Anay Valadez RN on 5/8/19 at 7:11 PM

## 2019-05-08 NOTE — PROGRESS NOTES
Hospitalist Progress Note      PCP: Damián Chan PA-C    Date of Admission: 4/29/2019    Chief Complaint: pt became unresponsive    Hospital Course: Reviewed H and P    Subjective:   Patient is up in bed, not in distress. Patient is having nonpurposeful activity appropriate lower extremity, does not follow command. No new event overnight noted. Medications:  Reviewed    Infusion Medications    dexmedetomidine (PRECEDEX) IV infusion 0.4 mcg/kg/hr (05/08/19 1009)     Scheduled Medications    saccharomyces boulardii  250 mg Oral BID    ceFAZolin  1 g Intravenous Q8H    atorvastatin  40 mg Oral Nightly    aspirin  325 mg Oral Daily    insulin glargine  25 Units Subcutaneous Daily    insulin lispro  0-12 Units Subcutaneous Q4H    miconazole   Topical BID    ipratropium-albuterol  1 ampule Inhalation Q4H    carboxymethylcellulose PF  1 drop Both Eyes TID    chlorhexidine  15 mL Mouth/Throat BID    pantoprazole  40 mg Intravenous BID    sodium chloride flush  10 mL Intravenous 2 times per day     PRN Meds: magnesium sulfate, potassium chloride, acetaminophen, dextrose, sodium chloride flush, magnesium hydroxide, acetaminophen      Intake/Output Summary (Last 24 hours) at 5/8/2019 1123  Last data filed at 5/8/2019 1000  Gross per 24 hour   Intake 914 ml   Output 2870 ml   Net -1956 ml       Physical Exam Performed:    /68   Pulse 110   Temp 100.2 °F (37.9 °C) (Core)   Resp (!) 36   Ht 5' 1\" (1.549 m)   Wt 130 lb 15.3 oz (59.4 kg)   SpO2 100%   BMI 24.74 kg/m²     General appearance: Intubated and sedated. HEENT: Conjunctivae/corneas clear. Neck: Supple  Respiratory: Vent sounds  Cardiovascular: tachycardic  Abdomen: Soft, non-tender, non-distended with normal bowel sounds. Musculoskeletal: No edema. Skin: Skin color, texture, turgor normal.  No rashes or lesions.   Neurologic:  Nonfocal  Capillary Refill: Brisk,< 3 seconds   Peripheral Pulses: +2 palpable, equal bilaterally Labs:   Recent Labs     05/06/19  0350  05/07/19  0414 05/07/19  0415 05/08/19  0500   WBC 17.9*  --   --  15.3* 14.9*   HGB 9.1*   < > 8.6* 8.5* 8.5*   HCT 27.1*  --   --  25.3* 24.9*     --   --  101* 114*    < > = values in this interval not displayed. Recent Labs     05/06/19  0350 05/07/19  0415 05/08/19  0500   * 141 146*   K 3.3* 3.7 3.6   * 106 112*   CO2 22 22 24   BUN 37* 33* 29*   CREATININE 1.2 1.2 1.1   CALCIUM 7.1* 7.0* 7.5*   PHOS 3.0 3.2 3.7       Urinalysis:      Lab Results   Component Value Date    NITRU Negative 04/29/2019    WBCUA 10-20 04/29/2019    BACTERIA 1+ 04/29/2019    RBCUA 3-5 04/29/2019    BLOODU MODERATE 04/29/2019    SPECGRAV 1.025 04/29/2019    GLUCOSEU >=1000 04/29/2019       Radiology:  XR CHEST PORTABLE   Final Result   No significant change in bilateral airspace disease given change in technique. VL DUP CAROTID BILATERAL   Final Result      XR CHEST PORTABLE   Final Result   Worsening pulmonary edema with grossly stable left basilar atelectasis and/or   pneumonia. XR ABDOMEN (KUB) (SINGLE AP VIEW)   Final Result   Nasogastric tube in good position. Persistent left lower lobe airspace disease         US GALLBLADDER RUQ   Final Result   Smaller free fluid seen in the right upper quadrant in the region of the   gallbladder. The gallbladder appears unremarkable. No gallstones. XR CHEST PORTABLE   Final Result   1. No significant change. MRI BRAIN WO CONTRAST   Final Result   1. Acute/early subacute infarction involving the left hippocampus. Additional punctate infarctions within the frontal lobes and posterior   temporal lobes bilaterally. This raises the possibility for thromboembolic   phenomenon from a central source. No associated hemorrhage. 2. Diffuse parenchymal volume loss and sequela of chronic microvascular   ischemic changes.    The findings were sent to the Radiology Results [id number] at 8:34   am on 5/5/2019 to be communicated to a licensed caregiver. XR CHEST PORTABLE   Final Result   Bilateral lower lobe airspace disease, left greater than right, increased   compared to prior         XR CHEST PORTABLE   Final Result   Stable appearance of the chest with dense opacification in the left mid and   lower lung zone. XR CHEST PORTABLE   Final Result   1. No significant interval change in the left-sided opacity reflecting   pneumonia better characterized on the CT of the thorax from 04/29/2019.   2. Support devices as described above. XR CHEST PORTABLE   Final Result   Catheter in good position. No postprocedure pneumothorax. XR CHEST PORTABLE   Final Result   Supportive devices are stable. Persisting consolidations in the left lung base, consistent with pneumonia. CT CHEST ABDOMEN PELVIS WO CONTRAST   Final Result   Dense consolidation within the inferior aspect of left upper lobe and   throughout the left lower lobe, most compatible with pneumonia and/or   aspiration. That should be followed to resolution, especially given the   nodular morphology within portions of the consolidation. Diffuse mural thickening of the esophagus. Correlate with clinical evidence   of esophagitis. The tip of the right femoral venous catheter is malpositioned within a sacral   vein. That should be repositioned for more optimal placement. CT CERVICAL SPINE WO CONTRAST   Final Result   Multilevel degenerative changes with no acute abnormality of the cervical   spine. CT HEAD WO CONTRAST   Final Result   Motion degraded study with no acute intracranial abnormality. XR CHEST PORTABLE   Final Result   Supportive tubing projects in normal position. Left basilar airspace disease, pneumonia versus aspiration pneumonitis. There may be a component of pleural effusion.          XR CHEST PORTABLE   Final Result   Atelectasis at the left lung base.      Question of small left pleural effusion. Assessment/Plan:    Active Hospital Problems    Diagnosis Date Noted    Acute renal failure (ARF) ([facility] [N17.9] 08/20/2015     Priority: High     Class: Acute    [facility] ([facility] [J81.0] 05/07/2019    Pulmonary infiltrate [R91.8] 05/07/2019    Atelectasis [J98.11] 05/07/2019    Staphylococcus aureus pneumonia ([facility] [J15.211] 05/06/2019    Altered mental status [R41.82]     Non-traumatic rhabdomyolysis [M62.82]     Acute encephalopathy [G93.40]     Arterial ischemic stroke, ICA, left, acute ([facility] [I63.232]     Pneumonia due to organism [J18.9]     Acute respiratory failure (HCC) [J96.00]     Prolonged Q-T interval on ECG [R94.31]     Hypokalemia [E87.6]     Elevated troponin [R74.8]     DKA (diabetic ketoacidoses) ([facility] [E13.10] 04/29/2019    Acidosis [E87.2] 04/29/2019    Cardiac arrest ([facility] [I46.9] 04/29/2019    DKA, type 2, not at goal [facility]) [E11.10] 04/29/2019     80 yo female with h/o CAD, DM2, HTN, depression admitted after being found unresponsive with acute respiratory failure due to pneumonia, DKA, metabolic encephalopathy, LOPEZ, metabolic acidosis, acute GI bleed. Acute respiratory failure secondary to aspiration pneumonia and associated septic shock- POA. Required intubation and sedation. Initially on Merrem/Linezolid and changed to unasyn. Abx started 4/30. Blood Cx neg thus far. Pulm following. Bronch 5/1/19, 5/2/19 - necrotizing features of PNA. Respiratory culture with staph. Awaiting neuro input for CNS status evaluation. MRI of brain done -showing multiple infarct area bilaterally raising possibility of thromboembolic infarcts. Trial of SBT daily, plan to wean off ventilator as per pulmonary. Acute multiple bilateral  thromboembolic infarcts, noted an MRI, source unknown, s/p  SERVANDO- unremarkable, continue aspirin and statin, monitor.     DKA - pt without diabetes medications for weeks if not

## 2019-05-08 NOTE — PROGRESS NOTES
ABG obtained, results shown to MELANIA Mcallister. Order to extubate. Patient extubated at [phone] to 5L NC, notified RT. Patient tolerated well.

## 2019-05-08 NOTE — PROGRESS NOTES
Shift assessment complete and documented on flowsheets. Respirations sustaining in the 30s. Four eyes skin assessment and MAR handoff completed with previous RN. Repositioned for comfort. Call light and over bed table within reach. Will continue to monitor.

## 2019-05-08 NOTE — PROGRESS NOTES
PROGRESS NOTE  S:69 yrs Patient  admitted on 4/29/2019 with DKA, type 2, not at goal [facility]) [E11.10]  DKA, type 2, not at goal [facility]) [E11.10] . Today she is intubated but awake. Denies abd pain and bleeding    Exam:   Vitals:    05/08/19 0700   BP: 132/71   Pulse: 108   Resp: 23   Temp:    SpO2: 100%      General appearance: alert, appears stated age and cooperative  HEENT: Sclera clear, anicteric  Neck: no adenopathy and supple, symmetrical, trachea midline  Lungs: clear to auscultation bilaterally  Heart: regular rate and rhythm, S1, S2 normal, no murmur, click, rub or gallop  Abdomen: soft, non-tender; bowel sounds normal; no masses,  no organomegaly  Extremities: extremities normal, atraumatic, no cyanosis or edema     Medications: Reviewed    Labs:  CBC:   Recent Labs     05/06/19  0350  05/07/19  0414 05/07/19  0415 05/08/19  0500   WBC 17.9*  --   --  15.3* 14.9*   HGB 9.1*   < > 8.6* 8.5* 8.5*   HCT 27.1*  --   --  25.3* 24.9*   MCV 83.0  --   --  84.3 84.3     --   --  101* 114*    < > = values in this interval not displayed. BMP:   Recent Labs     05/06/19  0350 05/07/19  0415 05/08/19  0500   * 141 146*   K 3.3* 3.7 3.6   * 106 112*   CO2 22 22 24   PHOS 3.0 3.2 3.7   BUN 37* 33* 29*   CREATININE 1.2 1.2 1.1     LIVER PROFILE:   Recent Labs     05/06/19  0350   AST 84*   ALT 40   PROT 5.7*   BILIDIR 1.4*   BILITOT 1.7*   ALKPHOS 1,199*       Impression: 81year old female with history of HTN, DM, CVA, asthma, Seizure do, admitted with cardiac arrest and DKA. She was also noted to have coffee ground aspirate per NG but Hgb is stable    Recommendation:  1. Continue supportive care  2. Monitor Hgb  3. PPI BID  4. Resume diet after extubation if she passes swallow eval  5. Observe for signs of bleeding  6.  EGD eventually      Juan Luis Espino MD  8:02 AM 5/8/2019

## 2019-05-08 NOTE — CONSULTS
Wound care note - conversation with bedside nurse regarding patient's skin issues. FMS rectal tube place yesterday and diarrhea stools now contained. Staff has initiated use of the Zinc Barrier cream to the buttocks, and is improving. Pt is on the ICU Isolibrium Mattress. Pressure Injury Prevention and wound treatment interventions in place per Protocols. Will reconsult as needed.   Thank you,  Ren Oliveros RN, [facility] [place]

## 2019-05-08 NOTE — PROGRESS NOTES
Nephrology Progress Note   [email or web address]      This patient is a 81year old female whom we are following for LOPEZ. Subjective: The patient was seen and examined. More awake today. UO=2.1L the past 24H. Able to follow some commands. Family History: Family at bedside and questions were answered. ROS: No abdominal pain or chest pain      Vitals:  /68   Pulse 110   Temp 100.2 °F (37.9 °C) (Core)   Resp (!) 36   Ht 5' 1\" (1.549 m)   Wt 130 lb 15.3 oz (59.4 kg)   SpO2 100%   BMI 24.74 kg/m²   I/O last 3 completed shifts: In: 9925 [I.V.:2109; NG/GT:1247]  Out: 2935 [Urine:2125; Stool:810]  I/O this shift: In: 79 [I.V.:10; NG/GT:60]  Out: 545 [Urine:545]    Physical Exam:  Physical Exam   Constitutional: No distress. She is intubated. HENT:   Head: Normocephalic and atraumatic. Eyes: Conjunctivae are normal. No scleral icterus. Neck: No tracheal deviation present. No thyromegaly present. Cardiovascular: Regular rhythm. Exam reveals no gallop and no friction rub.   tachycardic   Pulmonary/Chest: She is intubated. Equal chest expansion, coarse breath sounds bilateral   Abdominal: Soft. Bowel sounds are normal. She exhibits no distension. There is no tenderness. Musculoskeletal: She exhibits edema. Neurological: She is alert. Skin: Skin is warm. Vitals reviewed.         Medications:   saccharomyces boulardii  250 mg Oral BID    ceFAZolin  1 g Intravenous Q8H    atorvastatin  40 mg Oral Nightly    aspirin  325 mg Oral Daily    insulin glargine  25 Units Subcutaneous Daily    insulin lispro  0-12 Units Subcutaneous Q4H    miconazole   Topical BID    ipratropium-albuterol  1 ampule Inhalation Q4H    carboxymethylcellulose PF  1 drop Both Eyes TID    chlorhexidine  15 mL Mouth/Throat BID    pantoprazole  40 mg Intravenous BID    sodium chloride flush  10 mL Intravenous 2 times per day         Labs:  Recent Labs     05/06/19  0350  05/07/19  0414 05/07/19  0415 05/08/19  0500 WBC 17.9*  --   --  15.3* 14.9*   HGB 9.1*   < > 8.6* 8.5* 8.5*   HCT 27.1*  --   --  25.3* 24.9*   MCV 83.0  --   --  84.3 84.3     --   --  101* 114*    < > = values in this interval not displayed. Recent Labs     05/06/19  0350 05/07/19  0415 05/08/19  0500   * 141 146*   K 3.3* 3.7 3.6   * 106 112*   CO2 22 22 24   GLUCOSE 152* 252* 139*   PHOS 3.0 3.2 3.7   MG 1.70* 1.90 1.80   BUN 37* 33* 29*   CREATININE 1.2 1.2 1.1   LABGLOM 45* 45* 50*   GFRAA 54* 54* 60*           Assessment/Plan:  Ms. Tyrone Sorto is a 81year old female with PMHx of CAD, DM, HTN and seizures who presents after a cardiac arrest.     Acute Kidney Injury.  - Etiology: ATN in the setting of cardiac arrest/sepsis. - Data: Last serum creatinine in 07/2016 was <0.5mg/dL. - Urinalysis with blood and protein. - Clinical: Scr improving and stable, now down to 1.1, following ATN recovery pattern      Severe Anion Gap Metabolic Acidosis. - Likely from DKA and lactic acidosis. No toxic alcohol ingestion.  - Methanol, Ethylene glycol negative  - Resolved      Anemia.  - Had coffee-ground emesis on admission.  - Plans per GI.     S/P Cardiac Arrest.  - Plans per Cardiology      Hypophosphatemia  - Prn IV phos replacement     Hypokalemia  - Improved with replacement.      Multiple CVAs  - MRI brain 5/3, neurology seeing     Hypernatremia, mild. - Plan to increase FWF with TF if worsening. Acute Respiratory Failure. - On vent. S/P bronchoscopy yesterday. - Will give a dose of Lasix 20mg IV x 1 today. - Plans per pulmonary. Please do not hesitate to contact me at [phone] if with questions. Thank you!     Sixto Harper MD  5/8/2019  [facility]

## 2019-05-08 NOTE — PROGRESS NOTES
05/08/19 0824   Vent Information   $Ventilation $Subsequent Day   Vent Type 840   Vent Mode CPAP   Vt Ordered 4000 mL   Rate Set 0 bmp   Peak Flow 65 L/min   Pressure Support 13 cmH20   FiO2  30 %   Sensitivity 3   PEEP/CPAP 5   Cuff Pressure (cm H2O) 30 cm H2O   Humidification Source Heated wire   Humidification Temp 37   Humidification Temp Measured 36.9   Vent Patient Data   Peak Inspiratory Pressure 21 cmH2O   Mean Airway Pressure 12 cmH20   Rate Measured 40 br/min   Vt Exhaled 363 mL   Minute Volume 12.6 Liters   I:E Ratio 1:1.50   Plateau Pressure 23 WOG99   Static Compliance 20 mL/cmH2O   Dynamic Compliance 22 mL/cmH2O   Cough/Sputum   Sputum How Obtained Endotracheal   Cough Productive   Sputum Amount Small   Sputum Color Cloudy   Tenacity Thick   Spontaneous Breathing Trial (SBT) RT Doc   Pulse 113   SpO2 100 %   Breath Sounds   Right Upper Lobe Expiratory Wheezes   Right Middle Lobe Diminished   Right Lower Lobe Diminished   Left Upper Lobe Expiratory Wheezes   Left Lower Lobe Diminished   Additional Respiratory  Assessments   Resp 15   End Tidal CO2 21 (%)   Alarm Settings   High Pressure Alarm 45 cmH2O   Low Minute Volume Alarm 3 L/min   High Respiratory Rate 40 br/min   Low Exhaled Vt  300 mL   ETT (adult)   Placement Date/Time: 04/29/19 1340   Preoxygenation: Yes  Type: Cuffed  Tube Size: 8 mm  Placement Verified By[de-identified] Capnometry  Secured at: 23 cm  Measured From: Lips  Cuff Pressure: 30 cm H2O   Secured at 23 cm   Measured From [address] By [employer] tube saavedra

## 2019-05-08 NOTE — PLAN OF CARE
Problem: Falls - Risk of:  Goal: Will remain free from falls  Description  Will remain free from falls  Outcome: Ongoing  Note:   Fall risk assessment complete, fall precautions in place. Fall visuals posted, bed alarm on, bed in lowest position with wheels locked. Patient has been free of falls this shift, will continue to monitor. Goal: Absence of physical injury  Description  Absence of physical injury  Outcome: Ongoing     Problem: Risk for Impaired Skin Integrity  Goal: Tissue integrity - skin and mucous membranes  Description  Structural intactness and normal physiological function of skin and  mucous membranes. Outcome: Ongoing  Note:   Skin assessment complete. Pt at risk for skin breakdown. See Robi score. Pt remains on bedrest. Unable to reposition self in bed. Heels elevated off bed. Will continue to turn and reposition patient every two hours and as needed. Will continue to keep patient clean and dry, applying skin care cream as needed. Pillows used for positioning. Will continue to monitor and assess for skin breakdown. Problem: Nutrition  Goal: Optimal nutrition therapy  Outcome: Ongoing     Problem: Serum Glucose Level - Abnormal:  Goal: Ability to maintain appropriate glucose levels will improve  Description  Ability to maintain appropriate glucose levels will improve  Outcome: Ongoing     Problem: Sensory Perception - Impaired:  Goal: Ability to maintain a stable neurologic state will improve  Description  Ability to maintain a stable neurologic state will improve  Outcome: Ongoing     Problem: Restraint Use - Nonviolent/Non-Self-Destructive Behavior:  Goal: Absence of restraint indications  Description  Absence of restraint indications  Outcome: Ongoing  Note:   Patient still demonstrating indications of the need to be restrained at this time. Visual safety checks performed every hour, and restraint monitoring performed every two hours.  Patient has no signs of injuries from restraints at this time. Educated pt on the need for the restraints, no evidence of learning. Will continue to monitor.     Goal: Absence of restraint-related injury  Description  Absence of restraint-related injury  Outcome: Ongoing

## 2019-05-08 NOTE — PROGRESS NOTES
05/08/19 0349   Vent Information   Vent Type 840   Vent Mode AC/VC   Vt Ordered 400 mL   Rate Set 15 bmp   Peak Flow 65 L/min   Pressure Support 0 cmH20   FiO2  30 %   Sensitivity 3   PEEP/CPAP 5   Cuff Pressure (cm H2O) 30 cm H2O   Humidification Source Heated wire   Humidification Temp Measured 36.9   Circuit Condensation Drained   Vent Patient Data   Peak Inspiratory Pressure 24 cmH2O   Mean Airway Pressure 11 cmH20   Rate Measured 23 br/min   Vt Exhaled 500 mL   Minute Volume 9.6 Liters   I:E Ratio 1:4.10   Cough/Sputum   Sputum How Obtained Endotracheal;Suctioned   Cough Productive   Sputum Amount Small   Sputum Color Yellow;Cloudy   Tenacity Thick   Spontaneous Breathing Trial (SBT) RT Doc   Pulse 105   SpO2 99 %   Breath Sounds   Right Upper Lobe Expiratory Wheezes; Diminished   Right Middle Lobe Diminished   Right Lower Lobe Diminished   Left Upper Lobe Expiratory Wheezes; Diminished   Left Lower Lobe Diminished   Additional Respiratory  Assessments   Resp 27   End Tidal CO2 21 (%)   Position Semi-Umanzor's   Alarm Settings   High Pressure Alarm 45 cmH2O   Low Minute Volume Alarm 3 L/min   High Respiratory Rate 40 br/min   Low Exhaled Vt  300 mL   ETT (adult)   Placement Date/Time: 04/29/19 1340   Preoxygenation: Yes  Type: Cuffed  Tube Size: 8 mm  Placement Verified By[de-identified] Capnometry  Secured at: 23 cm  Measured From: Lips  Cuff Pressure: 30 cm H2O   Secured at 23 cm   Measured From Lips   ET Placement Right   Secured By Commercial tube saavedra   Site Condition Dry   Cuff Pressure 30 cm H2O

## 2019-05-08 NOTE — PROGRESS NOTES
INPATIENT PULMONARY CRITICAL CARE PROGRESS NOTE      Reason for visit     assistance with critical care management        SUBJECTIVE:  Patient continues to be critically ill on mechanical vent support when seen this morning, patient has moderate respiratory secretions in the endotracheal tube; patient was tried again on CPAP with PSV but failed ;patient underwent bronchoscopy yesterday and large amounts of thick mucus plugs were lavaged out  patient when seen was on no sedation, patient has been on 30% oxygen when seen on the ventilator, patient has a T-max of 99.3 °F, patient is blood sugars are not controlled but improving and are acceptable , patient has normal sinus rhythm on the monitor, patient has had adequate urine output,, patient remains off IV fluids  cumulative fluid balance is +11.8 L, no other pertinent review of system of concern     Physical Exam:  Blood pressure (!) 157/76, pulse 100, temperature 99.3 °F (37.4 °C), temperature source Core, resp. rate (!) 32, height 5' 1\" (1.549 m), weight 130 lb 15.3 oz (59.4 kg), SpO2 91 %, not currently breastfeeding. Constitutional:  No acute distress. on mechanical ventilatory support  HENT:  ETT (+). No thyromegaly. Eyes:  Conjunctivae are normal. Pupils equal, round, and reactive to light. No scleral icterus. Neck: . No tracheal deviation present. No obvious thyroid mass. Cardiovascular: Normal rate, regular rhythm, normal heart sounds. No right ventricular heave. No lower extremity edema. Pulmonary/Chest: No wheezes. Worsening rales. Chest wall is not dull to percussion. No accessory muscle usage or stridor. decreased BSI    Abdominal: Soft. Bowel sounds present. No distension or hernia. No tenderness. Musculoskeletal: No cyanosis. No clubbing. No obvious joint deformity. Lymphadenopathy: No cervical or supraclavicular adenopathy. Skin: Skin is warm and dry. No rash or nodules on the exposed extremities.   Neurologic: Intubated but communicative to some extent to simple commands         Results:  CBC:   Recent Labs     05/06/19  0350  05/07/19  0414 05/07/19  0415 05/08/19  0500   WBC 17.9*  --   --  15.3* 14.9*   HGB 9.1*   < > 8.6* 8.5* 8.5*   HCT 27.1*  --   --  25.3* 24.9*   MCV 83.0  --   --  84.3 84.3     --   --  101* 114*    < > = values in this interval not displayed. BMP:   Recent Labs     05/06/19  0350 05/07/19  0415 05/08/19  0500   * 141 146*   K 3.3* 3.7 3.6   * 106 112*   CO2 22 22 24   PHOS 3.0 3.2 3.7   BUN 37* 33* 29*   CREATININE 1.2 1.2 1.1     LIVER PROFILE:   Recent Labs     05/06/19  0350   AST 84*   ALT 40   BILIDIR 1.4*   BILITOT 1.7*   ALKPHOS 1,199*       Imaging:  I have reviewed radiology images personally. XR CHEST PORTABLE   Final Result   No significant change in bilateral airspace disease given change in technique. VL DUP CAROTID BILATERAL   Final Result      XR CHEST PORTABLE   Final Result   Worsening pulmonary edema with grossly stable left basilar atelectasis and/or   pneumonia. XR ABDOMEN (KUB) (SINGLE AP VIEW)   Final Result   Nasogastric tube in good position. Persistent left lower lobe airspace disease         US GALLBLADDER RUQ   Final Result   Smaller free fluid seen in the right upper quadrant in the region of the   gallbladder. The gallbladder appears unremarkable. No gallstones. XR CHEST PORTABLE   Final Result   1. No significant change. MRI BRAIN WO CONTRAST   Final Result   1. Acute/early subacute infarction involving the left hippocampus. Additional punctate infarctions within the frontal lobes and posterior   temporal lobes bilaterally. This raises the possibility for thromboembolic   phenomenon from a central source. No associated hemorrhage. 2. Diffuse parenchymal volume loss and sequela of chronic microvascular   ischemic changes.    The findings were sent to the Radiology Results [address] at 8:34   am on 5/5/2019 to be communicated to a licensed caregiver. XR CHEST PORTABLE   Final Result   Bilateral lower lobe airspace disease, left greater than right, increased   compared to prior         XR CHEST PORTABLE   Final Result   Stable appearance of the chest with dense opacification in the left mid and   lower lung zone. XR CHEST PORTABLE   Final Result   1. No significant interval change in the left-sided opacity reflecting   pneumonia better characterized on the CT of the thorax from 04/29/2019.   2. Support devices as described above. XR CHEST PORTABLE   Final Result   Catheter in good position. No postprocedure pneumothorax. XR CHEST PORTABLE   Final Result   Supportive devices are stable. Persisting consolidations in the left lung base, consistent with pneumonia. CT CHEST ABDOMEN PELVIS WO CONTRAST   Final Result   Dense consolidation within the inferior aspect of left upper lobe and   throughout the left lower lobe, most compatible with pneumonia and/or   aspiration. That should be followed to resolution, especially given the   nodular morphology within portions of the consolidation. Diffuse mural thickening of the esophagus. Correlate with clinical evidence   of esophagitis. The tip of the right femoral venous catheter is malpositioned within a sacral   vein. That should be repositioned for more optimal placement. CT CERVICAL SPINE WO CONTRAST   Final Result   Multilevel degenerative changes with no acute abnormality of the cervical   spine. CT HEAD WO CONTRAST   Final Result   Motion degraded study with no acute intracranial abnormality. XR CHEST PORTABLE   Final Result   Supportive tubing projects in normal position. Left basilar airspace disease, pneumonia versus aspiration pneumonitis. There may be a component of pleural effusion. XR CHEST PORTABLE   Final Result   Atelectasis at the left lung base. Question of small left pleural effusion. Gram Stain Result 05/01/2019 11:11 AM [address] Lab   4+ WBC's (Polymorphonuclear)   1+ RBC's   2+ Budding yeast   3+ Gram positive cocci    Organism Abnormal  05/01/2019 11:11 AM [facility]   Candida albicans    CULTURE, RESPIRATORY 05/01/2019 11:11 AM [facility] [employer] [facility]   10,000-100,000 CFU/mL   No further workup    Organism Abnormal  05/01/2019 11:11 AM [facility]   Staphylococcus aureus    CULTURE, RESPIRATORY 05/01/2019 11:11 AM [facility]   >100,000 CFU/mL   PBP2= Negative    Testing Performed By     Lab - Abbreviation Name Director Address Valid Date Range   19- - Nicole Yepez M.D., Ph.D. [address] [facility] [address] 08/30/17 0817-Present   25-- [facility] [address]  [facility] [place] [address] 08/30/17 0807-Present   Narrative   Performed by: [facility] [employer] [facility] [employer]   ORDER#: [id number]                          ORDERED BY: [facility]  SOURCE: BAL Quantitative Count LINGULA     COLLECTED:  05/01/19 11:11  ANTIBIOTICS AT [place]. :                      RECEIVED :  05/01/19 13:05  Performed at:  [facility]  [address] [facility] [address]   Phone [phone]   Susceptibility     Staphylococcus aureus (2)     Antibiotic Interpretation VENKATA Status    ceFAZolin Sensitive <=4 mcg/mL     ciprofloxacin Sensitive <=1 mcg/mL     clindamycin Sensitive <=0.5 mcg/mL     erythromycin Sensitive <=0.5 mcg/mL     oxacillin Sensitive <=0.25 mcg/mL     tetracycline Sensitive <=4 mcg/mL     trimethoprim-sulfamethoxazole Sensitive <=0.5/9.5 mcg/mL       Results for Ratna Mckay (MRN [id number]) as of 5/8/2019 19:20   Ref.  Range 5/7/2019 04:15 5/7/2019 07:58 5/7/2019 13:19 5/7/2019 17:05 5/7/2019 20:35 5/8/2019 01:06 5/8/2019 05:00   Sodium Latest Ref Range: 136 - 145 mmol/L 141      146 (H)   Potassium Latest Ref Range: 3.5 - 5.1 mmol/L 3.7      3.6   Chloride Latest Ref Range: 99 - 110 mmol/L 106      112 (H)   CO2 Latest Ref Range: 21 - 32 mmol/L 22      24   BUN Latest Ref Range: 7 - 20 mg/dL 33 (H)      29 (H)   Creatinine Latest Ref Range: 0.6 - 1.2 mg/dL 1.2      1.1   Anion Gap Latest Ref Range: 3 - 16  13      10   GFR Non- Latest Ref Range: >60  45 (A)      50 (A)   GFR  Latest Ref Range: >60  54 (A)      60 (A)   Magnesium Latest Ref Range: 1.80 - 2.40 mg/dL 1.90      1.80   Glucose Latest Ref Range: 70 - 99 mg/dL 252 (H)      139 (H)   POC Glucose Latest Ref Range: 70 - 99 mg/dl  228 (H) 194 (H) 119 (H) 121 (H) 161 (H)    Calcium Latest Ref Range: 8.3 - 10.6 mg/dL 7.0 (L)      7.5 (L)   Phosphorus Latest Ref Range: 2.5 - 4.9 mg/dL 3.2      3.7     Results for Isis Amezquita (MRN [id number]) as of 5/8/2019 19:20   Ref.  Range 5/5/2019 04:43 5/6/2019 03:50 5/6/2019 03:59 5/7/2019 04:14 5/7/2019 04:15 5/8/2019 05:00   WBC Latest Ref Range: 4.0 - 11.0 K/uL 17.7 (H) 17.9 (H)   15.3 (H) 14.9 (H)   RBC Latest Ref Range: 4.00 - 5.20 M/uL 3.29 (L) 3.26 (L)   3.00 (L) 2.96 (L)   Hemoglobin Quant Latest Ref Range: 12.0 - 16.0 g/dL 9.4 (L) 9.1 (L) 8.6 (L) 8.6 (L) 8.5 (L) 8.5 (L)   Hematocrit Latest Ref Range: 36.0 - 48.0 % 27.6 (L) 27.1 (L)   25.3 (L) 24.9 (L)   POC Hematocrit Latest Ref Range: 36.0 - 48.0 %   25.0 (L) 25.0 (L)     MCV Latest Ref Range: 80.0 - 100.0 fL 83.8 83.0   84.3 84.3   MCH Latest Ref Range: 26.0 - 34.0 pg 28.5 28.0   28.3 28.7   MCHC Latest Ref Range: 31.0 - 36.0 g/dL 34.0 33.7   33.6 34.1   MPV Latest Ref Range: 5.0 - 10.5 fL 8.1 8.3   8.4 8.8   RDW Latest Ref Range: 12.4 - 15.4 % 15.0 14.5   15.0 15.0   Platelet Count Latest Ref Range: 135 - 450 K/uL 220 154   101 (L) 114 (L)   Neutrophils % Latest Units: % 55.0 78.0   80.4 83.3   Lymphocyte % Latest Units: % 12.0 7.0   13.1 12.2   Monocytes % Latest Units: % 7.0 7.0   5.6 3.4   Results for Anurag Pitts (MRN [id number]) as of 5/8/2019 19:20   Ref. Range 5/7/2019 16:40   Gram Stain Result Unknown 4+ WBC's (Polymor. .. CULTURE, RESPIRATORY Unknown Further report to. .. SINGLE XRAY VIEW OF THE CHEST       5/8/2019 8:16 am       COMPARISON:   Radiograph 05/07/2019       HISTORY:   ORDERING SYSTEM PROVIDED HISTORY: vent   TECHNOLOGIST PROVIDED HISTORY:   Reason for exam:->vent   Ordering Physician Provided Reason for Exam: vent       FINDINGS:   Endotracheal tube tip in the mid trachea.  Enteric tube tip projecting over   the fundus of the stomach.  Right IJ central line tip in the SVC. Cardiomediastinal silhouette is unchanged.  No pneumothorax.  No significant   change in bilateral airspace disease given change in technique.  No acute   osseous abnormality.           Impression   No significant change in bilateral airspace disease given change in technique.             Assessment:  Active Problems:    Acute renal failure (ARF) (HCC)    DKA (diabetic ketoacidoses) ([facility])    Acidosis    Cardiac arrest (HCC)    DKA, type 2, not at goal [facility])    Acute respiratory failure (HCC)    Prolonged Q-T interval on ECG    Hypokalemia    Elevated troponin    Acute encephalopathy    Arterial ischemic stroke, ICA, left, acute ([address]    Pneumonia due to organism    Staphylococcus aureus pneumonia (HCC)    Altered mental status    Non-traumatic rhabdomyolysis    Acute pulmonary edema (HCC)    Pulmonary infiltrate    Atelectasis  Resolved Problems:    * No resolved hospital problems.  *          Plan:   · Ventilatory support to keep saturation between 90-94%  · Ventilator settings and waveforms are reviewed  · Ventilatory changes made  · IV sedation, if required, to keep patient ventilator synchrony  · IV Unasyn was changed to Ancef   · Bronchodilators  · S/p bronchoscopy -results are pending   · Neurochecks  · Monitor hemodynamics closely  ·  Lantus insulin 25 units at

## 2019-05-08 NOTE — PROGRESS NOTES
05/08/19 1220   Vent Information   Vent Type 840   Vent Mode CPAP   Vt Ordered 0 mL   Rate Set 0 bmp   Peak Flow 65 L/min   Pressure Support 15 cmH20   FiO2  30 %   Sensitivity 3   PEEP/CPAP 5   Humidification Source Heated wire   Humidification Temp 37   Humidification Temp Measured 34.2   Vent Patient Data   Peak Inspiratory Pressure 23 cmH2O   Mean Airway Pressure 11 cmH20   Rate Measured 21 br/min   Vt Exhaled 436 mL   Minute Volume 7.87 Liters   I:E Ratio 1:2.70   Cough/Sputum   Sputum How Obtained Endotracheal   Cough Productive   Sputum Amount Small   Sputum Color Cloudy   Tenacity Thick   Spontaneous Breathing Trial (SBT) RT Doc   Pulse 104   SpO2 99 %   Breath Sounds   Right Upper Lobe Rhonchi   Right Middle Lobe Diminished   Right Lower Lobe Diminished   Left Upper Lobe Rhonchi   Left Lower Lobe Diminished   Additional Respiratory  Assessments   Resp (!) 34   End Tidal CO2 21 (%)   Alarm Settings   High Pressure Alarm 45 cmH2O   Low Minute Volume Alarm 3 L/min   High Respiratory Rate 40 br/min   Low Exhaled Vt  200 mL   ETT (adult)   Placement Date/Time: 04/29/19 1340   Preoxygenation: Yes  Type: Cuffed  Tube Size: 8 mm  Placement Verified By[de-identified] Capnometry  Secured at: 23 cm  Measured From: Lips  Cuff Pressure: 30 cm H2O   Secured at 23 cm   Measured From Lips   ET Placement Left   Secured By Commercial tube saavedra

## 2019-05-08 NOTE — PROGRESS NOTES
Reassessment complete and documented on flowsheets, patient on 4L NC and tolerating, VSS. Oriented to self only. Repositioned for comfort. Will continue to monitor.

## 2019-05-08 NOTE — CARE COORDINATION
CM went to bedside to offer supports for patient. Nursing at bedside as they have just extubated. Daughter at bedside as well. Denies any current needs. Writer spoke to Michael Toth with [employer], they are continuing to follow and monitor.     Yadira Simpson RN

## 2019-05-09 ENCOUNTER — APPOINTMENT (OUTPATIENT)
Dept: GENERAL RADIOLOGY | Age: 69
DRG: 853 | End: 2019-05-09
Payer: COMMERCIAL

## 2019-05-09 LAB
ANION GAP SERPL CALCULATED.3IONS-SCNC: 11 MMOL/L (ref 3–16)
BASE EXCESS ARTERIAL: 3 MMOL/L (ref -3–3)
BASOPHILS ABSOLUTE: 0.1 K/UL (ref 0–0.2)
BASOPHILS RELATIVE PERCENT: 0.6 %
BUN BLDV-MCNC: 25 MG/DL (ref 7–20)
CALCIUM SERPL-MCNC: 7.5 MG/DL (ref 8.3–10.6)
CARBOXYHEMOGLOBIN ARTERIAL: 0 % (ref 0–1.5)
CHLORIDE BLD-SCNC: 109 MMOL/L (ref 99–110)
CO2: 27 MMOL/L (ref 21–32)
CREAT SERPL-MCNC: 0.9 MG/DL (ref 0.6–1.2)
CULTURE, RESPIRATORY: NORMAL
EOSINOPHILS ABSOLUTE: 0 K/UL (ref 0–0.6)
EOSINOPHILS RELATIVE PERCENT: 0.3 %
GFR AFRICAN AMERICAN: >60
GFR NON-AFRICAN AMERICAN: >60
GLUCOSE BLD-MCNC: 112 MG/DL (ref 70–99)
GLUCOSE BLD-MCNC: 114 MG/DL (ref 70–99)
GLUCOSE BLD-MCNC: 114 MG/DL (ref 70–99)
GLUCOSE BLD-MCNC: 119 MG/DL (ref 70–99)
GLUCOSE BLD-MCNC: 137 MG/DL (ref 70–99)
GLUCOSE BLD-MCNC: 140 MG/DL (ref 70–99)
GLUCOSE BLD-MCNC: 148 MG/DL (ref 70–99)
GRAM STAIN RESULT: NORMAL
HCO3 ARTERIAL: 26.5 MMOL/L (ref 21–29)
HCT VFR BLD CALC: 26.7 % (ref 36–48)
HEMOGLOBIN, ART, EXTENDED: 9.5 G/DL (ref 12–16)
HEMOGLOBIN: 8.7 G/DL (ref 12–16)
LYMPHOCYTES ABSOLUTE: 1.5 K/UL (ref 1–5.1)
LYMPHOCYTES RELATIVE PERCENT: 9 %
MAGNESIUM: 1.6 MG/DL (ref 1.8–2.4)
MCH RBC QN AUTO: 28.3 PG (ref 26–34)
MCHC RBC AUTO-ENTMCNC: 32.8 G/DL (ref 31–36)
MCV RBC AUTO: 86.4 FL (ref 80–100)
METHEMOGLOBIN ARTERIAL: 0.5 %
MONOCYTES ABSOLUTE: 0.6 K/UL (ref 0–1.3)
MONOCYTES RELATIVE PERCENT: 3.6 %
NEUTROPHILS ABSOLUTE: 14.4 K/UL (ref 1.7–7.7)
NEUTROPHILS RELATIVE PERCENT: 86.5 %
O2 CONTENT ARTERIAL: 12 ML/DL
O2 SAT, ARTERIAL: 89.4 %
O2 THERAPY: ABNORMAL
PCO2 ARTERIAL: 36.3 MMHG (ref 35–45)
PDW BLD-RTO: 15.1 % (ref 12.4–15.4)
PERFORMED ON: ABNORMAL
PH ARTERIAL: 7.48 (ref 7.35–7.45)
PHOSPHORUS: 4.1 MG/DL (ref 2.5–4.9)
PLATELET # BLD: 190 K/UL (ref 135–450)
PMV BLD AUTO: 8.8 FL (ref 5–10.5)
PO2 ARTERIAL: 57.5 MMHG (ref 75–108)
POTASSIUM SERPL-SCNC: 3.4 MMOL/L (ref 3.5–5.1)
POTASSIUM SERPL-SCNC: 3.7 MMOL/L (ref 3.5–5.1)
RBC # BLD: 3.09 M/UL (ref 4–5.2)
SODIUM BLD-SCNC: 147 MMOL/L (ref 136–145)
TCO2 ARTERIAL: 27.6 MMOL/L
WBC # BLD: 16.6 K/UL (ref 4–11)

## 2019-05-09 PROCEDURE — 6360000002 HC RX W HCPCS: Performed by: INTERNAL MEDICINE

## 2019-05-09 PROCEDURE — 97110 THERAPEUTIC EXERCISES: CPT

## 2019-05-09 PROCEDURE — 97163 PT EVAL HIGH COMPLEX 45 MIN: CPT

## 2019-05-09 PROCEDURE — 97167 OT EVAL HIGH COMPLEX 60 MIN: CPT

## 2019-05-09 PROCEDURE — 82803 BLOOD GASES ANY COMBINATION: CPT

## 2019-05-09 PROCEDURE — 99291 CRITICAL CARE FIRST HOUR: CPT | Performed by: INTERNAL MEDICINE

## 2019-05-09 PROCEDURE — 31720 CLEARANCE OF AIRWAYS: CPT

## 2019-05-09 PROCEDURE — 83735 ASSAY OF MAGNESIUM: CPT

## 2019-05-09 PROCEDURE — 3609010900 HC BRONCHOSCOPY THERAPUTIC ASPIRATION INITIAL: Performed by: INTERNAL MEDICINE

## 2019-05-09 PROCEDURE — 71045 X-RAY EXAM CHEST 1 VIEW: CPT

## 2019-05-09 PROCEDURE — 2709999900 HC NON-CHARGEABLE SUPPLY: Performed by: INTERNAL MEDICINE

## 2019-05-09 PROCEDURE — 6370000000 HC RX 637 (ALT 250 FOR IP): Performed by: FAMILY MEDICINE

## 2019-05-09 PROCEDURE — 97530 THERAPEUTIC ACTIVITIES: CPT

## 2019-05-09 PROCEDURE — 2500000003 HC RX 250 WO HCPCS: Performed by: INTERNAL MEDICINE

## 2019-05-09 PROCEDURE — 6370000000 HC RX 637 (ALT 250 FOR IP): Performed by: INTERNAL MEDICINE

## 2019-05-09 PROCEDURE — 99152 MOD SED SAME PHYS/QHP 5/>YRS: CPT | Performed by: INTERNAL MEDICINE

## 2019-05-09 PROCEDURE — 80048 BASIC METABOLIC PNL TOTAL CA: CPT

## 2019-05-09 PROCEDURE — C9113 INJ PANTOPRAZOLE SODIUM, VIA: HCPCS | Performed by: INTERNAL MEDICINE

## 2019-05-09 PROCEDURE — 94640 AIRWAY INHALATION TREATMENT: CPT

## 2019-05-09 PROCEDURE — 99153 MOD SED SAME PHYS/QHP EA: CPT | Performed by: INTERNAL MEDICINE

## 2019-05-09 PROCEDURE — 99233 SBSQ HOSP IP/OBS HIGH 50: CPT | Performed by: PSYCHIATRY & NEUROLOGY

## 2019-05-09 PROCEDURE — 2000000000 HC ICU R&B

## 2019-05-09 PROCEDURE — 94761 N-INVAS EAR/PLS OXIMETRY MLT: CPT

## 2019-05-09 PROCEDURE — 6360000002 HC RX W HCPCS

## 2019-05-09 PROCEDURE — 84100 ASSAY OF PHOSPHORUS: CPT

## 2019-05-09 PROCEDURE — 97535 SELF CARE MNGMENT TRAINING: CPT

## 2019-05-09 PROCEDURE — 2580000003 HC RX 258: Performed by: INTERNAL MEDICINE

## 2019-05-09 PROCEDURE — 31646 BRNCHSC W/THER ASPIR SBSQ: CPT | Performed by: INTERNAL MEDICINE

## 2019-05-09 PROCEDURE — 84132 ASSAY OF SERUM POTASSIUM: CPT

## 2019-05-09 PROCEDURE — 2700000000 HC OXYGEN THERAPY PER DAY

## 2019-05-09 PROCEDURE — 85025 COMPLETE CBC W/AUTO DIFF WBC: CPT

## 2019-05-09 PROCEDURE — 87070 CULTURE OTHR SPECIMN AEROBIC: CPT

## 2019-05-09 PROCEDURE — 87205 SMEAR GRAM STAIN: CPT

## 2019-05-09 RX ORDER — LIDOCAINE HYDROCHLORIDE 20 MG/ML
INJECTION, SOLUTION INFILTRATION; PERINEURAL PRN
Status: DISCONTINUED | OUTPATIENT
Start: 2019-05-09 | End: 2019-05-09 | Stop reason: ALTCHOICE

## 2019-05-09 RX ORDER — FENTANYL CITRATE 50 UG/ML
INJECTION, SOLUTION INTRAMUSCULAR; INTRAVENOUS
Status: COMPLETED
Start: 2019-05-09 | End: 2019-05-09

## 2019-05-09 RX ORDER — MIDAZOLAM HYDROCHLORIDE 5 MG/ML
INJECTION INTRAMUSCULAR; INTRAVENOUS
Status: COMPLETED
Start: 2019-05-09 | End: 2019-05-09

## 2019-05-09 RX ORDER — FENTANYL CITRATE 50 UG/ML
INJECTION, SOLUTION INTRAMUSCULAR; INTRAVENOUS PRN
Status: DISCONTINUED | OUTPATIENT
Start: 2019-05-09 | End: 2019-05-09 | Stop reason: ALTCHOICE

## 2019-05-09 RX ORDER — MAGNESIUM HYDROXIDE 1200 MG/15ML
LIQUID ORAL CONTINUOUS PRN
Status: COMPLETED | OUTPATIENT
Start: 2019-05-09 | End: 2019-05-09

## 2019-05-09 RX ORDER — NICOTINE POLACRILEX 4 MG
15 LOZENGE BUCCAL PRN
Status: DISCONTINUED | OUTPATIENT
Start: 2019-05-09 | End: 2019-05-20 | Stop reason: HOSPADM

## 2019-05-09 RX ORDER — MIDAZOLAM HYDROCHLORIDE 5 MG/ML
INJECTION INTRAMUSCULAR; INTRAVENOUS PRN
Status: DISCONTINUED | OUTPATIENT
Start: 2019-05-09 | End: 2019-05-09 | Stop reason: ALTCHOICE

## 2019-05-09 RX ORDER — POTASSIUM CHLORIDE 7.45 MG/ML
10 INJECTION INTRAVENOUS
Status: ACTIVE | OUTPATIENT
Start: 2019-05-09 | End: 2019-05-09

## 2019-05-09 RX ORDER — ACETYLCYSTEINE 200 MG/ML
SOLUTION ORAL; RESPIRATORY (INHALATION) PRN
Status: DISCONTINUED | OUTPATIENT
Start: 2019-05-09 | End: 2019-05-09 | Stop reason: ALTCHOICE

## 2019-05-09 RX ORDER — DEXTROSE MONOHYDRATE 50 MG/ML
100 INJECTION, SOLUTION INTRAVENOUS PRN
Status: DISCONTINUED | OUTPATIENT
Start: 2019-05-09 | End: 2019-05-20 | Stop reason: HOSPADM

## 2019-05-09 RX ORDER — DEXTROSE MONOHYDRATE 25 G/50ML
12.5 INJECTION, SOLUTION INTRAVENOUS PRN
Status: DISCONTINUED | OUTPATIENT
Start: 2019-05-09 | End: 2019-05-20 | Stop reason: HOSPADM

## 2019-05-09 RX ADMIN — Medication 10 ML: at 20:34

## 2019-05-09 RX ADMIN — INSULIN LISPRO 2 UNITS: 100 INJECTION, SOLUTION INTRAVENOUS; SUBCUTANEOUS at 04:22

## 2019-05-09 RX ADMIN — MICONAZOLE NITRATE: 2 POWDER TOPICAL at 20:34

## 2019-05-09 RX ADMIN — ASPIRIN 325 MG: 325 TABLET, COATED ORAL at 08:58

## 2019-05-09 RX ADMIN — Medication 10 ML: at 08:59

## 2019-05-09 RX ADMIN — IPRATROPIUM BROMIDE AND ALBUTEROL SULFATE 1 AMPULE: .5; 3 SOLUTION RESPIRATORY (INHALATION) at 07:48

## 2019-05-09 RX ADMIN — Medication 250 MG: at 08:58

## 2019-05-09 RX ADMIN — POTASSIUM CHLORIDE 20 MEQ: 29.8 INJECTION, SOLUTION INTRAVENOUS at 07:12

## 2019-05-09 RX ADMIN — CEFAZOLIN 1 G: 1 INJECTION, POWDER, FOR SOLUTION INTRAMUSCULAR; INTRAVENOUS at 02:09

## 2019-05-09 RX ADMIN — MICONAZOLE NITRATE: 2 POWDER TOPICAL at 08:59

## 2019-05-09 RX ADMIN — MAGNESIUM SULFATE HEPTAHYDRATE 1 G: 1 INJECTION, SOLUTION INTRAVENOUS at 06:04

## 2019-05-09 RX ADMIN — IPRATROPIUM BROMIDE AND ALBUTEROL SULFATE 1 AMPULE: .5; 3 SOLUTION RESPIRATORY (INHALATION) at 15:43

## 2019-05-09 RX ADMIN — IPRATROPIUM BROMIDE AND ALBUTEROL SULFATE 1 AMPULE: .5; 3 SOLUTION RESPIRATORY (INHALATION) at 11:33

## 2019-05-09 RX ADMIN — MIDAZOLAM HYDROCHLORIDE: 5 INJECTION, SOLUTION INTRAMUSCULAR; INTRAVENOUS at 10:59

## 2019-05-09 RX ADMIN — POTASSIUM CHLORIDE 20 MEQ: 29.8 INJECTION, SOLUTION INTRAVENOUS at 06:01

## 2019-05-09 RX ADMIN — PANTOPRAZOLE SODIUM 40 MG: 40 INJECTION, POWDER, FOR SOLUTION INTRAVENOUS at 20:34

## 2019-05-09 RX ADMIN — ATORVASTATIN CALCIUM 40 MG: 40 TABLET, FILM COATED ORAL at 20:33

## 2019-05-09 RX ADMIN — FENTANYL CITRATE: 50 INJECTION, SOLUTION INTRAMUSCULAR; INTRAVENOUS at 10:58

## 2019-05-09 RX ADMIN — PANTOPRAZOLE SODIUM 40 MG: 40 INJECTION, POWDER, FOR SOLUTION INTRAVENOUS at 08:59

## 2019-05-09 RX ADMIN — INSULIN GLARGINE 25 UNITS: 100 INJECTION, SOLUTION SUBCUTANEOUS at 08:58

## 2019-05-09 RX ADMIN — IPRATROPIUM BROMIDE AND ALBUTEROL SULFATE 1 AMPULE: .5; 3 SOLUTION RESPIRATORY (INHALATION) at 19:49

## 2019-05-09 RX ADMIN — IPRATROPIUM BROMIDE AND ALBUTEROL SULFATE 1 AMPULE: .5; 3 SOLUTION RESPIRATORY (INHALATION) at 04:08

## 2019-05-09 RX ADMIN — Medication 250 MG: at 20:33

## 2019-05-09 RX ADMIN — MAGNESIUM SULFATE HEPTAHYDRATE 1 G: 1 INJECTION, SOLUTION INTRAVENOUS at 07:13

## 2019-05-09 ASSESSMENT — PAIN SCALES - GENERAL
PAINLEVEL_OUTOF10: 0
PAINLEVEL_OUTOF10: 4
PAINLEVEL_OUTOF10: 2

## 2019-05-09 NOTE — PROGRESS NOTES
Nephrology Progress Note   [email or web address]      This patient is a 81year old female whom we are following for LOPEZ and electrolytes abnormalities. Subjective: The patient was seen and examined. Extubated last night. BP stable. Good urine output after a dose of Lasix IV yesterday. Family History: No family at bedside  ROS: No fever or chills      Vitals:  BP (!) 155/75   Pulse 105   Temp 98 °F (36.7 °C)   Resp 23   Ht 5' 1\" (1.549 m)   Wt 123 lb 0.3 oz (55.8 kg)   SpO2 99%   BMI 23.24 kg/m²   I/O last 3 completed shifts: In: 1287.9 [I.V.:296.9; NG/GT:991]  Out: 2913 [Urine:3725; Stool:50]  I/O this shift:  In: -   Out: 400 [Urine:400]    Physical Exam:  Physical Exam   Constitutional: She is cooperative. No distress. HENT:   Head: Normocephalic and atraumatic. Eyes: Conjunctivae are normal. No scleral icterus. Neck: No tracheal deviation present. No thyromegaly present. Cardiovascular: Exam reveals no gallop and no friction rub. Tachycardic, regular rhythm   Pulmonary/Chest: Effort normal. No respiratory distress. Coarse breath sounds bilateral   Abdominal: Soft. Bowel sounds are normal. She exhibits no distension. There is no tenderness. Musculoskeletal: She exhibits no edema. Neurological: She is alert. Skin: Skin is warm. Psychiatric: She has a normal mood and affect. Vitals reviewed.         Medications:   saccharomyces boulardii  250 mg Oral BID    atorvastatin  40 mg Oral Nightly    aspirin  325 mg Oral Daily    insulin glargine  25 Units Subcutaneous Daily    insulin lispro  0-12 Units Subcutaneous Q4H    miconazole   Topical BID    ipratropium-albuterol  1 ampule Inhalation Q4H    pantoprazole  40 mg Intravenous BID    sodium chloride flush  10 mL Intravenous 2 times per day         Labs:  Recent Labs     05/07/19  0415 05/08/19  0500 05/09/19  0515   WBC 15.3* 14.9* 16.6*   HGB 8.5* 8.5* 8.7*   HCT 25.3* 24.9* 26.7*   MCV 84.3 84.3 86.4   * 114* 190 Recent Labs     05/07/19  0415 05/08/19  0500 05/09/19  0515    146* 147*   K 3.7 3.6 3.4*    112* 109   CO2 22 24 27   GLUCOSE 252* 139* 137*   PHOS 3.2 3.7 4.1   MG 1.90 1.80 1.60*   BUN 33* 29* 25*   CREATININE 1.2 1.1 0.9   LABGLOM 45* 50* >60   GFRAA 54* 60* >60           Assessment/Plan:  Ms. Sondra Ojeda is a 81year old female with PMHx of CAD, DM, HTN and seizures who presents after a cardiac arrest.     Acute Kidney Injury.  - Etiology: ATN in the setting of cardiac arrest/sepsis. - Data: Last serum creatinine in 07/2016 was <0.5mg/dL. - Urinalysis with blood and protein. - Clinical: Scr improving, now down to 0.9. Good urine output.      Severe Anion Gap Metabolic Acidosis. - Likely from DKA and lactic acidosis. No toxic alcohol ingestion.  - Methanol, Ethylene glycol negative  - Resolved      Anemia.  - Had coffee-ground emesis on admission.  - Plans per GI.     S/P Cardiac Arrest.  - Plans per Cardiology      Hypophosphatemia  - Prn IV phos replacement     Hypokalemia  - Will give KCL 40meq IV x 1 today.     Multiple CVAs  - MRI brain 5/3, neurology seeing     Hypernatremia, mild. - FWF with TF.     Acute Respiratory Failure. - Extubated on 5/8/19.  - Plans per pulmonary. Please do not hesitate to contact me at [phone] if with questions. Thank you!     Ashlie Mast MD  5/9/2019  [facility]

## 2019-05-09 NOTE — PROCEDURES
Bronchoscopy note    Patient with acute respiratory failure with hypoxemia, pneumonia, acute CVA who was successfully extubated yesterday now having increased chest congestion, mucus plugging in the bronchi along with ineffective airway clearance and in order to decrease the chances of reintubation due to airway compromise it was decided to a bronchoscopy for therapeutic reasons and for that reason after informed consent, the endoscopy team was requested to come to the bedside, patient was given oropharyngeal analgesia with benzocaine and after timeout, patient was given lidocaine for tracheal bronchial analgesia, patient was given conscious sedation in the form of 1.5 mg of Versed and 37.5 µg of fentanyl for the procedure, the details of the sedation are as following--    Physician/patient of face-to-face sedation start time was 9:41 AM  Physician/patient face-to-face sedation stop time was 10:05 AM  Total moderate sedation time in minutes was 24 minutes  The patient was monitored continuously  throughout the entire procedure while the sedation was being administered    The bronchoscopic was induced by mouth using a bite block, patient was found to have thick mucous plugs in the posterior pharynx and around the vocal cords which was suctioned out, patient was also found to have thick mucous plugs adherent to the inferior side of the vocal cords with some bleeding which are quite difficult to suction out and had to use bronchoscopic forceps to remove piece by piece with limited success, patient's entire tracheobronchial tree was full of thick mucous plugs which were light yellow in color but they were quite tenacious and viscus and the entire tracheobronchial tree was therapeutically aspirated out using Mucomyst and saline; patient tolerated the procedure well and did not have any apparent complications; patient's bronchoscopy findings were discussed with patient's family along with options  The bronchial washings were sent for routine culture    Further management depending on patient's response to the above measures    Ace Soria MD

## 2019-05-09 NOTE — PROGRESS NOTES
Hospitalist Progress Note      PCP: Deloris Medina PA-C    Date of Admission: 4/29/2019    Chief Complaint: [facility] Course:      Subjective:   Patient is up in bed, seems comfortable, not in distress. Difficulty to follow command. No new event overnight noted. Medications:  Reviewed    Infusion Medications    dextrose       Scheduled Medications    potassium chloride  10 mEq Intravenous Q1H    saccharomyces boulardii  250 mg Oral BID    atorvastatin  40 mg Oral Nightly    aspirin  325 mg Oral Daily    insulin glargine  25 Units Subcutaneous Daily    insulin lispro  0-12 Units Subcutaneous Q4H    miconazole   Topical BID    ipratropium-albuterol  1 ampule Inhalation Q4H    pantoprazole  40 mg Intravenous BID    sodium chloride flush  10 mL Intravenous 2 times per day     PRN Meds: glucose, dextrose, glucagon (rDNA), dextrose, magnesium sulfate, potassium chloride, acetaminophen, sodium chloride flush, magnesium hydroxide, acetaminophen      Intake/Output Summary (Last 24 hours) at 5/9/2019 1404  Last data filed at 5/9/2019 1255  Gross per 24 hour   Intake 1117.85 ml   Output 2875 ml   Net -1757.15 ml       Physical Exam Performed:    BP (!) 144/66   Pulse 100   Temp 98 °F (36.7 °C) (Axillary)   Resp 20   Ht 5' 1\" (1.549 m)   Wt 123 lb 0.3 oz (55.8 kg)   SpO2 100%   BMI 23.24 kg/m²     General appearance: No apparent distress, appears stated age and cooperative. HEENT: Pupils equal, round, and reactive to light. Conjunctivae/corneas clear. Neck: Supple, with full range of motion. No jugular venous distention. Trachea midline. Respiratory:  Normal respiratory effort. Clear to auscultation, bilaterally without Rales/Wheezes/Rhonchi. Cardiovascular: Regular rate and rhythm with normal S1/S2 without murmurs, rubs or gallops. Abdomen: Soft, non-tender, non-distended with normal bowel sounds. Musculoskeletal: No clubbing, cyanosis or edema bilaterally.   Full range of motion without deformity. Skin: Skin color, texture, turgor normal.  No rashes or lesions. Neurologic:  Neurovascularly intact without any focal sensory/motor deficits. Cranial nerves: II-XII intact, difficult to evaluate since patient does not follow commands properly. Psychiatric: Alert and oriented to self only. Does not follow commands properly. Capillary Refill: Brisk,< 3 seconds   Peripheral Pulses: +2 palpable, equal bilaterally       Labs:   Recent Labs     05/07/19 0415 05/08/19  0500 05/09/19  0515   WBC 15.3* 14.9* 16.6*   HGB 8.5* 8.5* 8.7*   HCT 25.3* 24.9* 26.7*   * 114* 190     Recent Labs     05/07/19 0415 05/08/19  0500 05/09/19  0515 05/09/19  1246    146* 147*  --    K 3.7 3.6 3.4* 3.7    112* 109  --    CO2 22 24 27  --    BUN 33* 29* 25*  --    CREATININE 1.2 1.1 0.9  --    CALCIUM 7.0* 7.5* 7.5*  --    PHOS 3.2 3.7 4.1  --        Urinalysis:      Lab Results   Component Value Date    NITRU Negative 04/29/2019    WBCUA 10-20 04/29/2019    BACTERIA 1+ 04/29/2019    RBCUA 3-5 04/29/2019    BLOODU MODERATE 04/29/2019    SPECGRAV 1.025 04/29/2019    GLUCOSEU >=1000 04/29/2019       Radiology:  XR CHEST PORTABLE   Final Result   New right basilar and stable multifocal left lung airspace disease. XR CHEST PORTABLE   Final Result   No significant change in bilateral airspace disease given change in technique. VL DUP CAROTID BILATERAL   Final Result      XR CHEST PORTABLE   Final Result   Worsening pulmonary edema with grossly stable left basilar atelectasis and/or   pneumonia. XR ABDOMEN (KUB) (SINGLE AP VIEW)   Final Result   Nasogastric tube in good position. Persistent left lower lobe airspace disease         US GALLBLADDER RUQ   Final Result   Smaller free fluid seen in the right upper quadrant in the region of the   gallbladder. The gallbladder appears unremarkable. No gallstones. XR CHEST PORTABLE   Final Result   1.  No significant change. MRI BRAIN WO CONTRAST   Final Result   1. Acute/early subacute infarction involving the left hippocampus. Additional punctate infarctions within the frontal lobes and posterior   temporal lobes bilaterally. This raises the possibility for thromboembolic   phenomenon from a central source. No associated hemorrhage. 2. Diffuse parenchymal volume loss and sequela of chronic microvascular   ischemic changes. The findings were sent to the Radiology Results [address] at 8:34   am on 5/5/2019 to be communicated to a licensed caregiver. XR CHEST PORTABLE   Final Result   Bilateral lower lobe airspace disease, left greater than right, increased   compared to prior         XR CHEST PORTABLE   Final Result   Stable appearance of the chest with dense opacification in the left mid and   lower lung zone. XR CHEST PORTABLE   Final Result   1. No significant interval change in the left-sided opacity reflecting   pneumonia better characterized on the CT of the thorax from 04/29/2019.   2. Support devices as described above. XR CHEST PORTABLE   Final Result   Catheter in good position. No postprocedure pneumothorax. XR CHEST PORTABLE   Final Result   Supportive devices are stable. Persisting consolidations in the left lung base, consistent with pneumonia. CT CHEST ABDOMEN PELVIS WO CONTRAST   Final Result   Dense consolidation within the inferior aspect of left upper lobe and   throughout the left lower lobe, most compatible with pneumonia and/or   aspiration. That should be followed to resolution, especially given the   nodular morphology within portions of the consolidation. Diffuse mural thickening of the esophagus. Correlate with clinical evidence   of esophagitis. The tip of the right femoral venous catheter is malpositioned within a sacral   vein. That should be repositioned for more optimal placement.          CT CERVICAL SPINE WO CONTRAST   Final Result   Multilevel degenerative changes with no acute abnormality of the cervical   spine. CT HEAD WO CONTRAST   Final Result   Motion degraded study with no acute intracranial abnormality. XR CHEST PORTABLE   Final Result   Supportive tubing projects in normal position. Left basilar airspace disease, pneumonia versus aspiration pneumonitis. There may be a component of pleural effusion. XR CHEST PORTABLE   Final Result   Atelectasis at the left lung base. Question of small left pleural effusion. Assessment/Plan:    Active Hospital Problems    Diagnosis Date Noted    Acute renal failure (ARF) ([facility] [N17.9] 08/20/2015     Priority: High     Class: Acute    Acute pulmonary edema ([facility] [J81.0] 05/07/2019    Pulmonary infiltrate [R91.8] 05/07/2019    Atelectasis [J98.11] 05/07/2019    Staphylococcus aureus pneumonia ([facility] [J15.211] 05/06/2019    Altered mental status [R41.82]     Non-traumatic rhabdomyolysis [M62.82]     Acute encephalopathy [G93.40]     Arterial ischemic stroke, ICA, left, acute ([facility] [I63.232]     Pneumonia due to organism [J18.9]     Acute respiratory failure (HCC) [J96.00]     Prolonged Q-T interval on ECG [R94.31]     Hypokalemia [E87.6]     Elevated troponin [R74.8]     DKA (diabetic ketoacidoses) ([facility] [E13.10] 04/29/2019    Acidosis [E87.2] 04/29/2019    Cardiac arrest ([facility] [I46.9] 04/29/2019    DKA, type 2, not at goal [facility]) [E11.10] 04/29/2019     82 yo female with h/o CAD, DM2, HTN, depression admitted after being found unresponsive with acute respiratory failure due to pneumonia, DKA, metabolic encephalopathy, LOPEZ, metabolic acidosis, acute GI bleed.       Acute respiratory failure secondary to aspiration pneumonia and associated septic shock- POA. Required intubation and sedation. Initially on Merrem/Linezolid and changed to unasyn. Abx started 4/30. Blood Cx neg thus far. Pulm following.  Bronch 5/1/19, 5/2/19 - necrotizing features of PNA. Respiratory culture with staph. Awaiting neuro input for CNS status evaluation. MRI of brain done -showing multiple infarct area bilaterally raising possibility of thromboembolic infarcts. Trial of SBT daily, plan to wean off ventilator as per pulmonary. Patient was extubated on 5/8/2019, continue nasal cannula oxygen and supportive care, vigorous pulmonary toileting, further management as per pulmonary.     Acute multiple bilateral  thromboembolic infarcts, noted an MRI, source unknown, s/p  SERVANDO- unremarkable, continue aspirin and statin, neurology following, monitor.     DKA - pt without diabetes medications for weeks if not months. Unsure of BS at home. Last A1c on record is 5.3 in 2015. New A1c 16. Initially on insulin gtt and transitioned to lantus daily, SSI. Monitor lantus dose with presumed CKD as having some hypoglycemia due to stacking. Glucose on arrival 916 now controlled. Started on TF. Clinically remained stable.     Anion gap Metabolic acidosis and LOPEZ - secondary to above. Following BMPs. Nephrology following. Cr 2.0. IVF. Likely ATN. Slowly improving. No need for HD at this time. Continue to monitor.      Possible cardiac arrest? - troponin elevated but more likely due to demand and ischemia. Follow trop. Cardiology following. ECHO 55%, grade 1 DD, mild to mod AR. EP consulted for prolonged Qtc. Clinically hemodynamically remained stable.     Hematemesis and anemia- on admission. PPI. Hbg stable. GI consult. Conservative management at this time.      Hypotension - likely due to dehydration, infection, and severe acidosis - remains on pressors. Weaning as tolerates.      Vaginal candidiasis - fluconazole x 3 doses. DVT Prophylaxis: Lovenox  Diet: Diet NPO Effective Now  Code Status: Full Code    PT/OT Eval Status: Not ordered    Dispo - 2-4 days, possible placement in LTAC.     Olman Shen MD

## 2019-05-09 NOTE — PROGRESS NOTES
Jess Davis  Neurology Follow-up  [facility] Neurology    Date of Service: 5/9/2019    Subjective:   CC: Follow up today regarding: acute encephalopathy    Events noted. Chart and lab reviewed. The patient has been extubated. She is awake and alert. Soft speech but can follow directions. Generalized diffuse weakness. Denies any pain, headache or chest pain. Other review of system was limited due to poor cooperation from the patient.       Past Medical History:   Diagnosis Date    [facility] failure ([facility]F) ([facility] 8/20/2015    Asthma     CAD (coronary artery disease)     MI in 2013    Diabetes mellitus ([facility]     Hypertension     Pneumonia     Not sure when    Seizures ([facility]     She goes numb, doctor told her they are seizures    Unspecified cerebral artery occlusion with cerebral infarction     Pt reports having a lot of mini-strokes; first one was 2002; last one was a couple years ago     Current Facility-Administered Medications   Medication Dose Route Frequency Provider Last Rate Last Dose    glucose (GLUTOSE) 40 % oral gel 15 g  15 g Oral PRN Sebastián Pereira MD        dextrose 50 % solution 12.5 g  12.5 g Intravenous PRN Sebastián Pereira MD        glucagon (rDNA) injection 1 mg  1 mg Intramuscular PRN Sebastián Pereira MD        dextrose 5 % solution  100 mL/hr Intravenous PRN Sebastián Pereira MD        potassium chloride 10 mEq/100 mL IVPB (Peripheral Line)  10 mEq Intravenous Q1H Dani Keller MD        saccharomyces boulardii (FLORASTOR) capsule 250 mg  250 mg Oral BID Sebastián Pereira MD   250 mg at 05/09/19 0858    atorvastatin (LIPITOR) tablet 40 mg  40 mg Oral Nightly Michail Bence, MD   40 mg at 05/08/19 2000    aspirin tablet 325 mg  325 mg Oral Daily Michail Bence, MD   325 mg at 05/09/19 0858    insulin glargine (LANTUS) injection vial 25 Units  25 Units Subcutaneous Daily Sebastián Pereira MD   25 Units at 05/09/19 0858    magnesium sulfate 1 g in dextrose 5% 100 mL IVPB  1 g Intravenous PRN Perez Rendon MD   Stopped at 05/09/19 0813    potassium chloride 20 mEq/50 mL IVPB (Central Line)  20 mEq Intravenous PRN Perez Rendon MD 50 mL/hr at 05/09/19 0712 20 mEq at 05/09/19 2070    insulin lispro (HUMALOG) injection vial 0-12 Units  0-12 Units Subcutaneous Q4H Celine Frey MD   2 Units at 05/09/19 0422    miconazole (MICOTIN) 2 % powder   Topical BID Socorro MD Shante        ipratropium-albuterol (DUONEB) nebulizer solution 1 ampule  1 ampule Inhalation Q4H Fifi Perez MD   1 ampule at 05/09/19 1133    acetaminophen (TYLENOL) 160 MG/5ML solution 650 mg  650 mg Oral Q4H PRN Fifi Perez MD   650 mg at 05/08/19 1024    pantoprazole (PROTONIX) injection 40 mg  40 mg Intravenous BID Fifi Perez MD   40 mg at 05/09/19 0859    sodium chloride flush 0.9 % injection 10 mL  10 mL Intravenous 2 times per day Kuldip Garcia MD   10 mL at 05/09/19 0859    sodium chloride flush 0.9 % injection 10 mL  10 mL Intravenous PRN Kuldip Garcia MD        magnesium hydroxide (MILK OF MAGNESIA) 400 MG/5ML suspension 30 mL  30 mL Oral Daily PRN Kuldip Garcia MD        acetaminophen (TYLENOL) suppository 650 mg  650 mg Rectal Q4H PRN Kuldip Garcia MD   650 mg at 04/30/19 0048     Allergies   Allergen Reactions    Aspirin Nausea And Vomiting     family history includes Cancer in her mother and sister; Diabetes in her father; Heart Disease in her father; High Blood Pressure in her father; High Cholesterol in her father. reports that she has never smoked. She has never used smokeless tobacco. She reports that she does not drink alcohol or use drugs.          Objective:  Constitutional:     Constitutional:   Vitals:    05/09/19 1432 05/09/19 1500 05/09/19 1544 05/09/19 1553   BP:  (!) 146/71     Pulse: 101 102  100   Resp: 29 19 22 (!) 31   Temp:       TempSrc:       SpO2: 97% 100% 99% 100%   Weight:       Height:           General appearance: NAD  Eye: No icterus. PRRR  Fundus: Funduscopic examination could not be performed due to patient's poor cooperation. Neck: supple  Cardiovascular:          No lower leg edema with good pulsation. Mental Status:   Waxing and waning. AAO times one. Poor attention and concentration span. Soft speech  Unable to assess fund of knowledge, recent and remote memory due to confusion      Cranial Nerves:   II: Visual fields: NT due to confusion. Pupils: equal, round, reactive to light  III,IV,VI: No gaze preference. No nystagmus  V: Facial sensation : NT due to confusion  VII: Facial strength and movements: intact and symmetric  VIII: Hearing: NT due to confusion  IX: Palate elevation NT due to confusion  XI: Shoulder shrug NT due to confusion  XII: Tongue movements : midline  Musculoskeletal:  The patient can move all 4 extremities. Generalized diffuse weakness 3 +/5 more right than left    Tone: Normal tone. No rigidity. Reflexes: Bilateral biceps 2/4, triceps 2/4, brachial radialis 2/4, knee 2/4 and ankle 2/4. Planters: flexor bilaterally. Coordination: NT due to confusion  Sensation: NT due to confusion  Gait/Posture: NT due to confusion      Data:  LABS:   Lab Results   Component Value Date     05/09/2019    K 3.7 05/09/2019    K 5.2 04/30/2019     05/09/2019    CO2 27 05/09/2019    BUN 25 05/09/2019    CREATININE 0.9 05/09/2019    GFRAA >60 05/09/2019    LABGLOM >60 05/09/2019    GLUCOSE 137 05/09/2019    PHOS 4.1 05/09/2019    MG 1.60 05/09/2019    CALCIUM 7.5 05/09/2019     Lab Results   Component Value Date    WBC 16.6 05/09/2019    RBC 3.09 05/09/2019    HGB 8.7 05/09/2019    HCT 26.7 05/09/2019    MCV 86.4 05/09/2019    RDW 15.1 05/09/2019     05/09/2019     Lab Results   Component Value Date    INR 1.25 (H) 04/29/2019    PROTIME 14.2 (H) 04/29/2019       Neuroimaging and/or  labs reviewed by me and DW her family    Impression:  Acute encephalopathy, severe.   Likely multifactorial. Possible acute metabolic encephalopathy from DKA and hypoxic- ischemic insult. New ischemic bilateral hemispheric strokes affecting more left temporal region. Possible embolic. DKA  Diabetes, poorly controlled  Acute respiratory failure with hypoxemia, improved. Acute kidney injury  Pneumonia  GIB   No change      Recommendation    Continue the current supportive care  Speech and swallow evaluation  Aspiration precautions  PT and OT  Insulin sliding scale  Continue current respiratory support  Aspirin  Statin  Telemetry  Follow CBC  DC planning when medically stable  Discussed with her family        Paige Amador MD   [phone]      This dictation was generated by voice recognition computer software. Although all attempts are made to edit the dictation for accuracy, there may be errors in the transcription that are not intended.

## 2019-05-09 NOTE — PROGRESS NOTES
INPATIENT PULMONARY CRITICAL CARE PROGRESS NOTE      Reason for visit     assistance with critical care management        SUBJECTIVE:  Patient continues to be critically ill;patient was given trial of CPAP with PSV initially but patient was somewhat agitated and patient was started on low-dose of IV Precedex infusion and subsequently patient was started again on CPAP with pressure support of which she tolerated well and patient was comfortable and was not having noted volumes on the ventilator and also patient's respiratory rate was adequate and patient's acid base balance was normal and patient was communicative for which reason patient was extubated and put on nasal cannula oxygen, patient has been having increased chest congestion and is not able to expectorate much, patient has been having increased chest congestion for which frequent suctioning of the upper airways has been done as per nursing and patient sounded more congested this morning also because patient still has some weak cough reflex, patient has been afebrile this morning, patient is blood sugars are acceptable  , patient has normal sinus rhythm on the monitor, patient has had adequate urine output,, patient remains off IV fluids  cumulative fluid balance is +10.4 L, no other pertinent review of system of concern     Physical Exam:  Blood pressure (!) 144/66, pulse 100, temperature 98 °F (36.7 °C), temperature source Axillary, resp. rate 20, height 5' 1\" (1.549 m), weight 123 lb 0.3 oz (55.8 kg), SpO2 100 %, not currently breastfeeding. Constitutional:  Increased respiratory distress with increased chest congestion when seen this morning  HENT: . No thyromegaly. Eyes:  Conjunctivae are normal. Pupils equal, round, and reactive to light. No scleral icterus. Neck: . No tracheal deviation present. No obvious thyroid mass. Cardiovascular: Normal rate, regular rhythm, normal heart sounds. No right ventricular heave.  No lower extremity edema.  Pulmonary/Chest: No wheezes. Worsening rales. Increased chest congestion  Chest wall is not dull to percussion. No accessory muscle usage or stridor. decreased BSI    Abdominal: Soft. Bowel sounds present. No distension or hernia. No tenderness. Musculoskeletal: No cyanosis. No clubbing. No obvious joint deformity. Lymphadenopathy: No cervical or supraclavicular adenopathy. Skin: Skin is warm and dry. No rash or nodules on the exposed extremities. Neurologic: I communicative to some extent to simple commands         Results:  CBC:   Recent Labs     05/07/19 0415 05/08/19  0500 05/09/19  0515   WBC 15.3* 14.9* 16.6*   HGB 8.5* 8.5* 8.7*   HCT 25.3* 24.9* 26.7*   MCV 84.3 84.3 86.4   * 114* 190     BMP:   Recent Labs     05/07/19 0415 05/08/19  0500 05/09/19  0515 05/09/19  1246    146* 147*  --    K 3.7 3.6 3.4* 3.7    112* 109  --    CO2 22 24 27  --    PHOS 3.2 3.7 4.1  --    BUN 33* 29* 25*  --    CREATININE 1.2 1.1 0.9  --        Imaging:  I have reviewed radiology images personally. XR CHEST PORTABLE   Final Result   New right basilar and stable multifocal left lung airspace disease. XR CHEST PORTABLE   Final Result   No significant change in bilateral airspace disease given change in technique. VL DUP CAROTID BILATERAL   Final Result      XR CHEST PORTABLE   Final Result   Worsening pulmonary edema with grossly stable left basilar atelectasis and/or   pneumonia. XR ABDOMEN (KUB) (SINGLE AP VIEW)   Final Result   Nasogastric tube in good position. Persistent left lower lobe airspace disease         US GALLBLADDER RUQ   Final Result   Smaller free fluid seen in the right upper quadrant in the region of the   gallbladder. The gallbladder appears unremarkable. No gallstones. XR CHEST PORTABLE   Final Result   1. No significant change. MRI BRAIN WO CONTRAST   Final Result   1.  Acute/early subacute infarction involving the left hippocampus. Additional punctate infarctions within the frontal lobes and posterior   temporal lobes bilaterally. This raises the possibility for thromboembolic   phenomenon from a central source. No associated hemorrhage. 2. Diffuse parenchymal volume loss and sequela of chronic microvascular   ischemic changes. The findings were sent to the Radiology Results [address] at 8:34   am on 5/5/2019 to be communicated to a licensed caregiver. XR CHEST PORTABLE   Final Result   Bilateral lower lobe airspace disease, left greater than right, increased   compared to prior         XR CHEST PORTABLE   Final Result   Stable appearance of the chest with dense opacification in the left mid and   lower lung zone. XR CHEST PORTABLE   Final Result   1. No significant interval change in the left-sided opacity reflecting   pneumonia better characterized on the CT of the thorax from 04/29/2019.   2. Support devices as described above. XR CHEST PORTABLE   Final Result   Catheter in good position. No postprocedure pneumothorax. XR CHEST PORTABLE   Final Result   Supportive devices are stable. Persisting consolidations in the left lung base, consistent with pneumonia. CT CHEST ABDOMEN PELVIS WO CONTRAST   Final Result   Dense consolidation within the inferior aspect of left upper lobe and   throughout the left lower lobe, most compatible with pneumonia and/or   aspiration. That should be followed to resolution, especially given the   nodular morphology within portions of the consolidation. Diffuse mural thickening of the esophagus. Correlate with clinical evidence   of esophagitis. The tip of the right femoral venous catheter is malpositioned within a sacral   vein. That should be repositioned for more optimal placement. CT CERVICAL SPINE WO CONTRAST   Final Result   Multilevel degenerative changes with no acute abnormality of the cervical   spine. CT HEAD WO CONTRAST   Final Result   Motion degraded study with no acute intracranial abnormality. XR CHEST PORTABLE   Final Result   Supportive tubing projects in normal position. Left basilar airspace disease, pneumonia versus aspiration pneumonitis. There may be a component of pleural effusion. XR CHEST PORTABLE   Final Result   Atelectasis at the left lung base. Question of small left pleural effusion. Results for Yoana Brown (MRN [id number]) as of 5/9/2019 15:22   Ref. Range 5/8/2019 05:00 5/8/2019 07:36 5/8/2019 12:18 5/8/2019 13:32 5/8/2019 17:14 5/8/2019 19:21 5/9/2019 00:27 5/9/2019 04:15 5/9/2019 05:15   Sodium Latest Ref Range: 136 - 145 mmol/L 146 (H)        147 (H)   Potassium Latest Ref Range: 3.5 - 5.1 mmol/L 3.6        3.4 (L)   Chloride Latest Ref Range: 99 - 110 mmol/L 112 (H)        109   CO2 Latest Ref Range: 21 - 32 mmol/L 24        27   BUN Latest Ref Range: 7 - 20 mg/dL 29 (H)        25 (H)   Creatinine Latest Ref Range: 0.6 - 1.2 mg/dL 1.1        0.9   Anion Gap Latest Ref Range: 3 - 16  10        11   GFR Non- Latest Ref Range: >60  50 (A)        >60   GFR  Latest Ref Range: >60  60 (A)        >60   Magnesium Latest Ref Range: 1.80 - 2.40 mg/dL 1.80        1.60 (L)   Glucose Latest Ref Range: 70 - 99 mg/dL 139 (H)        137 (H)   POC Glucose Latest Ref Range: 70 - 99 mg/dl  139 (H) 111 (H) 126 (H) 63 (L) 93 112 (H) 148 (H)    Calcium Latest Ref Range: 8.3 - 10.6 mg/dL 7.5 (L)        7.5 (L)   Phosphorus Latest Ref Range: 2.5 - 4.9 mg/dL 3.7        4.1     Results for Yoana Brown (MRN [id number]) as of 5/9/2019 15:22   Ref.  Range 5/7/2019 04:15 5/8/2019 05:00 5/9/2019 05:15   WBC Latest Ref Range: 4.0 - 11.0 K/uL 15.3 (H) 14.9 (H) 16.6 (H)   RBC Latest Ref Range: 4.00 - 5.20 M/uL 3.00 (L) 2.96 (L) 3.09 (L)   Hemoglobin Quant Latest Ref Range: 12.0 - 16.0 g/dL 8.5 (L) 8.5 (L) 8.7 (L)   Hematocrit Latest Ref Range: 36.0 - 48.0 % 25.3 (L) 24.9 (L) 26.7 (L)   MCV Latest Ref Range: 80.0 - 100.0 fL 84.3 84.3 86.4   MCH Latest Ref Range: 26.0 - 34.0 pg 28.3 28.7 28.3   MCHC Latest Ref Range: 31.0 - 36.0 g/dL 33.6 34.1 32.8   MPV Latest Ref Range: 5.0 - 10.5 fL 8.4 8.8 8.8   RDW Latest Ref Range: 12.4 - 15.4 % 15.0 15.0 15.1   Platelet Count Latest Ref Range: 135 - 450 K/uL 101 (L) 114 (L) 190   Neutrophils % Latest Units: % 80.4 83.3 86.5   Lymphocyte % Latest Units: % 13.1 12.2 9.0     Results for Rafael Farooq (MRN [id number]) as of 5/9/2019 15:22   Ref. Range 5/1/2019 11:11 5/1/2019 11:11 5/1/2019 11:11 5/7/2019 16:40 5/9/2019 10:05   RESPIRATORY CULTURE Unknown    Rpt Rpt   Fungus (Mycology) Culture Unknown  Moderate growth. .. Pend     Gram Stain Result Unknown    4+ WBC's (Polymor. .. 2+ WBC's (Polymor. .. Organism Unknown Candida albicans (A) Candida albicans (A)      CULTURE, RESPIRATORY Unknown 10,000-100,000 CF. .. Pend  Fungus/Yeast pres. .. Fungus Stain Unknown  No Fungal element. .. FUNGUS CULTURE Unknown  Rpt (A)        Results for Rafael Farooq (MRN [id number]) as of 5/9/2019 15:22   Ref.  Range 5/6/2019 03:59 5/7/2019 04:14 5/8/2019 05:08 5/8/2019 13:32 5/9/2019 05:15   Hemoglobin, Art, Extended Latest Ref Range: 12.0 - 16.0 g/dL   9.6 (L)  9.5 (L)   pH, Arterial Latest Ref Range: 7.350 - 7.450  7.499 (H) 7.453 (H) 7.509 (H) 7.531 (H) 7.481 (H)   pCO2, Arterial Latest Ref Range: 35.0 - 45.0 mmHg 25.7 (L) 30.0 (L) 30.3 (L) 29.3 (L) 36.3   pO2, Arterial Latest Ref Range: 75.0 - 108.0 mmHg 86.2 51.8 (L) 76.9 89.2 57.5 (L)   HCO3, Arterial Latest Ref Range: 21.0 - 29.0 mmol/L 20.0 (L) 21.0 23.6 24.5 26.5   TCO2 (calc), Art Latest Ref Range: Not Established mmol/L 21 22 24.5 25 27.6   Base Excess, Arterial Latest Ref Range: -3.0 - 3.0 mmol/L -3 -3 1.0 2 3.0   O2 Sat, Arterial Latest Ref Range: >92 % 98 89 (L) 95.8 98 89.4 (L)   O2 Content, Arterial Latest Ref Range: Not Established mL/dL   13  12 Methemoglobin, Arterial Latest Ref Range: <1.5 %   0.7  0.5   Carboxyhgb, Arterial Latest Ref Range: 0.0 - 1.5 %   0.2  0.0   Sample Type Unknown ART ART  ART        ONE XRAY VIEW OF THE CHEST       5/9/2019 4:08 am       COMPARISON:   05/08/2019       HISTORY:   ORDERING SYSTEM PROVIDED HISTORY: RF/pneumonia   TECHNOLOGIST PROVIDED HISTORY:   Reason for exam:->RF/pneumonia   Ordering Physician Provided Reason for Exam: RF/pneumonia   Type of Exam: Subsequent/Follow-up       FINDINGS:   The endotracheal tube has been removed.  The enteric tube and right internal   jugular central venous catheter remain in place.  There has been little   change in multifocal left lung airspace disease.  There is new right basilar   airspace disease.  The heart size is within normal limits.  There is no   discernible pneumothorax.           Impression   New right basilar and stable multifocal left lung airspace disease. Assessment:  Active Problems:    Acute renal failure (ARF) ([facility])    DKA (diabetic ketoacidoses) ([facility])    Acidosis    Cardiac arrest ([facility])    DKA, type 2, not at goal [facility])    Acute respiratory failure ([facility])    Prolonged Q-T interval on ECG    Hypokalemia    Elevated troponin    Acute encephalopathy    Arterial ischemic stroke, ICA, left, acute ([address]    Pneumonia due to organism    Staphylococcus aureus pneumonia ([facility])    Altered mental status    Non-traumatic rhabdomyolysis    Acute pulmonary edema ([facility])    Pulmonary infiltrate    Atelectasis  Resolved Problems:    * No resolved hospital problems.  *          Plan:   · O2 supplementation to keep saturation between 90-94%  · Pulmonary toilet  · Patient has increased chest congestion and the cough reflex is weak and has propensity for airway compromise and reintubation-will do Rpt bronchoscopy for therapeutic purposes  · Inspite of aggressive pulmonary care -patient is not able to protect airways -patient may need reintubation and tracheostomy -case d/w family

## 2019-05-09 NOTE — PROGRESS NOTES
Physical Therapy/Occupational Therapy  Orders received, chart reviewed. Per RN, pt having bronch right now. Will re-attempt evaluations later as schedule permits. Thank you.   Yonathan Pedro, PT, DPT  Sunny Blair, OTR/L

## 2019-05-09 NOTE — CARE COORDINATION
Spoke to Dr Mike Zayas during rounds. In agreement with plans for [facility], [facility]. Writer spoke to Dub Ok with [facility] and they have a bed available at [facility]. Writer asked that they initiate precert.   Sam Woodward RN

## 2019-05-09 NOTE — PROGRESS NOTES
4 Eyes Skin Assessment     The patient is being assess for  {Blank single:57974::\"Admission\",\"Transfer to New Unit\",\"Post-Op Surgical\",\"Cath Lab Post-Op\",\"Shift Handoff\"}    I agree that 2 RN's have performed a thorough Head to Toe Skin Assessment on the patient. ALL assessment sites listed below have been assessed. Areas assessed by both nurses:   [x]   Head, Face, and Ears   [x]   Shoulders, Back, and Chest  [x]   Arms, Elbows, and Hands   [x]   Coccyx, Sacrum, and Ischum  [x]   Legs, Feet, and Heels        Does the Patient have Skin Breakdown?   Yes a wound was noted on the Admission Assessment and an WOUND LDA was Initiated documentation include the Connie-wound, Wound Assessment, Measurements, Dressing Treatment, Drainage, and Color\",         Robi Prevention initiated:  Yes   Wound Care Orders initiated:  Yes      [id number]  nurse consulted for Pressure Injury (Stage 3,4, Unstageable, DTI, NWPT, and Complex wounds):  Yes      Nurse 1 eSignature: Electronically signed by Javed Raza RN on 5/9/19 at 6:17 AM    **SHARE this note so that the co-signing nurse is able to place an eSignature**    Nurse 2 eSignature: {Esignature:[id number]}

## 2019-05-09 NOTE — CARE COORDINATION
Writer approached by [employer] NP and Dr Alma Turner discussing plan of care. Patient is stable at this time but there is some concern for respiratory exhaustion and possible reintubation/trach. They discussed this with family. Dr Alma Turner also voicing concern about swallowing ability and possible PEG placement. Team asked that writer meet with family to discuss LTACH needs. Writer went to bedside and spoke face to face with Stewart and via phone with Figueroa medeiros daughters. Patient asleep during discussion. Both verbalized understanding for potential needs. Both verbalizing concern of patient ability to tolerate trach placement if needed. Ultimately they decided that when patient awakens they would ask her as when the doctors discussed it with her they were informed it could be temporary. For now they asked that referrals be made first choice to [address] [place] [address] and second choice [facility].      Referral given to Miriam Amaral with [facility]. She is verifying availability at [facility]. Octavia kramer update writer with bed availability.   Sue Clarke RN

## 2019-05-09 NOTE — PROGRESS NOTES
Occupational Therapy   Occupational Therapy Initial Assessment and Treatment Note  Date: 2019   Patient Name: Izabella Erickson  MRN: [id number]     : 1950    Date of Service: 2019    Discharge Recommendations:  Patient would benefit from continued therapy after discharge(LTAC)  OT Equipment Recommendations  Equipment Needed: No  Other: defer to next level of care    Assessment   Performance deficits / Impairments: Decreased functional mobility ; Decreased safe awareness;Decreased balance;Decreased coordination;Decreased ADL status; Decreased cognition;Decreased endurance;Decreased strength;Decreased ROM; Decreased fine motor control  Assessment: Pt's daughter reports pt typically ambulatory, but has required assistance with ADL, functional mobility/transfers for about the past 6 months. Pt presents with significant RUE deficits, and anticipate pt will require assist of two for all functional mobility/transfers. Continue OT tx. Prognosis: Fair  Decision Making: High Complexity  Patient Education: Role of OT, safety, importance of mobility   REQUIRES OT FOLLOW UP: Yes  Activity Tolerance  Activity Tolerance: Patient limited by fatigue;Treatment limited secondary to decreased cognition  Safety Devices  Safety Devices in place: Yes  Type of devices: Left in bed;Call light within reach; Bed alarm in place;Nurse notified           Patient Diagnosis(es): The primary encounter diagnosis was Acute respiratory failure, unspecified whether with hypoxia or hypercapnia ([address] Diagnoses of Cardiac arrest ([address] Pneumonia due to organism, Dehydration, Altered mental status, unspecified altered mental status type, Hypothermia, initial encounter, Hypotension, unspecified hypotension type, Diabetic ketoacidosis with coma associated with type 2 diabetes mellitus ([address] Non-traumatic rhabdomyolysis, and Septicemia ([address] were also pertinent to this visit.      has a past medical history of Acute renal failure (ARF) ([address] Asthma, CAD (coronary artery disease), Diabetes mellitus ([facility] Hypertension, Pneumonia, Seizures ([facility] and Unspecified cerebral artery occlusion with cerebral infarction. has a past surgical history that includes Breast surgery; bladder repair; Breast lumpectomy (Left); bronchoscopy (N/A, 5/1/2019); bronchoscopy (5/1/2019); bronchoscopy (N/A, 5/2/2019); bronchoscopy (N/A, 5/7/2019); and bronchoscopy (5/7/2019).            Restrictions  Restrictions/Precautions  Restrictions/Precautions: General Precautions, Fall Risk, Up as Tolerated  Position Activity Restriction  Other position/activity restrictions: High fall risk per nursing assessment, NG feeding tube, 3.5L O2, central line, ICU monitoring, Gr    Subjective   General  Chart Reviewed: Yes  Patient assessed for rehabilitation services?: Yes  Family / Caregiver Present: Yes(Pt's daughter)  Referring Practitioner: Yokasta Trinh MD  Diagnosis: Acute respiratory failure secondary to aspiration pneumonia and associated septic shock, extubated 5/8/19; Acute multiple bilateral thromboembolic infarcts (Per MRI brain 5/3/19: \"Acute/early subacute infarction involving the left hippocampus, Additional punctate infarctions within the frontal lobes and posterior temporal lobes bilaterally\"   Subjective  Subjective: Pt in bed on arrival, not resistant to eval and treat  General Comment  Comments: Per RN okay to treat  Pain Assessment  Pain Assessment: 0-10  Pain Level: 0  Oxygen Therapy  SpO2: 99 %  Pulse Oximeter Device Mode: Continuous  Pulse Oximeter Device Location: Finger  O2 Device: Nasal cannula  O2 Flow Rate (L/min): 4 L/min  Patient Observation  Observations: Hourly rounding  Social/Functional History  Social/Functional History  Lives With: Son(who is home with pt 24/7)  Type of Home: Apartment  Home Layout: Two level, 1/2 bath on main level(pt lives on main floor with bedroom and 1/2 bath, full bathroom upstairs)  Home Access: Level entry  Bathroom Shower/Tub: Tub/Shower unit(located on 2nd floor)  Bathroom Toilet: Standard  Bathroom Equipment: Shower chair  Home Equipment: (no home DME, no home O2)  Receives Help From: Family  ADL Assistance: Needs assistance(assist from family for showers, pt typically (I) with remainder of ADL's)  Homemaking Responsibilities: No(son does all homemaking for pt)  Ambulation Assistance: Independent(pt was (I) with ambulation without AD prior to admission, although over the last few months family reports pt has been getting weaker and has been furniture-walking more often; family typically assists with amb up/down stairs)  Transfer Assistance: Needs assistance(family assists with tub transfers (pt typically sits down fully into tub), (I) with remainder of transfers at baseline)  Active : No  Mode of Transportation: Family  Additional Comments: Pt's family reports pt progressive weakness and difficulty with mobility over the last 6 months. Objective        Orientation  Overall Orientation Status: Impaired(Pt able to nod to correct prompts for orientation to person)  Orientation Level: Disoriented to place; Disoriented to time     Balance  Sitting Balance: Unable to assess(comment)(pt with overall weakness and varied command following this date, no sitting activity attempted this session)  ADL  Feeding: Dependent/Total(tube feed)  Grooming: Maximum assistance;Setup;Verbal cueing; Increased time to complete(to wash face)  Toileting: Dependent/Total(rees, incontinent of bowel, total assist for clean-up, leticia/bladder hygiene, clothing management)  Tone RUE  RUE Tone: Hypotonic  Tone LUE  LUE Tone: Normotonic  Coordination  Movements Are Fluid And Coordinated: No  Coordination and Movement description: Gross motor impairments; Fine motor impairments;Decreased speed;Decreased accuracy; Right UE;Left UE(more significant deficits RUE)     Bed mobility  Rolling to Left: Maximum assistance  Rolling to Right: Maximum assistance 5/13  Short term goal 3: Pt will complete functional transfers with Max A x2 and LRAD as appropriate  Patient Goals   Patient goals : Pt does not state a goal at time of eval       Therapy Time   Individual Concurrent Group Co-treatment   Time In 1505(10 minutes for eval)         Time Out 1530         Minutes 25         Timed Code Treatment Minutes: 15 Minutes     This note to serve as d/c summary should pt d/c prior to next session.     Torito Gan, OTR/L

## 2019-05-09 NOTE — PROGRESS NOTES
PROGRESS NOTE  S:69 yrs Patient  admitted on 4/29/2019 with DKA, type 2, not at goal [facility]) [E11.10]  DKA, type 2, not at goal [facility]) [E11.10] . Today she is extubated. Denies  Abdominal Pain. Daughter at bedside    Exam:   Vitals:    05/09/19 1000   BP: (!) 155/75   Pulse: 105   Resp: 19   Temp: 98 °F (36.7 °C)   SpO2: 96%      General appearance: alert, appears stated age and cooperative  HEENT: Oropharynx clear, no lesions  Neck: no adenopathy and supple, symmetrical, trachea midline  Lungs: clear to auscultation bilaterally  Heart: regular rate and rhythm, S1, S2 normal, no murmur, click, rub or gallop  Abdomen: soft, non-tender; bowel sounds normal; no masses,  no organomegaly  Extremities: extremities normal, atraumatic, no cyanosis or edema     Medications: Reviewed    Labs:  CBC:   Recent Labs     05/07/19  0415 05/08/19  0500 05/09/19  0515   WBC 15.3* 14.9* 16.6*   HGB 8.5* 8.5* 8.7*   HCT 25.3* 24.9* 26.7*   MCV 84.3 84.3 86.4   * 114* 190     BMP:   Recent Labs     05/07/19  0415 05/08/19  0500 05/09/19  0515    146* 147*   K 3.7 3.6 3.4*    112* 109   CO2 22 24 27   PHOS 3.2 3.7 4.1   BUN 33* 29* 25*   CREATININE 1.2 1.1 0.9     Impression: 81year old female with history of HTN, DM, CVA, asthma, Seizure do, admitted with cardiac arrest and DKA. She was also noted to have coffee ground aspirate per NG but Hgb is stable. Recommendation:  1. Continue supportive care  2. PPI BID  3. TFs per NG  4. Swallow eval  5. Monitor Hgb and signs of bleeding  6.  EGD before discharge      Nai Fields MD  11:27 AM 5/9/2019

## 2019-05-09 NOTE — PLAN OF CARE
Problem: Falls - Risk of:  Goal: Will remain free from falls  Description  Will remain free from falls  5/8/2019 2043 by Andrea Campos RN  Outcome: Ongoing  Note:   Pt high fall risk. Instructed to use call light before getting out of bed. Call light within reach. Bed in low position. Bed alarm on. Will continue to monitor. 5/8/2019 0846 by Jan Patel RN  Outcome: Ongoing  Note:   Fall risk assessment complete, fall precautions in place. Fall visuals posted, bed alarm on, bed in lowest position with wheels locked. Patient has been free of falls this shift, will continue to monitor. Problem: Risk for Impaired Skin Integrity  Goal: Tissue integrity - skin and mucous membranes  Description  Structural intactness and normal physiological function of skin and  mucous membranes. 5/8/2019 2043 by Andrea Campos RN  Outcome: Ongoing  Note:   Skin assessment complete. Pt at risk for skin breakdown. See Robi score. Pt remains on bedrest. Unable to reposition self in bed. Heels elevated off bed. Will continue to turn and reposition patient every two hours and as needed. Will continue to keep patient clean and dry, applying skin care cream as needed. Pillows used for positioning. Will continue to monitor and assess for skin breakdown. 5/8/2019 0846 by Jan Patel RN  Outcome: Ongoing  Note:   Skin assessment complete. Pt at risk for skin breakdown. See Robi score. Pt remains on bedrest. Unable to reposition self in bed. Heels elevated off bed. Will continue to turn and reposition patient every two hours and as needed. Will continue to keep patient clean and dry, applying skin care cream as needed. Pillows used for positioning. Will continue to monitor and assess for skin breakdown.        Problem: Serum Glucose Level - Abnormal:  Goal: Ability to maintain appropriate glucose levels will improve  Description  Ability to maintain appropriate glucose levels will improve  5/8/2019 2043 by Apolonia Espitia Arabella Tobar RN  Outcome: Ongoing  Note:   TF, q4 kamaljit, sliding scale coverage  5/8/2019 0846 by Cheryle Burroughs RN  Outcome: Ongoing

## 2019-05-09 NOTE — PRE SEDATION
Authorizing Provider   ondansetron (ZOFRAN ODT) 4 MG disintegrating tablet Take 1 tablet by mouth every 8 hours as needed for Nausea or Vomiting 7/17/16   Og Barroso MD   omeprazole (PRILOSEC) 10 MG capsule Take 10 mg by mouth daily    Historical Provider, MD   lisinopril (PRINIVIL;ZESTRIL) 10 MG tablet Take 10 mg by mouth daily. Historical Provider, MD   metFORMIN (GLUCOPHAGE) 1000 MG tablet   Take 1,000 mg by mouth daily (with breakfast)     Historical Provider, MD   PARoxetine (PAXIL) 30 MG tablet Take 30 mg by mouth every morning. Historical Provider, MD   amitriptyline (ELAVIL) 50 MG tablet Take 25 mg by mouth nightly     Historical Provider, MD   atorvastatin (LIPITOR) 40 MG tablet Take 40 mg by mouth daily. Historical Provider, MD   insulin detemir (LEVEMIR) 100 UNIT/ML injection vial   Inject 63 Units into the skin nightly     Historical Provider, MD           Pre-Sedation Documentation and Exam:   Vital signs have been reviewed (see flow sheet for vitals).     Mallampati Airway Assessment:  Mallampati Class II - (soft palate, fauces & uvula are visible)    Prior History of Anesthesia Complications:   none    ASA Classification:  Class 4 - A patient with an incapacitating systemic disease that is a constant threat to life    Sedation/ Anesthesia Plan:   intravenous sedation    Medications Planned:   midazolam (Versed) intravenously and fentanyl intravenously    Patient is an appropriate candidate for plan of sedation: yes    Electronically signed by Eze Gilmore MD on 5/9/2019 at 5:05 PM

## 2019-05-09 NOTE — PROGRESS NOTES
Physical Therapy    Facility/Department: [facility] C2 CARD TELEMETRY  Initial Assessment    NAME: Florin Weathers  : 1950  MRN: [id number]    Date of Service: 2019    Discharge Recommendations:  Patient would benefit from continued therapy after discharge(LTAC)   PT Equipment Recommendations  Equipment Needed: No(TBD at facility)    Assessment   Body structures, Functions, Activity limitations: Decreased functional mobility ; Decreased ROM; Decreased strength;Decreased balance;Decreased cognition;Decreased endurance  Assessment: Pt referred for PT evaluation during current hospital stay with dx of acute encephalopathy 2* DKA. Pt also with evidence of new B ischemic CVA's (particularly located in L temporal region). Pt with prolonged ICU stay as a result, during which she was intubated from  to . Pt now extremely weak and deconditioned, requiring maxA x 1 to roll and to perform certain supine LE exercises. Family reporting pt is more confused, much less verbal, and slower to respond to questions than at baseline. Given pt's current deficits and complexity of medical status, recommend LTAC at D/C. Treatment Diagnosis: Decreased strength and (I) with mobility  Specific instructions for Next Treatment: Progress ther ex and mobility as tolerated, progress to sitting EOB after next session  Prognosis: Guarded  Decision Making: High Complexity  Patient Education: Role of PT, benefits of mobility, technique for rolling, supine LE ther ex, D/C recs - pt verbalizes understanding  Barriers to Learning: Impaired cognition  REQUIRES PT FOLLOW UP: Yes  Activity Tolerance: Patient limited by fatigue;Patient limited by endurance  Activity Tolerance: O2 sats remained in the mid- to upper-90s throughout session (while on 3.5L O2). BP after supine LE ther ex is within normal range.        Patient Diagnosis(es): The primary encounter diagnosis was Acute respiratory failure, unspecified whether with hypoxia or hypercapnia ([facility] [address] Diagnoses of Cardiac arrest ([facility] [address] Pneumonia due to organism, Dehydration, Altered mental status, unspecified altered mental status type, Hypothermia, initial encounter, Hypotension, unspecified hypotension type, Diabetic ketoacidosis with coma associated with type 2 diabetes mellitus ([facility] [address] Non-traumatic rhabdomyolysis, and Septicemia ([facility] [address] were also pertinent to this visit. has a past medical history of Acute renal failure (ARF) ([facility] [address] Asthma, CAD (coronary artery disease), Diabetes mellitus ([facility] [address] Hypertension, Pneumonia, Seizures ([facility] [address] and Unspecified cerebral artery occlusion with cerebral infarction. has a past surgical history that includes Breast surgery; bladder repair; Breast lumpectomy (Left); bronchoscopy (N/A, 5/1/2019); bronchoscopy (5/1/2019); bronchoscopy (N/A, 5/2/2019); bronchoscopy (N/A, 5/7/2019); and bronchoscopy (5/7/2019). Restrictions  Restrictions/Precautions  Restrictions/Precautions: General Precautions, Fall Risk, Up as Tolerated  Position Activity Restriction  Other position/activity restrictions: High fall risk per nursing assessment, NG feeding tube, 3.5L O2, central line, ICU monitoring, Gr    Subjective  General  Chart Reviewed: Yes  Patient assessed for rehabilitation services?: Yes  Family / Caregiver Present: Yes(daughter)  Referring Practitioner: Dr. Santiago Khanna  Referral Date : 05/08/19  Diagnosis: Acute encephalopathy 2* DKA, new ischemic B CVA's (specifically located in L temporal region), pt intubated from 4/29/19 to 5/08/19  Follows Commands: Impaired  General Comment  Comments: Pt resting in bed upon entry of therapy staff  Subjective  Subjective: Pt agreeable to work with PT/OT this afternoon. Very lethargic and slow to respond to simple commands. Largely non-verbal, but able to softly say, \"Carrol\" when asked for her name.   Pain Screening  Patient Currently in Pain: (pt grimacing during leticia care but did not rate pain on 0-10 scale, sores noted on buttocks)  Intervention List: Patient able to continue with treatment    Orientation  Orientation  Overall Orientation Status: Impaired  Orientation Level: Oriented to person;Disoriented to place; Disoriented to time;Disoriented to situation     Social/Functional History  Social/Functional History  Lives With: Son(who is home with pt 24/7)  Type of Home: Apartment  Home Layout: Two level, 1/2 bath on main level(pt lives on main floor with bedroom and 1/2 bath, full bathroom upstairs)  Home Access: Level entry  Bathroom Shower/Tub: Tub/Shower unit(located on 2nd floor)  Bathroom Toilet: Standard  Bathroom Equipment: Shower chair  Home Equipment: (no home DME, no home O2)  Receives Help From: Family  ADL Assistance: Needs assistance(assist from family for showers, pt typically (I) with remainder of ADL's)  Homemaking Responsibilities: No(son does all homemaking for pt)  Ambulation Assistance: Independent(pt was (I) with ambulation without AD prior to admission, although over the last few months family reports pt has been getting weaker and has been furniture-walking more often; family typically assists with amb up/down stairs)  Transfer Assistance: Needs assistance(family assists with tub transfers (pt typically sits down fully into tub), (I) with remainder of transfers at baseline)  Active : No  Mode of Transportation: Family  Additional Comments: Pt's family reports pt progressive weakness and difficulty with mobility over the last 6 months.     Objective  Observation/Palpation  Posture: Poor    RLE PROM: WFL  RLE General AROM: Decreased >75% in ankle/foot (only able to slightly wiggle toes on command), decreased ~25% in knee and ~75% in hip due to profound weakness  LLE PROM: WFL  LLE General AROM: Decreased >75% in ankle/foot (only able to slightly wiggle toes on command), decreased ~25% in knee and ~75% in hip due to profound weakness    Strength RLE: 1/5 ankle/foot, 3-/5 knee, 2-/5 hip  Strength LLE: 1/5 Time   Individual Concurrent Group Co-treatment   Time In 1500         Time Out 1540         Minutes 40         Timed Code Treatment Minutes: [address] [place], [place] #[id number]

## 2019-05-10 ENCOUNTER — APPOINTMENT (OUTPATIENT)
Dept: GENERAL RADIOLOGY | Age: 69
DRG: 853 | End: 2019-05-10
Payer: COMMERCIAL

## 2019-05-10 LAB
ANION GAP SERPL CALCULATED.3IONS-SCNC: 11 MMOL/L (ref 3–16)
BASOPHILS ABSOLUTE: 0.1 K/UL (ref 0–0.2)
BASOPHILS RELATIVE PERCENT: 0.7 %
BUN BLDV-MCNC: 20 MG/DL (ref 7–20)
CALCIUM SERPL-MCNC: 7.7 MG/DL (ref 8.3–10.6)
CHLORIDE BLD-SCNC: 106 MMOL/L (ref 99–110)
CO2: 28 MMOL/L (ref 21–32)
CREAT SERPL-MCNC: 0.7 MG/DL (ref 0.6–1.2)
EOSINOPHILS ABSOLUTE: 0.2 K/UL (ref 0–0.6)
EOSINOPHILS RELATIVE PERCENT: 1.3 %
GFR AFRICAN AMERICAN: >60
GFR NON-AFRICAN AMERICAN: >60
GLUCOSE BLD-MCNC: 102 MG/DL (ref 70–99)
GLUCOSE BLD-MCNC: 110 MG/DL (ref 70–99)
GLUCOSE BLD-MCNC: 151 MG/DL (ref 70–99)
GLUCOSE BLD-MCNC: 164 MG/DL (ref 70–99)
GLUCOSE BLD-MCNC: 165 MG/DL (ref 70–99)
GLUCOSE BLD-MCNC: 174 MG/DL (ref 70–99)
GLUCOSE BLD-MCNC: 98 MG/DL (ref 70–99)
HCT VFR BLD CALC: 25.1 % (ref 36–48)
HCT VFR BLD CALC: 26.2 % (ref 36–48)
HEMOGLOBIN: 8.4 G/DL (ref 12–16)
HEMOGLOBIN: 8.5 G/DL (ref 12–16)
LYMPHOCYTES ABSOLUTE: 1.6 K/UL (ref 1–5.1)
LYMPHOCYTES RELATIVE PERCENT: 11.5 %
MAGNESIUM: 1.6 MG/DL (ref 1.8–2.4)
MCH RBC QN AUTO: 28 PG (ref 26–34)
MCH RBC QN AUTO: 28.8 PG (ref 26–34)
MCHC RBC AUTO-ENTMCNC: 32.2 G/DL (ref 31–36)
MCHC RBC AUTO-ENTMCNC: 33.4 G/DL (ref 31–36)
MCV RBC AUTO: 86.3 FL (ref 80–100)
MCV RBC AUTO: 87 FL (ref 80–100)
MONOCYTES ABSOLUTE: 0.6 K/UL (ref 0–1.3)
MONOCYTES RELATIVE PERCENT: 4.2 %
NEUTROPHILS ABSOLUTE: 11.3 K/UL (ref 1.7–7.7)
NEUTROPHILS RELATIVE PERCENT: 82.3 %
PDW BLD-RTO: 14.9 % (ref 12.4–15.4)
PDW BLD-RTO: 15.1 % (ref 12.4–15.4)
PERFORMED ON: ABNORMAL
PERFORMED ON: NORMAL
PHOSPHORUS: 3.5 MG/DL (ref 2.5–4.9)
PLATELET # BLD: 288 K/UL (ref 135–450)
PLATELET # BLD: 370 K/UL (ref 135–450)
PMV BLD AUTO: 8.1 FL (ref 5–10.5)
PMV BLD AUTO: 8.3 FL (ref 5–10.5)
POTASSIUM SERPL-SCNC: 3.9 MMOL/L (ref 3.5–5.1)
RBC # BLD: 2.91 M/UL (ref 4–5.2)
RBC # BLD: 3.01 M/UL (ref 4–5.2)
SODIUM BLD-SCNC: 145 MMOL/L (ref 136–145)
WBC # BLD: 13.8 K/UL (ref 4–11)
WBC # BLD: 14.6 K/UL (ref 4–11)

## 2019-05-10 PROCEDURE — 3609010900 HC BRONCHOSCOPY THERAPUTIC ASPIRATION INITIAL: Performed by: INTERNAL MEDICINE

## 2019-05-10 PROCEDURE — 2500000003 HC RX 250 WO HCPCS: Performed by: INTERNAL MEDICINE

## 2019-05-10 PROCEDURE — 97535 SELF CARE MNGMENT TRAINING: CPT

## 2019-05-10 PROCEDURE — 6370000000 HC RX 637 (ALT 250 FOR IP): Performed by: INTERNAL MEDICINE

## 2019-05-10 PROCEDURE — 99152 MOD SED SAME PHYS/QHP 5/>YRS: CPT | Performed by: INTERNAL MEDICINE

## 2019-05-10 PROCEDURE — 87070 CULTURE OTHR SPECIMN AEROBIC: CPT

## 2019-05-10 PROCEDURE — 6360000002 HC RX W HCPCS: Performed by: INTERNAL MEDICINE

## 2019-05-10 PROCEDURE — 87186 SC STD MICRODIL/AGAR DIL: CPT

## 2019-05-10 PROCEDURE — 94761 N-INVAS EAR/PLS OXIMETRY MLT: CPT

## 2019-05-10 PROCEDURE — 97530 THERAPEUTIC ACTIVITIES: CPT

## 2019-05-10 PROCEDURE — 2709999900 HC NON-CHARGEABLE SUPPLY: Performed by: INTERNAL MEDICINE

## 2019-05-10 PROCEDURE — 80048 BASIC METABOLIC PNL TOTAL CA: CPT

## 2019-05-10 PROCEDURE — C9113 INJ PANTOPRAZOLE SODIUM, VIA: HCPCS | Performed by: INTERNAL MEDICINE

## 2019-05-10 PROCEDURE — 85025 COMPLETE CBC W/AUTO DIFF WBC: CPT

## 2019-05-10 PROCEDURE — 87205 SMEAR GRAM STAIN: CPT

## 2019-05-10 PROCEDURE — 86403 PARTICLE AGGLUT ANTBDY SCRN: CPT

## 2019-05-10 PROCEDURE — 71045 X-RAY EXAM CHEST 1 VIEW: CPT

## 2019-05-10 PROCEDURE — 83735 ASSAY OF MAGNESIUM: CPT

## 2019-05-10 PROCEDURE — 2580000003 HC RX 258: Performed by: INTERNAL MEDICINE

## 2019-05-10 PROCEDURE — 87077 CULTURE AEROBIC IDENTIFY: CPT

## 2019-05-10 PROCEDURE — 2700000000 HC OXYGEN THERAPY PER DAY

## 2019-05-10 PROCEDURE — 31646 BRNCHSC W/THER ASPIR SBSQ: CPT | Performed by: INTERNAL MEDICINE

## 2019-05-10 PROCEDURE — 94640 AIRWAY INHALATION TREATMENT: CPT

## 2019-05-10 PROCEDURE — 84100 ASSAY OF PHOSPHORUS: CPT

## 2019-05-10 PROCEDURE — 99233 SBSQ HOSP IP/OBS HIGH 50: CPT | Performed by: INTERNAL MEDICINE

## 2019-05-10 PROCEDURE — 2000000000 HC ICU R&B

## 2019-05-10 PROCEDURE — 6370000000 HC RX 637 (ALT 250 FOR IP): Performed by: FAMILY MEDICINE

## 2019-05-10 PROCEDURE — 85027 COMPLETE CBC AUTOMATED: CPT

## 2019-05-10 RX ORDER — FENTANYL CITRATE 50 UG/ML
INJECTION, SOLUTION INTRAMUSCULAR; INTRAVENOUS PRN
Status: DISCONTINUED | OUTPATIENT
Start: 2019-05-10 | End: 2019-05-10 | Stop reason: ALTCHOICE

## 2019-05-10 RX ORDER — IPRATROPIUM BROMIDE AND ALBUTEROL SULFATE 2.5; .5 MG/3ML; MG/3ML
1 SOLUTION RESPIRATORY (INHALATION)
Status: DISCONTINUED | OUTPATIENT
Start: 2019-05-10 | End: 2019-05-13

## 2019-05-10 RX ORDER — LIDOCAINE HYDROCHLORIDE 20 MG/ML
INJECTION, SOLUTION EPIDURAL; INFILTRATION; INTRACAUDAL; PERINEURAL PRN
Status: DISCONTINUED | OUTPATIENT
Start: 2019-05-10 | End: 2019-05-10 | Stop reason: ALTCHOICE

## 2019-05-10 RX ORDER — MAGNESIUM HYDROXIDE 1200 MG/15ML
LIQUID ORAL CONTINUOUS PRN
Status: COMPLETED | OUTPATIENT
Start: 2019-05-10 | End: 2019-05-10

## 2019-05-10 RX ORDER — ACETYLCYSTEINE 200 MG/ML
SOLUTION ORAL; RESPIRATORY (INHALATION) PRN
Status: DISCONTINUED | OUTPATIENT
Start: 2019-05-10 | End: 2019-05-10 | Stop reason: ALTCHOICE

## 2019-05-10 RX ORDER — POTASSIUM CHLORIDE 29.8 MG/ML
20 INJECTION INTRAVENOUS ONCE
Status: COMPLETED | OUTPATIENT
Start: 2019-05-10 | End: 2019-05-10

## 2019-05-10 RX ORDER — MAGNESIUM SULFATE IN WATER 40 MG/ML
4 INJECTION, SOLUTION INTRAVENOUS ONCE
Status: DISCONTINUED | OUTPATIENT
Start: 2019-05-10 | End: 2019-05-13

## 2019-05-10 RX ORDER — MIDAZOLAM HYDROCHLORIDE 5 MG/ML
INJECTION INTRAMUSCULAR; INTRAVENOUS PRN
Status: DISCONTINUED | OUTPATIENT
Start: 2019-05-10 | End: 2019-05-10 | Stop reason: ALTCHOICE

## 2019-05-10 RX ORDER — MORPHINE SULFATE 2 MG/ML
2 INJECTION, SOLUTION INTRAMUSCULAR; INTRAVENOUS EVERY 4 HOURS PRN
Status: DISCONTINUED | OUTPATIENT
Start: 2019-05-10 | End: 2019-05-18

## 2019-05-10 RX ADMIN — Medication 250 MG: at 08:49

## 2019-05-10 RX ADMIN — IPRATROPIUM BROMIDE AND ALBUTEROL SULFATE 1 AMPULE: .5; 3 SOLUTION RESPIRATORY (INHALATION) at 00:07

## 2019-05-10 RX ADMIN — MAGNESIUM SULFATE HEPTAHYDRATE 1 G: 1 INJECTION, SOLUTION INTRAVENOUS at 16:14

## 2019-05-10 RX ADMIN — INSULIN LISPRO 1 UNITS: 100 INJECTION, SOLUTION INTRAVENOUS; SUBCUTANEOUS at 05:36

## 2019-05-10 RX ADMIN — IPRATROPIUM BROMIDE AND ALBUTEROL SULFATE 1 AMPULE: .5; 3 SOLUTION RESPIRATORY (INHALATION) at 08:02

## 2019-05-10 RX ADMIN — INSULIN LISPRO 2 UNITS: 100 INJECTION, SOLUTION INTRAVENOUS; SUBCUTANEOUS at 08:49

## 2019-05-10 RX ADMIN — INSULIN GLARGINE 25 UNITS: 100 INJECTION, SOLUTION SUBCUTANEOUS at 08:49

## 2019-05-10 RX ADMIN — PANTOPRAZOLE SODIUM 40 MG: 40 INJECTION, POWDER, FOR SOLUTION INTRAVENOUS at 21:46

## 2019-05-10 RX ADMIN — Medication 250 MG: at 21:46

## 2019-05-10 RX ADMIN — IPRATROPIUM BROMIDE AND ALBUTEROL SULFATE 1 AMPULE: .5; 3 SOLUTION RESPIRATORY (INHALATION) at 04:30

## 2019-05-10 RX ADMIN — Medication 10 ML: at 09:00

## 2019-05-10 RX ADMIN — PANTOPRAZOLE SODIUM 40 MG: 40 INJECTION, POWDER, FOR SOLUTION INTRAVENOUS at 08:49

## 2019-05-10 RX ADMIN — IPRATROPIUM BROMIDE AND ALBUTEROL SULFATE 1 AMPULE: .5; 3 SOLUTION RESPIRATORY (INHALATION) at 11:43

## 2019-05-10 RX ADMIN — MORPHINE SULFATE 2 MG: 2 INJECTION, SOLUTION INTRAMUSCULAR; INTRAVENOUS at 20:46

## 2019-05-10 RX ADMIN — MICONAZOLE NITRATE: 2 POWDER TOPICAL at 08:50

## 2019-05-10 RX ADMIN — ACETAMINOPHEN 650 MG: 650 SOLUTION ORAL at 21:46

## 2019-05-10 RX ADMIN — POTASSIUM CHLORIDE 20 MEQ: 29.8 INJECTION, SOLUTION INTRAVENOUS at 12:08

## 2019-05-10 RX ADMIN — MICONAZOLE NITRATE: 2 POWDER TOPICAL at 20:00

## 2019-05-10 RX ADMIN — Medication 10 ML: at 20:49

## 2019-05-10 RX ADMIN — ASPIRIN 325 MG: 325 TABLET, COATED ORAL at 08:49

## 2019-05-10 RX ADMIN — IPRATROPIUM BROMIDE AND ALBUTEROL SULFATE 1 AMPULE: .5; 3 SOLUTION RESPIRATORY (INHALATION) at 19:46

## 2019-05-10 RX ADMIN — ATORVASTATIN CALCIUM 40 MG: 40 TABLET, FILM COATED ORAL at 21:46

## 2019-05-10 ASSESSMENT — PAIN SCALES - GENERAL
PAINLEVEL_OUTOF10: 0
PAINLEVEL_OUTOF10: 4
PAINLEVEL_OUTOF10: 0
PAINLEVEL_OUTOF10: 10
PAINLEVEL_OUTOF10: 0

## 2019-05-10 ASSESSMENT — PAIN DESCRIPTION - FREQUENCY: FREQUENCY: INTERMITTENT

## 2019-05-10 ASSESSMENT — PAIN DESCRIPTION - DESCRIPTORS: DESCRIPTORS: CRAMPING

## 2019-05-10 ASSESSMENT — PAIN DESCRIPTION - PAIN TYPE: TYPE: ACUTE PAIN

## 2019-05-10 ASSESSMENT — PAIN SCALES - WONG BAKER: WONGBAKER_NUMERICALRESPONSE: 2

## 2019-05-10 ASSESSMENT — PAIN DESCRIPTION - PROGRESSION: CLINICAL_PROGRESSION: GRADUALLY WORSENING

## 2019-05-10 ASSESSMENT — PAIN DESCRIPTION - LOCATION: LOCATION: ABDOMEN

## 2019-05-10 ASSESSMENT — PAIN DESCRIPTION - ONSET: ONSET: SUDDEN

## 2019-05-10 NOTE — PROGRESS NOTES
759 mL free fluid. Recommend 125 mL free water flush q 4 hrs or per nephrology. · Additional Calories: EN meeting needs. · Anthropometric Measures:  · Ht: 5' 1\" (154.9 cm)   · Current Body Wt: 123 lb (55.8 kg)  · Admission Body Wt: 120 lb (54.4 kg)  · Usual Body Wt: (AUSTIN )  · Weight Change: Diuresing this admission   · Ideal Body Wt: 105 lb (47.6 kg),  · BMI Classification: BMI 18.5 - 24.9 Normal Weight    Nutrition Interventions:   Modify current Tube Feeding  Continued Inpatient Monitoring    Nutrition Evaluation:   · Evaluation: Progressing toward goals   · Goals: Tolerate most appropriate form of nutrition therapy this admission   · Monitoring: Nutrition Progression, TF Tolerance, TF Intake, Pertinent Labs      Electronically signed by Skyler Mooney.  Tammy Bermudez RD, MICHELLE on 5/10/19 at 12:38 PM    Contact Number: Office: [phone]; [address]: [facility]

## 2019-05-10 NOTE — PROGRESS NOTES
Occupational Therapy  Facility/Department: [facility] C2 CARD TELEMETRY  Daily Treatment Note  NAME: Moni Mancera  : 1950  MRN: [id number]    Date of Service: 5/10/2019    Discharge Recommendations:  Patient would benefit from continued therapy after discharge(LTAC )  OT Equipment Recommendations  Equipment Needed: No  Other: defer to next level of care    Assessment   Performance deficits / Impairments: Decreased functional mobility ; Decreased safe awareness;Decreased balance;Decreased coordination;Decreased ADL status; Decreased cognition;Decreased endurance;Decreased strength;Decreased ROM; Decreased fine motor control  Assessment: Pt agreeable to treatment, requiring assist of two people for bed mobility and significant amount of assistance and cues for sitting at EOB. Pt demonstrated improved command following this date, but continues to be limited by fatigue. Continue OT tx.    REQUIRES OT FOLLOW UP: Yes  Activity Tolerance  Activity Tolerance: Patient limited by fatigue  Safety Devices  Safety Devices in place: Yes  Type of devices: Left in bed;Call light within reach; Bed alarm in place;Nurse notified         Patient Diagnosis(es): The primary encounter diagnosis was Acute respiratory failure, unspecified whether with hypoxia or hypercapnia ([address] Diagnoses of Cardiac arrest ([address] Pneumonia due to organism, Dehydration, Altered mental status, unspecified altered mental status type, Hypothermia, initial encounter, Hypotension, unspecified hypotension type, Diabetic ketoacidosis with coma associated with type 2 diabetes mellitus ([address] Non-traumatic rhabdomyolysis, and Septicemia ([address] were also pertinent to this visit. has a past medical history of Acute renal failure (ARF) ([address] Asthma, CAD (coronary artery disease), Diabetes mellitus ([address] Hypertension, Pneumonia, Seizures ([address] and Unspecified cerebral artery occlusion with cerebral infarction.    has a past surgical history that includes Breast surgery; bladder repair; Breast lumpectomy (Left); bronchoscopy (N/A, 5/1/2019); bronchoscopy (5/1/2019); bronchoscopy (N/A, 5/2/2019); bronchoscopy (N/A, 5/7/2019); bronchoscopy (5/7/2019); and bronchoscopy (N/A, 5/9/2019).     Restrictions  Restrictions/Precautions  Restrictions/Precautions: General Precautions, Fall Risk, Up as Tolerated  Position Activity Restriction  Other position/activity restrictions: High fall risk per nursing assessment, NG feeding tube, 4L O2, central line, ICU monitoring, Rees  Subjective   General  Chart Reviewed: Yes  Patient assessed for rehabilitation services?: Yes  Family / Caregiver Present: Yes(Pt's daughters, EVELYN)  Referring Practitioner: Poppy Vasquez MD  Diagnosis: Acute respiratory failure secondary to aspiration pneumonia and associated septic shock, extubated 5/8/19; Acute multiple bilateral thromboembolic infarcts (Per MRI brain 5/3/19: \"Acute/early subacute infarction involving the left hippocampus, Additional punctate infarctions within the frontal lobes and posterior temporal lobes bilaterally\"   Subjective  Subjective: Pt in bed on arrival, agreeable to treatment   General Comment  Comments: Per RN okay to treat  Vital Signs  Patient Currently in Pain: Denies(grimacing with pericare d/t sores on buttocks)   Orientation  Orientation  Orientation Level: Oriented to person;Disoriented to time  Objective    ADL  Feeding: Dependent/Total(tube feeds)  UE Dressing: Maximum assistance(to change gown)  LE Dressing: Dependent/Total(for socks)  Toileting: Dependent/Total(rees; incontinent of bowel, requiring total assist for clean-up, clothing management and pericare)        Balance  Sitting Balance: Maximum assistance(seated EOB 5 minutes, R posterior lean)  Standing Balance: Unable to assess(comment)(pt with overall weakness, poor sitting balance and tolerance, no standing activity attempted this date)  Bed mobility  Rolling to Left: Maximum assistance  Rolling to Right: Maximum assistance  Supine to Sit: Dependent/Total  Sit to Supine: Dependent/Total  Comment: Max A x2 for bed mobility with cues for sequencing        Cognition  Overall Cognitive Status: Exceptions  Arousal/Alertness: Delayed responses to stimuli  Following Commands: Follows one step commands with increased time; Follows one step commands with repetition  Attention Span: Attends with cues to redirect; Difficulty attending to directions  Problem Solving: Assistance required to generate solutions;Assistance required to implement solutions  Initiation: Requires cues for all  Sequencing: Requires cues for all     Type of ROM/Therapeutic Exercise  Type of ROM/Therapeutic Exercise: AROM  Exercises  Shoulder Flexion: x5  Grasp/Release: x5    Plan   Plan  Times per week: 4-5x/week   Current Treatment Recommendations: Strengthening, ROM, Balance Training, Functional Mobility Training, Endurance Training, Neuromuscular Re-education, Patient/Caregiver Education & Training, Equipment Evaluation, Education, & procurement, Cognitive Reorientation, Safety Education & Training, Home Management Training    AM-[facility] Score  AM-[facility] Inpatient Daily Activity Raw Score: 7  AM-PAC Inpatient ADL T-Scale Score : 20.13  ADL Inpatient CMS 0-100% Score: 92.44  ADL Inpatient CMS G-Code Modifier : CM    Goals  Short term goals  Time Frame for Short term goals: by 5/16/19  Short term goal 1: Pt will complete 2 grooming tasks seated EOB with Max A  Short term goal 2: Pt with tolerate 10-15 reps UE ROM to increase strength and endurance, and engagement of RUE by 5/13  Short term goal 3: Pt will complete functional transfers with Max A x2 and LRAD as appropriate  Patient Goals   Patient goals : Pt does not state a goal at time of eval       Therapy Time   Individual Concurrent Group Co-treatment   Time In 0932         Time Out 1000         Minutes 28         Timed Code Treatment Minutes: 28 Minutes     This note to serve as d/c summary should pt d/c

## 2019-05-10 NOTE — PROGRESS NOTES
Nephrology Progress Note   [email or web address]      This patient is a 81year old female whom we are following for LOPEZ and electrolyte abnormalities. Subjective: The patient was seen and examined. TF off today, plan for bronchoscopy. Family History: Family at bedside and questions were answere. ROS: Unable to obtain      Vitals:  BP (!) 153/83   Pulse 115   Temp 98 °F (36.7 °C) (Axillary)   Resp (!) 40   Ht 5' 1\" (1.549 m)   Wt 123 lb 0.3 oz (55.8 kg)   SpO2 95%   BMI 23.24 kg/m²   I/O last 3 completed shifts: In: 306 [I.V.:306]  Out: 2075 [Urine:2075]  No intake/output data recorded. Physical Exam:  Physical Exam   Constitutional: She appears ill. HENT:   Head: Normocephalic and atraumatic. Eyes: No scleral icterus. Neck: No tracheal deviation present. No thyromegaly present. Cardiovascular: Exam reveals no gallop and no friction rub. Tachycardic, regular rhythm   Pulmonary/Chest:   Coarse breath sounds bilateral   Abdominal: Soft. Bowel sounds are normal. She exhibits no distension. There is no tenderness. Musculoskeletal: She exhibits edema. Vitals reviewed.         Medications:   magnesium sulfate  4 g Intravenous Once    potassium chloride  20 mEq Intravenous Once    psyllium  1 packet Per NG tube Daily    saccharomyces boulardii  250 mg Oral BID    atorvastatin  40 mg Oral Nightly    aspirin  325 mg Oral Daily    insulin glargine  25 Units Subcutaneous Daily    insulin lispro  0-12 Units Subcutaneous Q4H    miconazole   Topical BID    ipratropium-albuterol  1 ampule Inhalation Q4H    pantoprazole  40 mg Intravenous BID    sodium chloride flush  10 mL Intravenous 2 times per day         Labs:  Recent Labs     05/08/19  0500 05/09/19  0515 05/10/19  0534   WBC 14.9* 16.6* 13.8*   HGB 8.5* 8.7* 8.4*   HCT 24.9* 26.7* 25.1*   MCV 84.3 86.4 86.3   * 190 288     Recent Labs     05/08/19  0500 05/09/19  0515 05/09/19  1246 05/10/19  0534   * 147*  --  145   K 3.6 3.4* 3.7 3.9   * 109  --  106   CO2 24 27  --  28   GLUCOSE 139* 137*  --  174*   PHOS 3.7 4.1  --  3.5   MG 1.80 1.60*  --  1.60*   BUN 29* 25*  --  20   CREATININE 1.1 0.9  --  0.7   LABGLOM 50* >60  --  >60   GFRAA 60* >60  --  >60           Assessment/Plan:  Ms. Carmina Abel is a 81year old female with PMHx of CAD, DM, HTN and seizures who presents after a cardiac arrest.     Acute Kidney Injury.  - Etiology: ATN in the setting of cardiac arrest/sepsis. - Data: Last serum creatinine in 07/2016 was <0.5mg/dL. - Urinalysis with blood and protein. - Clinical: Scr improving, now down to 0.7. Good urine output.      Severe Anion Gap Metabolic Acidosis. - Likely from DKA and lactic acidosis. No toxic alcohol ingestion.  - Methanol, Ethylene glycol negative  - Resolved      Anemia.  - Had coffee-ground emesis on admission.  - Hgb stable.     S/P Cardiac Arrest.  - Plans per Cardiology      Hypophosphatemia/Hypokalemia  - PRN supplementation.     Multiple CVAs  - MRI brain 5/3, neurology seeing     Hypernatremia, mild. - FWF with TF.     Acute Respiratory Failure. - Extubated on 5/8/19.  - Plans per pulmonary. For bronch again today. Please do not hesitate to contact me at [phone] if with questions. Thank you!     Gege Duran MD  5/10/2019  [facility]

## 2019-05-10 NOTE — PROGRESS NOTES
Pulmonary & Critical Care Medicine ICU Progress Note      Events of Last 24 hours: The patient has remained afebrile and hemodynamically stable, on oxygen at 4 LPM, up to 6 of them this morning. She has an NG in place, receiving tube feeds. She was extubated on 5/8. She has been a nonsmoker. She has had multiple strokes in the past.  She has been treated for MSSA. She has been accepted to [address] in [facility] [address] as per . Recent Labs     05/08/19  1332 05/09/19  0515   PHART 7.531* 7.481*   [id number] 29.3* 36.3   PO2ART 89.2 57.5*     IV:     dextrose         Vitals:  BP (!) 153/83   Pulse 115   Temp 98 °F (36.7 °C) (Axillary)   Resp (!) 40   Ht 5' 1\" (1.549 m)   Wt 123 lb 0.3 oz (55.8 kg)   SpO2 95%   BMI 23.24 kg/m²  CVP (Mean): 9 mmHg      Intake/Output Summary (Last 24 hours) at 5/10/2019 0915  Last data filed at 5/10/2019 0300  Gross per 24 hour   Intake 306 ml   Output 2075 ml   Net -1769 ml       EXAM:  General: Frail, underweight appearing, in no respiratory distress. Appears malnourished, bitemporal muscle wasting   Eyes: PERRL. No sclera icterus. No conjunctival injection. ENT: No discharge. Pharynx clear, mucosa dry, class II airway. Neck: Trachea midline. Normal thyroid. No JVD  Resp: Diminished air entry left hemithorax No accessory muscle use. No crackles. No wheezing. No rhonchi. No dullness on percussion. CV: Regular rate. Regular rhythm. No mumur or rub. No edema. GI: Non-tender. Non-distended. No masses. No organmegaly. Normal bowel sounds. Skin: Warm and dry. No nodule on exposed extremities. No rash on exposed extremities. Lymph: No cervical LAD. No supraclavicular LAD. M/S: No cyanosis. No joint deformity. No clubbing. Neuro: Awake. Follows commands. PERRL, detailed neurological exam not performed  Psych: Oriented to person, place, time. No anxiety or agitation.      Medications:  Scheduled Meds:   magnesium sulfate  4 g Intravenous Once    potassium discussed in detail with the patient's family, including alternatives. They provided consent. Acute renal failure. Resolved. DKA, diabetes mellitus. Per IM, on Lantus and sliding scale insulin. Multiple strokes. Per IM. MSSA pneumonia. On Ancef. FEN. Tube feeds for now. Swallow evaluation later, after respiratory status improves. May need PEG tube. DVT prophylaxis. SCDs  PUD prophylaxis. Protonix. Multidisciplinary rounds were completed at the bedside with participation from the intensivist, pharmacy, nursing, nutrition.   All labs and imaging studies reviewed, discussed with family    Electronically signed by:  Giovanna Tejeda MD    5/10/2019    9:15 AM.

## 2019-05-10 NOTE — PROCEDURES
Procedure: Bronchoscopy with therapeutic aspiration    Indication: Respiratory failure, mucous plugging, increased oxygenation requirement, possible worsening of chest x-ray with increasing opacification of left hemithorax. ASA IV, Mallampati 3. Procedure details: The procedure was discussed in detail with the patient and her family. Alternatives to bronchoscopy were discussed. Informed consent was obtained, she was nothing by mouth. A timeout was performed, a bite block was placed and her oropharynx was sprayed with topical benzocaine. Sedation consisted of Versed 2 mg, fentanyl 25 µg. Once sedated, therapeutic bronchoscope was introduced through the bite block . Very thick secretions were present in the upper airway, which completely clogged the channel. The bronchoscope was removed, saline push through. Once patency was restored, the bronchoscope was reintroduced. There was evidence of mucoid to mucopurulent secretions around the vocal cords. The vocal cords showed small hemorrhage, probably from endotracheal tube trauma. In the subglottic region there was extensive anterior and mucoid to mucopurulent secretions, with evidence of inflammation, not yet meeting criteria for intubation granuloma. The distal trachea showed extensive mucopurulent secretions. The lower trachea did not appear inflamed. The pernell was sharp and central.  Endocrine was instilled into the lower trachea and into both mainstem bronchi. Extensive mucopurulent secretions producing extensive blocking of the airway were seen in the right lower lobe as well as a left lower lobe. There were lesser secretions in the right upper lobe, moderate secretions in the right middle lobe. The left upper lobe bronchus had very thick and received adherent secretions that could not be removed with saline washes. Utilizing normal saline push his, suctioning, 400 mg Mucomyst on each side, a therapeutic aspiration was performed bilaterally. The patient did desaturate and her option was turned up. Ultimately, the airway was cleared after having to remain the bronchoscope on a couple of occasions due to the channel getting clogged. Ultimately, the airway was completely clear secretions. Secretions appear mucopurulent to purulent. We will discuss findings with the patient's family, secretions will be sent for cultures. The patient tolerated the procedure well except for hypoxemia, her saturations and vital signs were stable at the end of the procedure.

## 2019-05-10 NOTE — PROGRESS NOTES
Physical Therapy  Facility/Department: [facility] C2 CARD TELEMETRY  Daily Treatment Note  NAME: Tyron Boston  : 1950  MRN: [id number]    Date of Service: 5/10/2019    Discharge Recommendations:  Patient would benefit from continued therapy after discharge(LTAC)   PT Equipment Recommendations  Equipment Needed: No    Patient Diagnosis(es): The primary encounter diagnosis was Acute respiratory failure, unspecified whether with hypoxia or hypercapnia ([address] Diagnoses of Cardiac arrest ([address] Pneumonia due to organism, Dehydration, Altered mental status, unspecified altered mental status type, Hypothermia, initial encounter, Hypotension, unspecified hypotension type, Diabetic ketoacidosis with coma associated with type 2 diabetes mellitus ([address] Non-traumatic rhabdomyolysis, and Septicemia ([address] were also pertinent to this visit. has a past medical history of Acute renal failure (ARF) ([address] Asthma, CAD (coronary artery disease), Diabetes mellitus ([address] Hypertension, Pneumonia, Seizures ([address] and Unspecified cerebral artery occlusion with cerebral infarction. has a past surgical history that includes Breast surgery; bladder repair; Breast lumpectomy (Left); bronchoscopy (N/A, 2019); bronchoscopy (2019); bronchoscopy (N/A, 2019); bronchoscopy (N/A, 2019); bronchoscopy (2019); and bronchoscopy (N/A, 2019). Restrictions  Restrictions/Precautions  Restrictions/Precautions: General Precautions, Fall Risk, Up as Tolerated  Position Activity Restriction  Other position/activity restrictions: High fall risk per nursing assessment, NG feeding tube, 4L O2, central line, ICU monitoring, Gr  Subjective   General  Chart Reviewed: Yes  Response To Previous Treatment: Not applicable  Family / Caregiver Present: Yes(family)  Referring Practitioner: Dr. Alma Turner  Subjective  Subjective: Pt agreeable to therapy.  Non verbal mainly throughout evaulation but nodding yes and no  General 4L            AM-PAC Score  AM-PAC Inpatient Mobility Raw Score : 8  AM-PAC Inpatient T-Scale Score : 28.52  Mobility Inpatient CMS 0-100% Score: 86.62  Mobility Inpatient CMS G-Code Modifier : CM          Goals  Short term goals  Time Frame for Short term goals: 1 week, 5/16/19 (unless otherwise specified)  Short term goal 1: Pt will transfer supine <-> sit with modA x 2  Short term goal 2: Pt will roll supine <-> sidelying with modA x 1  Short term goal 3: By 5/11/19: Pt will tolerate 12-15 reps BLE exercise for ROM/strengthening and endurance  Short term goal 4: Pt will tolerate sitting EOB x 5 minutes with modA x 1 or less to maintain upright trunk  Short term goal 5: When pt demonstrates adequate strength: Pt will transfer sit <-> stand and bed>chair using appropriate AD/lift equipment with modA x 2  Patient Goals   Patient goals : Patient unable to verbalize goals when asked    Plan    Plan  Times per week: 5-7x/week in acute care  Times per day: Daily  Specific instructions for Next Treatment: Progress ther ex and mobility as tolerated, progress to sitting EOB after next session  Current Treatment Recommendations: Strengthening, Balance Training, Functional Mobility Training, Endurance Training, Transfer Training, Safety Education & Training, Gait Training, Patient/Caregiver Education & Training, Positioning, ROM, Equipment Evaluation, Education, & procurement  Safety Devices  Type of devices:  All fall risk precautions in place, Call light within reach, Patient at risk for falls, Left in bed, Bed alarm in place, Nurse notified     Therapy Time   Individual Concurrent Group Co-treatment   Time In 0932         Time Out 1000         Minutes 28         Timed Code Treatment Minutes: [address] [facility], [address] [facility]

## 2019-05-10 NOTE — FLOWSHEET NOTE
Pt systolic BP has been running 150-170S. Per verbal From Dr Marci Kulkarni. BP does not need treated .

## 2019-05-10 NOTE — PROGRESS NOTES
Hospitalist Progress Note      PCP: Kristopher Amaya PA-C    Date of Admission: 4/29/2019    Chief Complaint: [facility] [place]:      Subjective:   Patient is up in bed, seems comfortable, not in distress. Difficulty to follow command. Shortness of breath noted. No new event overnight noted. Medications:  Reviewed    Infusion Medications    dextrose       Scheduled Medications    magnesium sulfate  4 g Intravenous Once    psyllium  1 packet Per NG tube Daily    ipratropium-albuterol  1 ampule Inhalation Q4H WA    saccharomyces boulardii  250 mg Oral BID    atorvastatin  40 mg Oral Nightly    aspirin  325 mg Oral Daily    insulin glargine  25 Units Subcutaneous Daily    insulin lispro  0-12 Units Subcutaneous Q4H    miconazole   Topical BID    pantoprazole  40 mg Intravenous BID    sodium chloride flush  10 mL Intravenous 2 times per day     PRN Meds: glucose, dextrose, glucagon (rDNA), dextrose, magnesium sulfate, potassium chloride, acetaminophen, sodium chloride flush, magnesium hydroxide, acetaminophen      Intake/Output Summary (Last 24 hours) at 5/10/2019 1315  Last data filed at 5/10/2019 1223  Gross per 24 hour   Intake 1999 ml   Output 2300 ml   Net -301 ml       Physical Exam Performed:    /60   Pulse 104   Temp 99 °F (37.2 °C) (Core)   Resp (!) 32   Ht 5' 1\" (1.549 m)   Wt 123 lb 0.3 oz (55.8 kg)   SpO2 100%   BMI 23.24 kg/m²     General appearance: No apparent distress, appears stated age and cooperative. HEENT: Pupils equal, round, and reactive to light. Conjunctivae/corneas clear. Neck: Supple, with full range of motion. No jugular venous distention. Trachea midline. Respiratory:  Normal respiratory effort. Clear to auscultation, bilaterally without Rales/Wheezes/Rhonchi. Cardiovascular: Regular rate and rhythm with normal S1/S2 without murmurs, rubs or gallops. Abdomen: Soft, non-tender, non-distended with normal bowel sounds.   Musculoskeletal: No clubbing, cyanosis or edema bilaterally. Full range of motion without deformity. Skin: Skin color, texture, turgor normal.  No rashes or lesions. Neurologic:  Neurovascularly intact without any focal sensory/motor deficits. Cranial nerves: II-XII intact, difficult to evaluate since patient does not follow commands properly. Psychiatric: Alert and oriented to self only. Does not follow commands properly. Capillary Refill: Brisk,< 3 seconds   Peripheral Pulses: +2 palpable, equal bilaterally       Labs:   Recent Labs     05/08/19  0500 05/09/19  0515 05/10/19  0534   WBC 14.9* 16.6* 13.8*   HGB 8.5* 8.7* 8.4*   HCT 24.9* 26.7* 25.1*   * 190 288     Recent Labs     05/08/19  0500 05/09/19  0515 05/09/19  1246 05/10/19  0534   * 147*  --  145   K 3.6 3.4* 3.7 3.9   * 109  --  106   CO2 24 27  --  28   BUN 29* 25*  --  20   CREATININE 1.1 0.9  --  0.7   CALCIUM 7.5* 7.5*  --  7.7*   PHOS 3.7 4.1  --  3.5       Urinalysis:      Lab Results   Component Value Date    NITRU Negative 04/29/2019    WBCUA 10-20 04/29/2019    BACTERIA 1+ 04/29/2019    RBCUA 3-5 04/29/2019    BLOODU MODERATE 04/29/2019    SPECGRAV 1.025 04/29/2019    GLUCOSEU >=1000 04/29/2019       Radiology:  XR CHEST PORTABLE   Final Result   Increasing opacification of the left hemithorax, consistent with a   combination of airspace disease and effusion. Findings of interstitial edema in the right lung are noted with trace   effusion. Unchanged appearance of the enteric tube and right internal jugular line. XR CHEST PORTABLE   Final Result   New right basilar and stable multifocal left lung airspace disease. XR CHEST PORTABLE   Final Result   No significant change in bilateral airspace disease given change in technique. VL DUP CAROTID BILATERAL   Final Result      XR CHEST PORTABLE   Final Result   Worsening pulmonary edema with grossly stable left basilar atelectasis and/or   pneumonia.          XR followed to resolution, especially given the   nodular morphology within portions of the consolidation. Diffuse mural thickening of the esophagus. Correlate with clinical evidence   of esophagitis. The tip of the right femoral venous catheter is malpositioned within a sacral   vein. That should be repositioned for more optimal placement. CT CERVICAL SPINE WO CONTRAST   Final Result   Multilevel degenerative changes with no acute abnormality of the cervical   spine. CT HEAD WO CONTRAST   Final Result   Motion degraded study with no acute intracranial abnormality. XR CHEST PORTABLE   Final Result   Supportive tubing projects in normal position. Left basilar airspace disease, pneumonia versus aspiration pneumonitis. There may be a component of pleural effusion. XR CHEST PORTABLE   Final Result   Atelectasis at the left lung base. Question of small left pleural effusion.                  Assessment/Plan:    Active Hospital Problems    Diagnosis Date Noted    Acute renal failure (ARF) ([facility] [N17.9] 08/20/2015     Priority: High     Class: Acute    Mucus plugging of bronchi [J98.09]     Acute pulmonary edema ([address] [J81.0] 05/07/2019    Pulmonary infiltrate [R91.8] 05/07/2019    Atelectasis [J98.11] 05/07/2019    Staphylococcus aureus pneumonia ([facility] [J15.211] 05/06/2019    Altered mental status [R41.82]     Non-traumatic rhabdomyolysis [M62.82]     Acute encephalopathy [G93.40]     Arterial ischemic stroke, ICA, left, acute (HCC) [I63.232]     Pneumonia due to organism [J18.9]     Acute respiratory failure (HCC) [J96.00]     Prolonged Q-T interval on ECG [R94.31]     Hypokalemia [E87.6]     Elevated troponin [R74.8]     DKA (diabetic ketoacidoses) ([facility] [E13.10] 04/29/2019    Acidosis [E87.2] 04/29/2019    Cardiac arrest ([facility] [I46.9] 04/29/2019    DKA, type 2, not at goal [facility]) [E11.10] 04/29/2019     80 yo female with h/o CAD, DM2, HTN, depression ECHO 55%, grade 1 DD, mild to mod AR. EP consulted for prolonged Qtc. Clinically hemodynamically remained stable.     Hematemesis and anemia- on admission. PPI. Hbg stable. GI consult. Conservative management at this time.      Hypotension - likely due to dehydration, infection, and severe acidosis - remains on pressors. Weaning as tolerates.      Vaginal candidiasis - fluconazole x 3 doses. DVT Prophylaxis: Lovenox  Diet: DIET TUBE FEED CONTINUOUS/CYCLIC NPO; Diabetic 1.5 (Glucerna 1.5); Nasogastric; Continuous; 25; 50; 20  Code Status: Full Code    PT/OT Eval Status: Not ordered    Dispo - 2-4 days, possible placement in LTAC.     Jered Rosa MD

## 2019-05-10 NOTE — CARE COORDINATION
CM received notification from Rickeyian Vásquez at [facility]. Patient insurance denied LTACH. Spoke to Dr Mason Suggs about peer to peer. Patient not stable for discharge anyway, underwent bronch today. Discussed options and it was decided to let the auth  and we will restart on Monday should patient stabilize. There is possibility of trach being needed. Octavia with [employer] aware and in agreement.   Lisa Griffiths RN

## 2019-05-10 NOTE — PROGRESS NOTES
Call placed to GI, per RN - Bennie Amor. Requesting callback. Dr. Lex Bruce handling calls at this time.     Flaquito Seymour

## 2019-05-10 NOTE — PROGRESS NOTES
RESPIRATORY THERAPY ASSESSMENT    Name:  Kenji Sosa Record Number:  [id number]  Age: 81 y.o.   rGender: female  : 1950  Today's Date:  5/10/2019  Room:  0224/0224-01    Assessment     Is the patient being admitted for a COPD or Asthma exacerbation? No   (If yes the patient will be seen every 4 hours for the first 24 hours and then reassessed)    Patient Admission Diagnosis      Allergies  Allergies   Allergen Reactions    Aspirin Nausea And Vomiting       Minimum Predicted Vital Capacity:     unable          Actual Vital Capacity:      unable              Pulmonary History:Asthma  Home Oxygen Therapy:  room air  Home Respiratory Therapy:None   Current Respiratory Therapy:  Duoneb txs Q4H  Treatment Type: HHN  Medications: Albuterol/Ipratropium    Respiratory Severity Index(RSI)   Patients with orders for inhalation medications, oxygen, or any therapeutic treatment modality will be placed on Respiratory Protocol. They will be assessed with the first treatment and at least every 72 hours thereafter. The following severity scale will be used to determine frequency of treatment intervention.     Smoking History: Pulmonary Disease or Smoking History, Greater than 15 pack year = 2    Social History  Social History     Tobacco Use    Smoking status: Never Smoker    Smokeless tobacco: Never Used   Substance Use Topics    Alcohol use: No     Comment: occasional    Drug use: No       Recent Surgical History: None = 0  Past Surgical History  Past Surgical History:   Procedure Laterality Date    BLADDER REPAIR      BREAST LUMPECTOMY Left     BREAST SURGERY      BRONCHOSCOPY N/A 2019    BRONCHOSCOPY THERAPUTIC ASPIRATION INITIAL performed by Fifi Perez MD at [address] [place] [address]  2019    BRONCHOSCOPY ALVEOLAR LAVAGE performed by Fifi Perez MD at [address] [place] [address] N/A 2019    BRONCHOSCOPY THERAPUTIC ASPIRATION INITIAL performed by Fifi Perez MD at [address] [place] [address] N/A 5/7/2019    BRONCHOSCOPY ALVEOLAR LAVAGE performed by Davis Koenig MD at [address] [place] [address]  5/7/2019    BRONCHOSCOPY THERAPUTIC ASPIRATION INITIAL performed by Davis Koenig MD at [address] [place] [address] N/A 5/9/2019    BRONCHOSCOPY THERAPUTIC ASPIRATION INITIAL performed by Davis Koenig MD at [address] [facility]       Level of Consciousness: Alert, Follows Commands but Disoriented = 1    Level of Activity: Non weight bearing- transfers bed to chair only = 3    Respiratory Pattern: Regular Pattern; RR 8-20 = 0    Breath Sounds: Diminshed bilaterally and/or crackles = 2    Sputum  Sputum Color: None, Tenacity: None, Sputum How Obtained: Spontaneous cough  Cough: Weak, productive = 2    Vital Signs   BP (!) 153/83   Pulse 115   Temp 98 °F (36.7 °C) (Axillary)   Resp (!) 40   Ht 5' 1\" (1.549 m)   Wt 123 lb 0.3 oz (55.8 kg)   SpO2 95%   BMI 23.24 kg/m²   SPO2 (COPD values may differ): 88-89% on room air or greater than 92% on FiO2 28- 35% = 2    Peak Flow (asthma only): not applicable = 0    RSI: 14-74 = Q6H or QID and Q4HPRN for dyspnea        Plan       Goals: medication delivery and improve oxygenation    Patient/caregiver was educated on the proper method of use for Respiratory Care Devices:  Yes      Level of patient/caregiver understanding able to:   ? Verbalize understanding   ? Demonstrate understanding       ? Teach back        ? Needs reinforcement       ? No available caregiver               ? Other:     Response to education:  Good     Is patient being placed on Home Treatment Regimen? No     Does the patient have everything they need prior to discharge? NA     Comments: chart reviewed and pt assessed    Plan of Care: change Duoneb txs to Q4H w/a    Electronically signed by Ida Dodd RCP on 5/10/2019 at 11:22 AM    Respiratory Protocol Guidelines     1.  Assessment and treatment by Respiratory Therapy will be initiated Mucolytic Agent:    1. Must always be administered with a bronchodilator. 2. Discontinue if patient experiences worsened bronchospasm, or secretions have lessened to the point that the patient is able to clear them with a cough. Anti-inflammatory and Combination Medications:    1. If the patient lacks prior history of lung disease, is not using inhaled anti-inflammatory medication at home, and lacks wheezing by examination or by history for at least 24 hours, contact physician for possible discontinuation.

## 2019-05-11 LAB
ANION GAP SERPL CALCULATED.3IONS-SCNC: 8 MMOL/L (ref 3–16)
BASOPHILS ABSOLUTE: 0.1 K/UL (ref 0–0.2)
BASOPHILS RELATIVE PERCENT: 0.6 %
BUN BLDV-MCNC: 20 MG/DL (ref 7–20)
CALCIUM SERPL-MCNC: 7.8 MG/DL (ref 8.3–10.6)
CHLORIDE BLD-SCNC: 109 MMOL/L (ref 99–110)
CO2: 30 MMOL/L (ref 21–32)
CREAT SERPL-MCNC: 0.6 MG/DL (ref 0.6–1.2)
CULTURE, RESPIRATORY: NORMAL
EOSINOPHILS ABSOLUTE: 0.2 K/UL (ref 0–0.6)
EOSINOPHILS RELATIVE PERCENT: 1.4 %
GFR AFRICAN AMERICAN: >60
GFR NON-AFRICAN AMERICAN: >60
GLUCOSE BLD-MCNC: 123 MG/DL (ref 70–99)
GLUCOSE BLD-MCNC: 123 MG/DL (ref 70–99)
GLUCOSE BLD-MCNC: 134 MG/DL (ref 70–99)
GLUCOSE BLD-MCNC: 135 MG/DL (ref 70–99)
GLUCOSE BLD-MCNC: 149 MG/DL (ref 70–99)
GLUCOSE BLD-MCNC: 149 MG/DL (ref 70–99)
GLUCOSE BLD-MCNC: 188 MG/DL (ref 70–99)
GRAM STAIN RESULT: NORMAL
HCT VFR BLD CALC: 24.2 % (ref 36–48)
HEMOGLOBIN: 8 G/DL (ref 12–16)
LYMPHOCYTES ABSOLUTE: 1.3 K/UL (ref 1–5.1)
LYMPHOCYTES RELATIVE PERCENT: 10.8 %
MAGNESIUM: 2 MG/DL (ref 1.8–2.4)
MCH RBC QN AUTO: 28.7 PG (ref 26–34)
MCHC RBC AUTO-ENTMCNC: 33.3 G/DL (ref 31–36)
MCV RBC AUTO: 86.3 FL (ref 80–100)
MONOCYTES ABSOLUTE: 0.7 K/UL (ref 0–1.3)
MONOCYTES RELATIVE PERCENT: 5.5 %
NEUTROPHILS ABSOLUTE: 10 K/UL (ref 1.7–7.7)
NEUTROPHILS RELATIVE PERCENT: 81.7 %
PDW BLD-RTO: 15.1 % (ref 12.4–15.4)
PERFORMED ON: ABNORMAL
PHOSPHORUS: 3.7 MG/DL (ref 2.5–4.9)
PLATELET # BLD: 414 K/UL (ref 135–450)
PMV BLD AUTO: 8.2 FL (ref 5–10.5)
POTASSIUM SERPL-SCNC: 3.9 MMOL/L (ref 3.5–5.1)
RBC # BLD: 2.8 M/UL (ref 4–5.2)
SODIUM BLD-SCNC: 147 MMOL/L (ref 136–145)
WBC # BLD: 12.3 K/UL (ref 4–11)

## 2019-05-11 PROCEDURE — 6370000000 HC RX 637 (ALT 250 FOR IP): Performed by: INTERNAL MEDICINE

## 2019-05-11 PROCEDURE — 2000000000 HC ICU R&B

## 2019-05-11 PROCEDURE — 80048 BASIC METABOLIC PNL TOTAL CA: CPT

## 2019-05-11 PROCEDURE — C9113 INJ PANTOPRAZOLE SODIUM, VIA: HCPCS | Performed by: INTERNAL MEDICINE

## 2019-05-11 PROCEDURE — 6360000002 HC RX W HCPCS: Performed by: INTERNAL MEDICINE

## 2019-05-11 PROCEDURE — 31720 CLEARANCE OF AIRWAYS: CPT

## 2019-05-11 PROCEDURE — 94640 AIRWAY INHALATION TREATMENT: CPT

## 2019-05-11 PROCEDURE — 83735 ASSAY OF MAGNESIUM: CPT

## 2019-05-11 PROCEDURE — 6370000000 HC RX 637 (ALT 250 FOR IP)

## 2019-05-11 PROCEDURE — 97530 THERAPEUTIC ACTIVITIES: CPT

## 2019-05-11 PROCEDURE — 99233 SBSQ HOSP IP/OBS HIGH 50: CPT | Performed by: INTERNAL MEDICINE

## 2019-05-11 PROCEDURE — 6370000000 HC RX 637 (ALT 250 FOR IP): Performed by: FAMILY MEDICINE

## 2019-05-11 PROCEDURE — 94761 N-INVAS EAR/PLS OXIMETRY MLT: CPT

## 2019-05-11 PROCEDURE — 2580000003 HC RX 258: Performed by: INTERNAL MEDICINE

## 2019-05-11 PROCEDURE — 85025 COMPLETE CBC W/AUTO DIFF WBC: CPT

## 2019-05-11 PROCEDURE — 2700000000 HC OXYGEN THERAPY PER DAY

## 2019-05-11 PROCEDURE — 84100 ASSAY OF PHOSPHORUS: CPT

## 2019-05-11 RX ADMIN — IPRATROPIUM BROMIDE AND ALBUTEROL SULFATE 1 AMPULE: .5; 3 SOLUTION RESPIRATORY (INHALATION) at 12:37

## 2019-05-11 RX ADMIN — MORPHINE SULFATE 2 MG: 2 INJECTION, SOLUTION INTRAMUSCULAR; INTRAVENOUS at 12:04

## 2019-05-11 RX ADMIN — MORPHINE SULFATE 2 MG: 2 INJECTION, SOLUTION INTRAMUSCULAR; INTRAVENOUS at 06:59

## 2019-05-11 RX ADMIN — INSULIN LISPRO 2 UNITS: 100 INJECTION, SOLUTION INTRAVENOUS; SUBCUTANEOUS at 02:24

## 2019-05-11 RX ADMIN — IPRATROPIUM BROMIDE AND ALBUTEROL SULFATE 1 AMPULE: .5; 3 SOLUTION RESPIRATORY (INHALATION) at 19:52

## 2019-05-11 RX ADMIN — ATORVASTATIN CALCIUM 40 MG: 40 TABLET, FILM COATED ORAL at 21:07

## 2019-05-11 RX ADMIN — IPRATROPIUM BROMIDE AND ALBUTEROL SULFATE 1 AMPULE: .5; 3 SOLUTION RESPIRATORY (INHALATION) at 15:29

## 2019-05-11 RX ADMIN — PANTOPRAZOLE SODIUM 40 MG: 40 INJECTION, POWDER, FOR SOLUTION INTRAVENOUS at 21:07

## 2019-05-11 RX ADMIN — PSYLLIUM HUSK 1 PACKET: 3.4 POWDER ORAL at 09:10

## 2019-05-11 RX ADMIN — INSULIN LISPRO 2 UNITS: 100 INJECTION, SOLUTION INTRAVENOUS; SUBCUTANEOUS at 22:42

## 2019-05-11 RX ADMIN — INSULIN LISPRO 2 UNITS: 100 INJECTION, SOLUTION INTRAVENOUS; SUBCUTANEOUS at 11:20

## 2019-05-11 RX ADMIN — ASPIRIN 325 MG: 325 TABLET, COATED ORAL at 09:10

## 2019-05-11 RX ADMIN — Medication 250 MG: at 21:08

## 2019-05-11 RX ADMIN — Medication 250 MG: at 09:09

## 2019-05-11 RX ADMIN — MICONAZOLE NITRATE: 2 POWDER TOPICAL at 11:23

## 2019-05-11 RX ADMIN — IPRATROPIUM BROMIDE AND ALBUTEROL SULFATE 1 AMPULE: .5; 3 SOLUTION RESPIRATORY (INHALATION) at 08:51

## 2019-05-11 RX ADMIN — Medication 10 ML: at 10:06

## 2019-05-11 RX ADMIN — MICONAZOLE NITRATE: 2 POWDER TOPICAL at 21:08

## 2019-05-11 RX ADMIN — Medication 10 ML: at 21:07

## 2019-05-11 RX ADMIN — INSULIN GLARGINE 25 UNITS: 100 INJECTION, SOLUTION SUBCUTANEOUS at 09:11

## 2019-05-11 RX ADMIN — Medication: at 11:23

## 2019-05-11 RX ADMIN — PANTOPRAZOLE SODIUM 40 MG: 40 INJECTION, POWDER, FOR SOLUTION INTRAVENOUS at 09:10

## 2019-05-11 RX ADMIN — MORPHINE SULFATE 2 MG: 2 INJECTION, SOLUTION INTRAMUSCULAR; INTRAVENOUS at 02:14

## 2019-05-11 ASSESSMENT — PAIN SCALES - GENERAL
PAINLEVEL_OUTOF10: 3
PAINLEVEL_OUTOF10: 7
PAINLEVEL_OUTOF10: 7
PAINLEVEL_OUTOF10: 6

## 2019-05-11 ASSESSMENT — PAIN DESCRIPTION - DESCRIPTORS: DESCRIPTORS: CRAMPING

## 2019-05-11 ASSESSMENT — PAIN DESCRIPTION - FREQUENCY: FREQUENCY: INTERMITTENT

## 2019-05-11 ASSESSMENT — PAIN SCALES - WONG BAKER
WONGBAKER_NUMERICALRESPONSE: 0

## 2019-05-11 ASSESSMENT — PAIN DESCRIPTION - LOCATION: LOCATION: ABDOMEN

## 2019-05-11 NOTE — PROGRESS NOTES
Physical Therapy  Facility/Department: [facility] C2 CARD TELEMETRY  Daily Treatment Note  NAME: Carrillo Larios  : 1950  MRN: [id number]    Date of Service: 2019    Discharge Recommendations:  Patient would benefit from continued therapy after discharge(LTAC)   PT Equipment Recommendations  Equipment Needed: No(defer to next level of care)    Patient Diagnosis(es): The primary encounter diagnosis was Acute respiratory failure, unspecified whether with hypoxia or hypercapnia ([address] Diagnoses of Cardiac arrest ([address] Pneumonia due to organism, Dehydration, Altered mental status, unspecified altered mental status type, Hypothermia, initial encounter, Hypotension, unspecified hypotension type, Diabetic ketoacidosis with coma associated with type 2 diabetes mellitus ([address] Non-traumatic rhabdomyolysis, and Septicemia ([address] were also pertinent to this visit. has a past medical history of Acute renal failure (ARF) ([address] Asthma, CAD (coronary artery disease), Diabetes mellitus ([address] Hypertension, Pneumonia, Seizures ([address] and Unspecified cerebral artery occlusion with cerebral infarction. has a past surgical history that includes Breast surgery; bladder repair; Breast lumpectomy (Left); bronchoscopy (N/A, 2019); bronchoscopy (2019); bronchoscopy (N/A, 2019); bronchoscopy (N/A, 2019); bronchoscopy (2019); bronchoscopy (N/A, 2019); and bronchoscopy (N/A, 5/10/2019).     Restrictions  Restrictions/Precautions  Restrictions/Precautions: General Precautions, Fall Risk, Up as Tolerated  Position Activity Restriction  Other position/activity restrictions: High fall risk per nursing assessment, NG feeding tube, 4L O2, central line, ICU monitoring, Gr  Subjective   General  Chart Reviewed: Yes  Response To Previous Treatment: Patient unable to report, no changes reported from family or staff  Family / Caregiver Present: Yes  Referring Practitioner: Dr. Mony Perez  Subjective  Subjective: Pt agreeable to therapy. Non verbal mainly throughout tx but nodding yes and no  General Comment  Comments: Pt resting in bed upon entry of therapy staff; RN cleared pt for therapy  Pain Screening  Patient Currently in Pain: Denies  Vital Signs  Patient Currently in Pain: Denies       Orientation  Orientation  Overall Orientation Status: Impaired  Orientation Level: Oriented to person;Disoriented to place; Disoriented to time;Disoriented to situation  Cognition      Objective   Bed mobility  Rolling to Left: Dependent/Total;2 Person assistance;Maximum assistance  Rolling to Right: Dependent/Total;2 Person assistance;Maximum assistance  Supine to Sit: Dependent/Total;2 Person assistance;Maximum assistance  Comment: sat EOB x 10' with Max A for upright posture and forward gaze, pt still demonstrating tendency to lean posteriorly and to the right  Transfers  Sit to Stand: Unable to assess  Stand to sit: Unable to assess  Bed to Chair: Unable to assess  Ambulation  Ambulation?: No     Assessment   Body structures, Functions, Activity limitations: Decreased functional mobility ; Decreased ROM; Decreased strength;Decreased balance;Decreased cognition;Decreased endurance  Assessment: Pt continues to require Max A x 2 to sit EOB and Max VC and assistance to maintain upright posture sitting at EOB. Pt unable to david ther ex or OOB mobility at this time. Continue to recommend LTAC at d/c.    Treatment Diagnosis: Decreased strength and (I) with mobility  Specific instructions for Next Treatment: Progress ther ex and mobility as tolerated, progress to sitting EOB after next session  Prognosis: Fair  Patient Education: Role of PT, benefits of mobility, technique for rolling, sitting balance, bed mobility, D/C recs - pt verbalizes understanding  Barriers to Learning: dec cog  REQUIRES PT FOLLOW UP: Yes  Activity Tolerance  Activity Tolerance: Patient limited by fatigue;Patient limited by endurance       AM-PAC Score  AM-PAC Inpatient Mobility Raw Score : 8  AM-PAC Inpatient T-Scale Score : 28.52  Mobility Inpatient CMS 0-100% Score: 86.62  Mobility Inpatient CMS G-Code Modifier : CM          Goals  Short term goals  Time Frame for Short term goals: 1 week, 5/16/19 (unless otherwise specified)  Short term goal 1: Pt will transfer supine <-> sit with modA x 2  Short term goal 2: Pt will roll supine <-> sidelying with modA x 1  Short term goal 3: By 5/11/19: Pt will tolerate 12-15 reps BLE exercise for ROM/strengthening and endurance-not met, ongoing  Short term goal 4: Pt will tolerate sitting EOB x 5 minutes with modA x 1 or less to maintain upright trunk- not met, ongoing  Short term goal 5: When pt demonstrates adequate strength: Pt will transfer sit <-> stand and bed>chair using appropriate AD/lift equipment with modA x 2  Patient Goals   Patient goals : Patient unable to verbalize goals when asked    Plan    Plan  Times per week: 5-7x/week in acute care  Times per day: Daily  Specific instructions for Next Treatment: Progress ther ex and mobility as tolerated, progress to sitting EOB after next session  Current Treatment Recommendations: Strengthening, Balance Training, Functional Mobility Training, Endurance Training, Transfer Training, Safety Education & Training, Gait Training, Patient/Caregiver Education & Training, Positioning, ROM, Equipment Evaluation, Education, & procurement  Safety Devices  Type of devices:  All fall risk precautions in place, Call light within reach, Patient at risk for falls, Left in bed, Nurse notified, Sitter present  Restraints  Initially in place: No     Therapy Time   Individual Concurrent Group Co-treatment   Time In       1406   Time Out       1435   Minutes       29   Timed Code Treatment Minutes: Winston Reilly, [place], DPT #[id number]

## 2019-05-11 NOTE — PROGRESS NOTES
Pulmonary & Critical Care Medicine ICU Progress Note      Events of Last 24 hours: The patient has remained afebrile and hemodynamically stable, on oxygen at 4 LPM, SaO2 96%. She has an NG in place, receiving tube feeds. She was extubated on 5/8. She has been a nonsmoker. She has had multiple strokes in the past.  She has been treated for MSSA. She has been accepted to [facility] in [place] as per . Bronchoscopy 5/19 with extensive mucopurulent secretions, mucous plugging. Had a few episodes of bright red blood per rectum, GI consulted. Recent Labs     05/08/19  1332 05/09/19  0515   PHART 7.531* 7.481*   ELL8RSY 29.3* 36.3   PO2ART 89.2 57.5*     IV:     dextrose         Vitals:  BP (!) 150/80   Pulse 106   Temp 98.4 °F (36.9 °C) (Oral)   Resp 28   Ht 5' 1\" (1.549 m)   Wt 123 lb 0.3 oz (55.8 kg)   SpO2 97%   BMI 23.24 kg/m²  CVP (Mean): 9 mmHg      Intake/Output Summary (Last 24 hours) at 5/11/2019 1050  Last data filed at 5/11/2019 0950  Gross per 24 hour   Intake 1935 ml   Output 2925 ml   Net -990 ml       EXAM:  General: Frail, underweight appearing, in no respiratory distress. Appears malnourished, bitemporal muscle wasting   Eyes: PERRL. No sclera icterus. No conjunctival injection. ENT: No discharge. Pharynx clear, mucosa dry, class II airway. Neck: Trachea midline. Normal thyroid. No JVD  Resp: Bilateral wheezes, mild tachypnea. No accessory muscle use. No crackles. No wheezing. No rhonchi. CV: Regular rate. Regular rhythm. No mumur or rub. No edema. GI: Non-tender. Non-distended. No masses. No organmegaly. Skin: Warm and dry. No nodule on exposed extremities. No rash on exposed extremities. Lymph: Deferred. M/S: No cyanosis. No joint deformity. No clubbing. Neuro: Awake. Follows commands. PERRL, detailed neurological exam not performed  Psych: Oriented to person, place, time. No anxiety or agitation.      Medications:  Scheduled Meds:   LIP BALM        magnesium sulfate  4 g Intravenous Once    psyllium  1 packet Per NG tube Daily    ipratropium-albuterol  1 ampule Inhalation Q4H WA    pneumococcal 13-valent conjugate  0.5 mL Intramuscular Once    saccharomyces boulardii  250 mg Oral BID    atorvastatin  40 mg Oral Nightly    aspirin  325 mg Oral Daily    insulin glargine  25 Units Subcutaneous Daily    insulin lispro  0-12 Units Subcutaneous Q4H    miconazole   Topical BID    pantoprazole  40 mg Intravenous BID    sodium chloride flush  10 mL Intravenous 2 times per day       PRN Meds:  morphine, glucose, dextrose, glucagon (rDNA), dextrose, magnesium sulfate, potassium chloride, acetaminophen, sodium chloride flush, magnesium hydroxide, acetaminophen    Results:  CBC:   Recent Labs     05/10/19  0534 05/10/19  2007 05/11/19  0656   WBC 13.8* 14.6* 12.3*   HGB 8.4* 8.5* 8.0*   HCT 25.1* 26.2* 24.2*   MCV 86.3 87.0 86.3    370 414     BMP:   Recent Labs     05/09/19  0515 05/09/19  1246 05/10/19  0534 05/11/19  0656   *  --  145 147*   K 3.4* 3.7 3.9 3.9     --  106 109   CO2 27  --  28 30   PHOS 4.1  --  3.5 3.7   BUN 25*  --  20 20   CREATININE 0.9  --  0.7 0.6       Cultures: Bronchoscopy with Candida albicans    Films:  CXR reviewed by me      Assessment and plan:  Acute respiratory failure, hypoxemic. Optional requirements are improved. Left lung atelectasis/pneumonia, mucous plugging, leukocytosis. Extensive mucopurulent secretions. 1+ gram-positive cocci, 4+ WBCs, cultures pending. Acute renal failure. Resolved. DKA, diabetes mellitus. Per IM, on Lantus and sliding scale insulin. Multiple strokes. Per IM. MSSA pneumonia. On Ancef. Lower GI bleed. Hematochezia. Had a rectal tube. GI feels this could be traumatic. Anemia. H&H stable. FEN. Tube feeds for now. Swallow evaluation later, after respiratory status improves. May need PEG tube. DVT prophylaxis. SCDs  PUD prophylaxis. Protonix.     Discussed with nursing, patient and her family.     Electronically signed by:  Giovanna Tejeda MD    5/11/2019    10:50 AM.

## 2019-05-11 NOTE — PROGRESS NOTES
Nephrology Progress Note   [email or web address]      This patient is a 81year old female whom we are following for LOPEZ and electrolyte abnormalities. Subjective: The patient was seen and examined. Sodium up to 147. TF back on. Tolerating well, getting 200 cc q 6 hours free water flush    Family History: Family at bedside and questions were answere. ROS: Unable to obtain      Vitals:  BP (!) 169/90   Pulse 106   Temp 98.4 °F (36.9 °C) (Oral)   Resp (!) 32   Ht 5' 1\" (1.549 m)   Wt 123 lb 0.3 oz (55.8 kg)   SpO2 99%   BMI 23.24 kg/m²   I/O last 3 completed shifts: In: 1935 [I.V.:60; NG/GT:1700; IV Piggyback:175]  Out: 8589 [Urine:1775]  No intake/output data recorded. Physical Exam:  Physical Exam   Constitutional: She appears ill. HENT:   Head: Normocephalic and atraumatic. Eyes: No scleral icterus. Neck: No tracheal deviation present. No thyromegaly present. Cardiovascular: Exam reveals no gallop and no friction rub. Tachycardic, regular rhythm   Pulmonary/Chest:   Coarse breath sounds bilateral   Abdominal: Soft. Bowel sounds are normal. She exhibits no distension. There is no tenderness. Musculoskeletal: She exhibits edema. Vitals reviewed.         Medications:   magnesium sulfate  4 g Intravenous Once    psyllium  1 packet Per NG tube Daily    ipratropium-albuterol  1 ampule Inhalation Q4H WA    pneumococcal 13-valent conjugate  0.5 mL Intramuscular Once    saccharomyces boulardii  250 mg Oral BID    atorvastatin  40 mg Oral Nightly    aspirin  325 mg Oral Daily    insulin glargine  25 Units Subcutaneous Daily    insulin lispro  0-12 Units Subcutaneous Q4H    miconazole   Topical BID    pantoprazole  40 mg Intravenous BID    sodium chloride flush  10 mL Intravenous 2 times per day         Labs:  Recent Labs     05/10/19  0534 05/10/19  2007 05/11/19  0656   WBC 13.8* 14.6* 12.3*   HGB 8.4* 8.5* 8.0*   HCT 25.1* 26.2* 24.2*   MCV 86.3 87.0 86.3    370 414 Recent Labs     05/09/19  0515 05/09/19  1246 05/10/19  0534 05/11/19  0656   *  --  145 147*   K 3.4* 3.7 3.9 3.9     --  106 109   CO2 27  --  28 30   GLUCOSE 137*  --  174* 123*   PHOS 4.1  --  3.5 3.7   MG 1.60*  --  1.60* 2.00   BUN 25*  --  20 20   CREATININE 0.9  --  0.7 0.6   LABGLOM >60  --  >60 >60   GFRAA >60  --  >60 >60           Assessment/Plan:  Ms. Gume Lugo is a 81year old female with PMHx of CAD, DM, HTN and seizures who presents after a cardiac arrest.     Acute Kidney Injury.  - Etiology: ATN in the setting of cardiac arrest/sepsis. - Data: Last serum creatinine in 07/2016 was <0.5mg/dL. - Urinalysis with blood and protein. - Clinical: Rolved      Severe Anion Gap Metabolic Acidosis. - Likely from DKA and lactic acidosis. No toxic alcohol ingestion.  - Methanol, Ethylene glycol negative  - Resolved      Anemia.  - Had coffee-ground emesis on admission.  - Hgb stable.     S/P Cardiac Arrest.  - Plans per Cardiology      Hypophosphatemia/Hypokalemia  - PRN supplementation.     Multiple CVAs  - MRI brain 5/3, neurology seeing     Hypernatremia, mild. - FWF with TF. Will increase to 100 cc every 1 hour. Follow labs. Not polyuric      Acute Respiratory Failure. - Extubated on 5/8/19.  - Plans per pulmonary    Please do not hesitate to contact me at [phone] if with questions. Thank you!     Jose M Gallo MD  5/11/2019  [facility]

## 2019-05-11 NOTE — PROGRESS NOTES
Patient family states that it is time for patient to receive pain medications. Patient has Morphine orders however, at the time of the request from the family the patient is resting quietly in bed. Patient repositioned in bed. Rates CPOT of 1. Explained to family that pain medication would not be appropriate to give at this time. Will continue to monitor.

## 2019-05-11 NOTE — PROGRESS NOTES
PROGRESS NOTE  S:69 yrs Patient  admitted on 4/29/2019 with DKA, type 2, not at goal [facility]) [E11.10]  DKA, type 2, not at goal [facility]) [E11.10] . Today she feels ok. Had a few episodes of rectal bleeding with clots after removal of rectal tube. Hgb is stable    Exam:   Vitals:    05/11/19 0930   BP: (!) 150/80   Pulse: 106   Resp: 28   Temp:    SpO2: 97%      General appearance: alert, appears stated age and cooperative  HEENT: Oropharynx clear, no lesions  Neck: no adenopathy and supple, symmetrical, trachea midline  Lungs: clear to auscultation bilaterally  Heart: regular rate and rhythm, S1, S2 normal, no murmur, click, rub or gallop  Abdomen: soft, non-tender; bowel sounds normal; no masses,  no organomegaly  Extremities: extremities normal, atraumatic, no cyanosis or edema     Medications: Reviewed    Labs:  CBC:   Recent Labs     05/10/19  0534 05/10/19  2007 05/11/19  0656   WBC 13.8* 14.6* 12.3*   HGB 8.4* 8.5* 8.0*   HCT 25.1* 26.2* 24.2*   MCV 86.3 87.0 86.3    370 414     BMP:   Recent Labs     05/09/19  0515 05/09/19  1246 05/10/19  0534 05/11/19  0656   *  --  145 147*   K 3.4* 3.7 3.9 3.9     --  106 109   CO2 27  --  28 30   PHOS 4.1  --  3.5 3.7   BUN 25*  --  20 20   CREATININE 0.9  --  0.7 0.6     Impression: 81year old female with history of HTN, DM, CVA, asthma, Seizure do, admitted with cardiac arrest and DKA. She was also noted to have coffee ground aspirate per NG but Hgb is stable.     Recommendation:  1. Continue supportive care  2. PPI BID  3. Monitor Hgb  4. Observe for signs of bleeding  5. Start canasa suppository for presumed proctitis  6.  Will follow      Caleb Galloway MD  11:07 AM 5/11/2019

## 2019-05-11 NOTE — PROGRESS NOTES
Occupational Therapy  Facility/Department: [facility] C2 CARD TELEMETRY  Daily Treatment Note  NAME: Izabella Erickson  : 1950  MRN: [id number]    Date of Service: 2019    Discharge Recommendations:  Patient would benefit from continued therapy after discharge       Assessment   Performance deficits / Impairments: Decreased functional mobility ; Decreased safe awareness;Decreased balance;Decreased coordination;Decreased ADL status; Decreased cognition;Decreased endurance;Decreased strength;Decreased ROM; Decreased fine motor control  Assessment: Pt agreeable to therapy. Pt max A x2 for bed mobility. Pt max A for sitting balance at EOB. Pt demo'd some edema in L hand, repositioned on pillow at end of session. Pt limited d/t overall weakness. Cont with POC>   Patient Education: Role of OT, safety, importance of ROM  REQUIRES OT FOLLOW UP: Yes  Activity Tolerance  Activity Tolerance: Patient limited by fatigue;Treatment limited secondary to decreased cognition  Safety Devices  Safety Devices in place: Yes  Type of devices: Left in bed;Call light within reach; Bed alarm in place;Nurse notified         Patient Diagnosis(es): The primary encounter diagnosis was Acute respiratory failure, unspecified whether with hypoxia or hypercapnia ([address] Diagnoses of Cardiac arrest ([address] Pneumonia due to organism, Dehydration, Altered mental status, unspecified altered mental status type, Hypothermia, initial encounter, Hypotension, unspecified hypotension type, Diabetic ketoacidosis with coma associated with type 2 diabetes mellitus ([address] Non-traumatic rhabdomyolysis, and Septicemia ([address] were also pertinent to this visit. has a past medical history of Acute renal failure (ARF) ([address] Asthma, CAD (coronary artery disease), Diabetes mellitus ([address] Hypertension, Pneumonia, Seizures ([address] and Unspecified cerebral artery occlusion with cerebral infarction.    has a past surgical history that includes Breast surgery; bladder repair; Breast lumpectomy (Left); bronchoscopy (N/A, 5/1/2019); bronchoscopy (5/1/2019); bronchoscopy (N/A, 5/2/2019); bronchoscopy (N/A, 5/7/2019); bronchoscopy (5/7/2019); bronchoscopy (N/A, 5/9/2019); and bronchoscopy (N/A, 5/10/2019). Restrictions  Restrictions/Precautions  Restrictions/Precautions: General Precautions, Fall Risk, Up as Tolerated  Position Activity Restriction  Other position/activity restrictions: High fall risk per nursing assessment, NG feeding tube, 4L O2, central line, ICU monitoring, Gr  Subjective   General  Chart Reviewed: Yes  Patient assessed for rehabilitation services?: Yes  Family / Caregiver Present: Yes  Referring Practitioner: Carmen Bolaños MD  Diagnosis: Acute respiratory failure secondary to aspiration pneumonia and associated septic shock, extubated 5/8/19; Acute multiple bilateral thromboembolic infarcts (Per MRI brain 5/3/19: \"Acute/early subacute infarction involving the left hippocampus, Additional punctate infarctions within the frontal lobes and posterior temporal lobes bilaterally\"   Subjective  Subjective: Pt in bed on arrival, agreeable to treatment   General Comment  Comments: Per RN okay to treat  Vital Signs  Patient Currently in Pain: Denies   Orientation  Orientation  Overall Orientation Status: Impaired  Objective    ADL  Feeding: Dependent/Total  Grooming: Maximum assistance;Setup;Verbal cueing; Increased time to complete(wash face)  Toileting: Dependent/Total        Balance  Sitting Balance: Maximum assistance(seated EOB)  Standing Balance: Unable to assess(comment)  Bed mobility  Rolling to Left: Dependent/Total;2 Person assistance;Maximum assistance  Rolling to Right: Dependent/Total;2 Person assistance;Maximum assistance  Supine to Sit: Dependent/Total;2 Person assistance;Maximum assistance  Sit to Supine: Dependent/Total  Scooting: Dependent/Total;2 Person assistance              Cognition  Overall Cognitive Status: Exceptions  Arousal/Alertness: Delayed responses to stimuli  Following Commands: Follows one step commands with increased time; Follows one step commands with repetition  Attention Span: Attends with cues to redirect; Difficulty attending to directions  Problem Solving: Assistance required to generate solutions;Assistance required to implement solutions  Initiation: Requires cues for all  Sequencing: Requires cues for all        Type of ROM/Therapeutic Exercise  Type of ROM/Therapeutic Exercise: AROM;AAROM  Exercises  Grasp/Release: x10 BUE noted swelling in LUE, repositioned                    Plan   Plan  Times per week: 4-5x/week   Current Treatment Recommendations: Strengthening, ROM, Balance Training, Functional Mobility Training, Endurance Training, Neuromuscular Re-education, Patient/Caregiver Education & Training, Equipment Evaluation, Education, & procurement, Cognitive Reorientation, Safety Education & Training, Home Management Training    AM-PAC Score        AM-PAC Inpatient Daily Activity Raw Score: 7  AM-PAC Inpatient ADL T-Scale Score : 20.13  ADL Inpatient CMS 0-100% Score: 92.44  ADL Inpatient CMS G-Code Modifier : CM    Goals  Short term goals  Time Frame for Short term goals: by 5/16/19  Short term goal 1: Pt will complete 2 grooming tasks seated EOB with Max A  Short term goal 2: Pt with tolerate 10-15 reps UE ROM to increase strength and endurance, and engagement of RUE by 5/13  Short term goal 3: Pt will complete functional transfers with Max A x2 and LRAD as appropriate  Patient Goals   Patient goals : Pt does not state a goal at time of eval       Therapy Time   Individual Concurrent Group Co-treatment   Time In 1406         Time Out 1435         Minutes 29         Timed Code Treatment Minutes: Sweta 22 Ruthie OTR/L

## 2019-05-11 NOTE — PLAN OF CARE
Problem: Falls - Risk of:  Goal: Will remain free from falls  Description  Bed in lowest locked positions, call light within reach, side rails up x 2, bed check in place. Instructed patient to call before getting out of bed. Patient verbalized understanding. Outcome: Ongoing     Problem: Risk for Impaired Skin Integrity  Goal: Tissue integrity - skin and mucous membranes  Description  Structural intactness and normal physiological function of skin and  mucous membranes. Outcome: Ongoing  Note:   Skin assessed this shift without further breakdown. Pt turned Q2h. Protective barrier cream/ moisture barrier cream applied with changes.       Problem: Serum Glucose Level - Abnormal:  Goal: Ability to maintain appropriate glucose levels will improve  Description  Ability to maintain appropriate glucose levels will improve  Outcome: Ongoing

## 2019-05-11 NOTE — PROGRESS NOTES
Hospitalist Progress Note      PCP: Duran Gan PA-C    Date of Admission: 4/29/2019    Chief Complaint: unresponsive    Hospital Course: 82 yo female with h/o CAD, DM2, HTN, depression admitted after being found unresponsive with acute respiratory failure due to pneumonia, DKA, metabolic encephalopathy, LOPEZ, metabolic acidosis, acute GI bleed. Subjective: She is lying in bed, looks weak and tired. Family at the bedside. She is able to follow simple commands no new complaints noted. Medications:  Reviewed    Infusion Medications    dextrose       Scheduled Medications    magnesium sulfate  4 g Intravenous Once    psyllium  1 packet Per NG tube Daily    ipratropium-albuterol  1 ampule Inhalation Q4H WA    pneumococcal 13-valent conjugate  0.5 mL Intramuscular Once    saccharomyces boulardii  250 mg Oral BID    atorvastatin  40 mg Oral Nightly    aspirin  325 mg Oral Daily    insulin glargine  25 Units Subcutaneous Daily    insulin lispro  0-12 Units Subcutaneous Q4H    miconazole   Topical BID    pantoprazole  40 mg Intravenous BID    sodium chloride flush  10 mL Intravenous 2 times per day     PRN Meds: morphine, glucose, dextrose, glucagon (rDNA), dextrose, magnesium sulfate, potassium chloride, acetaminophen, sodium chloride flush, magnesium hydroxide, acetaminophen      Intake/Output Summary (Last 24 hours) at 5/11/2019 1401  Last data filed at 5/11/2019 0950  Gross per 24 hour   Intake 242 ml   Output 2200 ml   Net -1958 ml       Physical Exam Performed:    /71   Pulse 105   Temp 98.6 °F (37 °C) (Axillary)   Resp (!) 34   Ht 5' 1\" (1.549 m)   Wt 123 lb 0.3 oz (55.8 kg)   SpO2 98%   BMI 23.24 kg/m²     General appearance: She is lying in bed, looks very weak and debilitated. She is awake and follow simple one step commands to the best of her ability  HEENT: Pupils equal, round, and reactive to light. Conjunctivae/corneas clear.   Neck: Supple, with full range of motion. Trachea midline. Respiratory:  Bilateral breath sounds, decreased at bases, no rales or rhonchi noted this time. Cardiovascular: Regular rate and rhythm with normal S1/S2   Abdomen: Soft, non-tender, non-distended with normal bowel sounds. Musculoskeletal: No clubbing, cyanosis or edema bilaterally. Neurologic:  Neurovascularly intact without any focal sensory/motor deficits. Cranial nerves: II-XII intact, grossly non-focal.  Psychiatric: She is awake, she did not speak while I was in the room. She was able to follow simple one step commands to the best of her ability  Capillary Refill: Brisk,< 3 seconds   Peripheral Pulses: +2 palpable, equal bilaterally       Labs:   Recent Labs     05/10/19  0534 05/10/19  2007 05/11/19  0656   WBC 13.8* 14.6* 12.3*   HGB 8.4* 8.5* 8.0*   HCT 25.1* 26.2* 24.2*    370 414     Recent Labs     05/09/19 0515 05/09/19  1246 05/10/19  0534 05/11/19  0656   *  --  145 147*   K 3.4* 3.7 3.9 3.9     --  106 109   CO2 27  --  28 30   BUN 25*  --  20 20   CREATININE 0.9  --  0.7 0.6   CALCIUM 7.5*  --  7.7* 7.8*   PHOS 4.1  --  3.5 3.7     No results for input(s): AST, ALT, BILIDIR, BILITOT, ALKPHOS in the last 72 hours. No results for input(s): INR in the last 72 hours. No results for input(s): Feliz Daily in the last 72 hours. Urinalysis:      Lab Results   Component Value Date    NITRU Negative 04/29/2019    WBCUA 10-20 04/29/2019    BACTERIA 1+ 04/29/2019    RBCUA 3-5 04/29/2019    BLOODU MODERATE 04/29/2019    SPECGRAV 1.025 04/29/2019    GLUCOSEU >=1000 04/29/2019       Radiology:  XR CHEST PORTABLE   Final Result   Increasing opacification of the left hemithorax, consistent with a   combination of airspace disease and effusion. Findings of interstitial edema in the right lung are noted with trace   effusion. Unchanged appearance of the enteric tube and right internal jugular line.          XR CHEST PORTABLE   Final Result [J18.9]     Acute respiratory failure (HCC) [J96.00]     Prolonged Q-T interval on ECG [R94.31]     Hypokalemia [E87.6]     Elevated troponin [R74.8]     DKA (diabetic ketoacidoses) ([facility] [E13.10] 04/29/2019    Acidosis [E87.2] 04/29/2019    Cardiac arrest ([facility] [I46.9] 04/29/2019    DKA, type 2, not at goal [facility]) [E11.10] 04/29/2019     Acute respiratory failure secondary to aspiration pneumonia and associated septic shock- POA. Required intubation and sedation. Initially on Merrem/Linezolid and changed to unasyn. Abx started 4/30. Blood Cx neg thus far. Pulm following. Bronch 5/1/19, 5/2/19 - necrotizing features of PNA. Respiratory culture with staph.    MRI of brain done -showing multiple infarct area bilaterally raising possibility of thromboembolic infarcts.   Patient was extubated on 5/8/2019, continue nasal cannula oxygen and supportive care, vigorous pulmonary toileting, further management as per pulmonary. Repeat chest x-ray on 5/10/2019 showing complete whiteout on left lung, status post bronchoscopy and removal of mucous plugs in purulent secretions, clinically condition improved afterward, continue to monitor in ICU.     Acute multiple bilateral  thromboembolic infarcts, noted an MRI, source unknown, s/p  SERVANDO- unremarkable, continue aspirin and statin, neurology following, monitor.     DKA - pt without diabetes medications for weeks if not months. Unsure of BS at home. Last A1c on record is 5.3 in 2015. New A1c 16. Initially on insulin gtt and transitioned to lantus daily, SSI. Monitor lantus dose with presumed CKD as having some hypoglycemia due to stacking. Glucose on arrival 916 now controlled. Started on TF.  Clinically remained stable.     Anion gap Metabolic acidosis and LOPEZ - secondary to above. Following BMPs. Nephrology following. Cr 2.0. IVF. Likely ATN. Slowly improving.  No need for HD at this time.  Continue to monitor.      Possible cardiac arrest? - troponin elevated but more likely due to demand and ischemia. Follow trop. Cardiology following. ECHO 55%, grade 1 DD, mild to mod AR. EP consulted for prolonged Qtc. Clinically hemodynamically remained stable.     Hematemesis and anemia- on admission. PPI. Hbg stable. GI consult. Conservative management at this time.         Vaginal candidiasis - fluconazole x 3 doses.               DVT Prophylaxis: lovenox  Diet: DIET TUBE FEED CONTINUOUS/CYCLIC NPO; Diabetic 1.5 (Glucerna 1.5);  Nasogastric; Continuous; 25; 50; 20  Code Status: Full Code    PT/OT Eval Status: not ordered     Dispo - uncertain at this time patient is critically ill in the ICU    Erika East MD

## 2019-05-12 ENCOUNTER — APPOINTMENT (OUTPATIENT)
Dept: GENERAL RADIOLOGY | Age: 69
DRG: 853 | End: 2019-05-12
Payer: COMMERCIAL

## 2019-05-12 LAB
ALBUMIN SERPL-MCNC: 2.3 G/DL (ref 3.4–5)
ANION GAP SERPL CALCULATED.3IONS-SCNC: 6 MMOL/L (ref 3–16)
BASE EXCESS ARTERIAL: 5.3 MMOL/L (ref -3–3)
BASOPHILS ABSOLUTE: 0.1 K/UL (ref 0–0.2)
BASOPHILS RELATIVE PERCENT: 1 %
BUN BLDV-MCNC: 21 MG/DL (ref 7–20)
CALCIUM SERPL-MCNC: 8 MG/DL (ref 8.3–10.6)
CARBOXYHEMOGLOBIN ARTERIAL: 0.5 % (ref 0–1.5)
CHLORIDE BLD-SCNC: 103 MMOL/L (ref 99–110)
CO2: 31 MMOL/L (ref 21–32)
CREAT SERPL-MCNC: 0.6 MG/DL (ref 0.6–1.2)
EOSINOPHILS ABSOLUTE: 0.2 K/UL (ref 0–0.6)
EOSINOPHILS RELATIVE PERCENT: 1.5 %
GFR AFRICAN AMERICAN: >60
GFR NON-AFRICAN AMERICAN: >60
GLUCOSE BLD-MCNC: 102 MG/DL (ref 70–99)
GLUCOSE BLD-MCNC: 148 MG/DL (ref 70–99)
GLUCOSE BLD-MCNC: 155 MG/DL (ref 70–99)
GLUCOSE BLD-MCNC: 158 MG/DL (ref 70–99)
GLUCOSE BLD-MCNC: 175 MG/DL (ref 70–99)
GLUCOSE BLD-MCNC: 85 MG/DL (ref 70–99)
HCO3 ARTERIAL: 29.2 MMOL/L (ref 21–29)
HCT VFR BLD CALC: 23.2 % (ref 36–48)
HEMOGLOBIN, ART, EXTENDED: 8.9 G/DL (ref 12–16)
HEMOGLOBIN: 7.7 G/DL (ref 12–16)
LYMPHOCYTES ABSOLUTE: 1.5 K/UL (ref 1–5.1)
LYMPHOCYTES RELATIVE PERCENT: 12.8 %
MAGNESIUM: 1.5 MG/DL (ref 1.8–2.4)
MCH RBC QN AUTO: 28.8 PG (ref 26–34)
MCHC RBC AUTO-ENTMCNC: 33.2 G/DL (ref 31–36)
MCV RBC AUTO: 86.8 FL (ref 80–100)
METHEMOGLOBIN ARTERIAL: 0.5 %
MONOCYTES ABSOLUTE: 0.9 K/UL (ref 0–1.3)
MONOCYTES RELATIVE PERCENT: 7.1 %
NEUTROPHILS ABSOLUTE: 9.3 K/UL (ref 1.7–7.7)
NEUTROPHILS RELATIVE PERCENT: 77.6 %
O2 CONTENT ARTERIAL: 11 ML/DL
O2 SAT, ARTERIAL: 89.9 %
O2 THERAPY: ABNORMAL
PCO2 ARTERIAL: 40 MMHG (ref 35–45)
PDW BLD-RTO: 15.4 % (ref 12.4–15.4)
PERFORMED ON: ABNORMAL
PERFORMED ON: NORMAL
PH ARTERIAL: 7.48 (ref 7.35–7.45)
PHOSPHORUS: 3.8 MG/DL (ref 2.5–4.9)
PLATELET # BLD: 501 K/UL (ref 135–450)
PMV BLD AUTO: 8 FL (ref 5–10.5)
PO2 ARTERIAL: 57.9 MMHG (ref 75–108)
POTASSIUM SERPL-SCNC: 4.3 MMOL/L (ref 3.5–5.1)
RBC # BLD: 2.68 M/UL (ref 4–5.2)
SODIUM BLD-SCNC: 140 MMOL/L (ref 136–145)
TCO2 ARTERIAL: 30.4 MMOL/L
WBC # BLD: 12 K/UL (ref 4–11)

## 2019-05-12 PROCEDURE — 94640 AIRWAY INHALATION TREATMENT: CPT

## 2019-05-12 PROCEDURE — 6370000000 HC RX 637 (ALT 250 FOR IP): Performed by: INTERNAL MEDICINE

## 2019-05-12 PROCEDURE — 2000000000 HC ICU R&B

## 2019-05-12 PROCEDURE — 2580000003 HC RX 258: Performed by: INTERNAL MEDICINE

## 2019-05-12 PROCEDURE — 6360000002 HC RX W HCPCS: Performed by: INTERNAL MEDICINE

## 2019-05-12 PROCEDURE — 36592 COLLECT BLOOD FROM PICC: CPT

## 2019-05-12 PROCEDURE — 71045 X-RAY EXAM CHEST 1 VIEW: CPT

## 2019-05-12 PROCEDURE — 31720 CLEARANCE OF AIRWAYS: CPT

## 2019-05-12 PROCEDURE — 6370000000 HC RX 637 (ALT 250 FOR IP): Performed by: FAMILY MEDICINE

## 2019-05-12 PROCEDURE — 80069 RENAL FUNCTION PANEL: CPT

## 2019-05-12 PROCEDURE — 99233 SBSQ HOSP IP/OBS HIGH 50: CPT | Performed by: INTERNAL MEDICINE

## 2019-05-12 PROCEDURE — 31622 DX BRONCHOSCOPE/WASH: CPT

## 2019-05-12 PROCEDURE — 94761 N-INVAS EAR/PLS OXIMETRY MLT: CPT

## 2019-05-12 PROCEDURE — C9113 INJ PANTOPRAZOLE SODIUM, VIA: HCPCS | Performed by: INTERNAL MEDICINE

## 2019-05-12 PROCEDURE — 2580000003 HC RX 258

## 2019-05-12 PROCEDURE — 31646 BRNCHSC W/THER ASPIR SBSQ: CPT | Performed by: INTERNAL MEDICINE

## 2019-05-12 PROCEDURE — 85025 COMPLETE CBC W/AUTO DIFF WBC: CPT

## 2019-05-12 PROCEDURE — 82803 BLOOD GASES ANY COMBINATION: CPT

## 2019-05-12 PROCEDURE — 83735 ASSAY OF MAGNESIUM: CPT

## 2019-05-12 PROCEDURE — 6360000002 HC RX W HCPCS

## 2019-05-12 PROCEDURE — 2700000000 HC OXYGEN THERAPY PER DAY

## 2019-05-12 RX ORDER — SODIUM CHLORIDE 9 MG/ML
INJECTION, SOLUTION INTRAVENOUS
Status: COMPLETED
Start: 2019-05-12 | End: 2019-05-12

## 2019-05-12 RX ORDER — MIDAZOLAM HYDROCHLORIDE 1 MG/ML
2 INJECTION INTRAMUSCULAR; INTRAVENOUS ONCE
Status: COMPLETED | OUTPATIENT
Start: 2019-05-12 | End: 2019-05-12

## 2019-05-12 RX ORDER — MIDAZOLAM HYDROCHLORIDE 1 MG/ML
INJECTION INTRAMUSCULAR; INTRAVENOUS
Status: COMPLETED
Start: 2019-05-12 | End: 2019-05-12

## 2019-05-12 RX ORDER — FENTANYL CITRATE 50 UG/ML
INJECTION, SOLUTION INTRAMUSCULAR; INTRAVENOUS
Status: DISPENSED
Start: 2019-05-12 | End: 2019-05-13

## 2019-05-12 RX ADMIN — Medication 10 ML: at 20:50

## 2019-05-12 RX ADMIN — Medication 250 MG: at 08:55

## 2019-05-12 RX ADMIN — Medication 10 ML: at 12:35

## 2019-05-12 RX ADMIN — SODIUM CHLORIDE 250 ML: 9 INJECTION, SOLUTION INTRAVENOUS at 06:41

## 2019-05-12 RX ADMIN — PANTOPRAZOLE SODIUM 40 MG: 40 INJECTION, POWDER, FOR SOLUTION INTRAVENOUS at 20:50

## 2019-05-12 RX ADMIN — VANCOMYCIN HYDROCHLORIDE 750 MG: 750 INJECTION, POWDER, LYOPHILIZED, FOR SOLUTION INTRAVENOUS at 12:35

## 2019-05-12 RX ADMIN — Medication 250 MG: at 20:50

## 2019-05-12 RX ADMIN — MAGNESIUM SULFATE HEPTAHYDRATE 1 G: 1 INJECTION, SOLUTION INTRAVENOUS at 06:47

## 2019-05-12 RX ADMIN — MIDAZOLAM HYDROCHLORIDE 2 MG: 1 INJECTION, SOLUTION INTRAMUSCULAR; INTRAVENOUS at 14:30

## 2019-05-12 RX ADMIN — MICONAZOLE NITRATE: 2 POWDER TOPICAL at 11:47

## 2019-05-12 RX ADMIN — PANTOPRAZOLE SODIUM 40 MG: 40 INJECTION, POWDER, FOR SOLUTION INTRAVENOUS at 08:55

## 2019-05-12 RX ADMIN — PSYLLIUM HUSK 1 PACKET: 3.4 POWDER ORAL at 08:55

## 2019-05-12 RX ADMIN — INSULIN LISPRO 2 UNITS: 100 INJECTION, SOLUTION INTRAVENOUS; SUBCUTANEOUS at 02:41

## 2019-05-12 RX ADMIN — INSULIN GLARGINE 25 UNITS: 100 INJECTION, SOLUTION SUBCUTANEOUS at 08:55

## 2019-05-12 RX ADMIN — ASPIRIN 325 MG: 325 TABLET, COATED ORAL at 11:47

## 2019-05-12 RX ADMIN — INSULIN LISPRO 2 UNITS: 100 INJECTION, SOLUTION INTRAVENOUS; SUBCUTANEOUS at 06:34

## 2019-05-12 RX ADMIN — MIDAZOLAM HYDROCHLORIDE 2 MG: 1 INJECTION INTRAMUSCULAR; INTRAVENOUS at 14:30

## 2019-05-12 RX ADMIN — IPRATROPIUM BROMIDE AND ALBUTEROL SULFATE 1 AMPULE: .5; 3 SOLUTION RESPIRATORY (INHALATION) at 07:48

## 2019-05-12 RX ADMIN — MAGNESIUM SULFATE HEPTAHYDRATE 1 G: 1 INJECTION, SOLUTION INTRAVENOUS at 09:32

## 2019-05-12 RX ADMIN — IPRATROPIUM BROMIDE AND ALBUTEROL SULFATE 1 AMPULE: .5; 3 SOLUTION RESPIRATORY (INHALATION) at 19:11

## 2019-05-12 RX ADMIN — IPRATROPIUM BROMIDE AND ALBUTEROL SULFATE 1 AMPULE: .5; 3 SOLUTION RESPIRATORY (INHALATION) at 11:22

## 2019-05-12 RX ADMIN — IPRATROPIUM BROMIDE AND ALBUTEROL SULFATE 1 AMPULE: .5; 3 SOLUTION RESPIRATORY (INHALATION) at 15:35

## 2019-05-12 RX ADMIN — INSULIN LISPRO 2 UNITS: 100 INJECTION, SOLUTION INTRAVENOUS; SUBCUTANEOUS at 08:56

## 2019-05-12 RX ADMIN — ATORVASTATIN CALCIUM 40 MG: 40 TABLET, FILM COATED ORAL at 20:50

## 2019-05-12 RX ADMIN — MICONAZOLE NITRATE: 2 POWDER TOPICAL at 20:51

## 2019-05-12 ASSESSMENT — PAIN SCALES - WONG BAKER
WONGBAKER_NUMERICALRESPONSE: 0

## 2019-05-12 ASSESSMENT — PAIN SCALES - GENERAL: PAINLEVEL_OUTOF10: 0

## 2019-05-12 NOTE — PROCEDURES
Procedure: Bronchoscopy with therapeutic aspiration     Indication: Respiratory failure, worsening option requirement, rhonchorous breath sounds, inability to clear secretions. ASA for, Mallampati 3     Procedure details: Informed consent was obtained, tube feeds were held. A timeout was performed. The patient was given Versed 2 mg IV push, bite block was placed. Unfortunately she desaturated right away, but this improved with holding upper jaw. 100% nonrebreather mask was placed. An Ambu scope was passed transorally through the mask and through the mouthpiece. Her upper airway showed frothy secretions. Vocal cords were normal.  After anesthetizing the cords and intubated the trachea. Extensive frothy secretions were present throughout the tracheobronchial tree from the trachea leading into both mainstem bronchi. Continue suctioning was performed. The pernell was sharp and central.  The tracheal mucosa did not appear inflamed. After instilling lidocaine, a therapeutic aspiration was performed on both sides utilizing suctioning and normal saline. When the airway was cleared, there was no evidence of airway inflammation, endobronchial lesions or anatomical abnormalities. At this point suctioning of the way out and confirming hemostasis, the procedure was terminated. Unfortunately this patient is unlikely to improve, as she is probably continuing to aspirate. We will decide over the next couple of days as to whether she should be placed in palliative care.

## 2019-05-12 NOTE — PROGRESS NOTES
Respiratory Therapy Bronchoscopy Assist      Name:  Kenji Sosa Record Number:  [id number]  Age: 81 y.o. Gender: female  : 1950  Today's Date:  2019  Room:  [address] [place] [address]-      BAL cath samples obtained from rt lung and left lung during bronchoscopy assist with patient on 100%. Sterile field maintained throughout procedure. Patient was not pre-sedated. 15 mL of saline instilled. 15 mL of cloudy fluid obtained. Pt tolerated procedure well. no Samples sent to lab for testing. Patient SpO2 within acceptable range throughout bronchscopy procedure. Physician assisted with bronch from 1430PM/AM to [phone] 90 PM/AM without complications.  Bronchoscope ID: [id number]    Patient/caregiver was educated on the proper method of use:  Yes      Level of patient/caregiver understanding able to:   [] Verbalize understanding   [] Demonstrate understanding       [] Teach back        [] Needs reinforcement       []  No available caregiver               []  Other:     Response to education:  Very Good     Electronically signed by Vanessa Shrestha RCP on 2019 at 5:31 PM

## 2019-05-12 NOTE — PROGRESS NOTES
Anesthesiologist & Circulator Verify:     1) Patient Identification (Two identifiers)   Wicho Suárez (: 1950, Sex: female)    MRN: [id number]   Check armband and consent   Verify with family, if applicable     2) Procedure and Anesthetic  Procedure(s) (LRB):  BRONCHOSCOPY THERAPUTIC ASPIRATION INITIAL (N/A)    Verify on consent   State anesthetic technique   Discuss regional block(s) and check for block/surgical site cole(s)    Blood consent signed, if applicable    3) Weight and Allergies   Weight: 118 lb 2.7 oz (53.6 kg)   Allergies: Aspirin    4) Verify information against whiteboard

## 2019-05-12 NOTE — PROGRESS NOTES
Nephrology Progress Note   [email or web address]      This patient is a 81year old female whom we are following for LOPEZ and electrolyte abnormalities. Subjective: The patient was seen and examined. Weak. Free water increased to 100 cc/hr and sodium is 140. Kidney function is normal.      Family History: Family at bedside and questions were answere. ROS: Unable to obtain      Vitals:  BP (!) 143/118   Pulse 107   Temp 98 °F (36.7 °C) (Core)   Resp 23   Ht 5' 1\" (1.549 m)   Wt 118 lb 2.7 oz (53.6 kg)   SpO2 92%   BMI 22.33 kg/m²   I/O last 3 completed shifts: In: 2970 [I.V.:264; NG/GT:2706]  Out: 2725 [Urine:2725]  No intake/output data recorded. Physical Exam:  Physical Exam   Constitutional: She appears ill. HENT:   Head: Normocephalic and atraumatic. Eyes: No scleral icterus. Neck: No tracheal deviation present. No thyromegaly present. Cardiovascular: Exam reveals no gallop and no friction rub. Tachycardic, regular rhythm   Pulmonary/Chest:   Coarse breath sounds bilateral   Abdominal: Soft. Bowel sounds are normal. She exhibits no distension. There is no tenderness. Musculoskeletal: She exhibits edema. Vitals reviewed.         Medications:   magnesium sulfate  4 g Intravenous Once    psyllium  1 packet Per NG tube Daily    ipratropium-albuterol  1 ampule Inhalation Q4H WA    pneumococcal 13-valent conjugate  0.5 mL Intramuscular Once    saccharomyces boulardii  250 mg Oral BID    atorvastatin  40 mg Oral Nightly    aspirin  325 mg Oral Daily    insulin glargine  25 Units Subcutaneous Daily    insulin lispro  0-12 Units Subcutaneous Q4H    miconazole   Topical BID    pantoprazole  40 mg Intravenous BID    sodium chloride flush  10 mL Intravenous 2 times per day         Labs:  Recent Labs     05/10/19  2007 05/11/19  0656 05/12/19  0556   WBC 14.6* 12.3* 12.0*   HGB 8.5* 8.0* 7.7*   HCT 26.2* 24.2* 23.2*   MCV 87.0 86.3 86.8    414 501*     Recent Labs 05/10/19  0534 05/11/19  0656 05/12/19  0556    147* 140   K 3.9 3.9 4.3    109 103   CO2 28 30 31   GLUCOSE 174* 123* 158*   PHOS 3.5 3.7 3.8   MG 1.60* 2.00 1.50*   BUN 20 20 21*   CREATININE 0.7 0.6 0.6   LABGLOM >60 >60 >60   GFRAA >60 >60 >60           Assessment/Plan:  Ms. Turcios is a 81year old female with PMHx of CAD, DM, HTN and seizures who presents after a cardiac arrest.     Acute Kidney Injury.  - Etiology: ATN in the setting of cardiac arrest/sepsis. - Data: Last serum creatinine in 07/2016 was <0.5mg/dL. - Urinalysis with blood and protein. - Clinical: Resolved      Severe Anion Gap Metabolic Acidosis. - Likely from DKA and lactic acidosis. No toxic alcohol ingestion.  - Methanol, Ethylene glycol negative  - Resolved      Anemia.  - Had coffee-ground emesis on admission.  - Hgb stable.     S/P Cardiac Arrest.  - Plans per Cardiology      Multiple CVAs  - MRI brain 5/3, neurology seeing     Hypernatremia, mild. - FWF with TF. Will increase to 100 cc every 1 hour. Follow labs. Not polyuric. Sodium is now normal  - Renal to sign off, would be happy to get back on the case if there is any need      Acute Respiratory Failure. - Extubated on 5/8/19.  - Plans per pulmonary    Please do not hesitate to contact me at [phone] if with questions. Thank you!     Narcisa Lewis MD  5/12/2019  [facility]

## 2019-05-12 NOTE — PROGRESS NOTES
Pt's respirations increasing to the 40's and is SOB. o2 requirements from 2L to now 5L. Chest xray obtained and ABG drawn.

## 2019-05-12 NOTE — PLAN OF CARE
Problem: Falls - Risk of:  Goal: Will remain free from falls  Description  Bed in lowest locked positions, call light within reach, side rails up x 2, bed check in place. Instructed patient to call before getting out of bed. Patient verbalized understanding. 5/11/2019 2353 by Arun Liu RN  Outcome: Ongoing    Problem: Risk for Impaired Skin Integrity  Goal: Tissue integrity - skin and mucous membranes  Description  Structural intactness and normal physiological function of skin and  mucous membranes. 5/11/2019 2353 by Arun Liu RN  Outcome: Ongoing  5/11/2019 1509 by Celsa Gar RN  Outcome: Ongoing  Note:   Skin assessed this shift without further breakdown. Pt turned Q2h. Protective barrier cream/ moisture barrier cream applied with changes.       Problem: Nutrition  Goal: Optimal nutrition therapy  5/11/2019 2353 by Arun Liu RN  Outcome: Ongoing  5/11/2019 1509 by Celsa Gar RN  Outcome: Ongoing     Problem: Serum Glucose Level - Abnormal:  Goal: Ability to maintain appropriate glucose levels will improve  Description  Ability to maintain appropriate glucose levels will improve  5/11/2019 2353 by Arun Liu RN  Outcome: Ongoing  5/11/2019 1509 by Celsa Gar RN  Outcome: Ongoing     Problem: Sensory Perception - Impaired:  Goal: Ability to maintain a stable neurologic state will improve  Description  Ability to maintain a stable neurologic state will improve  Outcome: Ongoing

## 2019-05-12 NOTE — PROGRESS NOTES
Pulmonary & Critical Care Medicine ICU Progress Note      Events of Last 24 hours: The patient has remained afebrile and hemodynamically stable, on oxygen at 6 LPM, SaO2 100%. She has an NG in place, receiving tube feeds. She was extubated on 5/8. She has been a nonsmoker. She has had multiple strokes in the past.  She has been treated for MSSA. She has been accepted to [facility] in [place] as per . Bronchoscopy 5/19 with extensive mucopurulent secretions, mucous plugging. Had a few episodes of bright red blood per rectum, no recurrence. Remains in sinus tachycardia, minimally communicative still continued to have excessive secretions      Recent Labs     05/12/19  0704   PHART 7.481*   QVN0KCR 40.0   PO2ART 57.9*     IV:     dextrose         Vitals:  BP (!) 148/73   Pulse 107   Temp 99.1 °F (37.3 °C) (Core)   Resp (!) 35   Ht 5' 1\" (1.549 m)   Wt 118 lb 2.7 oz (53.6 kg)   SpO2 (!) 88%   BMI 22.33 kg/m²  CVP (Mean): 9 mmHg      Intake/Output Summary (Last 24 hours) at 5/12/2019 0805  Last data filed at 5/12/2019 9760  Gross per 24 hour   Intake 2970 ml   Output 2725 ml   Net 245 ml       EXAM:  General: Frail, underweight appearing, in no respiratory distress. Appears malnourished, bitemporal muscle wasting   Eyes: PERRL. No sclera icterus. No conjunctival injection. ENT: No discharge. Pharynx clear, mucosa dry, class II airway. Neck: Trachea midline. Normal thyroid. No JVD  Resp: Bilateral wheezes, mild tachypnea. Mild accessory muscle use. Bilateral scattered crackles. No wheezing. No rhonchi. CV: Sinus tachycardia. No mumur or rub. No edema. GI: Non-tender. Non-distended. No masses. No organmegaly. Skin: Warm and dry. No nodule on exposed extremities. No rash on exposed extremities. Lymph: Deferred. M/S: No cyanosis. No joint deformity. No clubbing. Neuro: Awake. Follows commands.   PERRL, detailed neurological exam not performed  Psych: Oriented to person, place, time. No anxiety or agitation. Medications:  Scheduled Meds:   magnesium sulfate  4 g Intravenous Once    psyllium  1 packet Per NG tube Daily    ipratropium-albuterol  1 ampule Inhalation Q4H WA    pneumococcal 13-valent conjugate  0.5 mL Intramuscular Once    saccharomyces boulardii  250 mg Oral BID    atorvastatin  40 mg Oral Nightly    aspirin  325 mg Oral Daily    insulin glargine  25 Units Subcutaneous Daily    insulin lispro  0-12 Units Subcutaneous Q4H    miconazole   Topical BID    pantoprazole  40 mg Intravenous BID    sodium chloride flush  10 mL Intravenous 2 times per day       PRN Meds:  morphine, glucose, dextrose, glucagon (rDNA), dextrose, magnesium sulfate, potassium chloride, acetaminophen, sodium chloride flush, magnesium hydroxide, acetaminophen    Results:  CBC:   Recent Labs     05/10/19  2007 05/11/19  0656 05/12/19  0556   WBC 14.6* 12.3* 12.0*   HGB 8.5* 8.0* 7.7*   HCT 26.2* 24.2* 23.2*   MCV 87.0 86.3 86.8    414 501*     BMP:   Recent Labs     05/10/19  0534 05/11/19  0656 05/12/19  0556    147* 140   K 3.9 3.9 4.3    109 103   CO2 28 30 31   PHOS 3.5 3.7 3.8   BUN 20 20 21*   CREATININE 0.7 0.6 0.6       Cultures: Bronchoscopy with Candida albicans    Films:  CXR reviewed by me      Assessment and plan:  Acute respiratory failure, hypoxemic. Oxygen requirements improved, then worsened again. Left lung atelectasis/pneumonia, mucous plugging, leukocytosis. Extensive mucopurulent secretions. Culture with staph aureus, we will place on vancomycin until sensitivities available. Will proceed with repeat bronchoscopy, discussed with patient's daughters. Acute renal failure. Resolved. DKA, diabetes mellitus. Per IM, on Lantus and sliding scale insulin. Multiple strokes. Per IM. MSSA pneumonia. On Ancef. Lower GI bleed. Hematochezia. Had a rectal tube. GI feels this could be traumatic. Anemia. H&H stable. FEN. Tube feeds for now.   Swallow evaluation later, after respiratory status improves. May need PEG tube. DVT prophylaxis. SCDs  PUD prophylaxis. Protonix. Discussed with nursing, patient and her family.   Prognosis remains guarded, appears that she may have repeated episodes of aspiration/mucus plugging    Electronically signed by:  Kd Maddox MD    5/12/2019    8:05 AM.

## 2019-05-12 NOTE — PROGRESS NOTES
combination of airspace disease and effusion. Findings of interstitial edema in the right lung are noted with trace   effusion. Unchanged appearance of the enteric tube and right internal jugular line. XR CHEST PORTABLE   Final Result   New right basilar and stable multifocal left lung airspace disease. XR CHEST PORTABLE   Final Result   No significant change in bilateral airspace disease given change in technique. VL DUP CAROTID BILATERAL   Final Result      XR CHEST PORTABLE   Final Result   Worsening pulmonary edema with grossly stable left basilar atelectasis and/or   pneumonia. XR ABDOMEN (KUB) (SINGLE AP VIEW)   Final Result   Nasogastric tube in good position. Persistent left lower lobe airspace disease         US GALLBLADDER RUQ   Final Result   Smaller free fluid seen in the right upper quadrant in the region of the   gallbladder. The gallbladder appears unremarkable. No gallstones. XR CHEST PORTABLE   Final Result   1. No significant change. MRI BRAIN WO CONTRAST   Final Result   1. Acute/early subacute infarction involving the left hippocampus. Additional punctate infarctions within the frontal lobes and posterior   temporal lobes bilaterally. This raises the possibility for thromboembolic   phenomenon from a central source. No associated hemorrhage. 2. Diffuse parenchymal volume loss and sequela of chronic microvascular   ischemic changes. The findings were sent to the Radiology Results [address] at 8:34   am on 5/5/2019 to be communicated to a licensed caregiver. XR CHEST PORTABLE   Final Result   Bilateral lower lobe airspace disease, left greater than right, increased   compared to prior         XR CHEST PORTABLE   Final Result   Stable appearance of the chest with dense opacification in the left mid and   lower lung zone. XR CHEST PORTABLE   Final Result   1.  No significant interval change in the aureus pneumonia [facility]) [J15.211] 05/06/2019    Altered mental status [R41.82]     Non-traumatic rhabdomyolysis [M62.82]     Acute encephalopathy [G93.40]     Arterial ischemic stroke, ICA, left, acute (HCC) [I63.232]     Pneumonia due to organism [J18.9]     Acute respiratory failure (HCC) [J96.00]     Prolonged Q-T interval on ECG [R94.31]     Hypokalemia [E87.6]     Elevated troponin [R74.8]     DKA (diabetic ketoacidoses) ([facility] [E13.10] 04/29/2019    Acidosis [E87.2] 04/29/2019    Cardiac arrest ([facility] [I46.9] 04/29/2019    DKA, type 2, not at goal [facility]) [E11.10] 04/29/2019     Acute respiratory failure secondary to aspiration pneumonia and associated septic shock- POA. Required intubation and sedation. Initially on Merrem/Linezolid and changed to unasyn. Abx started 4/30. Blood Cx neg thus far. Pulm following. Bronch 5/1/19, 5/2/19 - necrotizing features of PNA. Respiratory culture with staph.    MRI of brain done -showing multiple infarct area bilaterally raising possibility of thromboembolic infarcts.   Patient was extubated on 5/8/2019, continue nasal cannula oxygen and supportive care, vigorous pulmonary toileting, further management as per pulmonary. Repeat chest x-ray on 5/10/2019 showing complete whiteout on left lung, status post bronchoscopy and removal of mucous plugs in purulent secretions, clinically condition improved afterward, continue to monitor in ICU. Still sounds quite congested today and she may be going again for repeat bronchoscopy     Acute multiple bilateral  thromboembolic infarcts, noted an MRI, source unknown, s/p  SERVANDO- unremarkable, continue aspirin and statin, neurology following, monitor.     DKA - pt without diabetes medications for weeks if not months. Unsure of BS at home. Last A1c on record is 5.3 in 2015. New A1c 16. Initially on insulin gtt and transitioned to lantus daily, SSI. Monitor lantus dose with presumed CKD as having some hypoglycemia due to stacking. Glucose on arrival 916 now controlled. Started on TF.  Clinically remained stable.     Anion gap Metabolic acidosis and LOPEZ - secondary to above. Following BMPs. Nephrology following. Cr 2.0. IVF. Likely ATN. Slowly improving. No need for HD at this time.  Continue to monitor.      Possible cardiac arrest? - troponin elevated but more likely due to demand and ischemia. Follow trop. Cardiology following. ECHO 55%, grade 1 DD, mild to mod AR. EP consulted for prolonged Qtc. Clinically hemodynamically remained stable.     Hematemesis and anemia- on admission. PPI. Hbg stable. GI consult. Conservative management at this time.         Vaginal candidiasis - fluconazole x 3 doses.                  DVT Prophylaxis: lovenox  Diet: DIET TUBE FEED CONTINUOUS/CYCLIC NPO; Diabetic 1.5 (Glucerna 1.5);  Nasogastric; Continuous; 25; 50; 20  Diet NPO Time Specified  Code Status: Full Code    PT/OT Eval Status: not ordered yet    Dispo - uncertain at this time patient is critically ill in the ICU    Ayan Elizabeth MD

## 2019-05-13 ENCOUNTER — APPOINTMENT (OUTPATIENT)
Dept: GENERAL RADIOLOGY | Age: 69
DRG: 853 | End: 2019-05-13
Payer: COMMERCIAL

## 2019-05-13 ENCOUNTER — APPOINTMENT (OUTPATIENT)
Dept: CT IMAGING | Age: 69
DRG: 853 | End: 2019-05-13
Payer: COMMERCIAL

## 2019-05-13 ENCOUNTER — APPOINTMENT (OUTPATIENT)
Dept: INTERVENTIONAL RADIOLOGY/VASCULAR | Age: 69
DRG: 853 | End: 2019-05-13
Payer: COMMERCIAL

## 2019-05-13 LAB
ANION GAP SERPL CALCULATED.3IONS-SCNC: 9 MMOL/L (ref 3–16)
BASOPHILS ABSOLUTE: 0.1 K/UL (ref 0–0.2)
BASOPHILS RELATIVE PERCENT: 0.9 %
BUN BLDV-MCNC: 17 MG/DL (ref 7–20)
CALCIUM SERPL-MCNC: 8.2 MG/DL (ref 8.3–10.6)
CHLORIDE BLD-SCNC: 102 MMOL/L (ref 99–110)
CO2: 30 MMOL/L (ref 21–32)
CREAT SERPL-MCNC: <0.5 MG/DL (ref 0.6–1.2)
CULTURE, RESPIRATORY: ABNORMAL
CULTURE, RESPIRATORY: ABNORMAL
EOSINOPHILS ABSOLUTE: 0.1 K/UL (ref 0–0.6)
EOSINOPHILS RELATIVE PERCENT: 1.3 %
GFR AFRICAN AMERICAN: >60
GFR NON-AFRICAN AMERICAN: >60
GLUCOSE BLD-MCNC: 104 MG/DL (ref 70–99)
GLUCOSE BLD-MCNC: 107 MG/DL (ref 70–99)
GLUCOSE BLD-MCNC: 113 MG/DL (ref 70–99)
GLUCOSE BLD-MCNC: 114 MG/DL (ref 70–99)
GLUCOSE BLD-MCNC: 116 MG/DL (ref 70–99)
GLUCOSE BLD-MCNC: 118 MG/DL (ref 70–99)
GLUCOSE BLD-MCNC: 99 MG/DL (ref 70–99)
GRAM STAIN RESULT: ABNORMAL
HCT VFR BLD CALC: 23.6 % (ref 36–48)
HEMOGLOBIN: 7.9 G/DL (ref 12–16)
LYMPHOCYTES ABSOLUTE: 1.1 K/UL (ref 1–5.1)
LYMPHOCYTES RELATIVE PERCENT: 10.7 %
MAGNESIUM: 1.8 MG/DL (ref 1.8–2.4)
MAGNESIUM: 1.9 MG/DL (ref 1.8–2.4)
MCH RBC QN AUTO: 28.9 PG (ref 26–34)
MCHC RBC AUTO-ENTMCNC: 33.3 G/DL (ref 31–36)
MCV RBC AUTO: 86.6 FL (ref 80–100)
MONOCYTES ABSOLUTE: 0.9 K/UL (ref 0–1.3)
MONOCYTES RELATIVE PERCENT: 8.4 %
NEUTROPHILS ABSOLUTE: 8.4 K/UL (ref 1.7–7.7)
NEUTROPHILS RELATIVE PERCENT: 78.7 %
ORGANISM: ABNORMAL
PDW BLD-RTO: 15.1 % (ref 12.4–15.4)
PERFORMED ON: ABNORMAL
PERFORMED ON: NORMAL
PHOSPHORUS: 4.4 MG/DL (ref 2.5–4.9)
PLATELET # BLD: 632 K/UL (ref 135–450)
PMV BLD AUTO: 7.7 FL (ref 5–10.5)
POTASSIUM SERPL-SCNC: 3.7 MMOL/L (ref 3.5–5.1)
RBC # BLD: 2.72 M/UL (ref 4–5.2)
SODIUM BLD-SCNC: 141 MMOL/L (ref 136–145)
WBC # BLD: 10.7 K/UL (ref 4–11)

## 2019-05-13 PROCEDURE — 97110 THERAPEUTIC EXERCISES: CPT

## 2019-05-13 PROCEDURE — C1751 CATH, INF, PER/CENT/MIDLINE: HCPCS

## 2019-05-13 PROCEDURE — 92526 ORAL FUNCTION THERAPY: CPT

## 2019-05-13 PROCEDURE — 6370000000 HC RX 637 (ALT 250 FOR IP): Performed by: INTERNAL MEDICINE

## 2019-05-13 PROCEDURE — 2580000003 HC RX 258: Performed by: INTERNAL MEDICINE

## 2019-05-13 PROCEDURE — 36592 COLLECT BLOOD FROM PICC: CPT

## 2019-05-13 PROCEDURE — 97530 THERAPEUTIC ACTIVITIES: CPT

## 2019-05-13 PROCEDURE — 94669 MECHANICAL CHEST WALL OSCILL: CPT

## 2019-05-13 PROCEDURE — 99291 CRITICAL CARE FIRST HOUR: CPT | Performed by: INTERNAL MEDICINE

## 2019-05-13 PROCEDURE — C9113 INJ PANTOPRAZOLE SODIUM, VIA: HCPCS | Performed by: INTERNAL MEDICINE

## 2019-05-13 PROCEDURE — 83735 ASSAY OF MAGNESIUM: CPT

## 2019-05-13 PROCEDURE — 80048 BASIC METABOLIC PNL TOTAL CA: CPT

## 2019-05-13 PROCEDURE — 94761 N-INVAS EAR/PLS OXIMETRY MLT: CPT

## 2019-05-13 PROCEDURE — 36556 INSERT NON-TUNNEL CV CATH: CPT

## 2019-05-13 PROCEDURE — 2580000003 HC RX 258

## 2019-05-13 PROCEDURE — 2000000000 HC ICU R&B

## 2019-05-13 PROCEDURE — 36573 INSJ PICC RS&I 5 YR+: CPT

## 2019-05-13 PROCEDURE — 31720 CLEARANCE OF AIRWAYS: CPT

## 2019-05-13 PROCEDURE — 71250 CT THORAX DX C-: CPT

## 2019-05-13 PROCEDURE — 2060000000 HC ICU INTERMEDIATE R&B

## 2019-05-13 PROCEDURE — 71045 X-RAY EXAM CHEST 1 VIEW: CPT

## 2019-05-13 PROCEDURE — 6360000002 HC RX W HCPCS: Performed by: INTERNAL MEDICINE

## 2019-05-13 PROCEDURE — 94640 AIRWAY INHALATION TREATMENT: CPT

## 2019-05-13 PROCEDURE — 92610 EVALUATE SWALLOWING FUNCTION: CPT

## 2019-05-13 PROCEDURE — 85025 COMPLETE CBC W/AUTO DIFF WBC: CPT

## 2019-05-13 PROCEDURE — 84100 ASSAY OF PHOSPHORUS: CPT

## 2019-05-13 PROCEDURE — 2500000003 HC RX 250 WO HCPCS: Performed by: NURSE PRACTITIONER

## 2019-05-13 PROCEDURE — 2700000000 HC OXYGEN THERAPY PER DAY

## 2019-05-13 RX ORDER — SODIUM CHLORIDE 0.9 % (FLUSH) 0.9 %
10 SYRINGE (ML) INJECTION PRN
Status: DISCONTINUED | OUTPATIENT
Start: 2019-05-13 | End: 2019-05-13

## 2019-05-13 RX ORDER — SODIUM CHLORIDE 9 MG/ML
INJECTION, SOLUTION INTRAVENOUS
Status: COMPLETED
Start: 2019-05-13 | End: 2019-05-13

## 2019-05-13 RX ORDER — SODIUM CHLORIDE FOR INHALATION 0.9 %
3 VIAL, NEBULIZER (ML) INHALATION EVERY 4 HOURS
Status: ACTIVE | OUTPATIENT
Start: 2019-05-13 | End: 2019-05-14

## 2019-05-13 RX ORDER — SODIUM CHLORIDE 0.9 % (FLUSH) 0.9 %
10 SYRINGE (ML) INJECTION EVERY 12 HOURS SCHEDULED
Status: DISCONTINUED | OUTPATIENT
Start: 2019-05-13 | End: 2019-05-13

## 2019-05-13 RX ORDER — INSULIN GLARGINE 100 [IU]/ML
18 INJECTION, SOLUTION SUBCUTANEOUS DAILY
Status: DISCONTINUED | OUTPATIENT
Start: 2019-05-14 | End: 2019-05-18

## 2019-05-13 RX ORDER — IPRATROPIUM BROMIDE AND ALBUTEROL SULFATE 2.5; .5 MG/3ML; MG/3ML
1 SOLUTION RESPIRATORY (INHALATION) EVERY 4 HOURS
Status: DISCONTINUED | OUTPATIENT
Start: 2019-05-13 | End: 2019-05-20

## 2019-05-13 RX ORDER — AMOXICILLIN AND CLAVULANATE POTASSIUM 875; 125 MG/1; MG/1
1 TABLET, FILM COATED ORAL EVERY 12 HOURS SCHEDULED
Status: DISCONTINUED | OUTPATIENT
Start: 2019-05-14 | End: 2019-05-17

## 2019-05-13 RX ORDER — LIDOCAINE HYDROCHLORIDE 10 MG/ML
5 INJECTION, SOLUTION INFILTRATION; PERINEURAL ONCE
Status: COMPLETED | OUTPATIENT
Start: 2019-05-13 | End: 2019-05-13

## 2019-05-13 RX ADMIN — MICONAZOLE NITRATE: 2 POWDER TOPICAL at 09:00

## 2019-05-13 RX ADMIN — ASPIRIN 325 MG: 325 TABLET, COATED ORAL at 08:57

## 2019-05-13 RX ADMIN — ISODIUM CHLORIDE 3 ML: 0.03 SOLUTION RESPIRATORY (INHALATION) at 23:37

## 2019-05-13 RX ADMIN — MICONAZOLE NITRATE: 2 POWDER TOPICAL at 21:48

## 2019-05-13 RX ADMIN — IPRATROPIUM BROMIDE AND ALBUTEROL SULFATE 1 AMPULE: .5; 3 SOLUTION RESPIRATORY (INHALATION) at 21:32

## 2019-05-13 RX ADMIN — PSYLLIUM HUSK 1 PACKET: 3.4 POWDER ORAL at 08:56

## 2019-05-13 RX ADMIN — SODIUM CHLORIDE 250 ML: 9 INJECTION, SOLUTION INTRAVENOUS at 08:59

## 2019-05-13 RX ADMIN — PANTOPRAZOLE SODIUM 40 MG: 40 INJECTION, POWDER, FOR SOLUTION INTRAVENOUS at 08:57

## 2019-05-13 RX ADMIN — IPRATROPIUM BROMIDE AND ALBUTEROL SULFATE 1 AMPULE: .5; 3 SOLUTION RESPIRATORY (INHALATION) at 08:39

## 2019-05-13 RX ADMIN — VANCOMYCIN HYDROCHLORIDE 750 MG: 750 INJECTION, POWDER, LYOPHILIZED, FOR SOLUTION INTRAVENOUS at 12:15

## 2019-05-13 RX ADMIN — LIDOCAINE HYDROCHLORIDE 2 ML: 10 INJECTION, SOLUTION EPIDURAL; INFILTRATION; INTRACAUDAL; PERINEURAL at 10:25

## 2019-05-13 RX ADMIN — PANTOPRAZOLE SODIUM 40 MG: 40 INJECTION, POWDER, FOR SOLUTION INTRAVENOUS at 21:44

## 2019-05-13 RX ADMIN — Medication 250 MG: at 21:44

## 2019-05-13 RX ADMIN — VANCOMYCIN HYDROCHLORIDE 750 MG: 750 INJECTION, POWDER, LYOPHILIZED, FOR SOLUTION INTRAVENOUS at 00:05

## 2019-05-13 RX ADMIN — ATORVASTATIN CALCIUM 40 MG: 40 TABLET, FILM COATED ORAL at 21:44

## 2019-05-13 RX ADMIN — SODIUM CHLORIDE, PRESERVATIVE FREE 10 ML: 5 INJECTION INTRAVENOUS at 09:08

## 2019-05-13 RX ADMIN — ISODIUM CHLORIDE 3 ML: 0.03 SOLUTION RESPIRATORY (INHALATION) at 12:33

## 2019-05-13 RX ADMIN — IPRATROPIUM BROMIDE AND ALBUTEROL SULFATE 1 AMPULE: .5; 3 SOLUTION RESPIRATORY (INHALATION) at 12:34

## 2019-05-13 RX ADMIN — IPRATROPIUM BROMIDE AND ALBUTEROL SULFATE 1 AMPULE: .5; 3 SOLUTION RESPIRATORY (INHALATION) at 23:37

## 2019-05-13 RX ADMIN — Medication 250 MG: at 08:56

## 2019-05-13 RX ADMIN — ISODIUM CHLORIDE 3 ML: 0.03 SOLUTION RESPIRATORY (INHALATION) at 21:32

## 2019-05-13 RX ADMIN — Medication 10 ML: at 08:57

## 2019-05-13 ASSESSMENT — PAIN SCALES - GENERAL
PAINLEVEL_OUTOF10: 0

## 2019-05-13 NOTE — PROGRESS NOTES
Physical Therapy  Facility/Department: [facility] C2 CARD TELEMETRY  Daily Treatment Note  NAME: Jess Davis  : 1950  MRN: [id number]    Date of Service: 2019    Discharge Recommendations:  Patient would benefit from continued therapy after discharge(LTAC)   PT Equipment Recommendations  Equipment Needed: No    Patient Diagnosis(es): The primary encounter diagnosis was Acute respiratory failure, unspecified whether with hypoxia or hypercapnia ([address] Diagnoses of Cardiac arrest ([address] Pneumonia due to organism, Dehydration, Altered mental status, unspecified altered mental status type, Hypothermia, initial encounter, Hypotension, unspecified hypotension type, Diabetic ketoacidosis with coma associated with type 2 diabetes mellitus ([address] Non-traumatic rhabdomyolysis, and Septicemia ([address] were also pertinent to this visit. has a past medical history of Acute renal failure (ARF) ([address] Asthma, CAD (coronary artery disease), Diabetes mellitus ([address] Hypertension, Pneumonia, Seizures ([address] and Unspecified cerebral artery occlusion with cerebral infarction. has a past surgical history that includes Breast surgery; bladder repair; Breast lumpectomy (Left); bronchoscopy (N/A, 2019); bronchoscopy (2019); bronchoscopy (N/A, 2019); bronchoscopy (N/A, 2019); bronchoscopy (2019); bronchoscopy (N/A, 2019); and bronchoscopy (N/A, 5/10/2019). Restrictions  Restrictions/Precautions  Restrictions/Precautions: General Precautions, Fall Risk, Up as Tolerated  Position Activity Restriction  Other position/activity restrictions: High fall risk per nursing assessment, NG feeding tube, 3L O2, central line, ICU monitoring, Gr  Subjective   General  Chart Reviewed: Yes  Response To Previous Treatment: Patient with no complaints from previous session. Family / Caregiver Present: No  Referring Practitioner: Dr. Jabier Beckwith  Subjective  Subjective: Pt agreeable to therapy.  Non verbal mainly throughout tx but nodding yes and no  General Comment  Comments: Pt resting in bed upon entry of therapy staff; RN cleared pt for therapy  Pain Screening  Patient Currently in Pain: Denies       Objective   Bed mobility  Supine to Sit: Dependent/Total(Max A x 2 for bed mobility, HOB elevated, cues for technique)  Sit to Supine: Dependent/Total(max A x 2)     Transfers  Sit to Stand: Unable to assess(unsafe this date due to poor sitting balance)  Ambulation  Ambulation?: No     Balance  Comments: Pt sat EOB x ~3-4 minutes. Min-CGA for sitting balance. PT providing cues for upright posture and forward gaze while OT facilitating neutral pelvic positioning. Exercises  Quad Sets: x 10 BLE   Heelslides: x 10 BLE with modA  Hip Abduction: x 10 BLE with modA  Knee Short Arc Quad: x 10 BLE  Ankle Pumps: x 10 BLE mod A          Assessment   Body structures, Functions, Activity limitations: Decreased functional mobility ; Decreased ROM; Decreased strength;Decreased balance;Decreased cognition;Decreased endurance  Assessment: Pt tolerated therapy well this date. Continues to require max A x 2 for bed mobility. Improved sitting balance this date. Pt only requiring min A to sit EOB. Continue to recommend LTAC at d/c.   Treatment Diagnosis: Decreased strength and (I) with mobility  Specific instructions for Next Treatment: Progress ther ex and mobility as tolerated, progress to sitting EOB after next session  Prognosis: Fair  Patient Education: Role of PT, benefits of mobility, sitting balance, bed mobility, D/C recs - pt verbalizes understanding  Barriers to Learning: decreased cognition  REQUIRES PT FOLLOW UP: Yes  Activity Tolerance  Activity Tolerance: Patient limited by fatigue;Patient limited by endurance  Activity Tolerance: Seated EOB, /73; ; SPO2 98% on 3L            AM-PAC Score  AM-PAC Inpatient Mobility Raw Score : 8  AM-PAC Inpatient T-Scale Score : 28.52  Mobility Inpatient CMS 0-100% Score: 86.62  Mobility Inpatient CMS G-Code Modifier : CM          Goals  Short term goals  Time Frame for Short term goals: 1 week, 5/16/19 (unless otherwise specified)  Short term goal 1: Pt will transfer supine <-> sit with modA x 2 - PROGRESSING, max A x 2 5/13  Short term goal 2: Pt will roll supine <-> sidelying with modA x 1  Short term goal 3: By 5/11/19: Pt will tolerate 12-15 reps BLE exercise for ROM/strengthening and endurance-PROGRESSING; pt able to tolerate 10 reps 5/13  Short term goal 4: Pt will tolerate sitting EOB x 5 minutes with modA x 1 or less to maintain upright trunk- PROGRESSING, min A for 3 minutes 5/13  Short term goal 5: When pt demonstrates adequate strength: Pt will transfer sit <-> stand and bed>chair using appropriate AD/lift equipment with modA x 2  Patient Goals   Patient goals : Patient unable to verbalize goals when asked    Plan    Plan  Times per week: 5-7x/week in acute care  Times per day: Daily  Specific instructions for Next Treatment: Progress ther ex and mobility as tolerated, progress to sitting EOB after next session  Current Treatment Recommendations: Strengthening, Balance Training, Functional Mobility Training, Endurance Training, Transfer Training, Safety Education & Training, Gait Training, Patient/Caregiver Education & Training, Positioning, ROM, Equipment Evaluation, Education, & procurement  Safety Devices  Type of devices:  All fall risk precautions in place, Call light within reach, Patient at risk for falls, Left in bed, Bed alarm in place, Nurse notified  Restraints  Initially in place: No     Therapy Time   Individual Concurrent Group Co-treatment   Time In 0800         Time Out 0823         Minutes 23         Timed Code Treatment Minutes: [facility] [place]

## 2019-05-13 NOTE — PROGRESS NOTES
Pulmonary & Critical Care Inpatient Progress Note   Fouzia Tracy MD     REASON FOR TODAY'S VISIT:  Assistance with critical care management    SUBJECTIVE:   Copious secretions, required frequent bronchoscopies in the past week to assist with this. Failing speech evaluations      Scheduled Meds:   sodium chloride nebulizer  3 mL Nebulization Q4H    insulin lispro  0-12 Units Subcutaneous Q4H    ipratropium-albuterol  1 ampule Inhalation Q4H    vancomycin  750 mg Intravenous Q12H    psyllium  1 packet Per NG tube Daily    pneumococcal 13-valent conjugate  0.5 mL Intramuscular Once    saccharomyces boulardii  250 mg Oral BID    atorvastatin  40 mg Oral Nightly    aspirin  325 mg Oral Daily    insulin glargine  25 Units Subcutaneous Daily    miconazole   Topical BID    pantoprazole  40 mg Intravenous BID    sodium chloride flush  10 mL Intravenous 2 times per day       Continuous Infusions:   dextrose         PRN Meds:  morphine, glucose, dextrose, glucagon (rDNA), dextrose, magnesium sulfate, potassium chloride, acetaminophen, sodium chloride flush, magnesium hydroxide, acetaminophen    ALLERGIES:  Patient is allergic to aspirin. Objective:   PHYSICAL EXAM:  /61   Pulse 104   Temp 97.8 °F (36.6 °C) (Core)   Resp 27   Ht 5' 1\" (1.549 m)   Wt 118 lb 2.7 oz (53.6 kg)   SpO2 93%   BMI 22.33 kg/m²    Physical Exam   Constitutional: She appears well-developed and well-nourished. No distress. HENT:   Head: Normocephalic and atraumatic. Mouth/Throat: Oropharynx is clear and moist. No oropharyngeal exudate. Neck: Neck supple. No JVD present. Cardiovascular: Normal heart sounds. Exam reveals no gallop and no friction rub. No murmur heard. Pulmonary/Chest: Effort normal. She has no wheezes. She has no rales. Equal chest rise and expansion bilaterally   Abdominal: Soft. Bowel sounds are normal. She exhibits no distension. There is no tenderness. Musculoskeletal: She exhibits no edema. pulm toileting, will add saline nebs and vest device  -CT chest to evaluate effusions, if loculated will place chest tube  -Has necrotizing pneumonia, will need 2 weeks of total atb therapy for this. Can transition vanc to appropriate oral therapy  -Repeatedly failing speech evals, NG tube not a good long term option. Will discuss further with family regarding PEG tube  -Monitor H/H, GI following and on PPI  -Mentation remains an issue but has slightly improved. Due to life threatening respiratory failure and sepsis.  Total critical care time involved in her care was 31 mins    Eddie Webb MD

## 2019-05-13 NOTE — PROGRESS NOTES
Occupational Therapy  Facility/Department: [facility] C2 CARD TELEMETRY  Daily Treatment Note  NAME: Marquis Mustafa  : 1950  MRN: [id number]    Date of Service: 2019    Discharge Recommendations:  Patient would benefit from continued therapy after discharge       Assessment   Performance deficits / Impairments: Decreased functional mobility ; Decreased safe awareness;Decreased balance;Decreased coordination;Decreased ADL status; Decreased cognition;Decreased endurance;Decreased strength;Decreased ROM; Decreased fine motor control  Assessment: Pt demo'd improving static sitting balance at EOB. Pt max - CGA x1 for sitting EOB. Pt required decreased cueing and assist for washing face at EOB. Pt continues to be limited by overall weakness and fatigue. Cont with POC. Patient Education: Role of OT, safety, importance of ROM  REQUIRES OT FOLLOW UP: Yes  Activity Tolerance  Activity Tolerance: Patient limited by fatigue;Treatment limited secondary to decreased cognition  Safety Devices  Safety Devices in place: Yes  Type of devices: Left in bed;Call light within reach;Nurse notified         Patient Diagnosis(es): The primary encounter diagnosis was Acute respiratory failure, unspecified whether with hypoxia or hypercapnia ([address] Diagnoses of Cardiac arrest ([address] Pneumonia due to organism, Dehydration, Altered mental status, unspecified altered mental status type, Hypothermia, initial encounter, Hypotension, unspecified hypotension type, Diabetic ketoacidosis with coma associated with type 2 diabetes mellitus ([address] Non-traumatic rhabdomyolysis, and Septicemia ([address] were also pertinent to this visit. has a past medical history of Acute renal failure (ARF) ([address] Asthma, CAD (coronary artery disease), Diabetes mellitus ([address] Hypertension, Pneumonia, Seizures ([address] and Unspecified cerebral artery occlusion with cerebral infarction.    has a past surgical history that includes Breast surgery; bladder repair; Breast lumpectomy (Left); bronchoscopy (N/A, 5/1/2019); bronchoscopy (5/1/2019); bronchoscopy (N/A, 5/2/2019); bronchoscopy (N/A, 5/7/2019); bronchoscopy (5/7/2019); bronchoscopy (N/A, 5/9/2019); and bronchoscopy (N/A, 5/10/2019).     Restrictions  Restrictions/Precautions  Restrictions/Precautions: General Precautions, Fall Risk, Up as Tolerated  Position Activity Restriction  Other position/activity restrictions: High fall risk per nursing assessment, NG feeding tube, 3L O2, central line, ICU monitoring, Gr  Subjective   General  Chart Reviewed: Yes  Patient assessed for rehabilitation services?: Yes  Family / Caregiver Present: Yes  Referring Practitioner: Lavern Wilson MD  Diagnosis: Acute respiratory failure secondary to aspiration pneumonia and associated septic shock, extubated 5/8/19; Acute multiple bilateral thromboembolic infarcts (Per MRI brain 5/3/19: \"Acute/early subacute infarction involving the left hippocampus, Additional punctate infarctions within the frontal lobes and posterior temporal lobes bilaterally\"   Subjective  Subjective: Pt in bed on arrival, agreeable to treatment   General Comment  Comments: Per RN okay to treat  Vital Signs  Patient Currently in Pain: Denies   Orientation  Orientation  Overall Orientation Status: Impaired(unable to formally assess d/t decreased communication skills)  Objective    ADL  Feeding: Dependent/Total  Grooming: Increased time to complete;Verbal cueing;Setup(wash face while seated EOB)  Toileting: Dependent/Total        Balance  Sitting Balance: Maximum assistance(max A -CGA)  Standing Balance: Unable to assess(comment)(Pt not safe for standing at this time d/t overall weakness )  Standing Balance  Time: ~5 minutes   Activity: seated EOB  Bed mobility  Rolling to Left: Dependent/Total;2 Person assistance;Maximum assistance  Rolling to Right: Dependent/Total;2 Person assistance;Maximum assistance  Supine to Sit: Dependent/Total;2 Person assistance;Maximum assistance  Sit to Supine: Dependent/Total;2 Person assistance;Maximum assistance  Scooting: Dependent/Total;2 Person assistance                          Cognition  Overall Cognitive Status: Exceptions  Arousal/Alertness: Delayed responses to stimuli  Following Commands: Follows one step commands with increased time; Follows one step commands with repetition  Attention Span: Attends with cues to redirect; Difficulty attending to directions  Problem Solving: Assistance required to generate solutions;Assistance required to implement solutions  Initiation: Requires cues for all  Sequencing: Requires cues for all  Cognition Comment: Pt demo'd improving ability to follow commands and nodding in response to questions                    Type of ROM/Therapeutic Exercise  Type of ROM/Therapeutic Exercise: AROM  Exercises  Elbow Flexion: x10  Elbow Extension: x10  Wrist Flexion: 2x10  Wrist Extension: 2x10  Grasp/Release: 2x10   Other: x10 chest press                     Plan   Plan  Times per week: 4-5x/week   Current Treatment Recommendations: Strengthening, ROM, Balance Training, Functional Mobility Training, Endurance Training, Neuromuscular Re-education, Patient/Caregiver Education & Training, Equipment Evaluation, Education, & procurement, Cognitive Reorientation, Safety Education & Training, Home Management Training    AM-[facility] Score        [facility] Inpatient Daily Activity Raw Score: 7  AM-[facility]AC Inpatient ADL T-Scale Score : 20.13  ADL Inpatient CMS 0-100% Score: 92.44  ADL Inpatient CMS G-Code Modifier : CM    Goals  Short term goals  Time Frame for Short term goals: by 5/16/19  Short term goal 1: Pt will complete 2 grooming tasks seated EOB with Max A  Short term goal 2: Pt with tolerate 10-15 reps UE ROM to increase strength and endurance, and engagement of RUE by 5/13, GOAL MET (5/13/19)  Short term goal 3: Pt will complete functional transfers with Max A x2 and LRAD as appropriate  Patient Goals   Patient goals : Pt does not state a goal at time of eval       Therapy Time   Individual Concurrent Group Co-treatment   Time In 0800         Time Out 0823         Minutes 23         Timed Code Treatment Minutes: 23 Minutes       CORRIE Robles/DAMON

## 2019-05-13 NOTE — PROGRESS NOTES
PROGRESS NOTE  S:69 yrs Patient  admitted on 4/29/2019 with DKA, type 2, not at goal [facility]) [E11.10]  DKA, type 2, not at goal [facility]) [E11.10] . Today she feels OK. More alert but still non-verbal. No bleeding    Exam:   Vitals:    05/13/19 1100   BP: 136/72   Pulse: 110   Resp: 26   Temp:    SpO2: 99%      General appearance: alert, appears stated age and cooperative  HEENT: Oropharynx clear, no lesions  Neck: no adenopathy and supple, symmetrical, trachea midline  Lungs: clear to auscultation bilaterally  Heart: regular rate and rhythm, S1, S2 normal, no murmur, click, rub or gallop  Abdomen: soft, non-tender; bowel sounds normal; no masses,  no organomegaly  Extremities: extremities normal, atraumatic, no cyanosis or edema     Medications: Reviewed    Labs:  CBC:   Recent Labs     05/11/19  0656 05/12/19  0556 05/13/19  0457   WBC 12.3* 12.0* 10.7   HGB 8.0* 7.7* 7.9*   HCT 24.2* 23.2* 23.6*   MCV 86.3 86.8 86.6    501* 632*     BMP:   Recent Labs     05/11/19  0656 05/12/19  0556 05/13/19  0457   * 140 141   K 3.9 4.3 3.7    103 102   CO2 30 31 30   PHOS 3.7 3.8 4.4   BUN 20 21* 17   CREATININE 0.6 0.6 <0.5*     Impression: 81year old female with history of HTN, DM, CVA, asthma, Seizure do, admitted with cardiac arrest and DKA. She was also noted to have coffee ground aspirate per NG but Hgb is stable    Recommendation:  1. Continue supportive care  2. PPI BID  3. Monitor Hgb  4. canasa suppository for proctitis  5. Observe for signs of bleeding  6.  EGD+colon once stable      Isabelle Morris MD  11:57 AM 5/13/2019

## 2019-05-13 NOTE — FLOWSHEET NOTE
Bedside report given by [facility] RN, Skin assessment completed. Pt has MRSD around the anus and up toward the coccyx as well as a pink and tan arearesembling a blister which the daughter states is an old boil pt was treating at home. Shift assessment completed and documented; see flowsheets for details. Pt resting in bed, VSS, Lines checked and verified, alarms on and audible. Repositioned patient, sacral Mepilex in place, call light within reach, will continue to closely monitor.

## 2019-05-13 NOTE — PROGRESS NOTES
Gave Bedside report to: Ihsan Durbin RN    4 Eyes Skin Assessment     The patient is being assess for   Shift Handoff    I agree that 2 RN's have performed a thorough Head to Toe Skin Assessment on the patient. ALL assessment sites listed below have been assessed. Areas assessed by both nurses:   [x]   Head, Face, and Ears   [x]   Shoulders, Back, and Chest, Abdomen  [x]   Arms, Elbows, and Hands   [x]   Coccyx, Sacrum, and Ischium  [x]   Legs, Feet, and Heels        Does the Patient have Skin Breakdown?   Yes LDA WOUND CARE was Initiated documentation include the Connie-wound, Wound Assessment, Measurements, Dressing Treatment, Drainage, and Color\",          Robi Prevention initiated:  Yes   Wound Care Orders initiated:  No      WOC nurse consulted for Pressure Injury (Stage 3,4, Unstageable, DTI, NWPT, Complex wounds)and New or Established Ostomies:  NA      Primary Nurse eSignature: Electronically signed by Lillian Bhatti RN on 5/13/19 at 7:36 PM    **SHARE this note so that the co-signing nurse is able to place an eSignature**    Co-signer eSignature: Electronically signed by Steffany Bell RN on 5/13/19 at 11:11 PM

## 2019-05-13 NOTE — PROGRESS NOTES
RESPIRATORY THERAPY ASSESSMENT    Name:  Kenji Sosa Record Number:  [id number]  Age: 81 y.o. Gender: female  : 1950  Today's Date:  2019  Room:  [facility]0224-01    Assessment     Is the patient being admitted for a COPD or Asthma exacerbation? NA   (If yes the patient will be seen every 4 hours for the first 24 hours and then reassessed)    Patient Admission Diagnosis      Allergies  Allergies   Allergen Reactions    Aspirin Nausea And Vomiting       Minimum Predicted Vital Capacity:     N/A          Actual Vital Capacity:      N/A              Pulmonary History:Asthma  Home Oxygen Therapy:  room air  Home Respiratory Therapy:None   Current Respiratory Therapy:  DuoNeb HHN Q4H [place] with Sodium Chloride HHN Q4H. Treatment Type: HHN  Medications: Albuterol/Ipratropium, Sodium Chloride    Respiratory Severity Index(RSI)   Patients with orders for inhalation medications, oxygen, or any therapeutic treatment modality will be placed on Respiratory Protocol. They will be assessed with the first treatment and at least every 72 hours thereafter. The following severity scale will be used to determine frequency of treatment intervention.     Smoking History: Pulmonary Disease or Smoking History, Greater than 15 pack year = 2    Social History  Social History     Tobacco Use    Smoking status: Never Smoker    Smokeless tobacco: Never Used   Substance Use Topics    Alcohol use: No     Comment: occasional    Drug use: No       Recent Surgical History: None = 0  Past Surgical History  Past Surgical History:   Procedure Laterality Date    BLADDER REPAIR      BREAST LUMPECTOMY Left     BREAST SURGERY      BRONCHOSCOPY N/A 2019    BRONCHOSCOPY THERAPUTIC ASPIRATION INITIAL performed by Nirmala He MD at  [facility]  2019    BRONCHOSCOPY ALVEOLAR LAVAGE performed by Nirmala He MD at  [facility] N/A 2019    BRONCHOSCOPY THERAPUTIC maximum of every 4 hours. If greater than every 4 hours is required, the physician will be contacted. 4. If the bronchospasm improves, the frequency of the bronchodilator can be decreased, based on the patient's RSI, but not less than home treatment regimen frequency. 5. Bronchodilator(s) will be discontinued if patient has a RSI less than 9 and has received no scheduled or as needed treatment for 72  Hrs. Patients Ordered on a Mucolytic Agent:    1. Must always be administered with a bronchodilator. 2. Discontinue if patient experiences worsened bronchospasm, or secretions have lessened to the point that the patient is able to clear them with a cough. Anti-inflammatory and Combination Medications:    1. If the patient lacks prior history of lung disease, is not using inhaled anti-inflammatory medication at home, and lacks wheezing by examination or by history for at least 24 hours, contact physician for possible discontinuation.

## 2019-05-13 NOTE — PLAN OF CARE
Problem: Serum Glucose Level - Abnormal:  Goal: Ability to maintain appropriate glucose levels will improve  Description  Ability to maintain appropriate glucose levels will improve  Outcome: Met This Shift  Goal: Ability to maintain appropriate glucose levels will improve to within specified parameters  Description  Ability to maintain appropriate glucose levels will improve to within specified parameters  Outcome: Met This Shift     Problem: Cardiac Output - Decreased:  Goal: Hemodynamic stability will improve  Description  Hemodynamic stability will improve  Outcome: Met This Shift     Problem: Fluid Volume - Imbalance:  Goal: Absence of imbalanced fluid volume signs and symptoms  Description  Absence of imbalanced fluid volume signs and symptoms  Outcome: Met This Shift     Problem: Gas Exchange - Impaired:  Goal: Levels of oxygenation will improve  Description  Levels of oxygenation will improve  Outcome: Met This Shift     Problem: Tissue Perfusion - Cardiopulmonary, Altered:  Goal: Absence of angina  Description  Absence of angina  Outcome: Met This Shift  Goal: Hemodynamic stability will improve  Description  Hemodynamic stability will improve  Outcome: Met This Shift     Problem: ACTIVITY INTOLERANCE/IMPAIRED MOBILITY  Goal: Mobility/activity is maintained at optimum level for patient  Outcome: Met This Shift     Problem: Discharge Planning:  Goal: Participates in care planning  Description  Participates in care planning  Outcome: Not Met This Shift   Patient has a past medical history of Acute renal failure (ARF) ([facility]), Asthma, CAD (coronary artery disease), Diabetes mellitus ([address] Hypertension, Pneumonia, Seizures ([address] and Unspecified cerebral artery occlusion with cerebral infarction. Comorbidities and risk factors for stroke reviewed and education provided as appropriate.      Patient and/or family's stated goal of care: Prevention of aspiration     Reviewed the Following Education with Patient and/or Family:   Signs and Symptoms of Stroke-Reviewed FAST   Facial Drooping   Arm Weakness   Speech Difficulty   Time to Call 911 and how to activate EMS (911)   Importance of Follow Up Appointment at Discharge   Importance of Compliance with Medications Prescribed at Discharge     Pt not appropriate for education at this time.      Family Present during Education: no     Stroke Education Booklet at Bedside: yes     Total Education Time: 15 Minutes

## 2019-05-13 NOTE — PROGRESS NOTES
Speech Language Pathology  Attempt Note    SLP reviewed chart and spoke with Mariah Clark RN who reported pt is about to have procedure done. SLP to re-attempt BSE later this date as schedule allows. Thank you.     Clyde Manuel, [address] [facility]  Speech-Language Pathologist

## 2019-05-13 NOTE — CARE COORDINATION
Spoke to NP regarding patient status. Patient continues fail swallow evaluations and will likely need a feeding tube. They have been discussing this with family. Respiratory status is stabilizing. They are asking that we restart precert this morning. Spoke to Octavia with [facility] and she is restarting precert today.   Sue Clarke RN

## 2019-05-13 NOTE — PROGRESS NOTES
HEENT: Pupils equal, round, and reactive to light. Conjunctivae/corneas clear. Neck: Supple, with full range of motion. Trachea midline. Respiratory:  Bilateral breath sounds, decreased at bases, no rales or rhonchi noted this time. Cardiovascular: Regular rate and rhythm with normal S1/S2   Abdomen: Soft, non-tender, non-distended with normal bowel sounds. Musculoskeletal: No clubbing, cyanosis or edema bilaterally. Neurologic:  Neurovascularly intact without any focal sensory/motor deficits. Cranial nerves: II-XII intact, grossly non-focal.  Psychiatric: She is awake, she did not speak while I was in the room. She was able to follow simple one step commands  Capillary Refill: Brisk,< 3 seconds   Peripheral Pulses: +2 palpable, equal bilaterally         Labs:   Recent Labs     05/11/19 0656 05/12/19 0556 05/13/19 0457   WBC 12.3* 12.0* 10.7   HGB 8.0* 7.7* 7.9*   HCT 24.2* 23.2* 23.6*    501* 632*     Recent Labs     05/11/19 0656 05/12/19 0556 05/13/19 0457   * 140 141   K 3.9 4.3 3.7    103 102   CO2 30 31 30   BUN 20 21* 17   CREATININE 0.6 0.6 <0.5*   CALCIUM 7.8* 8.0* 8.2*   PHOS 3.7 3.8 4.4     No results for input(s): AST, ALT, BILIDIR, BILITOT, ALKPHOS in the last 72 hours. No results for input(s): INR in the last 72 hours. No results for input(s): [place] Daily in the last 72 hours. Urinalysis:      Lab Results   Component Value Date    NITRU Negative 04/29/2019    WBCUA 10-20 04/29/2019    BACTERIA 1+ 04/29/2019    RBCUA 3-5 04/29/2019    BLOODU MODERATE 04/29/2019    SPECGRAV 1.025 04/29/2019    GLUCOSEU >=1000 04/29/2019       Radiology:  IR PICC WO SQ PORT/PUMP > 5 YEARS   Final Result   Successful placement of PICC line. CT CHEST WO CONTRAST   Final Result   Bilateral pleural effusions and scattered airspace disease which could   represent pulmonary edema, possibly with superimposed pneumonia.       Multiple nondisplaced left-sided anterior rib fractures and questionable   nondisplaced fracture of the anterior cortex of the sternum. Correlate for   point tenderness. XR CHEST PORTABLE   Final Result   Interval improvement in pulmonary edema and bilateral effusions. XR CHEST PORTABLE   Final Result   Worsening pulmonary edema. XR CHEST PORTABLE   Final Result   Increasing opacification of the left hemithorax, consistent with a   combination of airspace disease and effusion. Findings of interstitial edema in the right lung are noted with trace   effusion. Unchanged appearance of the enteric tube and right internal jugular line. XR CHEST PORTABLE   Final Result   New right basilar and stable multifocal left lung airspace disease. XR CHEST PORTABLE   Final Result   No significant change in bilateral airspace disease given change in technique. VL DUP CAROTID BILATERAL   Final Result      XR CHEST PORTABLE   Final Result   Worsening pulmonary edema with grossly stable left basilar atelectasis and/or   pneumonia. XR ABDOMEN (KUB) (SINGLE AP VIEW)   Final Result   Nasogastric tube in good position. Persistent left lower lobe airspace disease         US GALLBLADDER RUQ   Final Result   Smaller free fluid seen in the right upper quadrant in the region of the   gallbladder. The gallbladder appears unremarkable. No gallstones. XR CHEST PORTABLE   Final Result   1. No significant change. MRI BRAIN WO CONTRAST   Final Result   1. Acute/early subacute infarction involving the left hippocampus. Additional punctate infarctions within the frontal lobes and posterior   temporal lobes bilaterally. This raises the possibility for thromboembolic   phenomenon from a central source. No associated hemorrhage. 2. Diffuse parenchymal volume loss and sequela of chronic microvascular   ischemic changes.    The findings were sent to the Radiology Results [address] at 8:34   am on 5/5/2019 to be communicated to a licensed caregiver. XR CHEST PORTABLE   Final Result   Bilateral lower lobe airspace disease, left greater than right, increased   compared to prior         XR CHEST PORTABLE   Final Result   Stable appearance of the chest with dense opacification in the left mid and   lower lung zone. XR CHEST PORTABLE   Final Result   1. No significant interval change in the left-sided opacity reflecting   pneumonia better characterized on the CT of the thorax from 04/29/2019.   2. Support devices as described above. XR CHEST PORTABLE   Final Result   Catheter in good position. No postprocedure pneumothorax. XR CHEST PORTABLE   Final Result   Supportive devices are stable. Persisting consolidations in the left lung base, consistent with pneumonia. CT CHEST ABDOMEN PELVIS WO CONTRAST   Final Result   Dense consolidation within the inferior aspect of left upper lobe and   throughout the left lower lobe, most compatible with pneumonia and/or   aspiration. That should be followed to resolution, especially given the   nodular morphology within portions of the consolidation. Diffuse mural thickening of the esophagus. Correlate with clinical evidence   of esophagitis. The tip of the right femoral venous catheter is malpositioned within a sacral   vein. That should be repositioned for more optimal placement. CT CERVICAL SPINE WO CONTRAST   Final Result   Multilevel degenerative changes with no acute abnormality of the cervical   spine. CT HEAD WO CONTRAST   Final Result   Motion degraded study with no acute intracranial abnormality. XR CHEST PORTABLE   Final Result   Supportive tubing projects in normal position. Left basilar airspace disease, pneumonia versus aspiration pneumonitis. There may be a component of pleural effusion. XR CHEST PORTABLE   Final Result   Atelectasis at the left lung base. Question of small left pleural effusion. Assessment/Plan:    Active Hospital Problems    Diagnosis Date Noted    Acute renal failure (ARF) ([facility] [N17.9] 08/20/2015     Priority: High     Class: Acute    Mucus plugging of bronchi [J98.09]     Acute pulmonary edema ([facility] [J81.0] 05/07/2019    Pulmonary infiltrate [R91.8] 05/07/2019    Atelectasis of left lung [J98.11] 05/07/2019    Staphylococcus aureus pneumonia ([facility] [J15.211] 05/06/2019    Altered mental status [R41.82]     Non-traumatic rhabdomyolysis [M62.82]     Acute encephalopathy [G93.40]     Arterial ischemic stroke, ICA, left, acute (HCC) [I63.232]     Pneumonia due to organism [J18.9]     Acute respiratory failure (HCC) [J96.00]     Prolonged Q-T interval on ECG [R94.31]     Hypokalemia [E87.6]     Elevated troponin [R74.8]     DKA (diabetic ketoacidoses) ([facility] [E13.10] 04/29/2019    Acidosis [E87.2] 04/29/2019    Cardiac arrest ([facility] [I46.9] 04/29/2019    DKA, type 2, not at goal [facility]) [E11.10] 04/29/2019       DKA  - s/p insulin gtt, IVFs, and serial labs per protocol. Converted to SC insulin. Had not been on any meds for months, A1c is 16. Necrotizing MSSA pneumonia, with septic shock and acute hypoxic respiratory failure present on admission  - broad spectrum abx for two weeks, then changed to augmentin 5/13, and pulmonary recommends continuing this through 5/27.    - s/p many bronch's, extubated. CVA's, probably embolic  - TTE, carotid dopplers, and prolonged telemetry without apparent source of emboli. Continue aspirin, statin. UGIB, with hematemesis and ABLA  - monitor. Conservative management per GI. LOPEZ  - resolved with IVFs and above treatments. Acute metabolic encephalopathy  - due to all of the above issues, treated accordingly.          DVT Prophylaxis: enoxaparin  Diet: DIET TUBE FEED CONTINUOUS/CYCLIC NPO; Diabetic 1.5 (Glucerna 1.5);  Nasogastric; Continuous; 25; 50; 20  Code Status: Full Code    PT/OT Eval Status: rec'd LTAC    Dispo - when infection stable for outpatient management, perhaps after PEG if she doesn't regain the ability to swallow soon. Might be ready 5/18.       Patricia Up MD

## 2019-05-13 NOTE — PROGRESS NOTES
Speech Language Pathology  Facility/Department: [facility] C2 CARD TELEMETRY   CLINICAL BEDSIDE SWALLOW EVALUATION    NAME: Adriane Lombard  : 1950  MRN: [id number]     SLP recomends pt remain strict NPO with continued NG use for nutrition given history of dysphagia prior to admission, prolonged intubation, and concern for secretion management. SLP to continue to follow for ongoing assessment of swallow function. Pt will require an instrumental assessment prior to any diet initiation given hx of dysphagia/bronchoscopy findings/high risk of silent aspiration, however, pt not appropriate at this time for instrumental assessment. ADMISSION DATE: 2019  ADMITTING DIAGNOSIS: has Asthma; Hypertension; Diabetes mellitus ([address] Nausea & vomiting; Abdominal pain; Acute renal failure (ARF) ([address] Hyperglycemia; DKA (diabetic ketoacidoses) ([address] Acidosis; Cardiac arrest ([address] DKA, type 2, not at goal [facility]); Acute respiratory failure ([address] Prolonged Q-T interval on ECG; Hypokalemia; Elevated troponin; Acute encephalopathy; Arterial ischemic stroke, ICA, left, acute ([address] Pneumonia due to organism; Staphylococcus aureus pneumonia ([address] Altered mental status; Non-traumatic rhabdomyolysis; Acute pulmonary edema ([address] Pulmonary infiltrate; Atelectasis of left lung; and Mucus plugging of bronchi on their problem list.  ONSET DATE: 19    Recent Chest Xray/CT of Chest: 19  Impression   Bilateral pleural effusions and scattered airspace disease which could   represent pulmonary edema, possibly with superimposed pneumonia.       Multiple nondisplaced left-sided anterior rib fractures and questionable   nondisplaced fracture of the anterior cortex of the sternum.  Correlate for   point tenderness.      Date of Eval: 2019  Evaluating Therapist: Ev Galvez    Current Diet level:  Current Diet : NPO  Current Liquid Diet : NPO    Primary Complaint  Patient Complaint: Non-verbal     Pain:  Pain frequent oral care with simultaneous suction/      SLP to continue to follow for ongoing assessment of swallow function. Pt will require an instrumental assessment prior to any diet initiation given hx of dysphagia/bronchoscopy findings/high risk of silent aspiration, however, pt not appropriate at this time for instrumental assessment. Treatment Plan  Requires SLP Intervention: Yes  Duration/Frequency of Treatment: 3-5x/week   D/C Recommendations: To be determined       Recommended Diet and Intervention  Diet Solids Recommendation: NPO  Liquid Consistency Recommendation: NPO  Recommended Form of Meds: Via alternative means of nutrition  Recommendations: NPO;Dysphagia treatment  Therapeutic Interventions: Patient/Family education;Oral care    Compensatory Swallowing Strategies   Frequent oral care with simultaneous suction     Treatment/Goals  Short-term Goals  Timeframe for Short-term Goals: 5 days (5/18/19)  Long-term Goals  Timeframe for Long-term Goals: 7 days (5/20/19)  Dysphagia Goals: The patient will tolerate repeat BSE when able. General  Chart Reviewed: Yes  Comments: Non-verbal   Subjective  Subjective: Pt asleep, awakened by SLP, required cues to maintain alertness. RN present at bedside for portion of BSE. Pt's daughter present in room. Behavior/Cognition: Cooperative;Lethargic;Requires cueing  Respiratory Status: O2 via nasual cannula  O2 Device: Nasal cannula  Liters of Oxygen: 3 L  Communication Observation: Non-verbal  Follows Directions: Simple(Required cues, not able to follow all commands)  Dentition: Edentulous  Patient Positioning: Partially reclined  Baseline Vocal Quality: Aphonic  Volitional Cough: Congested  Volitional Swallow: Absent  Prior Dysphagia History: No prior SLP services at [facility]. Pt's daughter reported pt has had dysphagia that has progressively worsened over the last few months and that she frequently coughs/chokes with food that continues to increase in frequency. Oral Motor Deficits  Oral/Motor  Oral Motor: Exceptions to [facility]  Lingual ROM: Reduced left; Reduced right  Lingual Strength: Reduced    Oral Phase Dysfunction  Oral Phase  Oral Phase - Comment: No PO trials given      Indicators of Pharyngeal Phase Dysfunction   Pharyngeal Phase  Pharyngeal Phase: Exceptions  Pharyngeal Phase   Pharyngeal: No PO trials given. Pt demonstrated wet, very congested cough with severely reduced cough strength noted. Pt aphonic/non-verbal throughout assessment. Prognosis  Prognosis  Prognosis for safe diet advancement: guarded  Barriers to reach goals: severity of dysphagia;time post onset  Individuals consulted  Consulted and agree with results and recommendations: Patient; Family member  Family member consulted: Pt's daughter    Education  Patient Education: Pt and pt's daughter educated on NPO diet recommendation, risk of aspiration, and risks associated with aspiration   Patient Education Response: No evidence of learning  Safety Devices in place: Yes  Type of devices: All fall risk precautions in place; Left in bed;Call light within reach;Nurse notified    Therapy Time  SLP Individual Minutes  Time In: 9693  Time Out: 4207  Minutes: 500 [facility], [address] [place] [address], [address]  5/13/2019 11:46 AM

## 2019-05-14 LAB
ANION GAP SERPL CALCULATED.3IONS-SCNC: 10 MMOL/L (ref 3–16)
BASOPHILS ABSOLUTE: 0.1 K/UL (ref 0–0.2)
BASOPHILS RELATIVE PERCENT: 1.4 %
BUN BLDV-MCNC: 17 MG/DL (ref 7–20)
CALCIUM SERPL-MCNC: 8.2 MG/DL (ref 8.3–10.6)
CHLORIDE BLD-SCNC: 102 MMOL/L (ref 99–110)
CO2: 29 MMOL/L (ref 21–32)
CREAT SERPL-MCNC: 0.6 MG/DL (ref 0.6–1.2)
EKG ATRIAL RATE: 107 BPM
EKG DIAGNOSIS: NORMAL
EKG P AXIS: 51 DEGREES
EKG P-R INTERVAL: 140 MS
EKG Q-T INTERVAL: 360 MS
EKG QRS DURATION: 92 MS
EKG QTC CALCULATION (BAZETT): 480 MS
EKG R AXIS: 54 DEGREES
EKG T AXIS: 47 DEGREES
EKG VENTRICULAR RATE: 107 BPM
EOSINOPHILS ABSOLUTE: 0.1 K/UL (ref 0–0.6)
EOSINOPHILS RELATIVE PERCENT: 1.2 %
GFR AFRICAN AMERICAN: >60
GFR NON-AFRICAN AMERICAN: >60
GLUCOSE BLD-MCNC: 132 MG/DL (ref 70–99)
GLUCOSE BLD-MCNC: 135 MG/DL (ref 70–99)
GLUCOSE BLD-MCNC: 135 MG/DL (ref 70–99)
GLUCOSE BLD-MCNC: 168 MG/DL (ref 70–99)
GLUCOSE BLD-MCNC: 185 MG/DL (ref 70–99)
GLUCOSE BLD-MCNC: 203 MG/DL (ref 70–99)
GLUCOSE BLD-MCNC: 228 MG/DL (ref 70–99)
HCT VFR BLD CALC: 21.3 % (ref 36–48)
HEMOGLOBIN: 7.2 G/DL (ref 12–16)
LYMPHOCYTES ABSOLUTE: 1.4 K/UL (ref 1–5.1)
LYMPHOCYTES RELATIVE PERCENT: 16.3 %
MAGNESIUM: 1.9 MG/DL (ref 1.8–2.4)
MCH RBC QN AUTO: 29.3 PG (ref 26–34)
MCHC RBC AUTO-ENTMCNC: 33.9 G/DL (ref 31–36)
MCV RBC AUTO: 86.4 FL (ref 80–100)
MONOCYTES ABSOLUTE: 0.8 K/UL (ref 0–1.3)
MONOCYTES RELATIVE PERCENT: 9.7 %
NEUTROPHILS ABSOLUTE: 6 K/UL (ref 1.7–7.7)
NEUTROPHILS RELATIVE PERCENT: 71.4 %
PDW BLD-RTO: 15.3 % (ref 12.4–15.4)
PERFORMED ON: ABNORMAL
PHOSPHORUS: 3.6 MG/DL (ref 2.5–4.9)
PLATELET # BLD: 709 K/UL (ref 135–450)
PMV BLD AUTO: 7.4 FL (ref 5–10.5)
POTASSIUM SERPL-SCNC: 3.7 MMOL/L (ref 3.5–5.1)
RBC # BLD: 2.47 M/UL (ref 4–5.2)
SODIUM BLD-SCNC: 141 MMOL/L (ref 136–145)
WBC # BLD: 8.4 K/UL (ref 4–11)

## 2019-05-14 PROCEDURE — 93005 ELECTROCARDIOGRAM TRACING: CPT | Performed by: NURSE PRACTITIONER

## 2019-05-14 PROCEDURE — 80048 BASIC METABOLIC PNL TOTAL CA: CPT

## 2019-05-14 PROCEDURE — 85025 COMPLETE CBC W/AUTO DIFF WBC: CPT

## 2019-05-14 PROCEDURE — 93010 ELECTROCARDIOGRAM REPORT: CPT | Performed by: INTERNAL MEDICINE

## 2019-05-14 PROCEDURE — 2500000003 HC RX 250 WO HCPCS: Performed by: FAMILY MEDICINE

## 2019-05-14 PROCEDURE — 2060000000 HC ICU INTERMEDIATE R&B

## 2019-05-14 PROCEDURE — 6370000000 HC RX 637 (ALT 250 FOR IP): Performed by: INTERNAL MEDICINE

## 2019-05-14 PROCEDURE — 94640 AIRWAY INHALATION TREATMENT: CPT

## 2019-05-14 PROCEDURE — 84100 ASSAY OF PHOSPHORUS: CPT

## 2019-05-14 PROCEDURE — 2580000003 HC RX 258: Performed by: INTERNAL MEDICINE

## 2019-05-14 PROCEDURE — 94761 N-INVAS EAR/PLS OXIMETRY MLT: CPT

## 2019-05-14 PROCEDURE — C9113 INJ PANTOPRAZOLE SODIUM, VIA: HCPCS | Performed by: INTERNAL MEDICINE

## 2019-05-14 PROCEDURE — 94669 MECHANICAL CHEST WALL OSCILL: CPT

## 2019-05-14 PROCEDURE — 83735 ASSAY OF MAGNESIUM: CPT

## 2019-05-14 PROCEDURE — 94668 MNPJ CHEST WALL SBSQ: CPT

## 2019-05-14 PROCEDURE — 2700000000 HC OXYGEN THERAPY PER DAY

## 2019-05-14 PROCEDURE — 6360000002 HC RX W HCPCS: Performed by: INTERNAL MEDICINE

## 2019-05-14 PROCEDURE — 99233 SBSQ HOSP IP/OBS HIGH 50: CPT | Performed by: INTERNAL MEDICINE

## 2019-05-14 RX ORDER — PROCHLORPERAZINE EDISYLATE 5 MG/ML
10 INJECTION INTRAMUSCULAR; INTRAVENOUS EVERY 6 HOURS PRN
Status: DISCONTINUED | OUTPATIENT
Start: 2019-05-14 | End: 2019-05-20 | Stop reason: HOSPADM

## 2019-05-14 RX ADMIN — ASPIRIN 325 MG: 325 TABLET, COATED ORAL at 09:34

## 2019-05-14 RX ADMIN — IPRATROPIUM BROMIDE AND ALBUTEROL SULFATE 1 AMPULE: .5; 3 SOLUTION RESPIRATORY (INHALATION) at 19:54

## 2019-05-14 RX ADMIN — PSYLLIUM HUSK 1 PACKET: 3.4 POWDER ORAL at 09:34

## 2019-05-14 RX ADMIN — IPRATROPIUM BROMIDE AND ALBUTEROL SULFATE 1 AMPULE: .5; 3 SOLUTION RESPIRATORY (INHALATION) at 04:03

## 2019-05-14 RX ADMIN — IPRATROPIUM BROMIDE AND ALBUTEROL SULFATE 1 AMPULE: .5; 3 SOLUTION RESPIRATORY (INHALATION) at 15:09

## 2019-05-14 RX ADMIN — Medication 250 MG: at 20:45

## 2019-05-14 RX ADMIN — MICONAZOLE NITRATE: 2 POWDER TOPICAL at 09:31

## 2019-05-14 RX ADMIN — INSULIN GLARGINE 18 UNITS: 100 INJECTION, SOLUTION SUBCUTANEOUS at 09:36

## 2019-05-14 RX ADMIN — ISODIUM CHLORIDE 3 ML: 0.03 SOLUTION RESPIRATORY (INHALATION) at 04:04

## 2019-05-14 RX ADMIN — AMOXICILLIN AND CLAVULANATE POTASSIUM 1 TABLET: 875; 125 TABLET, FILM COATED ORAL at 09:34

## 2019-05-14 RX ADMIN — Medication 10 ML: at 10:20

## 2019-05-14 RX ADMIN — IPRATROPIUM BROMIDE AND ALBUTEROL SULFATE 1 AMPULE: .5; 3 SOLUTION RESPIRATORY (INHALATION) at 23:27

## 2019-05-14 RX ADMIN — AMOXICILLIN AND CLAVULANATE POTASSIUM 1 TABLET: 875; 125 TABLET, FILM COATED ORAL at 20:45

## 2019-05-14 RX ADMIN — MICONAZOLE NITRATE: 2 POWDER TOPICAL at 20:46

## 2019-05-14 RX ADMIN — INSULIN LISPRO 4 UNITS: 100 INJECTION, SOLUTION INTRAVENOUS; SUBCUTANEOUS at 04:28

## 2019-05-14 RX ADMIN — INSULIN LISPRO 4 UNITS: 100 INJECTION, SOLUTION INTRAVENOUS; SUBCUTANEOUS at 08:49

## 2019-05-14 RX ADMIN — Medication 10 ML: at 20:47

## 2019-05-14 RX ADMIN — IPRATROPIUM BROMIDE AND ALBUTEROL SULFATE 1 AMPULE: .5; 3 SOLUTION RESPIRATORY (INHALATION) at 11:51

## 2019-05-14 RX ADMIN — PANTOPRAZOLE SODIUM 40 MG: 40 INJECTION, POWDER, FOR SOLUTION INTRAVENOUS at 09:34

## 2019-05-14 RX ADMIN — IPRATROPIUM BROMIDE AND ALBUTEROL SULFATE 1 AMPULE: .5; 3 SOLUTION RESPIRATORY (INHALATION) at 07:27

## 2019-05-14 RX ADMIN — Medication 250 MG: at 09:34

## 2019-05-14 RX ADMIN — ATORVASTATIN CALCIUM 40 MG: 40 TABLET, FILM COATED ORAL at 20:46

## 2019-05-14 RX ADMIN — PANTOPRAZOLE SODIUM 40 MG: 40 INJECTION, POWDER, FOR SOLUTION INTRAVENOUS at 20:46

## 2019-05-14 RX ADMIN — INSULIN LISPRO 2 UNITS: 100 INJECTION, SOLUTION INTRAVENOUS; SUBCUTANEOUS at 12:32

## 2019-05-14 ASSESSMENT — PAIN SCALES - GENERAL
PAINLEVEL_OUTOF10: 0

## 2019-05-14 NOTE — CARE COORDINATION
CM received notification from Aren Guillen with [facility] they are out of network at that facility for patient insurance. Spoke to daughter Khang Jensen and as discussed prior they would like to move forward with second option of [facility]. Referral faxed and call placed to ensure receipt.   Cy Mehta RN

## 2019-05-14 NOTE — PROGRESS NOTES
Hospitalist Progress Note      PCP: Nereida Fernández PA-C    Date of Admission: 4/29/2019    Chief Complaint:  Patient was found unresponsive    Hospital Course:   80 yo female with h/o CAD, DM2, HTN, depression admitted after being found unresponsive with acute respiratory failure due to pneumonia, DKA, metabolic encephalopathy, LOPEZ, metabolic acidosis, acute GI bleed. She has been in the ICU, she is followed by pulmonary, and GI in consultation      Subjective:  Still drowsy and is not speaking. Failed swallow eval again. Family is interested in PEG. Would appreciate GI's input regarding this - especially since GI is planning an EGD per their note. Medications:  Reviewed    Infusion Medications    dextrose       Scheduled Medications    insulin lispro  0-12 Units Subcutaneous Q4H    ipratropium-albuterol  1 ampule Inhalation Q4H    insulin glargine  18 Units Subcutaneous Daily    amoxicillin-clavulanate  1 tablet Oral 2 times per day    psyllium  1 packet Per NG tube Daily    pneumococcal 13-valent conjugate  0.5 mL Intramuscular Once    saccharomyces boulardii  250 mg Oral BID    atorvastatin  40 mg Oral Nightly    aspirin  325 mg Oral Daily    miconazole   Topical BID    pantoprazole  40 mg Intravenous BID    sodium chloride flush  10 mL Intravenous 2 times per day     PRN Meds: prochlorperazine, morphine, glucose, dextrose, glucagon (rDNA), dextrose, magnesium sulfate, potassium chloride, acetaminophen, sodium chloride flush, magnesium hydroxide, acetaminophen      Intake/Output Summary (Last 24 hours) at 5/14/2019 1331  Last data filed at 5/14/2019 0500  Gross per 24 hour   Intake 1006 ml   Output 1145 ml   Net -139 ml       Physical Exam Performed:    /89   Pulse 103   Temp 98.1 °F (36.7 °C) (Axillary)   Resp 25   Ht 5' 1\" (1.549 m)   Wt 117 lb 11.6 oz (53.4 kg)   SpO2 96%   BMI 22.24 kg/m²     General appearance: She is lying in bed, looks very weak and debilitated. HEENT: Pupils equal, round, and reactive to light. Conjunctivae/corneas clear. Neck: Supple, with full range of motion. Trachea midline. Respiratory:  Bilateral breath sounds, decreased at bases, no rales or rhonchi noted this time. Cardiovascular: Regular rate and rhythm with normal S1/S2   Abdomen: Soft, non-tender, non-distended with normal bowel sounds. Musculoskeletal: No clubbing, cyanosis or edema bilaterally. Neurologic:  Neurovascularly intact without any focal sensory/motor deficits. Cranial nerves: II-XII intact, grossly non-focal.  Psychiatric: She is awake, she did not speak while I was in the room. She was able to follow simple one step commands  Capillary Refill: Brisk,< 3 seconds   Peripheral Pulses: +2 palpable, equal bilaterally         Labs:   Recent Labs     05/12/19  0556 05/13/19 0457 05/14/19  0554   WBC 12.0* 10.7 8.4   HGB 7.7* 7.9* 7.2*   HCT 23.2* 23.6* 21.3*   * 632* 709*     Recent Labs     05/12/19  0556 05/13/19 0457 05/14/19  0554    141 141   K 4.3 3.7 3.7    102 102   CO2 31 30 29   BUN 21* 17 17   CREATININE 0.6 <0.5* 0.6   CALCIUM 8.0* 8.2* 8.2*   PHOS 3.8 4.4 3.6     No results for input(s): AST, ALT, BILIDIR, BILITOT, ALKPHOS in the last 72 hours. No results for input(s): INR in the last 72 hours. No results for input(s): [place] in the last 72 hours. Urinalysis:      Lab Results   Component Value Date    NITRU Negative 04/29/2019    WBCUA 10-20 04/29/2019    BACTERIA 1+ 04/29/2019    RBCUA 3-5 04/29/2019    BLOODU MODERATE 04/29/2019    SPECGRAV 1.025 04/29/2019    GLUCOSEU >=1000 04/29/2019       Radiology:  IR PICC WO SQ PORT/PUMP > 5 YEARS   Final Result   Successful placement of PICC line. CT CHEST WO CONTRAST   Final Result   Bilateral pleural effusions and scattered airspace disease which could   represent pulmonary edema, possibly with superimposed pneumonia.       Multiple nondisplaced left-sided anterior rib fractures and questionable   nondisplaced fracture of the anterior cortex of the sternum. Correlate for   point tenderness. XR CHEST PORTABLE   Final Result   Interval improvement in pulmonary edema and bilateral effusions. XR CHEST PORTABLE   Final Result   Worsening pulmonary edema. XR CHEST PORTABLE   Final Result   Increasing opacification of the left hemithorax, consistent with a   combination of airspace disease and effusion. Findings of interstitial edema in the right lung are noted with trace   effusion. Unchanged appearance of the enteric tube and right internal jugular line. XR CHEST PORTABLE   Final Result   New right basilar and stable multifocal left lung airspace disease. XR CHEST PORTABLE   Final Result   No significant change in bilateral airspace disease given change in technique. VL DUP CAROTID BILATERAL   Final Result      XR CHEST PORTABLE   Final Result   Worsening pulmonary edema with grossly stable left basilar atelectasis and/or   pneumonia. XR ABDOMEN (KUB) (SINGLE AP VIEW)   Final Result   Nasogastric tube in good position. Persistent left lower lobe airspace disease         US GALLBLADDER RUQ   Final Result   Smaller free fluid seen in the right upper quadrant in the region of the   gallbladder. The gallbladder appears unremarkable. No gallstones. XR CHEST PORTABLE   Final Result   1. No significant change. MRI BRAIN WO CONTRAST   Final Result   1. Acute/early subacute infarction involving the left hippocampus. Additional punctate infarctions within the frontal lobes and posterior   temporal lobes bilaterally. This raises the possibility for thromboembolic   phenomenon from a central source. No associated hemorrhage. 2. Diffuse parenchymal volume loss and sequela of chronic microvascular   ischemic changes.    The findings were sent to the Radiology Results [address] at 8:34   am on 5/5/2019 to be communicated to a licensed caregiver. XR CHEST PORTABLE   Final Result   Bilateral lower lobe airspace disease, left greater than right, increased   compared to prior         XR CHEST PORTABLE   Final Result   Stable appearance of the chest with dense opacification in the left mid and   lower lung zone. XR CHEST PORTABLE   Final Result   1. No significant interval change in the left-sided opacity reflecting   pneumonia better characterized on the CT of the thorax from 04/29/2019.   2. Support devices as described above. XR CHEST PORTABLE   Final Result   Catheter in good position. No postprocedure pneumothorax. XR CHEST PORTABLE   Final Result   Supportive devices are stable. Persisting consolidations in the left lung base, consistent with pneumonia. CT CHEST ABDOMEN PELVIS WO CONTRAST   Final Result   Dense consolidation within the inferior aspect of left upper lobe and   throughout the left lower lobe, most compatible with pneumonia and/or   aspiration. That should be followed to resolution, especially given the   nodular morphology within portions of the consolidation. Diffuse mural thickening of the esophagus. Correlate with clinical evidence   of esophagitis. The tip of the right femoral venous catheter is malpositioned within a sacral   vein. That should be repositioned for more optimal placement. CT CERVICAL SPINE WO CONTRAST   Final Result   Multilevel degenerative changes with no acute abnormality of the cervical   spine. CT HEAD WO CONTRAST   Final Result   Motion degraded study with no acute intracranial abnormality. XR CHEST PORTABLE   Final Result   Supportive tubing projects in normal position. Left basilar airspace disease, pneumonia versus aspiration pneumonitis. There may be a component of pleural effusion. XR CHEST PORTABLE   Final Result   Atelectasis at the left lung base. Question of small left pleural effusion. Assessment/Plan:    Active Hospital Problems    Diagnosis Date Noted    Acute renal failure (ARF) ([facility] [N17.9] 08/20/2015     Priority: High     Class: Acute    Mucus plugging of bronchi [J98.09]     Acute pulmonary edema ([facility] [J81.0] 05/07/2019    Pulmonary infiltrate [R91.8] 05/07/2019    Atelectasis of left lung [J98.11] 05/07/2019    Staphylococcus aureus pneumonia ([facility] [J15.211] 05/06/2019    Altered mental status [R41.82]     Non-traumatic rhabdomyolysis [M62.82]     Acute encephalopathy [G93.40]     Arterial ischemic stroke, ICA, left, acute (HCC) [I63.232]     Pneumonia due to organism [J18.9]     Acute respiratory failure (HCC) [J96.00]     Prolonged Q-T interval on ECG [R94.31]     Hypokalemia [E87.6]     Elevated troponin [R74.8]     DKA (diabetic ketoacidoses) ([facility] [E13.10] 04/29/2019    Acidosis [E87.2] 04/29/2019    Cardiac arrest ([facility] [I46.9] 04/29/2019    DKA, type 2, not at goal [facility]) [E11.10] 04/29/2019       DKA  - s/p insulin gtt, IVFs, and serial labs per protocol. Converted to SC insulin. Had not been on any meds for months, A1c is 16. Necrotizing MSSA pneumonia, with septic shock and acute hypoxic respiratory failure present on admission  - broad spectrum abx for two weeks, then changed to augmentin 5/13, and pulmonary recommends continuing this through 5/27.    - s/p many bronch's, extubated. CVA's, probably embolic  - TTE, carotid dopplers, and prolonged telemetry without apparent source of emboli. Continue aspirin, statin. UGIB, with hematemesis and ABLA  - monitor. Conservative management per GI, considering EGD and colonoscopy. LOPEZ  - resolved with IVFs and above treatments. Acute metabolic encephalopathy  - due to all of the above issues, treated accordingly.          DVT Prophylaxis: enoxaparin  Diet: DIET TUBE FEED CONTINUOUS/CYCLIC NPO; Diabetic 1.5 (Glucerna 1.5);  Nasogastric;

## 2019-05-14 NOTE — PROGRESS NOTES
protein, and 759 mL free fluid. Recommend 125 mL free water flush q 4 hrs or per nephrology. · Additional Calories: EN meeting needs. · Anthropometric Measures:  · Ht: 5' 1\" (154.9 cm)   · Current Body Wt: 117 lb (53.1 kg)  · Admission Body Wt: 120 lb (54.4 kg)  · Usual Body Wt: ([place] )  · Weight Change: Diuresing this admission   · Ideal Body Wt: 105 lb (47.6 kg)   · BMI Classification: BMI 18.5 - 24.9 Normal Weight    Nutrition Interventions:   Continue current Tube Feeding  Continued Inpatient Monitoring    Nutrition Evaluation:   · Evaluation: Progressing toward goals   · Goals: Tolerate most appropriate form of nutrition therapy this admission   · Monitoring: Nutrition Progression, TF Tolerance, TF Intake, Pertinent Labs      Electronically signed by Sam Fajardo RD, LD on 5/14/19 at 9:48 AM    Contact Number: Office: [phone]; [address]: [phone]

## 2019-05-14 NOTE — PLAN OF CARE
Problem: Falls - Risk of:  Goal: Will remain free from falls  Description  Bed in lowest locked positions, call light within reach, side rails up x 2, bed check in place. Instructed patient to call before getting out of bed. Patient verbalized understanding. Outcome: Ongoing  Goal: Absence of physical injury  Description  Absence of physical injury  Outcome: Ongoing     Problem: Risk for Impaired Skin Integrity  Goal: Tissue integrity - skin and mucous membranes  Description  Structural intactness and normal physiological function of skin and  mucous membranes.   Outcome: Ongoing     Problem: Serum Glucose Level - Abnormal:  Goal: Ability to maintain appropriate glucose levels will improve  Description  Ability to maintain appropriate glucose levels will improve  Outcome: Ongoing  Goal: Ability to maintain appropriate glucose levels will improve to within specified parameters  Description  Ability to maintain appropriate glucose levels will improve to within specified parameters  Outcome: Ongoing     Problem: Airway Clearance - Ineffective:  Goal: Ability to maintain a clear airway will improve  Description  Ability to maintain a clear airway will improve  Outcome: Ongoing

## 2019-05-14 NOTE — CARE COORDINATION
Writer received a call this morning from [facility] with [employer]. At this time the insurance information that they have received is showing out of network for [facility] location. [facility] is working with the billing department to see if this is an error or if we need to discuss alternate options with family. CM awaiting returned call to clarify.  Camilla Castellanos RN

## 2019-05-14 NOTE — FLOWSHEET NOTE
05/14/19 0855   Assessment   Charting Type Shift assessment   Neurological   Neuro (WDL) X   Level of Consciousness 0   Orientation Level Oriented to person;Disoriented to place; Disoriented to time;Disoriented to situation   Cognition Follows commands; Impulsive   Language Nods/gestures appropriately   R Hand  Moderate   L Hand  Moderate   R Foot Plantar Flexion Moderate   L Foot Plantar Flexion Moderate   Sensation RUE Full sensation   LUE Motor Response Responds to command   Sensation LUE Full sensation   RLE Motor Response Responds to command   Sensation RLE Full sensation   LLE Motor Response Responds to command   Sensation LLE Full sensation   Gag Present   Tongue Deviation None   [place] Coma Scale   Eye Opening 4   Best Verbal Response 5   Best Motor Response 6   [place] Coma Scale Score 15   HEENT   HEENT (WDL) X   Nose Other (Comment)  (NG)   Tongue Dry   Voice Mute   Teeth Edentulous   Respiratory   Respiratory (WDL) X   Respiratory Pattern Regular   Respiratory Depth Normal   Respiratory Quality/Effort Unlabored   Chest Assessment Chest expansion symmetrical;Trachea midline   L Breath Sounds Inspiratory Wheezes; Expiratory Wheezes; Rhonchi   R Breath Sounds Expiratory Wheezes; Inspiratory Wheezes; Rhonchi   Breath Sounds   Right Upper Lobe Rhonchi   Right Middle Lobe Rhonchi   Right Lower Lobe Diminished   Left Upper Lobe Rhonchi   Left Lower Lobe Diminished   Cough/Sputum   Cough Weak;Non-productive; Congested   Cardiac   Cardiac (WDL) X   Cardiac Regularity Regular   Heart Sounds S1, S2   Cardiac Rhythm ST   Rhythm Interpretation   Pulse 107   Cardiac Monitor   Bedside Cardiac Monitor On Yes   Bedside Cardiac Audible Yes   Bedside Cardiac Alarms Set Yes   Gastrointestinal   Abdominal (WDL) WDL   RUQ Bowel Sounds Active   LUQ Bowel Sounds Active   RLQ Bowel Sounds Active   LLQ Bowel Sounds Active   Peripheral Vascular   Peripheral Vascular (WDL) X   Edema Right lower extremity; Left lower extremity RUE Edema +1;Non-pitting   LUE Edema +1;Non-pitting   RLE Edema +1   LLE Edema +1   Skin Color/Condition   Skin Color/Condition (WDL) X   Skin Condition/Temp Warm;Dry   Skin Integrity   Skin Integrity (WDL) X   Skin Integrity Bruising; Redness   Location scattered   Skin Integrity Site 2   Skin Integrity Location 2 Excoriation; Redness   Location 2 leticia area   Skin Integrity Site 3   Skin Integrity Location 3 Blister    Location 3 buttocks   Skin Integrity Site 4   Skin Integrity Location 4 Other (Comment)  (old boil)   Location 4 left buttock   Musculoskeletal   Musculoskeletal (WDL) X   RL Extremity Weakness   LL Extremity Weakness   Genitourinary   Genitourinary (WDL) X  (rees)   Anus/Rectum   Anus/Rectum (WDL) X   Evaluation Other (Comment)  (excoriation)   Wound 05/13/19 Buttocks Left Reddness surrounding a tan blister   Date First Assessed/Time First Assessed: 05/13/19 0715   Present on Hospital Admission: Yes  Primary Wound Type: (c) Other (comment)  Location: Buttocks  Wound Location Orientation: Left  Wound Description (Comments): Reddness surrounding a tan blister   Dressing Status Other (Comment)  (MUNIR)   Dressing/Treatment Moisture barrier; Pharmaceutical agent (see MAR)   Wound Assessment Pink   Drainage Amount None   Odor None   Leticia-wound Assessment Excoriated   Urethral Catheter Non-latex; Temperature probe 16 fr   Placement Date/Time: 04/29/19 1300   Urethral Catheter Timeout: Patient  Inserted by: Reji Weaver  Catheter Type: Non-latex; Temperature probe  Tube Size (fr): 16 fr  Catheter Balloon Size: 10 mL  Collection Container: Standard  Securement Method: Secur. ..    Catheter Indications Need for fluid management in critically ill patients in a critical care setting not able to be managed by other means such as BSC with hat, bedpan, urinal, condom catheter, or short term intermittent urethral catherization   Site Assessment No urethral drainage   Urine Color Yellow   Urine Appearance Clear Psychosocial   Psychosocial (WDL) X   Patient Behaviors Calm; Cooperative  (with care)

## 2019-05-14 NOTE — PROGRESS NOTES
PROGRESS NOTE  S:69 yrs Patient  admitted on 4/29/2019 with DKA, type 2, not at goal [facility]) [E11.10]  DKA, type 2, not at goal [facility]) [E11.10] . Today she is non-verbal but follows commands    Exam:   Vitals:    05/14/19 1131   BP: 116/89   Pulse: 103   Resp: 25   Temp: 98.1 °F (36.7 °C)   SpO2: 96%      General appearance: alert, appears stated age and cooperative  HEENT: Oropharynx clear, no lesions  Neck: no adenopathy and supple, symmetrical, trachea midline  Lungs: clear to auscultation bilaterally  Heart: regular rate and rhythm, S1, S2 normal, no murmur, click, rub or gallop  Abdomen: soft, non-tender; bowel sounds normal; no masses,  no organomegaly  Extremities: extremities normal, atraumatic, no cyanosis or edema     Medications: Reviewed    Labs:  CBC:   Recent Labs     05/12/19  0556 05/13/19  0457 05/14/19  0554   WBC 12.0* 10.7 8.4   HGB 7.7* 7.9* 7.2*   HCT 23.2* 23.6* 21.3*   MCV 86.8 86.6 86.4   * 632* 709*     BMP:   Recent Labs     05/12/19  0556 05/13/19  0457 05/14/19  0554    141 141   K 4.3 3.7 3.7    102 102   CO2 31 30 29   PHOS 3.8 4.4 3.6   BUN 21* 17 17   CREATININE 0.6 <0.5* 0.6     Impression: 81year old female with history of HTN, DM, CVA, asthma, Seizure do, admitted with cardiac arrest and DKA. She was also noted to have coffee ground aspirate per NG but Hgb is stable    Recommendation:  1. Continue supportive care  2. PPI BID  3. Monitor Hgb  4. Observe for any signs of bleeding  5. Continue canasa suppository for suspected proctitis  6.  EGD+colon once stable      Caleb Galloway MD  12:47 PM 5/14/2019

## 2019-05-14 NOTE — PROGRESS NOTES
Pulmonary & Critical Care Inpatient Progress Note   Karie Hashimoto, MD     REASON FOR TODAY'S VISIT:  Assistance with critical care management    SUBJECTIVE:   Improved secretions  On 1 LPM of oxygen  Continues to fail swallow evaluations. Scheduled Meds:   insulin lispro  0-12 Units Subcutaneous Q4H    ipratropium-albuterol  1 ampule Inhalation Q4H    insulin glargine  18 Units Subcutaneous Daily    amoxicillin-clavulanate  1 tablet Oral 2 times per day    psyllium  1 packet Per NG tube Daily    pneumococcal 13-valent conjugate  0.5 mL Intramuscular Once    saccharomyces boulardii  250 mg Oral BID    atorvastatin  40 mg Oral Nightly    aspirin  325 mg Oral Daily    miconazole   Topical BID    pantoprazole  40 mg Intravenous BID    sodium chloride flush  10 mL Intravenous 2 times per day       Continuous Infusions:   dextrose         PRN Meds:  prochlorperazine, morphine, glucose, dextrose, glucagon (rDNA), dextrose, magnesium sulfate, potassium chloride, acetaminophen, sodium chloride flush, magnesium hydroxide, acetaminophen    ALLERGIES:  Patient is allergic to aspirin. Objective:   PHYSICAL EXAM:  /63   Pulse 103   Temp 98.3 °F (36.8 °C) (Oral)   Resp 28   Ht 5' 1\" (1.549 m)   Wt 117 lb 11.6 oz (53.4 kg)   SpO2 95%   BMI 22.24 kg/m²    Physical Exam   Constitutional: She appears well-developed and well-nourished. No distress. HENT:   Head: Normocephalic and atraumatic. Mouth/Throat: Oropharynx is clear and moist. No oropharyngeal exudate. Neck: Neck supple. No JVD present. Cardiovascular: Normal heart sounds. Exam reveals no gallop and no friction rub. No murmur heard. Pulmonary/Chest: Effort normal. She has no wheezes. She has no rales. Equal chest rise and expansion bilaterally   Abdominal: Soft. Bowel sounds are normal. She exhibits no distension. There is no tenderness. Musculoskeletal: She exhibits no edema. Lymphadenopathy:     She has no cervical adenopathy. Neurological:   confused   Skin: Skin is warm and dry. No rash noted. She is not diaphoretic. Data Reviewed:   LABS:  CBC:  Recent Labs     05/12/19  0556 05/13/19  0457 05/14/19  0554   WBC 12.0* 10.7 8.4   HGB 7.7* 7.9* 7.2*   HCT 23.2* 23.6* 21.3*   MCV 86.8 86.6 86.4   * 632* 709*     BMP:  Recent Labs     05/12/19  0556 05/13/19  0457 05/14/19  0554    141 141   K 4.3 3.7 3.7    102 102   CO2 31 30 29   PHOS 3.8 4.4 3.6   BUN 21* 17 17   CREATININE 0.6 <0.5* 0.6     LIVER PROFILE:   No results for input(s): AST, ALT, LIPASE, ALB, BILIDIR, BILITOT, ALKPHOS in the last 72 hours. Invalid input(s): AMYLASE  PT/INR:  No results for input(s): PROTIME, INR in the last 72 hours. APTT: No results for input(s): APTT in the last 72 hours. UA:  No results for input(s): NITRITE, COLORU, PHUR, LABCAST, WBCUA, RBCUA, MUCUS, TRICHOMONAS, YEAST, BACTERIA, CLARITYU, SPECGRAV, LEUKOCYTESUR, UROBILINOGEN, BILIRUBINUR, BLOODU, GLUCOSEU, AMORPHOUS in the last 72 hours. Invalid input(s): Ike Wilburn  Recent Labs     05/12/19  0704   PHART 7.481*   FGE2YYF 40.0   PO2ART 57.9*           CT chest personally reviewed, dense left sided infiltrate and small to moderate free flowing pleural effusions bilaterally        Assessment:     1. Acute resp failure, hypoxic              -left sided necrotizing pneumonia, MSSA  2. DM poorly controlled  3. Acute encephalopathy              -acute ischemic stroke  4. Dysphagia  5. Critical illness myopathy  6. Acute gastritis/GI bleed, acute anemia    Plan:      -Augmentin for 2 week course, repeat imaging in 4-6 weeks to ensure clearance  -Wean FIO2 as tolerated, pulm toileting  -NPO with plan for possible PEG pending GI input  -Downtrending H/H but above 7 and hemodynamically stable.  Monitor and optimize nutritional status   -PT/OT as tolerated  -Placement in the works by case management    Rene Bradford MD

## 2019-05-14 NOTE — PLAN OF CARE
Problem: Falls - Risk of:  Goal: Will remain free from falls  Description  Bed in lowest locked positions, call light within reach, side rails up x 2, bed check in place. Instructed patient to call before getting out of bed. Patient verbalized understanding. 5/14/2019 1305 by Bhargavi Alvarado RN  Outcome: Ongoing  Pt remains free from falls. Bed in low position and call bell within reach. 5/14/2019 0459 by Cedric Smith RN  Outcome: Ongoing  Goal: Absence of physical injury  Description  Absence of physical injury  5/14/2019 0459 by Cedric Smith RN  Outcome: Ongoing     Problem: Risk for Impaired Skin Integrity  Goal: Tissue integrity - skin and mucous membranes  Description  Structural intactness and normal physiological function of skin and  mucous membranes. 5/14/2019 1305 by Bhargavi Alvarado RN  Outcome: Ongoing  Pt remains free from any new skin breakdown and continues to be turned Q2 hours  5/14/2019 0459 by Cedric Smith RN  Outcome: Ongoing     Problem: Nutrition  Goal: Optimal nutrition therapy  5/14/2019 1305 by Bhargavi Alvarado RN  Outcome: Ongoing  5/14/2019 0954 by Honorio Moreland RD, LD  Outcome: Ongoing     Problem: Serum Glucose Level - Abnormal:  Goal: Ability to maintain appropriate glucose levels will improve  Description  Ability to maintain appropriate glucose levels will improve  5/14/2019 1305 by Bhargavi Alvarado RN  Outcome: Ongoing  5/14/2019 0459 by Cedric Smith RN  Outcome: Ongoing  Goal: Ability to maintain appropriate glucose levels will improve to within specified parameters  Description  Ability to maintain appropriate glucose levels will improve to within specified parameters  5/14/2019 0459 by Cedric Smith RN  Outcome: Ongoing  Blood Glucose controlled with available sliding scale.       Problem: Sensory Perception - Impaired:  Goal: Ability to maintain a stable neurologic state will improve  Description  Ability to maintain a stable neurologic state will improve  Outcome: Ongoing  Pt continues to be nonverbal but follows commands      Problem: Airway Clearance - Ineffective:  Goal: Ability to maintain a clear airway will improve  Description  Ability to maintain a clear airway will improve  5/14/2019 1305 by Mimi Castro RN  Outcome: Ongoing  5/14/2019 0459 by Spencer Del Rosario RN  Outcome: Ongoing

## 2019-05-14 NOTE — PROGRESS NOTES
Speech Language Pathology  Attempt Note    SLP spoke with Jodie Sanchez RN, who ok'd entry of SLP. Upon entry, pt awake, partially reclined in bed, nonverbal. Pt observed to have significantly wet cough with secretions; concern for secretion management. NG in place. Pt able to follow some oral motor commands. Pt aphonic when attempting to voice and unable to produce cough or swallow on command. RR in 30s at rest. No PO trials given at this time given at this time d/t high risk of silent aspiration. No charge this date. SLP recomends pt remain strict NPO with continued NG use for nutrition given history of dysphagia prior to admission, prolonged intubation, and concern for secretion management. SLP to continue to follow for ongoing assessment of swallow function. Pt will require an instrumental assessment prior to any diet initiation given hx of dysphagia/bronchoscopy findings/high risk of silent aspiration, however, pt not appropriate at this time for instrumental assessment.       Brijesh Smith, [place], [address] [facility]  Speech-Language Pathologist

## 2019-05-14 NOTE — PROGRESS NOTES
Patient transferred from 224 to room 440. Daughter at bedside. Re-oriented to room, POC, and routine of the day. No needs at this time.

## 2019-05-15 ENCOUNTER — APPOINTMENT (OUTPATIENT)
Dept: GENERAL RADIOLOGY | Age: 69
DRG: 853 | End: 2019-05-15
Payer: COMMERCIAL

## 2019-05-15 LAB
ANION GAP SERPL CALCULATED.3IONS-SCNC: 10 MMOL/L (ref 3–16)
BASOPHILS ABSOLUTE: 0.2 K/UL (ref 0–0.2)
BASOPHILS RELATIVE PERCENT: 2.4 %
BUN BLDV-MCNC: 15 MG/DL (ref 7–20)
CALCIUM SERPL-MCNC: 8.5 MG/DL (ref 8.3–10.6)
CHLORIDE BLD-SCNC: 102 MMOL/L (ref 99–110)
CO2: 29 MMOL/L (ref 21–32)
CREAT SERPL-MCNC: <0.5 MG/DL (ref 0.6–1.2)
EOSINOPHILS ABSOLUTE: 0.1 K/UL (ref 0–0.6)
EOSINOPHILS RELATIVE PERCENT: 1.3 %
GFR AFRICAN AMERICAN: >60
GFR NON-AFRICAN AMERICAN: >60
GLUCOSE BLD-MCNC: 118 MG/DL (ref 70–99)
GLUCOSE BLD-MCNC: 122 MG/DL (ref 70–99)
GLUCOSE BLD-MCNC: 125 MG/DL (ref 70–99)
GLUCOSE BLD-MCNC: 133 MG/DL (ref 70–99)
GLUCOSE BLD-MCNC: 137 MG/DL (ref 70–99)
GLUCOSE BLD-MCNC: 147 MG/DL (ref 70–99)
GLUCOSE BLD-MCNC: 169 MG/DL (ref 70–99)
HCT VFR BLD CALC: 21.9 % (ref 36–48)
HEMOGLOBIN: 7.3 G/DL (ref 12–16)
LYMPHOCYTES ABSOLUTE: 1.6 K/UL (ref 1–5.1)
LYMPHOCYTES RELATIVE PERCENT: 20.4 %
MCH RBC QN AUTO: 29 PG (ref 26–34)
MCHC RBC AUTO-ENTMCNC: 33.5 G/DL (ref 31–36)
MCV RBC AUTO: 86.4 FL (ref 80–100)
MONOCYTES ABSOLUTE: 1 K/UL (ref 0–1.3)
MONOCYTES RELATIVE PERCENT: 13 %
NEUTROPHILS ABSOLUTE: 5.1 K/UL (ref 1.7–7.7)
NEUTROPHILS RELATIVE PERCENT: 62.9 %
PDW BLD-RTO: 14.9 % (ref 12.4–15.4)
PERFORMED ON: ABNORMAL
PLATELET # BLD: 773 K/UL (ref 135–450)
PMV BLD AUTO: 7.2 FL (ref 5–10.5)
POTASSIUM SERPL-SCNC: 3.9 MMOL/L (ref 3.5–5.1)
RBC # BLD: 2.54 M/UL (ref 4–5.2)
SODIUM BLD-SCNC: 141 MMOL/L (ref 136–145)
WBC # BLD: 8.1 K/UL (ref 4–11)

## 2019-05-15 PROCEDURE — 85025 COMPLETE CBC W/AUTO DIFF WBC: CPT

## 2019-05-15 PROCEDURE — 6370000000 HC RX 637 (ALT 250 FOR IP): Performed by: INTERNAL MEDICINE

## 2019-05-15 PROCEDURE — 6360000002 HC RX W HCPCS: Performed by: INTERNAL MEDICINE

## 2019-05-15 PROCEDURE — 6360000002 HC RX W HCPCS: Performed by: NURSE PRACTITIONER

## 2019-05-15 PROCEDURE — 94761 N-INVAS EAR/PLS OXIMETRY MLT: CPT

## 2019-05-15 PROCEDURE — 2580000003 HC RX 258: Performed by: INTERNAL MEDICINE

## 2019-05-15 PROCEDURE — 2700000000 HC OXYGEN THERAPY PER DAY

## 2019-05-15 PROCEDURE — 99233 SBSQ HOSP IP/OBS HIGH 50: CPT | Performed by: INTERNAL MEDICINE

## 2019-05-15 PROCEDURE — C9113 INJ PANTOPRAZOLE SODIUM, VIA: HCPCS | Performed by: INTERNAL MEDICINE

## 2019-05-15 PROCEDURE — 71045 X-RAY EXAM CHEST 1 VIEW: CPT

## 2019-05-15 PROCEDURE — 80048 BASIC METABOLIC PNL TOTAL CA: CPT

## 2019-05-15 PROCEDURE — 2060000000 HC ICU INTERMEDIATE R&B

## 2019-05-15 PROCEDURE — 94640 AIRWAY INHALATION TREATMENT: CPT

## 2019-05-15 PROCEDURE — 74018 RADEX ABDOMEN 1 VIEW: CPT

## 2019-05-15 PROCEDURE — 92526 ORAL FUNCTION THERAPY: CPT

## 2019-05-15 PROCEDURE — 94644 CONT INHLJ TX 1ST HOUR: CPT

## 2019-05-15 PROCEDURE — 31720 CLEARANCE OF AIRWAYS: CPT

## 2019-05-15 PROCEDURE — 94669 MECHANICAL CHEST WALL OSCILL: CPT

## 2019-05-15 RX ORDER — FUROSEMIDE 10 MG/ML
20 INJECTION INTRAMUSCULAR; INTRAVENOUS ONCE
Status: COMPLETED | OUTPATIENT
Start: 2019-05-15 | End: 2019-05-15

## 2019-05-15 RX ORDER — ACETAMINOPHEN 325 MG/1
650 TABLET ORAL EVERY 4 HOURS PRN
Status: DISCONTINUED | OUTPATIENT
Start: 2019-05-15 | End: 2019-05-15 | Stop reason: SDUPTHER

## 2019-05-15 RX ADMIN — IPRATROPIUM BROMIDE AND ALBUTEROL SULFATE 1 AMPULE: .5; 3 SOLUTION RESPIRATORY (INHALATION) at 23:37

## 2019-05-15 RX ADMIN — AMOXICILLIN AND CLAVULANATE POTASSIUM 1 TABLET: 875; 125 TABLET, FILM COATED ORAL at 21:40

## 2019-05-15 RX ADMIN — AMOXICILLIN AND CLAVULANATE POTASSIUM 1 TABLET: 875; 125 TABLET, FILM COATED ORAL at 08:34

## 2019-05-15 RX ADMIN — PROCHLORPERAZINE EDISYLATE 10 MG: 5 INJECTION INTRAMUSCULAR; INTRAVENOUS at 14:40

## 2019-05-15 RX ADMIN — ASPIRIN 325 MG: 325 TABLET, COATED ORAL at 08:34

## 2019-05-15 RX ADMIN — MICONAZOLE NITRATE: 2 POWDER TOPICAL at 08:35

## 2019-05-15 RX ADMIN — Medication 250 MG: at 08:34

## 2019-05-15 RX ADMIN — IPRATROPIUM BROMIDE AND ALBUTEROL SULFATE 1 AMPULE: .5; 3 SOLUTION RESPIRATORY (INHALATION) at 19:07

## 2019-05-15 RX ADMIN — ATORVASTATIN CALCIUM 40 MG: 40 TABLET, FILM COATED ORAL at 21:40

## 2019-05-15 RX ADMIN — IPRATROPIUM BROMIDE AND ALBUTEROL SULFATE 1 AMPULE: .5; 3 SOLUTION RESPIRATORY (INHALATION) at 03:32

## 2019-05-15 RX ADMIN — PANTOPRAZOLE SODIUM 40 MG: 40 INJECTION, POWDER, FOR SOLUTION INTRAVENOUS at 08:34

## 2019-05-15 RX ADMIN — IPRATROPIUM BROMIDE AND ALBUTEROL SULFATE 1 AMPULE: .5; 3 SOLUTION RESPIRATORY (INHALATION) at 12:06

## 2019-05-15 RX ADMIN — Medication 10 ML: at 08:34

## 2019-05-15 RX ADMIN — FUROSEMIDE 20 MG: 10 INJECTION, SOLUTION INTRAVENOUS at 21:58

## 2019-05-15 RX ADMIN — IPRATROPIUM BROMIDE AND ALBUTEROL SULFATE 1 AMPULE: .5; 3 SOLUTION RESPIRATORY (INHALATION) at 09:22

## 2019-05-15 RX ADMIN — PSYLLIUM HUSK 1 PACKET: 3.4 POWDER ORAL at 08:35

## 2019-05-15 RX ADMIN — INSULIN LISPRO 2 UNITS: 100 INJECTION, SOLUTION INTRAVENOUS; SUBCUTANEOUS at 21:41

## 2019-05-15 RX ADMIN — IPRATROPIUM BROMIDE AND ALBUTEROL SULFATE 1 AMPULE: .5; 3 SOLUTION RESPIRATORY (INHALATION) at 16:15

## 2019-05-15 RX ADMIN — MICONAZOLE NITRATE: 2 POWDER TOPICAL at 21:41

## 2019-05-15 RX ADMIN — INSULIN LISPRO 2 UNITS: 100 INJECTION, SOLUTION INTRAVENOUS; SUBCUTANEOUS at 00:30

## 2019-05-15 RX ADMIN — PROCHLORPERAZINE EDISYLATE 10 MG: 5 INJECTION INTRAMUSCULAR; INTRAVENOUS at 00:28

## 2019-05-15 RX ADMIN — MORPHINE SULFATE 2 MG: 2 INJECTION, SOLUTION INTRAMUSCULAR; INTRAVENOUS at 14:40

## 2019-05-15 RX ADMIN — Medication 10 ML: at 21:45

## 2019-05-15 RX ADMIN — SODIUM CHLORIDE, PRESERVATIVE FREE 10 ML: 5 INJECTION INTRAVENOUS at 14:40

## 2019-05-15 RX ADMIN — PANTOPRAZOLE SODIUM 40 MG: 40 INJECTION, POWDER, FOR SOLUTION INTRAVENOUS at 21:58

## 2019-05-15 RX ADMIN — MORPHINE SULFATE 2 MG: 2 INJECTION, SOLUTION INTRAMUSCULAR; INTRAVENOUS at 22:09

## 2019-05-15 RX ADMIN — Medication 250 MG: at 21:40

## 2019-05-15 ASSESSMENT — PAIN SCALES - WONG BAKER
WONGBAKER_NUMERICALRESPONSE: 2
WONGBAKER_NUMERICALRESPONSE: 0
WONGBAKER_NUMERICALRESPONSE: 2
WONGBAKER_NUMERICALRESPONSE: 2
WONGBAKER_NUMERICALRESPONSE: 0
WONGBAKER_NUMERICALRESPONSE: 0
WONGBAKER_NUMERICALRESPONSE: 4
WONGBAKER_NUMERICALRESPONSE: 0
WONGBAKER_NUMERICALRESPONSE: 0

## 2019-05-15 ASSESSMENT — PAIN SCALES - GENERAL
PAINLEVEL_OUTOF10: 0

## 2019-05-15 NOTE — PROGRESS NOTES
Hospitalist Progress Note      PCP: Floridalma Salazar PA-C    Date of Admission: 4/29/2019    Chief Complaint:  Patient was found unresponsive    Hospital Course:   82 yo female with h/o CAD, DM2, HTN, depression admitted after being found unresponsive with acute respiratory failure due to pneumonia, DKA, metabolic encephalopathy, LOPEZ, metabolic acidosis, acute GI bleed. She has been in the ICU, she is followed by pulmonary, and GI in consultation      Subjective:  More awake today. Still not speaking. Seems unlikely to safely swallow anytime soon. Will look to GI for possible PEG - family is willing. This might need to happen before discharge to [facility]. Medications:  Reviewed    Infusion Medications    dextrose       Scheduled Medications    insulin lispro  0-12 Units Subcutaneous Q4H    ipratropium-albuterol  1 ampule Inhalation Q4H    insulin glargine  18 Units Subcutaneous Daily    amoxicillin-clavulanate  1 tablet Oral 2 times per day    psyllium  1 packet Per NG tube Daily    pneumococcal 13-valent conjugate  0.5 mL Intramuscular Once    saccharomyces boulardii  250 mg Oral BID    atorvastatin  40 mg Oral Nightly    aspirin  325 mg Oral Daily    miconazole   Topical BID    pantoprazole  40 mg Intravenous BID    sodium chloride flush  10 mL Intravenous 2 times per day     PRN Meds: prochlorperazine, morphine, glucose, dextrose, glucagon (rDNA), dextrose, magnesium sulfate, potassium chloride, acetaminophen, sodium chloride flush, magnesium hydroxide, acetaminophen      Intake/Output Summary (Last 24 hours) at 5/15/2019 1610  Last data filed at 5/15/2019 1355  Gross per 24 hour   Intake 0 ml   Output 1975 ml   Net -1975 ml       Physical Exam Performed:    /77   Pulse 101   Temp 98.4 °F (36.9 °C) (Axillary)   Resp 20   Ht 5' 1\" (1.549 m)   Wt 121 lb 0.5 oz (54.9 kg)   SpO2 93%   BMI 22.87 kg/m²     General appearance: She is lying in bed, looks very weak and debilitated. HEENT: Pupils equal, round, and reactive to light. Conjunctivae/corneas clear. Neck: Supple, with full range of motion. Trachea midline. Respiratory:  Bilateral breath sounds, decreased at bases, no rales or rhonchi noted this time. Cardiovascular: Regular rate and rhythm with normal S1/S2   Abdomen: Soft, non-tender, non-distended with normal bowel sounds. Musculoskeletal: No clubbing, cyanosis or edema bilaterally. Neurologic:  Neurovascularly intact without any focal sensory/motor deficits. Cranial nerves: II-XII intact, grossly non-focal.  Psychiatric: She is awake, she did not speak while I was in the room. She was able to follow simple one step commands  Capillary Refill: Brisk,< 3 seconds   Peripheral Pulses: +2 palpable, equal bilaterally         Labs:   Recent Labs     05/13/19  0457 05/14/19  0554 05/15/19  0605   WBC 10.7 8.4 8.1   HGB 7.9* 7.2* 7.3*   HCT 23.6* 21.3* 21.9*   * 709* 773*     Recent Labs     05/13/19 0457 05/14/19 0554 05/15/19  0605    141 141   K 3.7 3.7 3.9    102 102   CO2 30 29 29   BUN 17 17 15   CREATININE <0.5* 0.6 <0.5*   CALCIUM 8.2* 8.2* 8.5   PHOS 4.4 3.6  --      No results for input(s): AST, ALT, BILIDIR, BILITOT, ALKPHOS in the last 72 hours. No results for input(s): INR in the last 72 hours. No results for input(s): Terryann Haste in the last 72 hours. Urinalysis:      Lab Results   Component Value Date    NITRU Negative 04/29/2019    WBCUA 10-20 04/29/2019    BACTERIA 1+ 04/29/2019    RBCUA 3-5 04/29/2019    BLOODU MODERATE 04/29/2019    SPECGRAV 1.025 04/29/2019    GLUCOSEU >=1000 04/29/2019       Radiology:  IR PICC WO SQ PORT/PUMP > 5 YEARS   Final Result   Successful placement of PICC line. CT CHEST WO CONTRAST   Final Result   Bilateral pleural effusions and scattered airspace disease which could   represent pulmonary edema, possibly with superimposed pneumonia.       Multiple nondisplaced left-sided anterior rib on 5/5/2019 to be communicated to a licensed caregiver. XR CHEST PORTABLE   Final Result   Bilateral lower lobe airspace disease, left greater than right, increased   compared to prior         XR CHEST PORTABLE   Final Result   Stable appearance of the chest with dense opacification in the left mid and   lower lung zone. XR CHEST PORTABLE   Final Result   1. No significant interval change in the left-sided opacity reflecting   pneumonia better characterized on the CT of the thorax from 04/29/2019.   2. Support devices as described above. XR CHEST PORTABLE   Final Result   Catheter in good position. No postprocedure pneumothorax. XR CHEST PORTABLE   Final Result   Supportive devices are stable. Persisting consolidations in the left lung base, consistent with pneumonia. CT CHEST ABDOMEN PELVIS WO CONTRAST   Final Result   Dense consolidation within the inferior aspect of left upper lobe and   throughout the left lower lobe, most compatible with pneumonia and/or   aspiration. That should be followed to resolution, especially given the   nodular morphology within portions of the consolidation. Diffuse mural thickening of the esophagus. Correlate with clinical evidence   of esophagitis. The tip of the right femoral venous catheter is malpositioned within a sacral   vein. That should be repositioned for more optimal placement. CT CERVICAL SPINE WO CONTRAST   Final Result   Multilevel degenerative changes with no acute abnormality of the cervical   spine. CT HEAD WO CONTRAST   Final Result   Motion degraded study with no acute intracranial abnormality. XR CHEST PORTABLE   Final Result   Supportive tubing projects in normal position. Left basilar airspace disease, pneumonia versus aspiration pneumonitis. There may be a component of pleural effusion. XR CHEST PORTABLE   Final Result   Atelectasis at the left lung base. Question of small left pleural effusion. XR ABDOMEN (KUB) (SINGLE AP VIEW)    (Results Pending)           Assessment/Plan:    Active Hospital Problems    Diagnosis Date Noted    Acute renal failure (ARF) ([facility] [N17.9] 08/20/2015     Priority: High     Class: Acute    Mucus plugging of bronchi [J98.09]     Acute pulmonary edema ([facility] [J81.0] 05/07/2019    Pulmonary infiltrate [R91.8] 05/07/2019    Atelectasis of left lung [J98.11] 05/07/2019    Staphylococcus aureus pneumonia ([facility] [J15.211] 05/06/2019    Altered mental status [R41.82]     Non-traumatic rhabdomyolysis [M62.82]     Acute encephalopathy [G93.40]     Arterial ischemic stroke, ICA, left, acute (HCC) [I63.232]     Pneumonia due to organism [J18.9]     Acute respiratory failure (HCC) [J96.00]     Prolonged Q-T interval on ECG [R94.31]     Hypokalemia [E87.6]     Elevated troponin [R74.8]     DKA (diabetic ketoacidoses) ([facility] [E13.10] 04/29/2019    Acidosis [E87.2] 04/29/2019    Cardiac arrest ([facility] [I46.9] 04/29/2019    DKA, type 2, not at goal [facility]) [E11.10] 04/29/2019       DKA  - s/p insulin gtt, IVFs, and serial labs per protocol. Converted to SC insulin. Had not been on any meds for months, A1c is 16. Necrotizing MSSA pneumonia, with septic shock and acute hypoxic respiratory failure present on admission  - broad spectrum abx for two weeks, then changed to augmentin 5/13, and pulmonary recommends continuing this through 5/27.    - s/p many bronch's, extubated. CVA's, probably embolic  - TTE, carotid dopplers, and prolonged telemetry without apparent source of emboli. Continue aspirin, statin. UGIB, with hematemesis and ABLA  - monitor. Conservative management per GI, considering EGD and colonoscopy. LOPEZ  - resolved with IVFs and above treatments.       Acute metabolic encephalopathy  - due to all of the above issues, treated accordingly.          DVT Prophylaxis: enoxaparin  Diet: DIET TUBE FEED CONTINUOUS/CYCLIC NPO; Diabetic 1.5 (Glucerna 1.5); Nasogastric; Continuous; 25; 50; 20  Code Status: Full Code    PT/OT Eval Status: rec'd LTAC    Dispo - when infection stable for outpatient management, perhaps after PEG if she doesn't regain the ability to swallow soon. Might be ready 5/18.       Milana Garza MD

## 2019-05-15 NOTE — PROGRESS NOTES
Speech Language Pathology  Facility/Department: [address] [place] [address] PCU  Dysphagia Daily Treatment Note    NAME: Carrillo aLrios  : 1950  MRN: [id number]    Patient Diagnosis(es):   Patient Active Problem List    Diagnosis Date Noted    Abdominal pain 2015     Priority: High     Class: Acute    Acute renal failure (ARF) ([facility] [address] 2015     Priority: High     Class: Acute    Nausea & vomiting 2015     Priority: High     Class: Acute    Mucus plugging of bronchi     Acute pulmonary edema ([facility] [address] [facility] 2019    Pulmonary infiltrate 2019    Atelectasis of left lung 2019    [facility]    Altered mental status     Non-traumatic rhabdomyolysis     Acute encephalopathy     Arterial ischemic stroke, ICA, left, acute ([facility]     Pneumonia due to organism     Acute respiratory failure (HCC)     Prolonged Q-T interval on ECG     Hypokalemia     Elevated troponin     DKA (diabetic ketoacidoses) [facility] 2019    Acidosis 2019    Cardiac arrest ([facility] 2019    DKA, type 2, not at goal [facility]) 2019    Hyperglycemia 2016    Asthma     Hypertension     Diabetes mellitus ([facility]      Allergies: Allergies   Allergen Reactions    Aspirin Nausea And Vomiting       Pain: No c/o pain during tx session    Current Diet: Pt has been NPO, NG in place    Diet Tolerance:  Pt has been NPO, NG in place    P.O. Trials: no PO trials observed this date; pt at high risk of aspiration with any PO intake at this time    Impressions:  Pt seen lying in bed, alert, RN OK'd SLP entry and therapy, pt's daughter at bedside. Pt currently on 1 L O2 NC, NG in place. Per chart, pt to likely have PEG placed. Pt continues with audible congestion noted, concern for pt's secretion management. Pt able to complete weak, congested, and unproductive cough on cue. Pt unable to complete volitional swallow despite cues.   Pt able to produce weak, breathy phonation tx    Signature:  Robina Moss M.S. [facility]  Speech-language pathologist  [id number]

## 2019-05-15 NOTE — PROGRESS NOTES
[facility] or Facility: [facility] From: Desean Patel RE: Caelmiviki Kirbylalitha 1950 RM: 440 Pt lungs sound very full of fluid maybe aspiration of tube feed. Can we please get a portable chest x ray.  Thank you Desean Patel Need Callback: NO CALLBACK REQ C4 PROGRESSIVE CARE  Unread

## 2019-05-15 NOTE — PROGRESS NOTES
Neurological:   confused   Skin: Skin is warm and dry. No rash noted. She is not diaphoretic. Data Reviewed:   LABS:  CBC:  Recent Labs     05/13/19 0457 05/14/19  0554 05/15/19  0605   WBC 10.7 8.4 8.1   HGB 7.9* 7.2* 7.3*   HCT 23.6* 21.3* 21.9*   MCV 86.6 86.4 86.4   * 709* 773*     BMP:  Recent Labs     05/13/19 0457 05/14/19  0554 05/15/19  0605    141 141   K 3.7 3.7 3.9    102 102   CO2 30 29 29   PHOS 4.4 3.6  --    BUN 17 17 15   CREATININE <0.5* 0.6 <0.5*     LIVER PROFILE:   No results for input(s): AST, ALT, LIPASE, ALB, BILIDIR, BILITOT, ALKPHOS in the last 72 hours. Invalid input(s): AMYLASE  PT/INR:  No results for input(s): PROTIME, INR in the last 72 hours. APTT: No results for input(s): APTT in the last 72 hours. UA:  No results for input(s): NITRITE, COLORU, PHUR, LABCAST, WBCUA, RBCUA, MUCUS, TRICHOMONAS, YEAST, BACTERIA, CLARITYU, SPECGRAV, LEUKOCYTESUR, UROBILINOGEN, BILIRUBINUR, BLOODU, GLUCOSEU, AMORPHOUS in the last 72 hours. Invalid input(s): KETONESU  No results for input(s): PHART, FPE1MRP, PO2ART in the last 72 hours. CT chest personally reviewed, dense left sided infiltrate and small to moderate free flowing pleural effusions bilaterally        Assessment:     1. Acute resp failure, hypoxic              -left sided necrotizing pneumonia, MSSA  2. DM poorly controlled  3. Acute encephalopathy              -acute ischemic stroke  4. Dysphagia  5. Critical illness myopathy  6. Acute gastritis/GI bleed, acute anemia    Plan:      -Augmentin for 2 week course, repeat imaging in 4-6 weeks to ensure clearance  -Wean FIO2 as tolerated, pulm toileting  -NPO with plan for possible PEG pending GI input  -Downtrending H/H but above 7 and hemodynamically stable.  Monitor and optimize nutritional status   -PT/OT as tolerated  -Placement in the works by case management    Please contact with questions    Eddie Webb MD

## 2019-05-15 NOTE — PROGRESS NOTES
Patient has a past medical history of Acute renal failure (ARF) ([facility] [address] Asthma, CAD (coronary artery disease), Diabetes mellitus ([facility] [address] Hypertension, Pneumonia, Seizures ([facility] [address] and Unspecified cerebral artery occlusion with cerebral infarction. Comorbidities and risk factors for stroke reviewed and education provided as appropriate. Patient and/or family's stated goal of care: Achieving a form of communication     Reviewed the Following Education with Patient and/or Family:   Signs and Symptoms of Stroke-Reviewed FAST   Facial Drooping   Arm Weakness   Speech Difficulty   Time to Call 911 and how to activate EMS (911)   Importance of Follow Up Appointment at Discharge   Importance of Compliance with Medications Prescribed at Discharge     Pt not appropriate for education at this time.      Family Present during Education: N/A     Stroke Education Booklet at Bedside: N/A     Total Education Time: 5 Minutes

## 2019-05-15 NOTE — PLAN OF CARE
Problem: Falls - Risk of:  Goal: Will remain free from falls  Description  Bed in lowest locked positions, call light within reach, side rails up x 2, bed check in place. Instructed patient to call before getting out of bed. Patient verbalized understanding. 5/15/2019 1052 by Buddy Sinha RN  Outcome: Ongoing  Note:   Patient understands how to use call light. Instructed to call out for assistance. Will continue to monitor. Fall risk precautions in place. 5/14/2019 2319 by Calhoun Severs, RN  Note:   Pt will remain free of falls this shift. Bedside table and call light within reach, bed alarm in place. Pt instructed to call out when in need of assistance, no evidence of understanding. Will continue to monitor. Problem: Risk for Impaired Skin Integrity  Goal: Tissue integrity - skin and mucous membranes  Description  Structural intactness and normal physiological function of skin and  mucous membranes. Outcome: Ongoing  Note:   Preventative mepilex on coccyx. Patient turned and repositioned every 2 hours and as needed, as long as patient allows. Pillow support provided. Problem: Nutrition  Goal: Optimal nutrition therapy  Outcome: Ongoing  Note:   Intake and output monitored. Encouraging healthy meals and meal replacement or protein shakes as needed per MD's orders. Problem: Serum Glucose Level - Abnormal:  Goal: Ability to maintain appropriate glucose levels will improve  Description  Ability to maintain appropriate glucose levels will improve  Outcome: Ongoing  Note:   Diabetes education provided today:    Managing high and low sugar readings. Problem: Injury - Risk of, Physical Injury:  Goal: Will remain free from falls  Description  Bed in lowest locked positions, call light within reach, side rails up x 2, bed check in place. Instructed patient to call before getting out of bed. Patient verbalized understanding.       5/15/2019 1052 by Buddy Sinha RN  Outcome: Ongoing  Note:   Patient understands how to use call light. Instructed to call out for assistance. Will continue to monitor. Fall risk precautions in place. 5/14/2019 2319 by Gee Lacey RN  Note:   Pt will remain free of falls this shift. Bedside table and call light within reach, bed alarm in place. Pt instructed to call out when in need of assistance, no evidence of understanding. Will continue to monitor.

## 2019-05-15 NOTE — CARE COORDINATION
Per Bogdan Moya at [address] [facility] location cannot accept due they are out of network with patient's insurance. [facility] location also cannot accept. Bogdan Moya stated that she feels like that patient does meet criteria and they may be able to accept at [facility] or [facility] [employer] [facility] [employer] locations. Spoke with daughter at bedside and she was agreeable to Bogdan Moya reviewing patient criteria again to see if they could accept at either of these locations. Will follow. It will require pre-cert with [facility] if so.

## 2019-05-15 NOTE — PROGRESS NOTES
Patient got dressed for discharge. Face now red, and pt states it feels like it's burning around her nose/mouth, down throat. Pt denied thickening of the throat at first, but within 10 minutes she stated that she now has a \"tickle\" in her throat and states that this is how it starts. Tele monitor/PIV both removed already for D/C. Face starting to slightly swell. Dr. Pauline Hernadez paged. Waiting for response. This must have been charted on the wrong patient.   Electronically signed by Harris East MD on 5/16/2019 at 7:43 AM

## 2019-05-15 NOTE — PLAN OF CARE
Problem: Falls - Risk of:  Goal: Will remain free from falls  Description  Bed in lowest locked positions, call light within reach, side rails up x 2, bed check in place. Instructed patient to call before getting out of bed. Patient verbalized understanding. 5/14/2019 2319 by Hiram Bryson RN  Note:   Pt will remain free of falls this shift. Bedside table and call light within reach, bed alarm in place. Pt instructed to call out when in need of assistance, no evidence of understanding. Will continue to monitor.

## 2019-05-15 NOTE — PROGRESS NOTES
PROGRESS NOTE  S:69 yrs Patient  admitted on 4/29/2019 with DKA, type 2, not at goal [facility]) [E11.10]  DKA, type 2, not at goal [facility]) [E11.10] . Today she feels oK. Denies complaints. Non-verbal but nods to questions    Exam:   Vitals:    05/15/19 0926   BP:    Pulse:    Resp:    Temp:    SpO2: 92%      General appearance: alert, appears stated age and cooperative  HEENT: Oropharynx clear, no lesions  Neck: no adenopathy and supple, symmetrical, trachea midline  Lungs: clear to auscultation bilaterally  Heart: regular rate and rhythm, S1, S2 normal, no murmur, click, rub or gallop  Abdomen: soft, non-tender; bowel sounds normal; no masses,  no organomegaly  Extremities: extremities normal, atraumatic, no cyanosis or edema     Medications: Reviewed    Labs:  CBC:   Recent Labs     05/13/19 0457 05/14/19  0554 05/15/19  0605   WBC 10.7 8.4 8.1   HGB 7.9* 7.2* 7.3*   HCT 23.6* 21.3* 21.9*   MCV 86.6 86.4 86.4   * 709* 773*     BMP:   Recent Labs     05/13/19 0457 05/14/19  0554 05/15/19  0605    141 141   K 3.7 3.7 3.9    102 102   CO2 30 29 29   PHOS 4.4 3.6  --    BUN 17 17 15   CREATININE <0.5* 0.6 <0.5*       Impression: 81year old female with history of HTN, DM, CVA, asthma, Seizure do, admitted with cardiac arrest and DKA. She was also noted to have coffee ground aspirate per NG but Hgb is stable     Recommendation:  1. Continue supportive care  2. PPI bid  3. Monitor Hgb  4. Observe for signs of bleeding  5. EGD+colon once stable  6.  Will follow      Isabelle Morris MD  10:51 AM 5/15/2019

## 2019-05-15 NOTE — CARE COORDINATION
Patient has been accepted at [facility] at the [facility] location and per Alona Nick pre-cert was initiated today.

## 2019-05-16 LAB
ANION GAP SERPL CALCULATED.3IONS-SCNC: 12 MMOL/L (ref 3–16)
BASOPHILS ABSOLUTE: 0.1 K/UL (ref 0–0.2)
BASOPHILS RELATIVE PERCENT: 1.3 %
BUN BLDV-MCNC: 14 MG/DL (ref 7–20)
CALCIUM SERPL-MCNC: 8.5 MG/DL (ref 8.3–10.6)
CHLORIDE BLD-SCNC: 100 MMOL/L (ref 99–110)
CO2: 29 MMOL/L (ref 21–32)
CREAT SERPL-MCNC: 0.6 MG/DL (ref 0.6–1.2)
EOSINOPHILS ABSOLUTE: 0.1 K/UL (ref 0–0.6)
EOSINOPHILS RELATIVE PERCENT: 1.5 %
GFR AFRICAN AMERICAN: >60
GFR NON-AFRICAN AMERICAN: >60
GLUCOSE BLD-MCNC: 135 MG/DL (ref 70–99)
GLUCOSE BLD-MCNC: 139 MG/DL (ref 70–99)
GLUCOSE BLD-MCNC: 143 MG/DL (ref 70–99)
GLUCOSE BLD-MCNC: 148 MG/DL (ref 70–99)
GLUCOSE BLD-MCNC: 152 MG/DL (ref 70–99)
GLUCOSE BLD-MCNC: 75 MG/DL (ref 70–99)
GLUCOSE BLD-MCNC: 84 MG/DL (ref 70–99)
HCT VFR BLD CALC: 25.5 % (ref 36–48)
HEMOGLOBIN: 8.3 G/DL (ref 12–16)
INR BLD: 1.24 (ref 0.86–1.14)
LYMPHOCYTES ABSOLUTE: 2.2 K/UL (ref 1–5.1)
LYMPHOCYTES RELATIVE PERCENT: 27.1 %
MCH RBC QN AUTO: 28.4 PG (ref 26–34)
MCHC RBC AUTO-ENTMCNC: 32.8 G/DL (ref 31–36)
MCV RBC AUTO: 86.6 FL (ref 80–100)
MONOCYTES ABSOLUTE: 1.1 K/UL (ref 0–1.3)
MONOCYTES RELATIVE PERCENT: 14.2 %
NEUTROPHILS ABSOLUTE: 4.4 K/UL (ref 1.7–7.7)
NEUTROPHILS RELATIVE PERCENT: 55.9 %
PDW BLD-RTO: 15 % (ref 12.4–15.4)
PERFORMED ON: ABNORMAL
PERFORMED ON: NORMAL
PERFORMED ON: NORMAL
PLATELET # BLD: 779 K/UL (ref 135–450)
PMV BLD AUTO: 7 FL (ref 5–10.5)
POTASSIUM REFLEX MAGNESIUM: 3.9 MMOL/L (ref 3.5–5.1)
PROTHROMBIN TIME: 14.1 SEC (ref 9.8–13)
RBC # BLD: 2.94 M/UL (ref 4–5.2)
SODIUM BLD-SCNC: 141 MMOL/L (ref 136–145)
WBC # BLD: 8 K/UL (ref 4–11)

## 2019-05-16 PROCEDURE — 87070 CULTURE OTHR SPECIMN AEROBIC: CPT

## 2019-05-16 PROCEDURE — 6370000000 HC RX 637 (ALT 250 FOR IP): Performed by: INTERNAL MEDICINE

## 2019-05-16 PROCEDURE — 87206 SMEAR FLUORESCENT/ACID STAI: CPT

## 2019-05-16 PROCEDURE — 88305 TISSUE EXAM BY PATHOLOGIST: CPT

## 2019-05-16 PROCEDURE — 87116 MYCOBACTERIA CULTURE: CPT

## 2019-05-16 PROCEDURE — 3609010800 HC BRONCHOSCOPY ALVEOLAR LAVAGE: Performed by: INTERNAL MEDICINE

## 2019-05-16 PROCEDURE — 80048 BASIC METABOLIC PNL TOTAL CA: CPT

## 2019-05-16 PROCEDURE — 99233 SBSQ HOSP IP/OBS HIGH 50: CPT | Performed by: INTERNAL MEDICINE

## 2019-05-16 PROCEDURE — 2060000000 HC ICU INTERMEDIATE R&B

## 2019-05-16 PROCEDURE — 94640 AIRWAY INHALATION TREATMENT: CPT

## 2019-05-16 PROCEDURE — 87205 SMEAR GRAM STAIN: CPT

## 2019-05-16 PROCEDURE — 2500000003 HC RX 250 WO HCPCS: Performed by: INTERNAL MEDICINE

## 2019-05-16 PROCEDURE — 36592 COLLECT BLOOD FROM PICC: CPT

## 2019-05-16 PROCEDURE — 94761 N-INVAS EAR/PLS OXIMETRY MLT: CPT

## 2019-05-16 PROCEDURE — 3609010900 HC BRONCHOSCOPY THERAPUTIC ASPIRATION INITIAL: Performed by: INTERNAL MEDICINE

## 2019-05-16 PROCEDURE — C9113 INJ PANTOPRAZOLE SODIUM, VIA: HCPCS | Performed by: INTERNAL MEDICINE

## 2019-05-16 PROCEDURE — 88312 SPECIAL STAINS GROUP 1: CPT

## 2019-05-16 PROCEDURE — 2580000003 HC RX 258: Performed by: INTERNAL MEDICINE

## 2019-05-16 PROCEDURE — 2709999900 HC NON-CHARGEABLE SUPPLY: Performed by: INTERNAL MEDICINE

## 2019-05-16 PROCEDURE — 31624 DX BRONCHOSCOPE/LAVAGE: CPT | Performed by: INTERNAL MEDICINE

## 2019-05-16 PROCEDURE — 92526 ORAL FUNCTION THERAPY: CPT

## 2019-05-16 PROCEDURE — 6360000002 HC RX W HCPCS: Performed by: INTERNAL MEDICINE

## 2019-05-16 PROCEDURE — 87102 FUNGUS ISOLATION CULTURE: CPT

## 2019-05-16 PROCEDURE — 2700000000 HC OXYGEN THERAPY PER DAY

## 2019-05-16 PROCEDURE — 85610 PROTHROMBIN TIME: CPT

## 2019-05-16 PROCEDURE — 31646 BRNCHSC W/THER ASPIR SBSQ: CPT | Performed by: INTERNAL MEDICINE

## 2019-05-16 PROCEDURE — 85025 COMPLETE CBC W/AUTO DIFF WBC: CPT

## 2019-05-16 PROCEDURE — 88112 CYTOPATH CELL ENHANCE TECH: CPT

## 2019-05-16 PROCEDURE — 87015 SPECIMEN INFECT AGNT CONCNTJ: CPT

## 2019-05-16 PROCEDURE — 94669 MECHANICAL CHEST WALL OSCILL: CPT

## 2019-05-16 RX ORDER — MAGNESIUM HYDROXIDE 1200 MG/15ML
LIQUID ORAL CONTINUOUS PRN
Status: COMPLETED | OUTPATIENT
Start: 2019-05-16 | End: 2019-05-16

## 2019-05-16 RX ORDER — LIDOCAINE HYDROCHLORIDE 20 MG/ML
INJECTION, SOLUTION INFILTRATION; PERINEURAL PRN
Status: DISCONTINUED | OUTPATIENT
Start: 2019-05-16 | End: 2019-05-16 | Stop reason: ALTCHOICE

## 2019-05-16 RX ORDER — SODIUM CHLORIDE, SODIUM LACTATE, POTASSIUM CHLORIDE, CALCIUM CHLORIDE 600; 310; 30; 20 MG/100ML; MG/100ML; MG/100ML; MG/100ML
INJECTION, SOLUTION INTRAVENOUS
Status: DISPENSED
Start: 2019-05-16 | End: 2019-05-17

## 2019-05-16 RX ADMIN — PANTOPRAZOLE SODIUM 40 MG: 40 INJECTION, POWDER, FOR SOLUTION INTRAVENOUS at 21:30

## 2019-05-16 RX ADMIN — IPRATROPIUM BROMIDE AND ALBUTEROL SULFATE 1 AMPULE: .5; 3 SOLUTION RESPIRATORY (INHALATION) at 21:07

## 2019-05-16 RX ADMIN — PANTOPRAZOLE SODIUM 40 MG: 40 INJECTION, POWDER, FOR SOLUTION INTRAVENOUS at 10:43

## 2019-05-16 RX ADMIN — INSULIN LISPRO 2 UNITS: 100 INJECTION, SOLUTION INTRAVENOUS; SUBCUTANEOUS at 12:23

## 2019-05-16 RX ADMIN — IPRATROPIUM BROMIDE AND ALBUTEROL SULFATE 1 AMPULE: .5; 3 SOLUTION RESPIRATORY (INHALATION) at 07:38

## 2019-05-16 RX ADMIN — MORPHINE SULFATE 2 MG: 2 INJECTION, SOLUTION INTRAMUSCULAR; INTRAVENOUS at 12:33

## 2019-05-16 RX ADMIN — Medication 250 MG: at 10:43

## 2019-05-16 RX ADMIN — IPRATROPIUM BROMIDE AND ALBUTEROL SULFATE 1 AMPULE: .5; 3 SOLUTION RESPIRATORY (INHALATION) at 15:43

## 2019-05-16 RX ADMIN — INSULIN GLARGINE 18 UNITS: 100 INJECTION, SOLUTION SUBCUTANEOUS at 10:44

## 2019-05-16 RX ADMIN — ASPIRIN 325 MG: 325 TABLET, COATED ORAL at 10:44

## 2019-05-16 RX ADMIN — ATORVASTATIN CALCIUM 40 MG: 40 TABLET, FILM COATED ORAL at 21:29

## 2019-05-16 RX ADMIN — PSYLLIUM HUSK 1 PACKET: 3.4 POWDER ORAL at 10:44

## 2019-05-16 RX ADMIN — IPRATROPIUM BROMIDE AND ALBUTEROL SULFATE 1 AMPULE: .5; 3 SOLUTION RESPIRATORY (INHALATION) at 03:40

## 2019-05-16 RX ADMIN — Medication 10 ML: at 10:53

## 2019-05-16 RX ADMIN — Medication 10 ML: at 21:29

## 2019-05-16 RX ADMIN — MICONAZOLE NITRATE: 2 POWDER TOPICAL at 10:52

## 2019-05-16 RX ADMIN — MORPHINE SULFATE 2 MG: 2 INJECTION, SOLUTION INTRAMUSCULAR; INTRAVENOUS at 21:23

## 2019-05-16 RX ADMIN — MORPHINE SULFATE 2 MG: 2 INJECTION, SOLUTION INTRAMUSCULAR; INTRAVENOUS at 04:31

## 2019-05-16 RX ADMIN — AMOXICILLIN AND CLAVULANATE POTASSIUM 1 TABLET: 875; 125 TABLET, FILM COATED ORAL at 10:43

## 2019-05-16 RX ADMIN — Medication 250 MG: at 21:29

## 2019-05-16 RX ADMIN — MICONAZOLE NITRATE: 2 POWDER TOPICAL at 21:30

## 2019-05-16 RX ADMIN — AMOXICILLIN AND CLAVULANATE POTASSIUM 1 TABLET: 875; 125 TABLET, FILM COATED ORAL at 21:29

## 2019-05-16 RX ADMIN — IPRATROPIUM BROMIDE AND ALBUTEROL SULFATE 1 AMPULE: .5; 3 SOLUTION RESPIRATORY (INHALATION) at 11:44

## 2019-05-16 ASSESSMENT — PAIN SCALES - WONG BAKER
WONGBAKER_NUMERICALRESPONSE: 8
WONGBAKER_NUMERICALRESPONSE: 0
WONGBAKER_NUMERICALRESPONSE: 8

## 2019-05-16 ASSESSMENT — PAIN SCALES - GENERAL: PAINLEVEL_OUTOF10: 8

## 2019-05-16 NOTE — PROGRESS NOTES
Pulmonary & Critical Care Inpatient Progress Note   Ricardo Yarbrough MD     REASON FOR TODAY'S VISIT:  Assistance with critical care management    SUBJECTIVE:   Worsening hypoxia and breath sounds today, concern for aspiration     Scheduled Meds:   insulin lispro  0-12 Units Subcutaneous Q4H    ipratropium-albuterol  1 ampule Inhalation Q4H    insulin glargine  18 Units Subcutaneous Daily    amoxicillin-clavulanate  1 tablet Oral 2 times per day    psyllium  1 packet Per NG tube Daily    pneumococcal 13-valent conjugate  0.5 mL Intramuscular Once    saccharomyces boulardii  250 mg Oral BID    atorvastatin  40 mg Oral Nightly    aspirin  325 mg Oral Daily    miconazole   Topical BID    pantoprazole  40 mg Intravenous BID    sodium chloride flush  10 mL Intravenous 2 times per day       Continuous Infusions:   dextrose         PRN Meds:  prochlorperazine, morphine, glucose, dextrose, glucagon (rDNA), dextrose, magnesium sulfate, potassium chloride, acetaminophen, sodium chloride flush, magnesium hydroxide, acetaminophen    ALLERGIES:  Patient is allergic to aspirin. Objective:   PHYSICAL EXAM:  /74   Pulse 111   Temp 98.9 °F (37.2 °C) (Axillary)   Resp 20   Ht 5' 1\" (1.549 m)   Wt 117 lb 11.6 oz (53.4 kg)   SpO2 93%   BMI 22.24 kg/m²    Physical Exam   Constitutional: She appears well-developed and well-nourished. No distress. HENT:   Head: Normocephalic and atraumatic. Mouth/Throat: Oropharynx is clear and moist. No oropharyngeal exudate. Neck: Neck supple. No JVD present. Cardiovascular: Normal heart sounds. Exam reveals no gallop and no friction rub. No murmur heard. Pulmonary/Chest: Effort normal. She has no wheezes. She has no rales. Equal chest rise and expansion bilaterally   Abdominal: Soft. Bowel sounds are normal. She exhibits no distension. There is no tenderness. Musculoskeletal: She exhibits no edema. Lymphadenopathy:     She has no cervical adenopathy. Neurological:   confused   Skin: Skin is warm and dry. No rash noted. She is not diaphoretic. Data Reviewed:   LABS:  CBC:  Recent Labs     05/14/19  0554 05/15/19  0605 05/16/19  0444   WBC 8.4 8.1 8.0   HGB 7.2* 7.3* 8.3*   HCT 21.3* 21.9* 25.5*   MCV 86.4 86.4 86.6   * 773* 779*     BMP:  Recent Labs     05/14/19  0554 05/15/19  0605 05/16/19 0444    141 141   K 3.7 3.9 3.9    102 100   CO2 29 29 29   PHOS 3.6  --   --    BUN 17 15 14   CREATININE 0.6 <0.5* 0.6     LIVER PROFILE:   No results for input(s): AST, ALT, LIPASE, ALB, BILIDIR, BILITOT, ALKPHOS in the last 72 hours. Invalid input(s): AMYLASE  PT/INR:  Recent Labs     05/16/19 0444   PROTIME 14.1*   INR 1.24*     APTT: No results for input(s): APTT in the last 72 hours. UA:  No results for input(s): NITRITE, COLORU, PHUR, LABCAST, WBCUA, RBCUA, MUCUS, TRICHOMONAS, YEAST, BACTERIA, CLARITYU, SPECGRAV, LEUKOCYTESUR, UROBILINOGEN, BILIRUBINUR, BLOODU, GLUCOSEU, AMORPHOUS in the last 72 hours. Invalid input(s): KETONESU  No results for input(s): PHART, [id number], PO2ART in the last 72 hours. CT chest personally reviewed, dense left sided infiltrate and small to moderate free flowing pleural effusions bilaterally        Assessment:     1. Acute resp failure, hypoxic              -left sided necrotizing pneumonia, MSSA  2. DM poorly controlled  3. Acute encephalopathy              -acute ischemic stroke  4. Dysphagia  5. Critical illness myopathy  6. Acute gastritis/GI bleed, acute anemia    Plan:      -Will bronch today to assist with pulm toileting  -Continue atb  -Hold tube feeds, may need NJ tube placed.  Discussed with GI; they are considering a J tube placement but plan to wait until she is more medically stable for this  -May need TPN if there will be a delay in nutritional support    Jesusita Sidhu MD

## 2019-05-16 NOTE — PROGRESS NOTES
Paged GI for Dr. Ger Soto regarding discussion of possible PEG placement this week. GI has not returned call at this time.

## 2019-05-16 NOTE — PROGRESS NOTES
Speech Language Pathology  Facility/Department: [facility] C4 PCU  Dysphagia Daily Treatment Note    NAME: Dorinda Bejarano  : 1950  MRN: [id number]    Patient Diagnosis(es):   Patient Active Problem List    Diagnosis Date Noted    Abdominal pain 2015     Priority: High     Class: Acute    Acute renal failure (ARF) ([facility] [address] 2015     Priority: High     Class: Acute    Nausea & vomiting 2015     Priority: High     Class: Acute    Mucus plugging of bronchi     Acute pulmonary edema ([facility] [address] 2019    Pulmonary infiltrate 2019    Atelectasis of left lung 2019    [facility]    Altered mental status     Non-traumatic rhabdomyolysis     Acute encephalopathy     Arterial ischemic stroke, ICA, left, acute ([facility]     Pneumonia due to organism     Acute respiratory failure (HCC)     Prolonged Q-T interval on ECG     Hypokalemia     Elevated troponin     DKA (diabetic ketoacidoses) ([facility] 2019    Acidosis 2019    Cardiac arrest ([facility] 2019    DKA, type 2, not at goal [facility]) 2019    Hyperglycemia 2016    Asthma     Hypertension     Diabetes mellitus ([facility]      Allergies: Allergies   Allergen Reactions    Aspirin Nausea And Vomiting     Onset Date:  19    Subjective:  Patient was in bed and her daughter were present in the room. All visible lines intact and all precautions maintained. Pain:  No report of pain    Current Diet:  Ng tube and npo    Diet Tolerance: npo    P.O. Trials: npo  Thin       Nectar       Honey       Puree       Solid         Dysphagia Therapy: There was no PO given. Assisted RT with repositioning as vest was applied to facilitate secretions clearance. The patient is pending PEG. Her daughters are concerned that the patient has increased tremor. Suggest Neurology follow up. The patient had heart rate 111, 02 sat 95% and /58.  There was no functional verbal communication from the patient. The patient had dried blood on lips and tongue. She maintained an open mouth posture at rest and had no visible frequency of spontaneous saliva swallows noted. She had no saliva swallow on cue with additional tactile and manual stimulation given to the areas of the mandible, hyoid bone, and thyroid cartilage. Expiratory wheeze noted as per cervical auscultation of neck in area of larynx. She had ng tube in place. Patient is noted to have spontaneous cough which was wet, weak, and congested. She did not expectorate secretions. SLP explained scope of practice. Patient did not follow oral and pharyngeal based ROM commands. Recommendations:  Oral feeding not suggested    Patient/Family/Caregiver Education:  Explained scope of practice    Compensatory Strategies:  Staff should maintain good oral hygiene for this patient and monitor her temperature, lung, diet, and weight status. Plan:    Continued Dysphagia treatment with goals per plan of care. Discharge Recommendations: If pt discharges from hospital prior to Speech/Swallowing discharge, this note serves as tx and discharge summary. Total Treatment Time:   swallow tx  [id number]    [facility] CCC-SLP. D BCS-S  5-16-19

## 2019-05-16 NOTE — PROGRESS NOTES
Still no call back from GI at this time. Paged again. Awaiting response regarding possible PEG placement.

## 2019-05-16 NOTE — PROGRESS NOTES
Bedside bronchoscopy completed. Pt tolerated well. Oxygen titrated down to 11 liters high flow. SpO2 currently 96%. VSS. Will continue to monitor pt status. New order received from Dr. Chalo Wolff to hold tube feeding until [facility] tube can be placed by radiology. Will hold per order.

## 2019-05-16 NOTE — PROGRESS NOTES
PROGRESS NOTE  S:69 yrs Patient  admitted on 4/29/2019 with DKA, type 2, not at goal [facility]) [E11.10]  DKA, type 2, not at goal [facility]) [E11.10] . Today she is on high flow oxygen. She had worsening respiratory failure last night and placed on high flow nasal canula. Exam:   Vitals:    05/16/19 0845   BP: 129/74   Pulse: 111   Resp: 20   Temp: 98.9 °F (37.2 °C)   SpO2: 93%      General appearance: alert, appears stated age and cooperative  HEENT: Sclera clear, anicteric  Neck: no adenopathy and no carotid bruit  Lungs: rales bilaterally  Heart: regular rate and rhythm, S1, S2 normal, no murmur, click, rub or gallop  Abdomen: soft, non-tender; bowel sounds normal; no masses,  no organomegaly  Extremities: extremities normal, atraumatic, no cyanosis or edema     Medications: Reviewed    Labs:  CBC:   Recent Labs     05/14/19  0554 05/15/19  0605 05/16/19  0444   WBC 8.4 8.1 8.0   HGB 7.2* 7.3* 8.3*   HCT 21.3* 21.9* 25.5*   MCV 86.4 86.4 86.6   * 773* 779*     BMP:   Recent Labs     05/14/19  0554 05/15/19  0605 05/16/19  0444    141 141   K 3.7 3.9 3.9    102 100   CO2 29 29 29   PHOS 3.6  --   --    BUN 17 15 14   CREATININE 0.6 <0.5* 0.6     PT/INR:   Recent Labs     05/16/19  0444   INR 1.24*     Impression: 81year old female with history of HTN, DM, CVA, asthma, Seizure do, admitted with cardiac arrest and DKA. She was also noted to have coffee ground aspirate per NG but Hgb is stable. She is now in respiratory failure, presumably from aspiration in need for PEG tube placement    Recommendation:  1. Continue supportive care  2. Bronch today? 3. EGD with PEG placement once stable  4.  Will follow      Ara Pierson MD  10:22 AM 5/16/2019

## 2019-05-16 NOTE — CARE COORDINATION
5/16/19 Denial upheld despite P2P. Will give family SNF list to review and follow up in am for choice.

## 2019-05-16 NOTE — PROGRESS NOTES
Occupational Therapy  Pt going for a bronch this afternoon. Family requested pt rest this AM due to decreased alertness and ability to follow commands. Will continue to follow as pt is able and appropriate.      Hilary BARNHART  [address], OTR/L [id number]

## 2019-05-16 NOTE — PROGRESS NOTES
Call received from IR. Dr. Rya Vila has concerns about placing the NJ tube that Dr. Gemma Brown ordered, due to pt being post broch. IR is concerned that the pt is too unstable at this time. IR would like to place NJ tube first this tomorrow morning. Call placed to inform Dr. Gemma Brown, no answer at this time. Will try again.

## 2019-05-16 NOTE — PROGRESS NOTES
Pt has been on continuous pulse ox. CMU called to notify RN that O2 sat was in the low 80s and sustaining. Family at bedside. Educated pt on deep breathing and coughing to improve oxygen saturation. Called respiratory to bedside d/t increasing pt from 2L NC to 6L NC with O2 sat improving to only the high 80s. Attempted to NT suction pt with RT at bedside and needed extra staff assist. Notified charge RN. NT suction unsuccessful, although mucous was suctioned through suction canister. Pt nodded and agreed that she felt better after suctioning. Pt O2 sat returned to 96% on 7L HFNC. Will continue to monitor.

## 2019-05-16 NOTE — FLOWSHEET NOTE
05/16/19 0845   Assessment   Charting Type Shift assessment   Neurological   Neuro (WDL) X   Level of Consciousness 0   Orientation Level Oriented to person;Disoriented to place; Disoriented to time;Disoriented to situation   Cognition Follows commands; Impulsive   Language Nods/gestures appropriately   R Hand  Moderate   L Hand  Moderate   R Foot Plantar Flexion Moderate   L Foot Plantar Flexion Moderate   RUE Motor Response Responds to command   Sensation RUE Full sensation   LUE Motor Response Responds to command   Sensation LUE Full sensation   RLE Motor Response Responds to command   Sensation RLE Full sensation   LLE Motor Response Responds to command   Sensation LLE Full sensation   Gag Present   HEENT   HEENT (WDL) X   Right Eye Intact   Left Eye Intact   Nose Other (Comment)  (NG)   Throat Intact   Tongue Dry   Voice Mute   Mucous Membrane Dry   Teeth Edentulous   Respiratory   Respiratory (WDL) X   Respiratory Pattern Tachypneic;Regular   Respiratory Depth Shallow   Respiratory Quality/Effort Unlabored   Chest Assessment Chest expansion symmetrical;Trachea midline   L Breath Sounds Diminished   R Breath Sounds Clear;Diminished   Breath Sounds   Right Upper Lobe Clear   Right Middle Lobe Clear   Right Lower Lobe Diminished   Left Upper Lobe Clear   Left Lower Lobe Diminished   Cardiac   Cardiac (WDL) X   Cardiac Regularity Regular   Heart Sounds S1, S2   Cardiac Rhythm ST   Rhythm Interpretation   Pulse 111   Cardiac Monitor   Bedside Cardiac Monitor On Yes   Bedside Cardiac Audible Yes   Bedside Cardiac Alarms Set Yes   Gastrointestinal   Abdominal (WDL) WDL   Abdomen Inspection Soft   RUQ Bowel Sounds Active   LUQ Bowel Sounds Active   RLQ Bowel Sounds Active   LLQ Bowel Sounds Active   GI Symptoms Cramping  (pt nods yes when asked )   Peripheral Vascular   Peripheral Vascular (WDL) X   Edema Right lower extremity; Left lower extremity   RUE Edema Trace   LUE Edema Trace   RLE Edema Trace   LLE Edema Trace   Skin Color/Condition   Skin Color/Condition (WDL) X   Skin Integrity   Skin Integrity (WDL) X   Musculoskeletal   Musculoskeletal (WDL) X   RL Extremity Weakness   LL Extremity Weakness   Genitourinary   Genitourinary (WDL) X  (rees)   Flank Tenderness No   Suprapubic Tenderness No   Wound 05/13/19 Buttocks Left Reddness surrounding a tan blister   Date First Assessed/Time First Assessed: 05/13/19 0715   Present on Hospital Admission: Yes  Primary Wound Type: (c) Other (comment)  Location: Buttocks  Wound Location Orientation: Left  Wound Description (Comments): Reddness surrounding a tan blister   Dressing/Treatment Moisture barrier; Pharmaceutical agent (see MAR); Open to air   Wound Assessment Pink;Red   Drainage Amount None   Odor None   Connie-wound Assessment Excoriated;Red   Urethral Catheter Non-latex; Temperature probe 16 fr   Placement Date/Time: 04/29/19 1300   Urethral Catheter Timeout: Patient  Inserted by: [employer]  Catheter Type: Non-latex; Temperature probe  Tube Size (fr): 16 fr  Catheter Balloon Size: 10 mL  Collection Container: Standard  Securement Method: Secur. .. Catheter Indications Need for fluid management in critically ill patients in a critical care setting not able to be managed by other means such as BSC with hat, bedpan, urinal, condom catheter, or short term intermittent urethral catherization   Site Assessment No urethral drainage   Urine Color Yellow   Urine Appearance Clear   Psychosocial   Psychosocial (WDL) X   Patient Behaviors Cooperative; Anxious

## 2019-05-16 NOTE — PROGRESS NOTES
22   Ht 5' 1\" (1.549 m)   Wt 117 lb 11.6 oz (53.4 kg)   SpO2 94%   BMI 22.24 kg/m²     General appearance: She is lying in bed, looks very weak and debilitated. HEENT: Pupils equal, round, and reactive to light. Conjunctivae/corneas clear. Neck: Supple, with full range of motion. Trachea midline. Respiratory:  Bilateral breath sounds, decreased at bases, no rales or rhonchi noted this time. Cardiovascular: Regular rate and rhythm with normal S1/S2   Abdomen: Soft, non-tender, non-distended with normal bowel sounds. Musculoskeletal: No clubbing, cyanosis or edema bilaterally. Neurologic:  Neurovascularly intact without any focal sensory/motor deficits. Cranial nerves: II-XII intact, grossly non-focal.  Psychiatric: She is awake, she did not speak while I was in the room. She was able to follow simple one step commands  Capillary Refill: Brisk,< 3 seconds   Peripheral Pulses: +2 palpable, equal bilaterally         Labs:   Recent Labs     05/14/19  0554 05/15/19  0605 05/16/19 0444   WBC 8.4 8.1 8.0   HGB 7.2* 7.3* 8.3*   HCT 21.3* 21.9* 25.5*   * 773* 779*     Recent Labs     05/14/19  0554 05/15/19  0605 05/16/19  0444    141 141   K 3.7 3.9 3.9    102 100   CO2 29 29 29   BUN 17 15 14   CREATININE 0.6 <0.5* 0.6   CALCIUM 8.2* 8.5 8.5   PHOS 3.6  --   --      No results for input(s): AST, ALT, BILIDIR, BILITOT, ALKPHOS in the last 72 hours. Recent Labs     05/16/19 0444   INR 1.24*     No results for input(s): González Aver in the last 72 hours. Urinalysis:      Lab Results   Component Value Date    NITRU Negative 04/29/2019    WBCUA 10-20 04/29/2019    BACTERIA 1+ 04/29/2019    RBCUA 3-5 04/29/2019    BLOODU MODERATE 04/29/2019    SPECGRAV 1.025 04/29/2019    GLUCOSEU >=1000 04/29/2019       Radiology:  XR CHEST PORTABLE   Final Result   New PICC line on the right terminates at the atrial caval junction. Increased airspace disease and effusion on the left.          XR ABDOMEN (KUB) (SINGLE AP VIEW)   Final Result   No evidence of bowel obstruction         IR PICC WO SQ PORT/PUMP > 5 YEARS   Final Result   Successful placement of PICC line. CT CHEST WO CONTRAST   Final Result   Bilateral pleural effusions and scattered airspace disease which could   represent pulmonary edema, possibly with superimposed pneumonia. Multiple nondisplaced left-sided anterior rib fractures and questionable   nondisplaced fracture of the anterior cortex of the sternum. Correlate for   point tenderness. XR CHEST PORTABLE   Final Result   Interval improvement in pulmonary edema and bilateral effusions. XR CHEST PORTABLE   Final Result   Worsening pulmonary edema. XR CHEST PORTABLE   Final Result   Increasing opacification of the left hemithorax, consistent with a   combination of airspace disease and effusion. Findings of interstitial edema in the right lung are noted with trace   effusion. Unchanged appearance of the enteric tube and right internal jugular line. XR CHEST PORTABLE   Final Result   New right basilar and stable multifocal left lung airspace disease. XR CHEST PORTABLE   Final Result   No significant change in bilateral airspace disease given change in technique. VL DUP CAROTID BILATERAL   Final Result      XR CHEST PORTABLE   Final Result   Worsening pulmonary edema with grossly stable left basilar atelectasis and/or   pneumonia. XR ABDOMEN (KUB) (SINGLE AP VIEW)   Final Result   Nasogastric tube in good position. Persistent left lower lobe airspace disease         US GALLBLADDER RUQ   Final Result   Smaller free fluid seen in the right upper quadrant in the region of the   gallbladder. The gallbladder appears unremarkable. No gallstones. XR CHEST PORTABLE   Final Result   1. No significant change. MRI BRAIN WO CONTRAST   Final Result   1.  Acute/early subacute infarction involving the left hippocampus. Additional punctate infarctions within the frontal lobes and posterior   temporal lobes bilaterally. This raises the possibility for thromboembolic   phenomenon from a central source. No associated hemorrhage. 2. Diffuse parenchymal volume loss and sequela of chronic microvascular   ischemic changes. The findings were sent to the Radiology Results [id number] at 8:34   am on 5/5/2019 to be communicated to a licensed caregiver. XR CHEST PORTABLE   Final Result   Bilateral lower lobe airspace disease, left greater than right, increased   compared to prior         XR CHEST PORTABLE   Final Result   Stable appearance of the chest with dense opacification in the left mid and   lower lung zone. XR CHEST PORTABLE   Final Result   1. No significant interval change in the left-sided opacity reflecting   pneumonia better characterized on the CT of the thorax from 04/29/2019.   2. Support devices as described above. XR CHEST PORTABLE   Final Result   Catheter in good position. No postprocedure pneumothorax. XR CHEST PORTABLE   Final Result   Supportive devices are stable. Persisting consolidations in the left lung base, consistent with pneumonia. CT CHEST ABDOMEN PELVIS WO CONTRAST   Final Result   Dense consolidation within the inferior aspect of left upper lobe and   throughout the left lower lobe, most compatible with pneumonia and/or   aspiration. That should be followed to resolution, especially given the   nodular morphology within portions of the consolidation. Diffuse mural thickening of the esophagus. Correlate with clinical evidence   of esophagitis. The tip of the right femoral venous catheter is malpositioned within a sacral   vein. That should be repositioned for more optimal placement. CT CERVICAL SPINE WO CONTRAST   Final Result   Multilevel degenerative changes with no acute abnormality of the cervical   spine. CT HEAD WO CONTRAST   Final Result   Motion degraded study with no acute intracranial abnormality. XR CHEST PORTABLE   Final Result   Supportive tubing projects in normal position. Left basilar airspace disease, pneumonia versus aspiration pneumonitis. There may be a component of pleural effusion. XR CHEST PORTABLE   Final Result   Atelectasis at the left lung base. Question of small left pleural effusion. Assessment/Plan:    Active Hospital Problems    Diagnosis Date Noted    [facility] [N17.9] 08/20/2015     Priority: High     Class: Acute    Mucus plugging of bronchi [J98.09]     Acute pulmonary edema ([facility] [J81.0] 05/07/2019    Pulmonary infiltrate [R91.8] 05/07/2019    Atelectasis of left lung [J98.11] 05/07/2019    Staphylococcus aureus pneumonia ([facility] [J15.211] 05/06/2019    Altered mental status [R41.82]     Non-traumatic rhabdomyolysis [M62.82]     Acute encephalopathy [G93.40]     Arterial ischemic stroke, ICA, left, acute (HCC) [I63.232]     Pneumonia due to organism [J18.9]     Acute respiratory failure (HCC) [J96.00]     Prolonged Q-T interval on ECG [R94.31]     Hypokalemia [E87.6]     Elevated troponin [R74.8]     DKA (diabetic ketoacidoses) ([facility] [E13.10] 04/29/2019    Acidosis [E87.2] 04/29/2019    Cardiac arrest ([facility] [I46.9] 04/29/2019    DKA, type 2, not at goal [facility]) [E11.10] 04/29/2019       DKA  - s/p insulin gtt, IVFs, and serial labs per protocol. Converted to SC insulin. Had not been on any meds for months, A1c is 16. Necrotizing MSSA pneumonia, with septic shock and acute hypoxic respiratory failure present on admission  - broad spectrum abx for two weeks, then changed to augmentin 5/13, and pulmonary recommends continuing this through 5/27.    - s/p many bronch's, extubated. CVA's, probably embolic  - TTE, carotid dopplers, and prolonged telemetry without apparent source of emboli.

## 2019-05-16 NOTE — PROGRESS NOTES
RESPIRATORY THERAPY ASSESSMENT    Name:  Kenji Sosa Record Number:  [id number]  Age: 81 y.o. Gender: female  : 1950  Today's Date:  2019  Room:  [facility]/1452-72    Assessment     Is the patient being admitted for a COPD or Asthma exacerbation? No   (If yes the patient will be seen every 4 hours for the first 24 hours and then reassessed)    Patient Admission Diagnosis      Allergies  Allergies   Allergen Reactions    Aspirin Nausea And Vomiting       Minimum Predicted Vital Capacity:     760          Actual Vital Capacity:      unable              Pulmonary History:Asthma  Home Oxygen Therapy: room air  Home Respiratory Therapy: none  Current Respiratory Therapy: duoneb q4h, chest vest q4hw/a  Treatment Type: HHN  Medications: Albuterol/Ipratropium    Respiratory Severity Index(RSI)   Patients with orders for inhalation medications, oxygen, or any therapeutic treatment modality will be placed on Respiratory Protocol. They will be assessed with the first treatment and at least every 72 hours thereafter. The following severity scale will be used to determine frequency of treatment intervention.     Smoking History: No Smoking History = 0    Social History  Social History     Tobacco Use    Smoking status: Never Smoker    Smokeless tobacco: Never Used   Substance Use Topics    Alcohol use: No     Comment: occasional    Drug use: No       Recent Surgical History: None = 0  Past Surgical History  Past Surgical History:   Procedure Laterality Date    BLADDER REPAIR      BREAST LUMPECTOMY Left     BREAST SURGERY      BRONCHOSCOPY N/A 2019    BRONCHOSCOPY THERAPUTIC ASPIRATION INITIAL performed by Angelita Ruano MD at [address] [place] [address]  2019    BRONCHOSCOPY ALVEOLAR LAVAGE performed by Angelita Ruano MD at [address] [place] [address] N/A 2019    BRONCHOSCOPY THERAPUTIC ASPIRATION INITIAL performed by Angelita Ruano MD at [facility] BRONCHOSCOPY N/A 5/7/2019    BRONCHOSCOPY ALVEOLAR LAVAGE performed by Kim Bell MD at [address]  5/7/2019    BRONCHOSCOPY THERAPUTIC ASPIRATION INITIAL performed by Kim Bell MD at [address] N/A 5/9/2019    BRONCHOSCOPY THERAPUTIC ASPIRATION INITIAL performed by Kim Bell MD at [address] N/A 5/10/2019    BRONCHOSCOPY THERAPUTIC ASPIRATION INITIAL performed by Yesika Rivera MD at [address] [place] [address]       Level of Consciousness: Lethargic = 3    Level of Activity: Non weight bearing- transfers bed to chair only = 3    Respiratory Pattern: Dyspnea with exertion;Irregular pattern;or RR less than 6 = 2    Breath Sounds: Diminshed bilaterally and/or crackles = 2    Sputum  Sputum Color: Tan, Red, Cloudy, Tenacity: Thick, Thin, Sputum How Obtained: Spontaneous cough  Cough: Weak, productive = 2    Vital Signs   /74   Pulse 111   Temp 98.9 °F (37.2 °C) (Axillary)   Resp 20   Ht 5' 1\" (1.549 m)   Wt 117 lb 11.6 oz (53.4 kg)   SpO2 93%   BMI 22.24 kg/m²   SPO2 (COPD values may differ): Less than 86% on room air or greater than 92% on FiO2 greater than 50% = 4    Peak Flow (asthma only): not applicable = 0    RSI: 82-62 = Q4 (every four hours)        Plan       Goals: mobilize retained secretions, volume expansion and improve oxygenation    Patient/caregiver was educated on the proper method of use for Respiratory Care Devices:  Yes      Level of patient/caregiver understanding able to:   ? Verbalize understanding   ? Demonstrate understanding       ? Teach back        ? Needs reinforcement       ? No available caregiver               ? Other:     Response to education:  Poor     Is patient being placed on Home Treatment Regimen? No     Does the patient have everything they need prior to discharge?   NA     Comments: chart review and pt assessed    Plan of Care: keep pt on hospital regimen    Electronically signed by Jose De Jesus Zamorano RCP on 5/16/2019 at 10:50 AM    Respiratory Protocol Guidelines     1. Assessment and treatment by Respiratory Therapy will be initiated for medication and therapeutic interventions upon initiation of aerosolized medication. 2. Physician will be contacted for respiratory rate (RR) greater than 35 breaths per minute. Therapy will be held for heart rate (HR) greater than 140 beats per minute, pending direction from physician. 3. Bronchodilators will be administered via Metered Dose Inhaler (MDI) with spacer when the following criteria are met:  a. Alert and cooperative     b. HR < 140 bpm  c. RR < 30 bpm                d. Can demonstrate a 2-3 second inspiratory hold  4. Bronchodilators will be administered via Hand Held Nebulizer [facility]) to patients when ANY of the following criteria are met  a. Incognizant or uncooperative          b. Patients treated with HHN at Home        c. Unable to demonstrate proper use of MDI with spacer     d. RR > 30 bpm   5. Bronchodilators will be delivered via Metered Dose Inhaler (MDI), HHN, Aerogen to intubated patients on mechanical ventilation. 6. Inhalation medication orders will be delivered and/or substituted as outlined below. Aerosolized Medications Ordering and Administration Guidelines:    1. All Medications will be ordered by a physician, and their frequency and/or modality will be adjusted as defined by the patients Respiratory Severity Index (RSI) score. 2. If the patient does not have documented COPD, consider discontinuing anticholinergics when RSI is less than 9.  3. If the bronchospasm worsens (increased RSI), then the bronchodilator frequency can be increased to a maximum of every 4 hours. If greater than every 4 hours is required, the physician will be contacted.   4. If the bronchospasm improves, the frequency of the bronchodilator can be decreased, based on the patient's RSI, but not less than home treatment regimen frequency. 5. Bronchodilator(s) will be discontinued if patient has a RSI less than 9 and has received no scheduled or as needed treatment for 72  Hrs. Patients Ordered on a Mucolytic Agent:    1. Must always be administered with a bronchodilator. 2. Discontinue if patient experiences worsened bronchospasm, or secretions have lessened to the point that the patient is able to clear them with a cough. Anti-inflammatory and Combination Medications:    1. If the patient lacks prior history of lung disease, is not using inhaled anti-inflammatory medication at home, and lacks wheezing by examination or by history for at least 24 hours, contact physician for possible discontinuation.

## 2019-05-16 NOTE — PROGRESS NOTES
Physical Therapy  Pt going for a bronch this afternoon. Family requested pt rest this AM due to decreased alertness and ability to follow commands. Will continue to follow as pt is able and appropriate.      Carina Gilbert, PT, DPT

## 2019-05-16 NOTE — PLAN OF CARE
Problem: Falls - Risk of:  Goal: Will remain free from falls  Description  Bed in lowest locked positions, call light within reach, side rails up x 2, bed check in place. Instructed patient to call before getting out of bed. Patient verbalized understanding. 5/16/2019 1208 by Bri Mitchell RN  Outcome: Ongoing  5/16/2019 0308 by Claudene Pebbles, RN  Outcome: Ongoing  Note:   Pt is free of falls at this time. Bed is in the lowest position, wheels locked, side rails up x 2, call light, and personal belongings within reach. Will continue to monitor. Goal: Absence of physical injury  Description  Absence of physical injury  5/16/2019 1208 by Bri Mitchell RN  Outcome: Ongoing  5/16/2019 0308 by Claudene Pebbles, RN  Outcome: Ongoing     Problem: Risk for Impaired Skin Integrity  Goal: Tissue integrity - skin and mucous membranes  Description  Structural intactness and normal physiological function of skin and  mucous membranes. 5/16/2019 1208 by Bri Mitchell RN  Outcome: Ongoing  5/16/2019 0308 by Claudene Pebbles, RN  Outcome: Ongoing  Note:   Skin assessment and q 2 turns completed and documented. No new issues at this time. Will continue to monitor. Problem: Nutrition  Goal: Optimal nutrition therapy  5/16/2019 1208 by Bri Mitchell RN  Outcome: Ongoing  5/16/2019 0308 by Claudene Pebbles, RN  Outcome: Ongoing     Problem: Serum Glucose Level - Abnormal:  Goal: Ability to maintain appropriate glucose levels will improve  Description  Ability to maintain appropriate glucose levels will improve  5/16/2019 1208 by Bri Mitchell RN  Outcome: Ongoing  5/16/2019 0308 by Claudene Pebbles, RN  Outcome: Ongoing  Note:   Diabetes education provided today:    Different diabetic medications. Managing high and low sugar readings. Rotation of sites for subcutaneous medication injection.   Accuchecks related to tube feed     Goal: Ability to maintain appropriate glucose levels will improve to within specified parameters  Description  Ability to maintain appropriate glucose levels will improve to within specified parameters  5/16/2019 1208 by Sandra Yao RN  Outcome: Ongoing  5/16/2019 0308 by Dani Goodman RN  Outcome: Ongoing     Problem: Sensory Perception - Impaired:  Goal: Ability to maintain a stable neurologic state will improve  Description  Ability to maintain a stable neurologic state will improve  5/16/2019 1208 by Sandra Yao RN  Outcome: Ongoing  5/16/2019 0308 by Dani Goodman RN  Outcome: Ongoing  Goal: Demonstrations of improved sensory functioning will increase  Description  Demonstrations of improved sensory functioning will increase  5/16/2019 1208 by Sandra Yao RN  Outcome: Ongoing  5/16/2019 0308 by Dani Goodman RN  Outcome: Ongoing  Goal: Decrease in sensory misperception frequency  Description  Decrease in sensory misperception frequency  5/16/2019 1208 by Sandra Yao RN  Outcome: Ongoing  5/16/2019 0308 by Dani Goodman RN  Outcome: Ongoing  Goal: Able to refrain from responding to false sensory perceptions  Description  Able to refrain from responding to false sensory perceptions  5/16/2019 1208 by Sandra Yao RN  Outcome: Ongoing  5/16/2019 0308 by Dani Goodman RN  Outcome: Ongoing  Goal: Demonstrates accurate environmental perceptions  Description  Demonstrates accurate environmental perceptions  5/16/2019 1208 by Sandra Yao RN  Outcome: Ongoing  5/16/2019 0308 by Dani Goodman RN  Outcome: Ongoing  Goal: Able to distinguish between reality-based and nonreality-based thinking  Description  Able to distinguish between reality-based and nonreality-based thinking  5/16/2019 1208 by Sandra Yao RN  Outcome: Ongoing  5/16/2019 0308 by Dani Goodman RN  Outcome: Ongoing  Goal: Able to interrupt nonreality-based thinking  Description  Able to interrupt nonreality-based thinking  5/16/2019 1208 by Sandra Yao RN  Outcome: Ongoing  5/16/2019 0308 by Dani Goodman RN  Outcome: Ongoing     Problem: Discharge Planning:  Goal: Participates in care planning  Description  Participates in care planning  5/16/2019 1208 by Klever Cespedes RN  Outcome: Ongoing  5/16/2019 0308 by Lexx Boone RN  Outcome: Ongoing  Goal: Discharged to appropriate level of care  Description  Discharged to appropriate level of care  5/16/2019 1208 by Klever Cespedes RN  Outcome: Ongoing  5/16/2019 0308 by Lexx Boone RN  Outcome: Ongoing  Goal: Ability to perform activities of daily living will improve  Description  Ability to perform activities of daily living will improve  5/16/2019 1208 by Klever Cespedes RN  Outcome: Ongoing  5/16/2019 0308 by Lexx Boone RN  Outcome: Ongoing     Problem: Airway Clearance - Ineffective:  Goal: Ability to maintain a clear airway will improve  Description  Ability to maintain a clear airway will improve  5/16/2019 1208 by Klever Cespedes RN  Outcome: Ongoing  5/16/2019 0308 by Lexx Boone RN  Outcome: Ongoing     Problem: Anxiety/Stress:  Goal: Level of anxiety will decrease  Description  Level of anxiety will decrease  5/16/2019 1208 by Klever Cespedes RN  Outcome: Ongoing  5/16/2019 0308 by Lexx Boone RN  Outcome: Ongoing     Problem: Aspiration:  Goal: Absence of aspiration  Description  Absence of aspiration  5/16/2019 1208 by Klever Cespedes RN  Outcome: Ongoing  5/16/2019 0308 by Lexx Boone RN  Outcome: Ongoing     Problem:  Bowel Function - Altered:  Goal: Bowel elimination is within specified parameters  Description  Bowel elimination is within specified parameters  5/16/2019 1208 by Klever Cespedes RN  Outcome: Ongoing  5/16/2019 0308 by Lexx Boone RN  Outcome: Ongoing     Problem: Cardiac Output - Decreased:  Goal: Hemodynamic stability will improve  Description  Hemodynamic stability will improve  5/16/2019 1208 by Klever Cespedes RN  Outcome: Ongoing  5/16/2019 0308 by Lexx Boone RN  Outcome: Ongoing     Problem: Fluid Volume - Imbalance:  Goal: Absence of imbalanced fluid volume signs and symptoms  Description  Absence of imbalanced fluid volume signs and symptoms  5/16/2019 1208 by Cecilio Alejandre RN  Outcome: Ongoing  5/16/2019 0308 by Rosey Galaviz RN  Outcome: Ongoing     Problem: Gas Exchange - Impaired:  Goal: Levels of oxygenation will improve  Description  Levels of oxygenation will improve  5/16/2019 1208 by Cecilio Alejandre RN  Outcome: Ongoing  5/16/2019 0308 by Rosey Galaviz RN  Outcome: Ongoing     Problem: Mental Status - Impaired:  Goal: Mental status will be restored to baseline  Description  Mental status will be restored to baseline  5/16/2019 1208 by Cecilio Alejandre RN  Outcome: Ongoing  5/16/2019 0308 by Rosey Galaviz RN  Outcome: Ongoing     Problem: Nutrition Deficit:  Goal: Ability to achieve adequate nutritional intake will improve  Description  Ability to achieve adequate nutritional intake will improve  5/16/2019 1208 by Cecilio Alejandre RN  Outcome: Ongoing  5/16/2019 0308 by Rosey Galaviz RN  Outcome: Ongoing     Problem: Pain:  Goal: Pain level will decrease  Description  Pain level will decrease  5/16/2019 1208 by Cecilio Alejandre RN  Outcome: Ongoing  5/16/2019 0308 by Rosey Galaviz RN  Outcome: Ongoing  Note:   Pt complaint of pain. See MAR. Call light in reach, will continue to monitor.   Goal: Recognizes and communicates pain  Description  Recognizes and communicates pain  5/16/2019 1208 by Cecilio Alejandre RN  Outcome: Ongoing  5/16/2019 0308 by Rosey Galaviz RN  Outcome: Ongoing  Goal: Control of acute pain  Description  Control of acute pain  5/16/2019 1208 by Cecilio Alejandre RN  Outcome: Ongoing  5/16/2019 0308 by Rosey Galaviz RN  Outcome: Ongoing  Goal: Control of chronic pain  Description  Control of chronic pain  5/16/2019 1208 by Cecilio Alejandre RN  Outcome: Ongoing  5/16/2019 0308 by Rosey Galaviz RN  Outcome: Ongoing     Problem: Skin Integrity - Impaired:  Goal: Will show no infection signs and symptoms  Description  Will show no infection signs and symptoms  5/16/2019 1208 by Sandra Yao RN  Outcome: Ongoing  5/16/2019 0308 by Dani Goodman RN  Outcome: Ongoing  Goal: Absence of new skin breakdown  Description  Absence of new skin breakdown  5/16/2019 1208 by Sandra Yao RN  Outcome: Ongoing  5/16/2019 0308 by Dani Goodman RN  Outcome: Ongoing     Problem: Sleep Pattern Disturbance:  Goal: Appears well-rested  Description  Appears well-rested  5/16/2019 1208 by Sandra Yao RN  Outcome: Ongoing  5/16/2019 0308 by Dani Goodman RN  Outcome: Ongoing     Problem: Tissue Perfusion, Altered:  Goal: Circulatory function within specified parameters  Description  Circulatory function within specified parameters  5/16/2019 1208 by Sandra Yao RN  Outcome: Ongoing  5/16/2019 0308 by Dani Goodman RN  Outcome: Ongoing     Problem: Tissue Perfusion - Cardiopulmonary, Altered:  Goal: Absence of angina  Description  Absence of angina  5/16/2019 1208 by Sandra Yao RN  Outcome: Ongoing  5/16/2019 0308 by Dani Goodman RN  Outcome: Ongoing  Goal: Hemodynamic stability will improve  Description  Hemodynamic stability will improve  5/16/2019 1208 by Sandra Yao RN  Outcome: Ongoing  5/16/2019 0308 by Dani Goodman RN  Outcome: Ongoing     Problem: HEMODYNAMIC STATUS  Goal: Patient has stable vital signs and fluid balance  5/16/2019 1208 by Sandra Yao RN  Outcome: Ongoing  5/16/2019 0308 by Dani Goodman RN  Outcome: Ongoing     Problem: ACTIVITY INTOLERANCE/IMPAIRED MOBILITY  Goal: Mobility/activity is maintained at optimum level for patient  5/16/2019 1208 by Sandra Yao RN  Outcome: Ongoing  5/16/2019 0308 by Dani Goodman RN  Outcome: Ongoing     Problem: COMMUNICATION IMPAIRMENT  Goal: Ability to express needs and understand communication  5/16/2019 [id number] by Sandra Yao RN  Outcome: Ongoing  5/16/2019 0308 by Dani Goodman RN  Outcome: Ongoing  Note:   Patient has a past medical history of Acute renal failure (ARF) ([facility] [address] Asthma, CAD (coronary artery disease), Diabetes mellitus ([facility] [address] Hypertension, Pneumonia, Seizures ([facility] [address] and Unspecified cerebral artery occlusion with cerebral infarction. Comorbidities and risk factors for stroke reviewed and education provided as appropriate. Patient and/or family's stated goal of care: Prevention of aspiration     Reviewed the Following Education with Patient and/or Family:   Signs and Symptoms of Stroke-Reviewed FAST   Facial Drooping   Arm Weakness   Speech Difficulty   Time to Call 911 and how to activate EMS (911)   Importance of Follow Up Appointment at Discharge   Importance of Compliance with Medications Prescribed at Discharge     Pt can nod and gesture during education, but cannot assess mental orientation of pt .      Family Present during Education: yes     Stroke Education Booklet at Bedside: yes     Total Education Time: 45 Minutes           Problem: Tissue Perfusion - Renal, Altered:  Goal: Electrolytes within specified parameters  Description  Electrolytes within specified parameters  5/16/2019 1208 by Cecilio Alejandre RN  Outcome: Ongoing  5/16/2019 0308 by Rosey Galaviz RN  Outcome: Ongoing  Goal: Urine creatinine clearance will be within specified parameters  Description  Urine creatinine clearance will be within specified parameters  5/16/2019 1208 by Cecilio Alejandre RN  Outcome: Ongoing  5/16/2019 0308 by Rosey Galaviz RN  Outcome: Ongoing  Goal: Serum creatinine will be within specified parameters  Description  Serum creatinine will be within specified parameters  5/16/2019 1208 by Cecilio Alejandre RN  Outcome: Ongoing  5/16/2019 0308 by Rosey Galaviz RN  Outcome: Ongoing  Goal: Ability to achieve a balanced intake and output will improve  Description  Ability to achieve a balanced intake and output will improve  5/16/2019 1208 by Cecilio Alejandre RN  Outcome: Ongoing  5/16/2019 0308 by Rosey Galaviz RN  Outcome: Ongoing     Problem: Confusion - Acute:  Goal: Mental status will be restored to baseline  Description  Mental status will be restored to baseline  5/16/2019 1208 by Maria Luz Chapin RN  Outcome: Ongoing  5/16/2019 0308 by Suzy Childers RN  Outcome: Ongoing  Goal: Absence of continued neurological deterioration signs and symptoms  Description  Absence of continued neurological deterioration signs and symptoms  5/16/2019 1208 by Maria Luz Chapin RN  Outcome: Ongoing  5/16/2019 0308 by Suzy Childers RN  Outcome: Ongoing     Problem: Injury - Risk of, Physical Injury:  Goal: Will remain free from falls  Description  Bed in lowest locked positions, call light within reach, side rails up x 2, bed check in place. Instructed patient to call before getting out of bed. Patient verbalized understanding. 5/16/2019 1208 by Maria Luz Chapin RN  Outcome: Ongoing  5/16/2019 0308 by Suzy Childers RN  Outcome: Ongoing  Note:   Pt is free of falls at this time. Bed is in the lowest position, wheels locked, side rails up x 2, call light, and personal belongings within reach. Will continue to monitor.     Goal: Absence of physical injury  Description  Absence of physical injury  5/16/2019 1208 by Maria Luz Chapin RN  Outcome: Ongoing  5/16/2019 0308 by Suzy Childers RN  Outcome: Ongoing     Problem: Mood - Altered:  Goal: Mood stable  Description  Mood stable  5/16/2019 1208 by Maria Luz Chapin RN  Outcome: Ongoing  5/16/2019 0308 by Suzy Childers RN  Outcome: Ongoing  Goal: Absence of abusive behavior  Description  Absence of abusive behavior  5/16/2019 1208 by Maria Luz Chapin RN  Outcome: Ongoing  5/16/2019 0308 by Suzy Childers RN  Outcome: Ongoing  Goal: Verbalizations of feeling emotionally comfortable while being cared for will increase  Description  Verbalizations of feeling emotionally comfortable while being cared for will increase  5/16/2019 1208 by Maria Luz Chapin RN  Outcome: Ongoing  5/16/2019 0308 by Suzy Childers RN  Outcome: Ongoing     Problem: Psychomotor Activity - Altered:  Goal: Absence

## 2019-05-16 NOTE — PLAN OF CARE
Problem: Falls - Risk of:  Goal: Will remain free from falls  Description  Bed in lowest locked positions, call light within reach, side rails up x 2, bed check in place. Instructed patient to call before getting out of bed. Patient verbalized understanding. Outcome: Ongoing  Note:   Pt is free of falls at this time. Bed is in the lowest position, wheels locked, side rails up x 2, call light, and personal belongings within reach. Will continue to monitor. Goal: Absence of physical injury  Description  Absence of physical injury  Outcome: Ongoing     Problem: Risk for Impaired Skin Integrity  Goal: Tissue integrity - skin and mucous membranes  Description  Structural intactness and normal physiological function of skin and  mucous membranes. Outcome: Ongoing  Note:   Skin assessment and q 2 turns completed and documented. No new issues at this time. Will continue to monitor. Problem: Nutrition  Goal: Optimal nutrition therapy  Outcome: Ongoing     Problem: Serum Glucose Level - Abnormal:  Goal: Ability to maintain appropriate glucose levels will improve  Description  Ability to maintain appropriate glucose levels will improve  Outcome: Ongoing  Note:   Diabetes education provided today:    Different diabetic medications. Managing high and low sugar readings. Rotation of sites for subcutaneous medication injection.   Accuchecks related to tube feed       Goal: Ability to maintain appropriate glucose levels will improve to within specified parameters  Description  Ability to maintain appropriate glucose levels will improve to within specified parameters  Outcome: Ongoing     Problem: Sensory Perception - Impaired:  Goal: Ability to maintain a stable neurologic state will improve  Description  Ability to maintain a stable neurologic state will improve  Outcome: Ongoing  Goal: Demonstrations of improved sensory functioning will increase  Description  Demonstrations of improved sensory functioning will increase  Outcome: Ongoing  Goal: Decrease in sensory misperception frequency  Description  Decrease in sensory misperception frequency  Outcome: Ongoing  Goal: Able to refrain from responding to false sensory perceptions  Description  Able to refrain from responding to false sensory perceptions  Outcome: Ongoing  Goal: Demonstrates accurate environmental perceptions  Description  Demonstrates accurate environmental perceptions  Outcome: Ongoing  Goal: Able to distinguish between reality-based and nonreality-based thinking  Description  Able to distinguish between reality-based and nonreality-based thinking  Outcome: Ongoing  Goal: Able to interrupt nonreality-based thinking  Description  Able to interrupt nonreality-based thinking  Outcome: Ongoing     Problem: Discharge Planning:  Goal: Participates in care planning  Description  Participates in care planning  Outcome: Ongoing  Goal: Discharged to appropriate level of care  Description  Discharged to appropriate level of care  Outcome: Ongoing  Goal: Ability to perform activities of daily living will improve  Description  Ability to perform activities of daily living will improve  Outcome: Ongoing     Problem: Airway Clearance - Ineffective:  Goal: Ability to maintain a clear airway will improve  Description  Ability to maintain a clear airway will improve  Outcome: Ongoing     Problem: Anxiety/Stress:  Goal: Level of anxiety will decrease  Description  Level of anxiety will decrease  Outcome: Ongoing     Problem: Aspiration:  Goal: Absence of aspiration  Description  Absence of aspiration  Outcome: Ongoing     Problem:  Bowel Function - Altered:  Goal: Bowel elimination is within specified parameters  Description  Bowel elimination is within specified parameters  Outcome: Ongoing     Problem: Cardiac Output - Decreased:  Goal: Hemodynamic stability will improve  Description  Hemodynamic stability will improve  Outcome: Ongoing     Problem: Fluid Volume - Imbalance:  Goal: Absence of imbalanced fluid volume signs and symptoms  Description  Absence of imbalanced fluid volume signs and symptoms  Outcome: Ongoing     Problem: Gas Exchange - Impaired:  Goal: Levels of oxygenation will improve  Description  Levels of oxygenation will improve  Outcome: Ongoing     Problem: Mental Status - Impaired:  Goal: Mental status will be restored to baseline  Description  Mental status will be restored to baseline  Outcome: Ongoing     Problem: Nutrition Deficit:  Goal: Ability to achieve adequate nutritional intake will improve  Description  Ability to achieve adequate nutritional intake will improve  Outcome: Ongoing     Problem: Pain:  Goal: Pain level will decrease  Description  Pain level will decrease  Outcome: Ongoing  Note:   Pt complaint of pain. See MAR. Call light in reach, will continue to monitor.   Goal: Recognizes and communicates pain  Description  Recognizes and communicates pain  Outcome: Ongoing  Goal: Control of acute pain  Description  Control of acute pain  Outcome: Ongoing  Goal: Control of chronic pain  Description  Control of chronic pain  Outcome: Ongoing     Problem: Skin Integrity - Impaired:  Goal: Will show no infection signs and symptoms  Description  Will show no infection signs and symptoms  Outcome: Ongoing  Goal: Absence of new skin breakdown  Description  Absence of new skin breakdown  Outcome: Ongoing     Problem: Sleep Pattern Disturbance:  Goal: Appears well-rested  Description  Appears well-rested  Outcome: Ongoing     Problem: Tissue Perfusion, Altered:  Goal: Circulatory function within specified parameters  Description  Circulatory function within specified parameters  Outcome: Ongoing     Problem: Tissue Perfusion - Cardiopulmonary, Altered:  Goal: Absence of angina  Description  Absence of angina  Outcome: Ongoing  Goal: Hemodynamic stability will improve  Description  Hemodynamic stability will improve  Outcome: Ongoing     Problem: HEMODYNAMIC STATUS  Goal: Patient has stable vital signs and fluid balance  Outcome: Ongoing     Problem: ACTIVITY INTOLERANCE/IMPAIRED MOBILITY  Goal: Mobility/activity is maintained at optimum level for patient  Outcome: Ongoing     Problem: COMMUNICATION IMPAIRMENT  Goal: Ability to express needs and understand communication  Outcome: Ongoing  Note:   Patient has a past medical history of Acute renal failure (ARF) ([facility] [address] Asthma, CAD (coronary artery disease), Diabetes mellitus ([facility] [address] Hypertension, Pneumonia, Seizures ([facility] [address] and Unspecified cerebral artery occlusion with cerebral infarction. Comorbidities and risk factors for stroke reviewed and education provided as appropriate. Patient and/or family's stated goal of care: Prevention of aspiration     Reviewed the Following Education with Patient and/or Family:   Signs and Symptoms of Stroke-Reviewed FAST   Facial Drooping   Arm Weakness   Speech Difficulty   Time to Call 911 and how to activate EMS (911)   Importance of Follow Up Appointment at Discharge   Importance of Compliance with Medications Prescribed at Discharge     Pt can nod and gesture during education, but cannot assess mental orientation of pt .      Family Present during Education: yes     Stroke Education Booklet at Bedside: yes     Total Education Time: 39 Minutes             Problem: Tissue Perfusion - Renal, Altered:  Goal: Electrolytes within specified parameters  Description  Electrolytes within specified parameters  Outcome: Ongoing  Goal: Urine creatinine clearance will be within specified parameters  Description  Urine creatinine clearance will be within specified parameters  Outcome: Ongoing  Goal: Serum creatinine will be within specified parameters  Description  Serum creatinine will be within specified parameters  Outcome: Ongoing  Goal: Ability to achieve a balanced intake and output will improve  Description  Ability to achieve a balanced intake and output will improve  Outcome: Ongoing     Problem: Confusion - Acute:  Goal: Mental status will be restored to baseline  Description  Mental status will be restored to baseline  Outcome: Ongoing  Goal: Absence of continued neurological deterioration signs and symptoms  Description  Absence of continued neurological deterioration signs and symptoms  Outcome: Ongoing     Problem: Injury - Risk of, Physical Injury:  Goal: Will remain free from falls  Description  Bed in lowest locked positions, call light within reach, side rails up x 2, bed check in place. Instructed patient to call before getting out of bed. Patient verbalized understanding. Outcome: Ongoing  Note:   Pt is free of falls at this time. Bed is in the lowest position, wheels locked, side rails up x 2, call light, and personal belongings within reach. Will continue to monitor.     Goal: Absence of physical injury  Description  Absence of physical injury  Outcome: Ongoing     Problem: Mood - Altered:  Goal: Mood stable  Description  Mood stable  Outcome: Ongoing  Goal: Absence of abusive behavior  Description  Absence of abusive behavior  Outcome: Ongoing  Goal: Verbalizations of feeling emotionally comfortable while being cared for will increase  Description  Verbalizations of feeling emotionally comfortable while being cared for will increase  Outcome: Ongoing     Problem: Psychomotor Activity - Altered:  Goal: Absence of psychomotor disturbance signs and symptoms  Description  Absence of psychomotor disturbance signs and symptoms  Outcome: Ongoing

## 2019-05-17 PROBLEM — E44.0 MODERATE MALNUTRITION (HCC): Chronic | Status: ACTIVE | Noted: 2019-05-17

## 2019-05-17 LAB
GLUCOSE BLD-MCNC: 76 MG/DL (ref 70–99)
GLUCOSE BLD-MCNC: 77 MG/DL (ref 70–99)
GLUCOSE BLD-MCNC: 78 MG/DL (ref 70–99)
GLUCOSE BLD-MCNC: 79 MG/DL (ref 70–99)
GLUCOSE BLD-MCNC: 82 MG/DL (ref 70–99)
GLUCOSE BLD-MCNC: 84 MG/DL (ref 70–99)
GLUCOSE BLD-MCNC: 87 MG/DL (ref 70–99)
HCT VFR BLD CALC: 22.9 % (ref 36–48)
HEMATOLOGY PATH CONSULT: NORMAL
HEMATOLOGY PATH CONSULT: YES
HEMOGLOBIN: 7.6 G/DL (ref 12–16)
MCH RBC QN AUTO: 28.6 PG (ref 26–34)
MCHC RBC AUTO-ENTMCNC: 33.2 G/DL (ref 31–36)
MCV RBC AUTO: 86.1 FL (ref 80–100)
PDW BLD-RTO: 14.8 % (ref 12.4–15.4)
PERFORMED ON: NORMAL
PLATELET # BLD: 1007 K/UL (ref 135–450)
PLATELET SLIDE REVIEW: ABNORMAL
PMV BLD AUTO: 6.7 FL (ref 5–10.5)
RBC # BLD: 2.65 M/UL (ref 4–5.2)
SLIDE REVIEW: ABNORMAL
WBC # BLD: 9.1 K/UL (ref 4–11)

## 2019-05-17 PROCEDURE — 2060000000 HC ICU INTERMEDIATE R&B

## 2019-05-17 PROCEDURE — 3609013300 HC EGD TUBE PLACEMENT: Performed by: INTERNAL MEDICINE

## 2019-05-17 PROCEDURE — C9113 INJ PANTOPRAZOLE SODIUM, VIA: HCPCS | Performed by: INTERNAL MEDICINE

## 2019-05-17 PROCEDURE — 99152 MOD SED SAME PHYS/QHP 5/>YRS: CPT | Performed by: INTERNAL MEDICINE

## 2019-05-17 PROCEDURE — 2709999900 HC NON-CHARGEABLE SUPPLY: Performed by: INTERNAL MEDICINE

## 2019-05-17 PROCEDURE — 2580000003 HC RX 258: Performed by: INTERNAL MEDICINE

## 2019-05-17 PROCEDURE — 6360000002 HC RX W HCPCS: Performed by: NURSE PRACTITIONER

## 2019-05-17 PROCEDURE — 99233 SBSQ HOSP IP/OBS HIGH 50: CPT | Performed by: INTERNAL MEDICINE

## 2019-05-17 PROCEDURE — 7100000011 HC PHASE II RECOVERY - ADDTL 15 MIN: Performed by: INTERNAL MEDICINE

## 2019-05-17 PROCEDURE — 6370000000 HC RX 637 (ALT 250 FOR IP): Performed by: INTERNAL MEDICINE

## 2019-05-17 PROCEDURE — 6360000002 HC RX W HCPCS: Performed by: INTERNAL MEDICINE

## 2019-05-17 PROCEDURE — 2700000000 HC OXYGEN THERAPY PER DAY

## 2019-05-17 PROCEDURE — 85027 COMPLETE CBC AUTOMATED: CPT

## 2019-05-17 PROCEDURE — 0DH63UZ INSERTION OF FEEDING DEVICE INTO STOMACH, PERCUTANEOUS APPROACH: ICD-10-PCS | Performed by: INTERNAL MEDICINE

## 2019-05-17 PROCEDURE — 3E0G76Z INTRODUCTION OF NUTRITIONAL SUBSTANCE INTO UPPER GI, VIA NATURAL OR ARTIFICIAL OPENING: ICD-10-PCS | Performed by: INTERNAL MEDICINE

## 2019-05-17 PROCEDURE — 2580000003 HC RX 258: Performed by: NURSE PRACTITIONER

## 2019-05-17 PROCEDURE — 94761 N-INVAS EAR/PLS OXIMETRY MLT: CPT

## 2019-05-17 PROCEDURE — 94640 AIRWAY INHALATION TREATMENT: CPT

## 2019-05-17 PROCEDURE — 2500000003 HC RX 250 WO HCPCS: Performed by: INTERNAL MEDICINE

## 2019-05-17 PROCEDURE — 7100000010 HC PHASE II RECOVERY - FIRST 15 MIN: Performed by: INTERNAL MEDICINE

## 2019-05-17 PROCEDURE — 94669 MECHANICAL CHEST WALL OSCILL: CPT

## 2019-05-17 PROCEDURE — 99153 MOD SED SAME PHYS/QHP EA: CPT | Performed by: INTERNAL MEDICINE

## 2019-05-17 PROCEDURE — 36592 COLLECT BLOOD FROM PICC: CPT

## 2019-05-17 RX ORDER — MIDAZOLAM HYDROCHLORIDE 5 MG/ML
INJECTION INTRAMUSCULAR; INTRAVENOUS PRN
Status: DISCONTINUED | OUTPATIENT
Start: 2019-05-17 | End: 2019-05-17 | Stop reason: ALTCHOICE

## 2019-05-17 RX ORDER — LIDOCAINE HYDROCHLORIDE 10 MG/ML
INJECTION, SOLUTION EPIDURAL; INFILTRATION; INTRACAUDAL; PERINEURAL PRN
Status: DISCONTINUED | OUTPATIENT
Start: 2019-05-17 | End: 2019-05-17 | Stop reason: ALTCHOICE

## 2019-05-17 RX ORDER — SODIUM CHLORIDE, SODIUM LACTATE, POTASSIUM CHLORIDE, CALCIUM CHLORIDE 600; 310; 30; 20 MG/100ML; MG/100ML; MG/100ML; MG/100ML
INJECTION, SOLUTION INTRAVENOUS CONTINUOUS
Status: DISCONTINUED | OUTPATIENT
Start: 2019-05-17 | End: 2019-05-20 | Stop reason: HOSPADM

## 2019-05-17 RX ORDER — CEFAZOLIN SODIUM 1 G/3ML
1 INJECTION, POWDER, FOR SOLUTION INTRAMUSCULAR; INTRAVENOUS ONCE
Status: COMPLETED | OUTPATIENT
Start: 2019-05-17 | End: 2019-05-17

## 2019-05-17 RX ORDER — FENTANYL CITRATE 50 UG/ML
INJECTION, SOLUTION INTRAMUSCULAR; INTRAVENOUS PRN
Status: DISCONTINUED | OUTPATIENT
Start: 2019-05-17 | End: 2019-05-17 | Stop reason: ALTCHOICE

## 2019-05-17 RX ADMIN — ALTEPLASE 2 MG: 2.2 INJECTION, POWDER, LYOPHILIZED, FOR SOLUTION INTRAVENOUS at 00:06

## 2019-05-17 RX ADMIN — IPRATROPIUM BROMIDE AND ALBUTEROL SULFATE 1 AMPULE: .5; 3 SOLUTION RESPIRATORY (INHALATION) at 07:54

## 2019-05-17 RX ADMIN — IPRATROPIUM BROMIDE AND ALBUTEROL SULFATE 1 AMPULE: .5; 3 SOLUTION RESPIRATORY (INHALATION) at 03:26

## 2019-05-17 RX ADMIN — MICONAZOLE NITRATE: 2 POWDER TOPICAL at 09:40

## 2019-05-17 RX ADMIN — SODIUM CHLORIDE, POTASSIUM CHLORIDE, SODIUM LACTATE AND CALCIUM CHLORIDE: 600; 310; 30; 20 INJECTION, SOLUTION INTRAVENOUS at 14:04

## 2019-05-17 RX ADMIN — MORPHINE SULFATE 2 MG: 2 INJECTION, SOLUTION INTRAMUSCULAR; INTRAVENOUS at 02:32

## 2019-05-17 RX ADMIN — Medication 10 ML: at 09:47

## 2019-05-17 RX ADMIN — MORPHINE SULFATE 2 MG: 2 INJECTION, SOLUTION INTRAMUSCULAR; INTRAVENOUS at 22:52

## 2019-05-17 RX ADMIN — IPRATROPIUM BROMIDE AND ALBUTEROL SULFATE 1 AMPULE: .5; 3 SOLUTION RESPIRATORY (INHALATION) at 21:38

## 2019-05-17 RX ADMIN — CEFAZOLIN 1 G: 1 INJECTION, POWDER, FOR SOLUTION INTRAMUSCULAR; INTRAVENOUS at 12:27

## 2019-05-17 RX ADMIN — MICONAZOLE NITRATE: 2 POWDER TOPICAL at 22:02

## 2019-05-17 RX ADMIN — IPRATROPIUM BROMIDE AND ALBUTEROL SULFATE 1 AMPULE: .5; 3 SOLUTION RESPIRATORY (INHALATION) at 01:10

## 2019-05-17 RX ADMIN — PANTOPRAZOLE SODIUM 40 MG: 40 INJECTION, POWDER, FOR SOLUTION INTRAVENOUS at 09:47

## 2019-05-17 RX ADMIN — MORPHINE SULFATE 2 MG: 2 INJECTION, SOLUTION INTRAMUSCULAR; INTRAVENOUS at 18:34

## 2019-05-17 RX ADMIN — PANTOPRAZOLE SODIUM 40 MG: 40 INJECTION, POWDER, FOR SOLUTION INTRAVENOUS at 21:50

## 2019-05-17 RX ADMIN — IPRATROPIUM BROMIDE AND ALBUTEROL SULFATE 1 AMPULE: .5; 3 SOLUTION RESPIRATORY (INHALATION) at 15:32

## 2019-05-17 RX ADMIN — ACETAMINOPHEN 650 MG: 650 SUPPOSITORY RECTAL at 21:50

## 2019-05-17 RX ADMIN — PIPERACILLIN SODIUM,TAZOBACTAM SODIUM 4.5 G: 4; .5 INJECTION, POWDER, FOR SOLUTION INTRAVENOUS at 22:52

## 2019-05-17 RX ADMIN — PROCHLORPERAZINE EDISYLATE 10 MG: 5 INJECTION INTRAMUSCULAR; INTRAVENOUS at 05:39

## 2019-05-17 ASSESSMENT — PAIN SCALES - GENERAL
PAINLEVEL_OUTOF10: 8
PAINLEVEL_OUTOF10: 1
PAINLEVEL_OUTOF10: 8

## 2019-05-17 ASSESSMENT — PAIN SCALES - WONG BAKER: WONGBAKER_NUMERICALRESPONSE: 8

## 2019-05-17 NOTE — PROGRESS NOTES
EGD/PEG tube placement completed with Dr. Kerry Stephens. Abdomen was prepped and cleansed in sterile fashion with sterile drape in place. Placement verified with 1:1 transillumination. LUQ abdominal area anesthestized with 3mL of 1% lidocaine. Small incision was made at 1240. 20Fr PEG was pulled through and bumper placed at 2.5cm. Betadine and clean dry dressing placed, covered with abdominal binder. Patient tolerated procedure well.    Karthik Berkowitz RN

## 2019-05-17 NOTE — PLAN OF CARE
Problem: Falls - Risk of:  Goal: Will remain free from falls  Description  Bed in lowest locked positions, call light within reach, side rails up x 2, bed check in place. Instructed patient to call before getting out of bed. Patient verbalized understanding. 5/17/2019 1659 by Griffin Bauer RN  Outcome: Ongoing  5/17/2019 0432 by Nicole Dai RN  Outcome: Ongoing  Goal: Absence of physical injury  Description  Absence of physical injury  5/17/2019 1659 by Griffin Bauer RN  Outcome: Ongoing  5/17/2019 0432 by Nicole Dai RN  Outcome: Ongoing     Problem: Risk for Impaired Skin Integrity  Goal: Tissue integrity - skin and mucous membranes  Description  Structural intactness and normal physiological function of skin and  mucous membranes. 5/17/2019 1659 by Griffin Bauer RN  Outcome: Ongoing  5/17/2019 0432 by Nicole Dai RN  Outcome: Ongoing     Problem: Nutrition  Goal: Optimal nutrition therapy  5/17/2019 1659 by Griffin Bauer RN  Outcome: Ongoing  5/17/2019 1317 by Mariela Barnhart RD, LD  Outcome: Ongoing  5/17/2019 0432 by Nicole Dai RN  Outcome: Ongoing     Problem: Serum Glucose Level - Abnormal:  Goal: Ability to maintain appropriate glucose levels will improve  Description  Ability to maintain appropriate glucose levels will improve  5/17/2019 1659 by Griffin Bauer RN  Outcome: Ongoing  5/17/2019 0432 by Nicole Dai RN  Outcome: Ongoing  Note:   Diabetes education provided today:    Different diabetic medications. Managing high and low sugar readings. Rotation of sites for subcutaneous medication injection.       Goal: Ability to maintain appropriate glucose levels will improve to within specified parameters  Description  Ability to maintain appropriate glucose levels will improve to within specified parameters  5/17/2019 1659 by Griffin Bauer RN  Outcome: Ongoing  5/17/2019 0432 by Nicole Dai RN  Outcome: Ongoing     Problem: Sensory Perception - Impaired:  Goal: Ability to maintain a stable neurologic state will improve  Description  Ability to maintain a stable neurologic state will improve  5/17/2019 1659 by Kahlil Perkins RN  Outcome: Ongoing  5/17/2019 0432 by Nando Williamson RN  Outcome: Ongoing  Goal: Demonstrations of improved sensory functioning will increase  Description  Demonstrations of improved sensory functioning will increase  5/17/2019 1659 by Kahlil Perkins RN  Outcome: Ongoing  5/17/2019 0432 by Nando Williamson RN  Outcome: Ongoing  Goal: Decrease in sensory misperception frequency  Description  Decrease in sensory misperception frequency  5/17/2019 1659 by Kahlil Perkins RN  Outcome: Ongoing  5/17/2019 0432 by Nando Williamson RN  Outcome: Ongoing  Goal: Able to refrain from responding to false sensory perceptions  Description  Able to refrain from responding to false sensory perceptions  5/17/2019 1659 by Kalhil Perkins RN  Outcome: Ongoing  5/17/2019 0432 by Nando Williamson RN  Outcome: Ongoing  Goal: Demonstrates accurate environmental perceptions  Description  Demonstrates accurate environmental perceptions  5/17/2019 1659 by Kahlil Perkins RN  Outcome: Ongoing  5/17/2019 0432 by Nando Williamson RN  Outcome: Ongoing  Goal: Able to distinguish between reality-based and nonreality-based thinking  Description  Able to distinguish between reality-based and nonreality-based thinking  5/17/2019 1659 by Kahlil Perkins RN  Outcome: Ongoing  5/17/2019 0432 by Nando Williamson RN  Outcome: Ongoing  Goal: Able to interrupt nonreality-based thinking  Description  Able to interrupt nonreality-based thinking  5/17/2019 1659 by Kahlil Perkins RN  Outcome: Ongoing  5/17/2019 0432 by Nando Williamson RN  Outcome: Ongoing     Problem: Discharge Planning:  Goal: Participates in care planning  Description  Participates in care planning  5/17/2019 1659 by Kahlil Perkins RN  Outcome: Ongoing  5/17/2019 0432 by Nando Williamson RN  Outcome: Ongoing  Goal: Discharged to appropriate level of care  Description  Discharged to appropriate level of care  5/17/2019 1659 by Kahlil Perkins RN  Outcome: Ongoing  5/17/2019 0432 by Nando Williamson RN  Outcome: Ongoing  Goal: Ability to perform activities of daily living will improve  Description  Ability to perform activities of daily living will improve  5/17/2019 1659 by Kahlil Perkins RN  Outcome: Ongoing  5/17/2019 0432 by Nando Williamson RN  Outcome: Ongoing     Problem: Airway Clearance - Ineffective:  Goal: Ability to maintain a clear airway will improve  Description  Ability to maintain a clear airway will improve  5/17/2019 1659 by Kahlil Perkins RN  Outcome: Ongoing  5/17/2019 0432 by Nando Williamson RN  Outcome: Ongoing     Problem: Anxiety/Stress:  Goal: Level of anxiety will decrease  Description  Level of anxiety will decrease  5/17/2019 1659 by Kahlil Perkins RN  Outcome: Ongoing  5/17/2019 0432 by Nando Williamson RN  Outcome: Ongoing     Problem: Aspiration:  Goal: Absence of aspiration  Description  Absence of aspiration  5/17/2019 1659 by Kahlil Perkins RN  Outcome: Ongoing  5/17/2019 0432 by Nando Williamson RN  Outcome: Ongoing     Problem:  Bowel Function - Altered:  Goal: Bowel elimination is within specified parameters  Description  Bowel elimination is within specified parameters  5/17/2019 1659 by Kahlil Perkins RN  Outcome: Ongoing  5/17/2019 0432 by Nando Williamson RN  Outcome: Ongoing     Problem: Cardiac Output - Decreased:  Goal: Hemodynamic stability will improve  Description  Hemodynamic stability will improve  5/17/2019 1659 by Kahlil Perkins RN  Outcome: Ongoing  5/17/2019 0432 by Nando Williamson RN  Outcome: Ongoing     Problem: Fluid Volume - Imbalance:  Goal: Absence of imbalanced fluid volume signs and symptoms  Description  Absence of imbalanced fluid volume signs and symptoms  5/17/2019 1659 by Kahlil Perkins RN  Outcome: Ongoing  5/17/2019 0432 by Nando Williamson RN  Outcome: Ongoing     Problem: Gas Exchange - Impaired:  Goal: Levels of Disturbance:  Goal: Appears well-rested  Description  Appears well-rested  5/17/2019 1659 by Rebeca Blake RN  Outcome: Ongoing  5/17/2019 0432 by Arturo Jim RN  Outcome: Ongoing     Problem: Tissue Perfusion, Altered:  Goal: Circulatory function within specified parameters  Description  Circulatory function within specified parameters  5/17/2019 1659 by Rebeca Blake RN  Outcome: Ongoing  5/17/2019 0432 by Arturo Jim RN  Outcome: Ongoing     Problem: Tissue Perfusion - Cardiopulmonary, Altered:  Goal: Absence of angina  Description  Absence of angina  5/17/2019 1659 by Rebeca Blake RN  Outcome: Ongoing  5/17/2019 0432 by Arturo Jim RN  Outcome: Ongoing  Goal: Hemodynamic stability will improve  Description  Hemodynamic stability will improve  5/17/2019 1659 by Rebeca Blake RN  Outcome: Ongoing  5/17/2019 0432 by Arturo Jim RN  Outcome: Ongoing     Problem: HEMODYNAMIC STATUS  Goal: Patient has stable vital signs and fluid balance  5/17/2019 1659 by Rebeca Blake RN  Outcome: Ongoing  5/17/2019 0432 by Arturo Jim RN  Outcome: Ongoing     Problem: ACTIVITY INTOLERANCE/IMPAIRED MOBILITY  Goal: Mobility/activity is maintained at optimum level for patient  5/17/2019 1659 by Rebeca Blake RN  Outcome: Ongoing  5/17/2019 0432 by Arturo Jim RN  Outcome: Ongoing     Problem: COMMUNICATION IMPAIRMENT  Goal: Ability to express needs and understand communication  5/17/2019 1659 by Rebeca Blake RN  Outcome: Ongoing  5/17/2019 0432 by Arturo Jim RN  Outcome: Ongoing  Note:   Patient has a past medical history of Acute renal failure (ARF) ([facility] [place] [address] Asthma, CAD (coronary artery disease), Diabetes mellitus ([facility] [place] [address] Hypertension, Pneumonia, Seizures ([facility] [place] [address] and Unspecified cerebral artery occlusion with cerebral infarction. Comorbidities and risk factors for stroke reviewed and education provided as appropriate.      Patient and/or family's stated goal of care: Prevention of aspiration     Reviewed the Following Education with Patient and/or Family:   Signs and Symptoms of Stroke-Reviewed FAST   Facial Drooping   Arm Weakness   Speech Difficulty   Time to Call 46 and how to activate EMS (911)   Importance of Follow Up Appointment at Discharge   Importance of Compliance with Medications Prescribed at Discharge     Pt nods to acknowledge education .      Family Present during Education: yes     Stroke Education Booklet at Bedside: yes     Total Education Time: 20 Minutes           Problem: Tissue Perfusion - Renal, Altered:  Goal: Electrolytes within specified parameters  Description  Electrolytes within specified parameters  5/17/2019 1659 by Kemal Nassar RN  Outcome: Ongoing  5/17/2019 0432 by Kevan Person RN  Outcome: Ongoing  Goal: Urine creatinine clearance will be within specified parameters  Description  Urine creatinine clearance will be within specified parameters  5/17/2019 1659 by Kemal Nassar RN  Outcome: Ongoing  5/17/2019 0432 by Kevan Person RN  Outcome: Ongoing  Goal: Serum creatinine will be within specified parameters  Description  Serum creatinine will be within specified parameters  5/17/2019 1659 by Kemal Nassar RN  Outcome: Ongoing  5/17/2019 0432 by Kevan Person RN  Outcome: Ongoing  Goal: Ability to achieve a balanced intake and output will improve  Description  Ability to achieve a balanced intake and output will improve  5/17/2019 1659 by Kemal Nassar RN  Outcome: Ongoing  5/17/2019 0432 by Kevan Person RN  Outcome: Ongoing     Problem: Confusion - Acute:  Goal: Mental status will be restored to baseline  Description  Mental status will be restored to baseline  5/17/2019 1659 by Kemal Nassar RN  Outcome: Ongoing  5/17/2019 0432 by Kevan Person RN  Outcome: Ongoing  Goal: Absence of continued neurological deterioration signs and symptoms  Description  Absence of continued neurological deterioration signs and symptoms  5/17/2019 1659 by Kemal Nassar RN  Outcome: Ongoing  5/17/2019 0432 by Kristy Damon RN  Outcome: Ongoing     Problem: Injury - Risk of, Physical Injury:  Goal: Will remain free from falls  Description  Bed in lowest locked positions, call light within reach, side rails up x 2, bed check in place. Instructed patient to call before getting out of bed. Patient verbalized understanding.       5/17/2019 1659 by Sheri Malloy RN  Outcome: Ongoing  5/17/2019 0432 by Kristy Damon RN  Outcome: Ongoing  Goal: Absence of physical injury  Description  Absence of physical injury  5/17/2019 1659 by Sheri Malloy RN  Outcome: Ongoing  5/17/2019 0432 by Kristy Damon RN  Outcome: Ongoing     Problem: Mood - Altered:  Goal: Mood stable  Description  Mood stable  5/17/2019 1659 by Sheri Malloy RN  Outcome: Ongoing  5/17/2019 0432 by Kristy Damon RN  Outcome: Ongoing  Goal: Absence of abusive behavior  Description  Absence of abusive behavior  5/17/2019 1659 by Sheri Malloy RN  Outcome: Ongoing  5/17/2019 0432 by Kristy Damon RN  Outcome: Ongoing  Goal: Verbalizations of feeling emotionally comfortable while being cared for will increase  Description  Verbalizations of feeling emotionally comfortable while being cared for will increase  5/17/2019 1659 by Sheri Malloy RN  Outcome: Ongoing  5/17/2019 0432 by Kristy Damon RN  Outcome: Ongoing     Problem: Psychomotor Activity - Altered:  Goal: Absence of psychomotor disturbance signs and symptoms  Description  Absence of psychomotor disturbance signs and symptoms  5/17/2019 1659 by Sheri Malloy RN  Outcome: Ongoing  5/17/2019 0432 by Kristy Damon RN  Outcome: Ongoing     Problem: Pain:  Goal: Pain level will decrease  Description  Pain level will decrease  5/17/2019 1659 by Sheri Malloy RN  Outcome: Ongoing  5/17/2019 0432 by Kristy Damon RN  Outcome: Ongoing

## 2019-05-17 NOTE — CARE COORDINATION
5/17/19 F/U with family for SNF choice.  Pt's daughters still looking into options, will follow up in am.

## 2019-05-17 NOTE — PROGRESS NOTES
Nutrition Assessment (Enteral Nutrition)    Type and Reason for Visit: Reassess    Nutrition Recommendations:   · Continue NPO, per MD  · When OK to start TF via PEG, recommend Bolus feeds with Alphonsus Bowl Peptide 1.5 Formula. Recommend 160 mL TF  3x/day (1.5 cartons total). Recommend 30 mL free water flush before and after each bolus feeding. · Once tolerance established, goal is 325 mL TF x3 times per day ( 3 cartons per day). Recommend 140 mL free water flush before and after each bolus feeding to meet hydration needs, if no IVF and Na WNL. · Monitor closely and correct lytes (K, Phos, Mg) before progressing TF to goal d/t risk of refeeding syndrome  · Discontinue psyllium fiber  · Monitor nutrition adequacy, weights, pertinent labs, BMs and clinical progress      Nutrition Assessment: Follow up: Pt nutrition status declined d/t possible aspiration. Bronch yesterday. TF on hold. PEG tube placement today. Per GI note, TF to start tomorrow, 5/18. SLP following, continuing to recommend NPO. Continues on probiotic and psyllium fiber. Will monitor ability to restart TF and nutrition status. Spoke with pt's daughter who reports pt was having cramping and difficult BMs with Glucerna TF. Will modify to Alphonsus Bowl Peptide formula to promote GI tolerance. Malnutrition Assessment:  · Malnutrition Status: Meets the criteria for moderate malnutrition  · Context: Acute illness or injury  · Findings of the 6 clinical characteristics of malnutrition (Minimum of 2 out of 6 clinical characteristics is required to make the diagnosis of moderate or severe Protein Calorie Malnutrition based on AND/ASPEN Guidelines):  1. Energy Intake-Less than or equal to 50% of estimated energy requirement, Greater than or equal to 5 days    2. Weight Loss-Unable to assess(d/t fluid fluctuations ), unable to assess  3. Fat Loss-Moderate subcutaneous fat loss, Orbital  4.  Muscle Loss-Moderate muscle mass loss, Temples (temporalis muscle), Clavicles (pectoralis and deltoids), Thigh (quadriceps)  5. Fluid Accumulation-Mild fluid accumulation, Extremities  6.  Strength-Not measured    Nutrition Risk Level: High    Nutrition Needs:  · Estimated Daily Total Kcal: 5232-0941  · Estimated Daily Protein (g): 62-77  · Estimated Daily Fluid (ml/day): 1 mL/kcal     Nutrition Diagnosis:   · Problem: Inadequate energy intake  · Etiology: related to Impaired respiratory function-inability to consume food, Insufficient energy/nutrient consumption     Signs and symptoms:  as evidenced by NPO status due to medical condition, Nutrition support - EN    Objective Information:  · Nutrition-Focused Physical Findings: +1 BUE and BLE edema   · Wound Type: None  · Current Nutrition Therapies:  · Oral Diet Orders: NPO   · Oral Diet intake: NPO  · Oral Nutrition Supplement (ONS) Orders: None  · Tube Feeding (TF) Orders:   · Current TF & Flush Orders Provides: Glucerna 1.5 @ 50 mL/hr x 20 hours to provide 1 L TV, 1500 kcal, 83 gm protein, 759 mL free water. Recommend 125 mL free water flush q 4 hrs or per Nephrology  · Goal TF & Flush Orders Provides: Recommend bolus TF with Jacklynn Crabtree Peptide 1.5 formula, Recommend 3 cans per day ( 325 mL at each feeding, 3 times per day) to provide 1500 kcal, 72 gm protein and 663 mL free water. Recommend 140 mL free water flush before and after each bolus feeding to meet hydration needs, if no IVF. · Anthropometric Measures:  · Ht: 5' 1\" (154.9 cm)   · Current Body Wt: 111 lb (50.3 kg)  · Admission Body Wt: 120 lb (54.4 kg)  · Usual Body Wt: ([place]USTIN )  · Weight Change: Pts daughter reports significant weight loss over the last year, more rapidly within last 6 months, but unable to determine amount.    · Ideal Body Wt: 105 lb (47.6 kg)   · BMI Classification: BMI 18.5 - 24.9 Normal Weight    Nutrition Interventions:   Start Tube Feeding  Continued Inpatient Monitoring    Nutrition Evaluation:   · Evaluation: Progressing toward goals   · Goals:  Tolerate most appropriate form of nutrition therapy this admission   · Monitoring: Nutrition Progression, TF Tolerance, TF Intake, Pertinent Labs      Electronically signed by Nanntete Griffiths RD, MICHELLE on 5/17/19 at 1:09 PM    Contact Number: Office: [phone]; [address]: [phone]

## 2019-05-17 NOTE — PROGRESS NOTES
Pulmonary & Critical Care Inpatient Progress Note   Doc MD Conrado     REASON FOR TODAY'S VISIT:  Assistance with critical care management    SUBJECTIVE:   Hypoxia improved after bronch yesterday  No other acute events    Scheduled Meds:   insulin lispro  0-12 Units Subcutaneous Q4H    ipratropium-albuterol  1 ampule Inhalation Q4H    insulin glargine  18 Units Subcutaneous Daily    amoxicillin-clavulanate  1 tablet Oral 2 times per day    psyllium  1 packet Per NG tube Daily    pneumococcal 13-valent conjugate  0.5 mL Intramuscular Once    saccharomyces boulardii  250 mg Oral BID    atorvastatin  40 mg Oral Nightly    aspirin  325 mg Oral Daily    miconazole   Topical BID    pantoprazole  40 mg Intravenous BID    sodium chloride flush  10 mL Intravenous 2 times per day       Continuous Infusions:   dextrose         PRN Meds:  prochlorperazine, morphine, glucose, dextrose, glucagon (rDNA), dextrose, magnesium sulfate, potassium chloride, acetaminophen, sodium chloride flush, magnesium hydroxide, acetaminophen    ALLERGIES:  Patient is allergic to aspirin. Objective:   PHYSICAL EXAM:  /76   Pulse 111   Temp 99.2 °F (37.3 °C) (Axillary)   Resp 18   Ht 5' 1\" (1.549 m)   Wt 111 lb 15.9 oz (50.8 kg)   SpO2 90%   BMI 21.16 kg/m²    Physical Exam   Constitutional: She appears well-developed and well-nourished. No distress. HENT:   Head: Normocephalic and atraumatic. Mouth/Throat: Oropharynx is clear and moist. No oropharyngeal exudate. Neck: Neck supple. No JVD present. Cardiovascular: Normal heart sounds. Exam reveals no gallop and no friction rub. No murmur heard. Pulmonary/Chest: Effort normal. She has no wheezes. She has no rales. Equal chest rise and expansion bilaterally   Abdominal: Soft. Bowel sounds are normal. She exhibits no distension. There is no tenderness. Musculoskeletal: She exhibits no edema. Lymphadenopathy:     She has no cervical adenopathy.    Neurological: continue aggressive measures.      Vikas Headley MD

## 2019-05-17 NOTE — PROGRESS NOTES
Pt coughing a little, suction used to get secretions out of mouth. Transport here to take pt to floor. VSS.

## 2019-05-17 NOTE — PLAN OF CARE
Nutrition Problem: Inadequate energy intake  Intervention: Food and/or Nutrient Delivery: Start Tube Feeding  Nutritional Goals:  Tolerate most appropriate form of nutrition therapy this admission

## 2019-05-17 NOTE — FLOWSHEET NOTE
05/17/19 0835   Assessment   Charting Type Shift assessment   Neurological   Neuro (WDL) X   Level of Consciousness 0   Orientation Level Oriented to person;Disoriented to place; Disoriented to time;Disoriented to situation   Cognition Follows commands; Impulsive   Language Nods/gestures appropriately   R Hand  Moderate   L Hand  Moderate   R Foot Plantar Flexion Moderate   L Foot Plantar Flexion Moderate   RUE Motor Response Responds to command   Sensation RUE Full sensation   LUE Motor Response Responds to command   Sensation LUE Full sensation   RLE Motor Response Responds to command   Sensation RLE Full sensation   LLE Motor Response Responds to command   Sensation LLE Full sensation   Gag Present   NIH/MNIHSS Stroke Scale Assessed No   [place] Coma Scale   Eye Opening 4   Best Verbal Response 5   Best Motor Response 6   [place] Coma Scale Score 15   HEENT   HEENT (WDL) X   Right Eye Intact   Left Eye Intact   Nose Other (Comment)  (NG)   Throat Intact   Tongue Dry   Voice Mute   Mucous Membrane Dry   Teeth Edentulous   Respiratory   Respiratory (WDL) X   Respiratory Pattern Tachypneic;Regular   Respiratory Depth Shallow   Respiratory Quality/Effort Unlabored   Chest Assessment Chest expansion symmetrical;Trachea midline   L Breath Sounds Clear;Diminished   R Breath Sounds Clear;Diminished   Breath Sounds   Right Upper Lobe Clear   Right Middle Lobe Clear   Right Lower Lobe Diminished   Left Upper Lobe Clear   Left Lower Lobe Diminished   Cardiac   Cardiac (WDL) X   Cardiac Regularity Regular   Heart Sounds S1, S2   Cardiac Rhythm ST   Rhythm Interpretation   Pulse 111   Cardiac Monitor   Bedside Cardiac Monitor On Yes   Bedside Cardiac Audible Yes   Bedside Cardiac Alarms Set Yes   Gastrointestinal   Abdominal (WDL) WDL   Abdomen Inspection Soft   RUQ Bowel Sounds Active   LUQ Bowel Sounds Active   RLQ Bowel Sounds Active   LLQ Bowel Sounds Active   GI Symptoms Cramping  (pt nods yes when asked )

## 2019-05-17 NOTE — PROGRESS NOTES
Hospitalist Progress Note      PCP: Jorge Luis Caballero PA-C    Date of Admission: 4/29/2019    Chief Complaint:  Patient was found unresponsive    Hospital Course:   82 yo female with h/o CAD, DM2, HTN, depression admitted after being found unresponsive with acute respiratory failure due to pneumonia, DKA, metabolic encephalopathy, LOPEZ, metabolic acidosis, acute GI bleed. She has been in the ICU, she is followed by pulmonary, and GI in consultation      Subjective:  Drowsy today. Hypoxia much improved after bronch yesterday. Asking surgery for J-tube. ADDENDUM: spoke with Dr. Zonia Jerry, PEG seems like best option, family agrees. Canceled surgery consult.       Medications:  Reviewed    Infusion Medications    lactated ringers      dextrose       Scheduled Medications    insulin lispro  0-12 Units Subcutaneous Q4H    ipratropium-albuterol  1 ampule Inhalation Q4H    insulin glargine  18 Units Subcutaneous Daily    amoxicillin-clavulanate  1 tablet Oral 2 times per day    psyllium  1 packet Per NG tube Daily    pneumococcal 13-valent conjugate  0.5 mL Intramuscular Once    saccharomyces boulardii  250 mg Oral BID    atorvastatin  40 mg Oral Nightly    aspirin  325 mg Oral Daily    miconazole   Topical BID    pantoprazole  40 mg Intravenous BID    sodium chloride flush  10 mL Intravenous 2 times per day     PRN Meds: prochlorperazine, morphine, glucose, dextrose, glucagon (rDNA), dextrose, magnesium sulfate, potassium chloride, acetaminophen, sodium chloride flush, magnesium hydroxide, acetaminophen      Intake/Output Summary (Last 24 hours) at 5/17/2019 1103  Last data filed at 5/17/2019 0522  Gross per 24 hour   Intake --   Output 1200 ml   Net -1200 ml       Physical Exam Performed:    /76   Pulse 111   Temp 99.2 °F (37.3 °C) (Axillary)   Resp 18   Ht 5' 1\" (1.549 m)   Wt 111 lb 15.9 oz (50.8 kg)   SpO2 90%   BMI 21.16 kg/m²     General appearance: She is lying in bed, looks very weak and debilitated. HEENT: Pupils equal, round, and reactive to light. Conjunctivae/corneas clear. Neck: Supple, with full range of motion. Trachea midline. Respiratory:  Bilateral breath sounds, decreased at bases, no rales or rhonchi noted this time. Cardiovascular: Regular rate and rhythm with normal S1/S2   Abdomen: Soft, non-tender, non-distended with normal bowel sounds. Musculoskeletal: No clubbing, cyanosis or edema bilaterally. Neurologic:  Neurovascularly intact without any focal sensory/motor deficits. Cranial nerves: II-XII intact, grossly non-focal.  Psychiatric: She is awake, she did not speak while I was in the room. She was able to follow simple one step commands  Capillary Refill: Brisk,< 3 seconds   Peripheral Pulses: +2 palpable, equal bilaterally         Labs:   Recent Labs     05/15/19  0605 05/16/19  0444 05/17/19  0525   WBC 8.1 8.0 9.1   HGB 7.3* 8.3* 7.6*   HCT 21.9* 25.5* 22.9*   * 779* 1,007*     Recent Labs     05/15/19  0605 05/16/19  0444    141   K 3.9 3.9    100   CO2 29 29   BUN 15 14   CREATININE <0.5* 0.6   CALCIUM 8.5 8.5     No results for input(s): AST, ALT, BILIDIR, BILITOT, ALKPHOS in the last 72 hours. Recent Labs     05/16/19  0444   INR 1.24*     No results for input(s): González Aver in the last 72 hours. Urinalysis:      Lab Results   Component Value Date    NITRU Negative 04/29/2019    WBCUA 10-20 04/29/2019    BACTERIA 1+ 04/29/2019    RBCUA 3-5 04/29/2019    BLOODU MODERATE 04/29/2019    SPECGRAV 1.025 04/29/2019    GLUCOSEU >=1000 04/29/2019       Radiology:  XR CHEST PORTABLE   Final Result   New PICC line on the right terminates at the atrial caval junction. Increased airspace disease and effusion on the left. XR ABDOMEN (KUB) (SINGLE AP VIEW)   Final Result   No evidence of bowel obstruction         IR PICC WO SQ PORT/PUMP > 5 YEARS   Final Result   Successful placement of PICC line.          CT CHEST WO CONTRAST Final Result   Bilateral pleural effusions and scattered airspace disease which could   represent pulmonary edema, possibly with superimposed pneumonia. Multiple nondisplaced left-sided anterior rib fractures and questionable   nondisplaced fracture of the anterior cortex of the sternum. Correlate for   point tenderness. XR CHEST PORTABLE   Final Result   Interval improvement in pulmonary edema and bilateral effusions. XR CHEST PORTABLE   Final Result   Worsening pulmonary edema. XR CHEST PORTABLE   Final Result   Increasing opacification of the left hemithorax, consistent with a   combination of airspace disease and effusion. Findings of interstitial edema in the right lung are noted with trace   effusion. Unchanged appearance of the enteric tube and right internal jugular line. XR CHEST PORTABLE   Final Result   New right basilar and stable multifocal left lung airspace disease. XR CHEST PORTABLE   Final Result   No significant change in bilateral airspace disease given change in technique. VL DUP CAROTID BILATERAL   Final Result      XR CHEST PORTABLE   Final Result   Worsening pulmonary edema with grossly stable left basilar atelectasis and/or   pneumonia. XR ABDOMEN (KUB) (SINGLE AP VIEW)   Final Result   Nasogastric tube in good position. Persistent left lower lobe airspace disease         US GALLBLADDER RUQ   Final Result   Smaller free fluid seen in the right upper quadrant in the region of the   gallbladder. The gallbladder appears unremarkable. No gallstones. XR CHEST PORTABLE   Final Result   1. No significant change. MRI BRAIN WO CONTRAST   Final Result   1. Acute/early subacute infarction involving the left hippocampus. Additional punctate infarctions within the frontal lobes and posterior   temporal lobes bilaterally. This raises the possibility for thromboembolic   phenomenon from a central source. Left basilar airspace disease, pneumonia versus aspiration pneumonitis. There may be a component of pleural effusion. XR CHEST PORTABLE   Final Result   Atelectasis at the left lung base. Question of small left pleural effusion. IR REPOSITION TUBE GI FEEDING    (Results Pending)           Assessment/Plan:    Active Hospital Problems    Diagnosis Date Noted    Acute renal failure (ARF) ([facility] [N17.9] 08/20/2015     Priority: High     Class: Acute    Mucus plugging of bronchi [J98.09]     Acute pulmonary edema ([facility] [J81.0] 05/07/2019    Pulmonary infiltrate [R91.8] 05/07/2019    Atelectasis of left lung [J98.11] 05/07/2019    Staphylococcus aureus pneumonia ([facility] [J15.211] 05/06/2019    Altered mental status [R41.82]     Non-traumatic rhabdomyolysis [M62.82]     Acute encephalopathy [G93.40]     Arterial ischemic stroke, ICA, left, acute (HCC) [I63.232]     Pneumonia due to organism [J18.9]     Acute respiratory failure (HCC) [J96.00]     Prolonged Q-T interval on ECG [R94.31]     Hypokalemia [E87.6]     Elevated troponin [R74.8]     DKA (diabetic ketoacidoses) ([facility] [E13.10] 04/29/2019    Acidosis [E87.2] 04/29/2019    Cardiac arrest ([facility] [I46.9] 04/29/2019    DKA, type 2, not at goal [facility]) [E11.10] 04/29/2019       DKA  - s/p insulin gtt, IVFs, and serial labs per protocol. Converted to SC insulin. Had not been on any meds for months, A1c is 16. Necrotizing MSSA pneumonia, with septic shock and acute hypoxic respiratory failure present on admission  - broad spectrum abx for two weeks, then changed to augmentin 5/13, and pulmonary recommends continuing this through 5/27.    - extubated, s/p many bronch's, most recently on 5/16, when NG tube feeds were suctioned out of her lungs and she didn't even require sedation because she had no gag reflex. CVA's, probably embolic  - TTE, carotid dopplers, and prolonged telemetry without apparent source of emboli.   Continue aspirin, statin. UGIB, with hematemesis and ABLA  - monitor. Conservative management per GI, considering EGD and colonoscopy. LOPEZ  - resolved with IVFs and above treatments. Acute metabolic encephalopathy  - due to all of the above issues, treated accordingly. Dysphagia  - Aspirated badly with NG feeds. Asked surgery for J-tube.       DVT Prophylaxis: enoxaparin  Diet: Diet NPO Time Specified  Code Status: Full Code    PT/OT Eval Status: rec'd LTAC    Dispo - when respiratory status is consistently stable for outpatient management, when tolerating feeds via J-tube without the need for frequent bronchoscopies. Might be ready 5/20 or later.       Jean-Claude Lopez MD

## 2019-05-17 NOTE — PROGRESS NOTES
Pt's oxygen demand has increased from 3 liters nasal cannula to 12 liters high flow. SpO2 89-91% on 12LHF. Pt continues to have extremely weak cough. RT notified. Per RT, will give treatment, try the vest, and will attempt NT suction. Will continue to monitor pt status.

## 2019-05-17 NOTE — H&P
Date End Date Taking? Authorizing Provider   ondansetron (ZOFRAN ODT) 4 MG disintegrating tablet Take 1 tablet by mouth every 8 hours as needed for Nausea or Vomiting 7/17/16   Kati Florentino MD   omeprazole (PRILOSEC) 10 MG capsule Take 10 mg by mouth daily    Historical Provider, MD   lisinopril (PRINIVIL;ZESTRIL) 10 MG tablet Take 10 mg by mouth daily. Historical Provider, MD   metFORMIN (GLUCOPHAGE) 1000 MG tablet   Take 1,000 mg by mouth daily (with breakfast)     Historical Provider, MD   PARoxetine (PAXIL) 30 MG tablet Take 30 mg by mouth every morning. Historical Provider, MD   amitriptyline (ELAVIL) 50 MG tablet Take 25 mg by mouth nightly     Historical Provider, MD   atorvastatin (LIPITOR) 40 MG tablet Take 40 mg by mouth daily. Historical Provider, MD   insulin detemir (LEVEMIR) 100 UNIT/ML injection vial   Inject 63 Units into the skin nightly     Historical Provider, MD        Allergies: Allergies   Allergen Reactions    Aspirin Nausea And Vomiting       Nurses notes reviewed and agreed. Physical Exam:  Vital Signs: /76   Pulse 111   Temp 99.2 °F (37.3 °C) (Axillary)   Resp 18   Ht 5' 1\" (1.549 m)   Wt 111 lb 15.9 oz (50.8 kg)   SpO2 90%   BMI 21.16 kg/m²    Airway: Mallampati: II (soft palate, uvula, fauces visible)  Pulmonary:Normal  Cardiac:Normal  Abdomen:Normal    Pre-Procedure Assessment / Plan:  ASA: Class 3 - A patient with severe systemic disease that limits activity but is not incapacitating  Level of Sedation Plan: Moderate sedation  Post Procedure plan: Return to same level of care    I assessed the patient and find that the patient is in satisfactory condition to proceed with the planned procedure and sedation plan. I have explained the risk, benefits, and alternatives to the procedure; the patient's daughter understands and agrees to proceed.            [facility]  5/17/2019

## 2019-05-17 NOTE — PLAN OF CARE
Problem: Falls - Risk of:  Goal: Will remain free from falls  Description  Bed in lowest locked positions, call light within reach, side rails up x 2, bed check in place. Instructed patient to call before getting out of bed. Patient verbalized understanding. Outcome: Ongoing  Goal: Absence of physical injury  Description  Absence of physical injury  Outcome: Ongoing     Problem: Risk for Impaired Skin Integrity  Goal: Tissue integrity - skin and mucous membranes  Description  Structural intactness and normal physiological function of skin and  mucous membranes. Outcome: Ongoing     Problem: Nutrition  Goal: Optimal nutrition therapy  Outcome: Ongoing     Problem: Serum Glucose Level - Abnormal:  Goal: Ability to maintain appropriate glucose levels will improve  Description  Ability to maintain appropriate glucose levels will improve  Outcome: Ongoing  Note:   Diabetes education provided today:    Different diabetic medications. Managing high and low sugar readings. Rotation of sites for subcutaneous medication injection.         Goal: Ability to maintain appropriate glucose levels will improve to within specified parameters  Description  Ability to maintain appropriate glucose levels will improve to within specified parameters  Outcome: Ongoing     Problem: Sensory Perception - Impaired:  Goal: Ability to maintain a stable neurologic state will improve  Description  Ability to maintain a stable neurologic state will improve  Outcome: Ongoing  Goal: Demonstrations of improved sensory functioning will increase  Description  Demonstrations of improved sensory functioning will increase  Outcome: Ongoing  Goal: Decrease in sensory misperception frequency  Description  Decrease in sensory misperception frequency  Outcome: Ongoing  Goal: Able to refrain from responding to false sensory perceptions  Description  Able to refrain from responding to false sensory perceptions  Outcome: Ongoing  Goal: Demonstrates accurate environmental perceptions  Description  Demonstrates accurate environmental perceptions  Outcome: Ongoing  Goal: Able to distinguish between reality-based and nonreality-based thinking  Description  Able to distinguish between reality-based and nonreality-based thinking  Outcome: Ongoing  Goal: Able to interrupt nonreality-based thinking  Description  Able to interrupt nonreality-based thinking  Outcome: Ongoing     Problem: Discharge Planning:  Goal: Participates in care planning  Description  Participates in care planning  Outcome: Ongoing  Goal: Discharged to appropriate level of care  Description  Discharged to appropriate level of care  Outcome: Ongoing  Goal: Ability to perform activities of daily living will improve  Description  Ability to perform activities of daily living will improve  Outcome: Ongoing     Problem: Airway Clearance - Ineffective:  Goal: Ability to maintain a clear airway will improve  Description  Ability to maintain a clear airway will improve  Outcome: Ongoing     Problem: Anxiety/Stress:  Goal: Level of anxiety will decrease  Description  Level of anxiety will decrease  Outcome: Ongoing     Problem: Aspiration:  Goal: Absence of aspiration  Description  Absence of aspiration  Outcome: Ongoing     Problem:  Bowel Function - Altered:  Goal: Bowel elimination is within specified parameters  Description  Bowel elimination is within specified parameters  Outcome: Ongoing     Problem: Cardiac Output - Decreased:  Goal: Hemodynamic stability will improve  Description  Hemodynamic stability will improve  Outcome: Ongoing     Problem: Fluid Volume - Imbalance:  Goal: Absence of imbalanced fluid volume signs and symptoms  Description  Absence of imbalanced fluid volume signs and symptoms  Outcome: Ongoing     Problem: Gas Exchange - Impaired:  Goal: Levels of oxygenation will improve  Description  Levels of oxygenation will improve  Outcome: Ongoing     Problem: Mental Status - Impaired:  Goal: Mental status will be restored to baseline  Description  Mental status will be restored to baseline  Outcome: Ongoing     Problem: Nutrition Deficit:  Goal: Ability to achieve adequate nutritional intake will improve  Description  Ability to achieve adequate nutritional intake will improve  Outcome: Ongoing     Problem: Pain:  Goal: Pain level will decrease  Description  Pain level will decrease  Outcome: Ongoing  Goal: Recognizes and communicates pain  Description  Recognizes and communicates pain  Outcome: Ongoing  Goal: Control of acute pain  Description  Control of acute pain  Outcome: Ongoing  Goal: Control of chronic pain  Description  Control of chronic pain  Outcome: Ongoing     Problem: Skin Integrity - Impaired:  Goal: Will show no infection signs and symptoms  Description  Will show no infection signs and symptoms  Outcome: Ongoing  Goal: Absence of new skin breakdown  Description  Absence of new skin breakdown  Outcome: Ongoing     Problem: Sleep Pattern Disturbance:  Goal: Appears well-rested  Description  Appears well-rested  Outcome: Ongoing     Problem: Tissue Perfusion, Altered:  Goal: Circulatory function within specified parameters  Description  Circulatory function within specified parameters  Outcome: Ongoing     Problem: Tissue Perfusion - Cardiopulmonary, Altered:  Goal: Absence of angina  Description  Absence of angina  Outcome: Ongoing  Goal: Hemodynamic stability will improve  Description  Hemodynamic stability will improve  Outcome: Ongoing     Problem: HEMODYNAMIC STATUS  Goal: Patient has stable vital signs and fluid balance  Outcome: Ongoing     Problem: ACTIVITY INTOLERANCE/IMPAIRED MOBILITY  Goal: Mobility/activity is maintained at optimum level for patient  Outcome: Ongoing     Problem: COMMUNICATION IMPAIRMENT  Goal: Ability to express needs and understand communication  Outcome: Ongoing  Note:   Patient has a past medical history of Acute renal failure (ARF) ([facility] within reach, side rails up x 2, bed check in place. Instructed patient to call before getting out of bed. Patient verbalized understanding.       Outcome: Ongoing  Goal: Absence of physical injury  Description  Absence of physical injury  Outcome: Ongoing     Problem: Mood - Altered:  Goal: Mood stable  Description  Mood stable  Outcome: Ongoing  Goal: Absence of abusive behavior  Description  Absence of abusive behavior  Outcome: Ongoing  Goal: Verbalizations of feeling emotionally comfortable while being cared for will increase  Description  Verbalizations of feeling emotionally comfortable while being cared for will increase  Outcome: Ongoing     Problem: Psychomotor Activity - Altered:  Goal: Absence of psychomotor disturbance signs and symptoms  Description  Absence of psychomotor disturbance signs and symptoms  Outcome: Ongoing

## 2019-05-17 NOTE — OP NOTE
Arnaud Rivero   5/17/2019  Esophagogastroduodenoscopy  A pre-procedure re-evaluation was performed immediately prior to the procedure. Preprocedure Dx: dysphagia  Postprocedure Dx: mild esophagitis, 20F Entake PEG tube placed, external bolster at 2.5cm cole  Medications: Procedural sedation with Versed & Fentanyl  Complications: None  Estimated Blood Loss: <5cc  Specimens: were not obtained  Recommendation  1. Continue supportive care  2. Keep HOB elevated  3. Start meds and water flushes per PEG  4. Start tube feeds tomorrow  5. Flush PEG with 60mL free water every 6h and after use  6.  Will follow    Tony Shelton

## 2019-05-18 ENCOUNTER — APPOINTMENT (OUTPATIENT)
Dept: GENERAL RADIOLOGY | Age: 69
DRG: 853 | End: 2019-05-18
Payer: COMMERCIAL

## 2019-05-18 LAB
ANION GAP SERPL CALCULATED.3IONS-SCNC: 12 MMOL/L (ref 3–16)
BUN BLDV-MCNC: 12 MG/DL (ref 7–20)
CALCIUM SERPL-MCNC: 8.5 MG/DL (ref 8.3–10.6)
CHLORIDE BLD-SCNC: 101 MMOL/L (ref 99–110)
CO2: 28 MMOL/L (ref 21–32)
CREAT SERPL-MCNC: 0.6 MG/DL (ref 0.6–1.2)
CULTURE, RESPIRATORY: ABNORMAL
CULTURE, RESPIRATORY: ABNORMAL
GFR AFRICAN AMERICAN: >60
GFR NON-AFRICAN AMERICAN: >60
GLUCOSE BLD-MCNC: 110 MG/DL (ref 70–99)
GLUCOSE BLD-MCNC: 126 MG/DL (ref 70–99)
GLUCOSE BLD-MCNC: 138 MG/DL (ref 70–99)
GLUCOSE BLD-MCNC: 144 MG/DL (ref 70–99)
GLUCOSE BLD-MCNC: 84 MG/DL (ref 70–99)
GLUCOSE BLD-MCNC: 85 MG/DL (ref 70–99)
GLUCOSE BLD-MCNC: 87 MG/DL (ref 70–99)
GRAM STAIN RESULT: ABNORMAL
HCT VFR BLD CALC: 21.4 % (ref 36–48)
HEMATOLOGY PATH CONSULT: NO
HEMOGLOBIN: 7.1 G/DL (ref 12–16)
MCH RBC QN AUTO: 28.7 PG (ref 26–34)
MCHC RBC AUTO-ENTMCNC: 33.3 G/DL (ref 31–36)
MCV RBC AUTO: 86.3 FL (ref 80–100)
ORGANISM: ABNORMAL
PDW BLD-RTO: 14.8 % (ref 12.4–15.4)
PERFORMED ON: ABNORMAL
PERFORMED ON: NORMAL
PERFORMED ON: NORMAL
PLATELET # BLD: 1027 K/UL (ref 135–450)
PMV BLD AUTO: 6.3 FL (ref 5–10.5)
POTASSIUM REFLEX MAGNESIUM: 3.9 MMOL/L (ref 3.5–5.1)
RBC # BLD: 2.48 M/UL (ref 4–5.2)
SODIUM BLD-SCNC: 141 MMOL/L (ref 136–145)
WBC # BLD: 9.7 K/UL (ref 4–11)

## 2019-05-18 PROCEDURE — 6360000002 HC RX W HCPCS: Performed by: NURSE PRACTITIONER

## 2019-05-18 PROCEDURE — 80048 BASIC METABOLIC PNL TOTAL CA: CPT

## 2019-05-18 PROCEDURE — 85027 COMPLETE CBC AUTOMATED: CPT

## 2019-05-18 PROCEDURE — 2580000003 HC RX 258: Performed by: NURSE PRACTITIONER

## 2019-05-18 PROCEDURE — 99233 SBSQ HOSP IP/OBS HIGH 50: CPT | Performed by: INTERNAL MEDICINE

## 2019-05-18 PROCEDURE — 71045 X-RAY EXAM CHEST 1 VIEW: CPT

## 2019-05-18 PROCEDURE — 2580000003 HC RX 258: Performed by: INTERNAL MEDICINE

## 2019-05-18 PROCEDURE — C9113 INJ PANTOPRAZOLE SODIUM, VIA: HCPCS | Performed by: INTERNAL MEDICINE

## 2019-05-18 PROCEDURE — 6360000002 HC RX W HCPCS: Performed by: INTERNAL MEDICINE

## 2019-05-18 PROCEDURE — 6370000000 HC RX 637 (ALT 250 FOR IP): Performed by: INTERNAL MEDICINE

## 2019-05-18 PROCEDURE — 94640 AIRWAY INHALATION TREATMENT: CPT

## 2019-05-18 PROCEDURE — 2700000000 HC OXYGEN THERAPY PER DAY

## 2019-05-18 PROCEDURE — 94761 N-INVAS EAR/PLS OXIMETRY MLT: CPT

## 2019-05-18 PROCEDURE — 2060000000 HC ICU INTERMEDIATE R&B

## 2019-05-18 RX ORDER — MORPHINE SULFATE 2 MG/ML
2 INJECTION, SOLUTION INTRAMUSCULAR; INTRAVENOUS
Status: DISCONTINUED | OUTPATIENT
Start: 2019-05-18 | End: 2019-05-19

## 2019-05-18 RX ADMIN — MICONAZOLE NITRATE: 2 POWDER TOPICAL at 21:26

## 2019-05-18 RX ADMIN — ACETAMINOPHEN 650 MG: 650 SUPPOSITORY RECTAL at 15:25

## 2019-05-18 RX ADMIN — MORPHINE SULFATE 2 MG: 2 INJECTION, SOLUTION INTRAMUSCULAR; INTRAVENOUS at 22:07

## 2019-05-18 RX ADMIN — MORPHINE SULFATE 2 MG: 2 INJECTION, SOLUTION INTRAMUSCULAR; INTRAVENOUS at 09:49

## 2019-05-18 RX ADMIN — IPRATROPIUM BROMIDE AND ALBUTEROL SULFATE 1 AMPULE: .5; 3 SOLUTION RESPIRATORY (INHALATION) at 07:59

## 2019-05-18 RX ADMIN — PANTOPRAZOLE SODIUM 40 MG: 40 INJECTION, POWDER, FOR SOLUTION INTRAVENOUS at 08:19

## 2019-05-18 RX ADMIN — IPRATROPIUM BROMIDE AND ALBUTEROL SULFATE 1 AMPULE: .5; 3 SOLUTION RESPIRATORY (INHALATION) at 23:52

## 2019-05-18 RX ADMIN — PANTOPRAZOLE SODIUM 40 MG: 40 INJECTION, POWDER, FOR SOLUTION INTRAVENOUS at 21:12

## 2019-05-18 RX ADMIN — PIPERACILLIN SODIUM,TAZOBACTAM SODIUM 4.5 G: 4; .5 INJECTION, POWDER, FOR SOLUTION INTRAVENOUS at 15:26

## 2019-05-18 RX ADMIN — PIPERACILLIN SODIUM,TAZOBACTAM SODIUM 4.5 G: 4; .5 INJECTION, POWDER, FOR SOLUTION INTRAVENOUS at 23:07

## 2019-05-18 RX ADMIN — IPRATROPIUM BROMIDE AND ALBUTEROL SULFATE 1 AMPULE: .5; 3 SOLUTION RESPIRATORY (INHALATION) at 00:02

## 2019-05-18 RX ADMIN — SODIUM CHLORIDE, PRESERVATIVE FREE 10 ML: 5 INJECTION INTRAVENOUS at 03:03

## 2019-05-18 RX ADMIN — Medication 10 ML: at 08:19

## 2019-05-18 RX ADMIN — IPRATROPIUM BROMIDE AND ALBUTEROL SULFATE 1 AMPULE: .5; 3 SOLUTION RESPIRATORY (INHALATION) at 16:08

## 2019-05-18 RX ADMIN — MORPHINE SULFATE 2 MG: 2 INJECTION, SOLUTION INTRAMUSCULAR; INTRAVENOUS at 03:02

## 2019-05-18 RX ADMIN — MORPHINE SULFATE 2 MG: 2 INJECTION, SOLUTION INTRAMUSCULAR; INTRAVENOUS at 15:11

## 2019-05-18 RX ADMIN — MORPHINE SULFATE 2 MG: 2 INJECTION, SOLUTION INTRAMUSCULAR; INTRAVENOUS at 19:37

## 2019-05-18 RX ADMIN — IPRATROPIUM BROMIDE AND ALBUTEROL SULFATE 1 AMPULE: .5; 3 SOLUTION RESPIRATORY (INHALATION) at 21:01

## 2019-05-18 RX ADMIN — MICONAZOLE NITRATE: 2 POWDER TOPICAL at 08:19

## 2019-05-18 RX ADMIN — IPRATROPIUM BROMIDE AND ALBUTEROL SULFATE 1 AMPULE: .5; 3 SOLUTION RESPIRATORY (INHALATION) at 12:22

## 2019-05-18 RX ADMIN — IPRATROPIUM BROMIDE AND ALBUTEROL SULFATE 1 AMPULE: .5; 3 SOLUTION RESPIRATORY (INHALATION) at 03:51

## 2019-05-18 RX ADMIN — PIPERACILLIN SODIUM,TAZOBACTAM SODIUM 4.5 G: 4; .5 INJECTION, POWDER, FOR SOLUTION INTRAVENOUS at 06:26

## 2019-05-18 RX ADMIN — Medication 10 ML: at 21:12

## 2019-05-18 ASSESSMENT — PAIN SCALES - WONG BAKER: WONGBAKER_NUMERICALRESPONSE: 4

## 2019-05-18 ASSESSMENT — PAIN SCALES - GENERAL
PAINLEVEL_OUTOF10: 6
PAINLEVEL_OUTOF10: 7
PAINLEVEL_OUTOF10: 4
PAINLEVEL_OUTOF10: 4
PAINLEVEL_OUTOF10: 7
PAINLEVEL_OUTOF10: 8

## 2019-05-18 NOTE — PROGRESS NOTES
Hospitalist Progress Note      PCP: Srinivas Abdi PA-C    Date of Admission: 4/29/2019    Chief Complaint:  Patient was found unresponsive    Hospital Course:   80 yo female with h/o CAD, DM2, HTN, depression admitted after being found unresponsive with acute respiratory failure due to pneumonia, DKA, metabolic encephalopathy, LOPEZ, metabolic acidosis, acute GI bleed. She has been in the ICU, she is followed by pulmonary, and GI in consultation      Subjective:  Continues to get weaker and drowsier. Hypoxia again worsened, even without tube feeds being on. CXR looks worse, pulm input pending. Medications:  Reviewed    Infusion Medications    lactated ringers 75 mL/hr at 05/18/19 0243    dextrose       Scheduled Medications    piperacillin-tazobactam  4.5 g Intravenous Q8H    insulin lispro  0-12 Units Subcutaneous Q4H    ipratropium-albuterol  1 ampule Inhalation Q4H    insulin glargine  18 Units Subcutaneous Daily    psyllium  1 packet Per NG tube Daily    pneumococcal 13-valent conjugate  0.5 mL Intramuscular Once    saccharomyces boulardii  250 mg Oral BID    atorvastatin  40 mg Oral Nightly    aspirin  325 mg Oral Daily    miconazole   Topical BID    pantoprazole  40 mg Intravenous BID    sodium chloride flush  10 mL Intravenous 2 times per day     PRN Meds: prochlorperazine, morphine, glucose, dextrose, glucagon (rDNA), dextrose, magnesium sulfate, potassium chloride, acetaminophen, sodium chloride flush, magnesium hydroxide, acetaminophen      Intake/Output Summary (Last 24 hours) at 5/18/2019 1015  Last data filed at 5/18/2019 0410  Gross per 24 hour   Intake 558.87 ml   Output 1200 ml   Net -641.13 ml       Physical Exam Performed:    BP (!) 143/76   Pulse 109   Temp 100.3 °F (37.9 °C)   Resp 20   Ht 5' 1\" (1.549 m)   Wt 112 lb 7 oz (51 kg)   SpO2 94%   BMI 21.24 kg/m²     General appearance: She is lying in bed, looks very weak and debilitated.     HEENT: Pupils equal, round, and reactive to light. Conjunctivae/corneas clear. Neck: Supple, with full range of motion. Trachea midline. Respiratory:  Bilateral breath sounds, decreased at bases, intermittent gurgling breath sounds  Cardiovascular: Regular rate and rhythm with normal S1/S2   Abdomen: Soft, mild diffuse tenderness, non-distended with normal bowel sounds. PEG in place. Musculoskeletal: No clubbing, cyanosis or edema bilaterally. Neurologic:  she has expressive aphasia. No gag reflex. Diffuse weakness. Psychiatric:  drowsy, falls asleep easily. Nods yes or no to basic questions if you can keep her attention long enough. The extent of her insight is unclear. Capillary Refill: Brisk,< 3 seconds   Peripheral Pulses: +2 palpable, equal bilaterally         Labs:   Recent Labs     05/16/19  0444 05/17/19  0525 05/18/19  0550   WBC 8.0 9.1 9.7   HGB 8.3* 7.6* 7.1*   HCT 25.5* 22.9* 21.4*   * 1,007* 1,027*     Recent Labs     05/16/19  0444 05/18/19  0550    141   K 3.9 3.9    101   CO2 29 28   BUN 14 12   CREATININE 0.6 0.6   CALCIUM 8.5 8.5     No results for input(s): AST, ALT, BILIDIR, BILITOT, ALKPHOS in the last 72 hours. Recent Labs     05/16/19  0444   INR 1.24*     No results for input(s): Victorine Elyahua in the last 72 hours. Urinalysis:      Lab Results   Component Value Date    NITRU Negative 04/29/2019    WBCUA 10-20 04/29/2019    BACTERIA 1+ 04/29/2019    RBCUA 3-5 04/29/2019    BLOODU MODERATE 04/29/2019    SPECGRAV 1.025 04/29/2019    GLUCOSEU >=1000 04/29/2019       Radiology:  XR CHEST PORTABLE   Final Result   Right basilar infiltrate representing atelectasis versus pneumonia. Stable opacification of the left lung representing large effusion and/or   consolidation. XR CHEST PORTABLE   Final Result   New PICC line on the right terminates at the atrial caval junction. Increased airspace disease and effusion on the left.          XR ABDOMEN (KUB) (SINGLE AP didn't even require sedation because she had no gag reflex. CVA's, probably embolic  - TTE, carotid dopplers, and prolonged telemetry without apparent source of emboli. Continue aspirin, statin. UGIB, with hematemesis and ABLA  - monitor. Conservative management per GI.  PPI. Noted mild esophagitis when PEG was placed 5/17. LOPEZ  - resolved with IVFs and above treatments. Acute metabolic encephalopathy  - due to all of the above issues, treated accordingly. Dysphagia  - Aspirated badly with NG feeds. Discussed with GI, it was decided to proceed with PEG and eventual extension into jejunum rather than initial surgical placement of J-tube.       DVT Prophylaxis: enoxaparin  Diet: DIET TUBE FEED CONTINUOUS/CYCLIC NPO; Diabetic 1.5 (Glucerna 1.5); Nasogastric; Continuous; 25; 50; 20  Code Status: Full Code. Discussed poor prognosis in grim detail with patient and family. Patient wants \"everything done. \"  Palliative care consulted. PT/OT Eval Status: rec'd LTAC    Dispo - when respiratory status is consistently stable for outpatient management, when tolerating feeds via PEG tube without the need for frequent bronchoscopies. Might be ready 5/23. Insurance denied LTAC despite peer to peer. Plan is for SNF eventually.       Arnulfo Varela MD

## 2019-05-18 NOTE — PROGRESS NOTES
Sent the following to NP Severo Inks: \"The pt's O2 level dropped to 86% on a high-flow nasal cannula. Respiratory came and did a breathing treatment, and she was at 100% while the mask on. Within a few minutes of her being back on the nasal cannula the pt was back down to 86%. [address] [place] [address], respiratory therapist, put a non-rebreather mask at 15 L/min and the pt is at 98%. Please advise. Thank you. \"    Electronically signed by Hayde Concepcion RN on 5/17/2019 at 10:16 PM       MELANIA Diamond advised RN to try putting the nasal cannula prongs in the pt's mouth to see if that was effective. RN and respiratory therapist [address] [place] [address] entered room and put the prongs in pt's mouth. The pt's O2 sat numbers stayed at 93% to 96%, but she pushed the cannula out of her mouth. The pt is currently back on the non-rebreather at 100%. Sent the following to MELANIA Diamond: \"We tried the nasal cannula prongs in the pt's mouth. She stayed between 93% and 96% but kept pulling them out of her mouth. She is back on the non-rebreather mask. Thanks. \"    MELANIA Diamond answered, \"Ok\"    Will continue to monitor.     Electronically signed by Hayde Concepcion RN on 5/17/2019 at 10:33 PM

## 2019-05-18 NOTE — PROGRESS NOTES
Lab notified RN that pt's platelets were high. Sent the following to Dr. Ashish Silvestre: \"The pt had a critical lab value. Her platelets are at 5,847. Please advise. Thank you. \"    Electronically signed by Korey Torres RN on 5/18/2019 at 6:09 AM       At 9789 Dr. Ashish Silvestre replied, \"Chronic or acute. \"    RN replied, \"It was 1,007 yesterday. \"    Electronically signed by Korey Torres RN on 5/18/2019 at 6:45 AM

## 2019-05-18 NOTE — PROGRESS NOTES
Vitals:    05/18/19 0805   BP: (!) 146/78   Pulse: 108   Resp:    Temp: 101 °F (38.3 °C)   SpO2:      Pt resting quietly in bed alert and oriented. Pt had no acute events overnight. Pt denies having pain at this time and denies having further needs. Patient on 14 L HF nasal Cannula at this time with O2 saturation of 95. Antibiotics infusing at this time. Bed locked in lowest position, call light within reach, bedside table within reach. Will continue to monitor.

## 2019-05-18 NOTE — PROGRESS NOTES
Lymphadenopathy:     She has no cervical adenopathy. Neurological:   confused   Skin: Skin is warm and dry. No rash noted. She is not diaphoretic. Data Reviewed:   LABS:  CBC:  Recent Labs     05/16/19  0444 05/17/19  0525 05/18/19  0550   WBC 8.0 9.1 9.7   HGB 8.3* 7.6* 7.1*   HCT 25.5* 22.9* 21.4*   MCV 86.6 86.1 86.3   * 1,007* 1,027*     BMP:  Recent Labs     05/16/19  0444 05/18/19  0550    141   K 3.9 3.9    101   CO2 29 28   BUN 14 12   CREATININE 0.6 0.6     LIVER PROFILE:   No results for input(s): AST, ALT, LIPASE, ALB, BILIDIR, BILITOT, ALKPHOS in the last 72 hours. Invalid input(s): AMYLASE  PT/INR:  Recent Labs     05/16/19 0444   PROTIME 14.1*   INR 1.24*     APTT: No results for input(s): APTT in the last 72 hours. UA:  No results for input(s): NITRITE, COLORU, PHUR, LABCAST, WBCUA, RBCUA, MUCUS, TRICHOMONAS, YEAST, BACTERIA, CLARITYU, SPECGRAV, LEUKOCYTESUR, UROBILINOGEN, BILIRUBINUR, BLOODU, GLUCOSEU, AMORPHOUS in the last 72 hours. Invalid input(s): KETONESU  No results for input(s): PHART, UUD6BFB, PO2ART in the last 72 hours. CT chest personally reviewed, dense left sided infiltrate and small to moderate free flowing pleural effusions bilaterally        Assessment:     1. Acute resp failure, hypoxic              -left sided necrotizing pneumonia, MSSA  2. DM poorly controlled  3. Acute encephalopathy              -acute ischemic stroke  4. Dysphagia  5. Critical illness myopathy  6. Acute gastritis/GI bleed, acute anemia    Plan:      -Had a lengthy discussion with the patient and family at the bedside, they declined repeat bronch. Reviewed her ongoing anticipated respiratory decline due to poor secretion control and recurrent aspiration.  They did not wish to pursue endotracheal intubation or resuscitative efforts should she arrest. I then explained the limitations of bipap support in this circumstance as the pressure would further inhibit her

## 2019-05-18 NOTE — PROGRESS NOTES
Pt needs a non-rebreather or her O2 sats drop into the 70s. She has been pulling the mask off. RN applied soft wrist restraint to right hand. Sent the following to Dr. Neena Benavides: \"The pt would not stop removing non-rebreather mask. Her daughters are holding her left hand. I put her right hand in a restraint. Will look for orders. Thank you. \"    Dr Neena Benavides replied, \"Please pit order as verbal. I can on the floor shortly. \"    RN will put order in and continue to monitor.     Electronically signed by Moriah Vogel RN on 5/18/2019 at 6:43 AM

## 2019-05-18 NOTE — OP NOTE
[facility]                                OPERATIVE REPORT    PATIENT NAME: Vandana Ramon                      :        1950  MED REC NO:   [id number]                          ROOM:       2839  ACCOUNT NO:   [de-identified]                           ADMIT DATE: 2019  PROVIDER:     Belenda Schaumann, MD    DATE OF PROCEDURE:  2019    PREOPERATIVE DIAGNOSES:  1. Aspiration pneumonia. 2.  Dysphagia. 3.  Malnutrition. PROCEDURE:  EGD with PEG tube placement. POSTPROCEDURE DIAGNOSES:  1.  Mild esophagitis likely reflux esophagitis. 2.  Hiatal hernia. 3.  Successful placement of 20-Swedish PEG tube. 4.  External bumper was placed at 2.5 cm cole. SURGEON:  Belenda Schaumann, MD    PROCEDURE INDICATIONS:  This is a 61-year-old female with a history of  hypertension, diabetes, CVA, asthma, seizure disorder, admitted with  cardiac arrest and DKA. She eventually was extubated, but continued to  have recurrent aspiration per despite NG tube placement for nutritional  support. She is in need for PEG tube placement for nutritional support. MEDICATIONS:  Versed 0.5 mg, fentanyl 12.5 mcg. PROCEDURE DETAILS:  Informed consent was obtained after discussing  risks, benefits, and alternatives. Full history and physical was  performed. The patient was classified ASA class III. Medications were  sequentially given to achieve adequate sedation. Cardiopulmonary status  was continuously monitored throughout the procedure. The patient was  placed in the supine position. Once the patient was deemed to be  adequately sedated, a standard upper gastroscope was inserted into the  mouth and advanced under direct visualization to the second portion of  the duodenum. Entire mucosa of esophagus, stomach,  and duodenum were  examined carefully.   The patient tolerated the procedure well without  any difficulties. FINDINGS:  ESOPHAGUS:  The entire esophagus appeared normal except for minimal  esophagitis characterized by erythema, granularity, edema and erosion in  the lower third of the esophagus. This is likely reflux-induced  esophagitis. There was a small hiatal hernia. STOMACH:  The entire stomach appeared normal.  The bilious liquid was  aspirated. A suitable spot for PEG tube placement was identified using  transillumination method and palpation method. Once identified, the  skin was cleaned and prepped in a sterile manner. 5 mL of lidocaine was  injected in the subcutaneous tissue for local anesthesia. Then, 1 cm  incision was made in the abdominal wall. A 5 mm trocar loaded with a  needle was inserted in the incision site and observed entering into the  gastric lumen. This was grasped using a snare in the gastric lumen. The needle was then exchanged in favor of a guidewire. The wire was  then grasped and pulled out of the patient's mouth using the snare. The  tip of the PEG tube was attached to the wire. A traction was applied to  the abdominal wall and wire was pulled back into the stomach as it  pulled the PEG tube with it. External bumper was placed at 2.5 cm cole. A dressing was applied successfully. DUODENUM:  Examined portion of the duodenum appeared normal.    SUMMARY:  1.  Mild esophagitis. 2.  Reflux esophagitis. 3.  Hiatal hernia. 4.  Successful placement of 20-[place] ConMed EnTake pull-method PEG  tube. 5.  External bumper was placed at 2.5 cm cole. RECOMMENDATIONS:  1. Return the patient to floor for continuous medical care. 2.  Continue antibiotics. 3.  Keep the head of the bed elevated during tube feed infusions. 4.  Start meds and water flushes per PEG tube in 4 to 6 hours. 5.  Start tube feeds tomorrow. 6.  Flush PEG tube with 60 mL of free water every 6 hours and after use.         Tucker Mo MD    D: 05/17/2019 13:23:29       T: 05/18/2019 0:58:47     [id number]  Job#: [id number]     Doc#: [id number]    CC:  MD Nan Morejon PA

## 2019-05-18 NOTE — PROGRESS NOTES
Sent the following to Annette Harding NP:\"The pt has become restless and is pulling the mask from her face. Is there anything we can give her to help with her anxiety. When the mask is off her O2 sat falls rapidly. Will look for orders. Thank you. \"

## 2019-05-19 PROBLEM — J69.0 ASPIRATION PNEUMONIA (HCC): Status: ACTIVE | Noted: 2019-05-19

## 2019-05-19 LAB
ANION GAP SERPL CALCULATED.3IONS-SCNC: 15 MMOL/L (ref 3–16)
BUN BLDV-MCNC: 10 MG/DL (ref 7–20)
CALCIUM SERPL-MCNC: 8.6 MG/DL (ref 8.3–10.6)
CHLORIDE BLD-SCNC: 101 MMOL/L (ref 99–110)
CO2: 25 MMOL/L (ref 21–32)
CREAT SERPL-MCNC: 0.6 MG/DL (ref 0.6–1.2)
GFR AFRICAN AMERICAN: >60
GFR NON-AFRICAN AMERICAN: >60
GLUCOSE BLD-MCNC: 133 MG/DL (ref 70–99)
GLUCOSE BLD-MCNC: 138 MG/DL (ref 70–99)
GLUCOSE BLD-MCNC: 139 MG/DL (ref 70–99)
GLUCOSE BLD-MCNC: 139 MG/DL (ref 70–99)
GLUCOSE BLD-MCNC: 142 MG/DL (ref 70–99)
GLUCOSE BLD-MCNC: 164 MG/DL (ref 70–99)
GLUCOSE BLD-MCNC: 168 MG/DL (ref 70–99)
HCT VFR BLD CALC: 21.9 % (ref 36–48)
HEMOGLOBIN: 7.3 G/DL (ref 12–16)
MCH RBC QN AUTO: 28.8 PG (ref 26–34)
MCHC RBC AUTO-ENTMCNC: 33.6 G/DL (ref 31–36)
MCV RBC AUTO: 85.8 FL (ref 80–100)
PDW BLD-RTO: 14.6 % (ref 12.4–15.4)
PERFORMED ON: ABNORMAL
PLATELET # BLD: 972 K/UL (ref 135–450)
PMV BLD AUTO: 6.9 FL (ref 5–10.5)
POTASSIUM REFLEX MAGNESIUM: 3.9 MMOL/L (ref 3.5–5.1)
RBC # BLD: 2.55 M/UL (ref 4–5.2)
SODIUM BLD-SCNC: 141 MMOL/L (ref 136–145)
WBC # BLD: 10.8 K/UL (ref 4–11)

## 2019-05-19 PROCEDURE — 2580000003 HC RX 258: Performed by: INTERNAL MEDICINE

## 2019-05-19 PROCEDURE — 99233 SBSQ HOSP IP/OBS HIGH 50: CPT | Performed by: INTERNAL MEDICINE

## 2019-05-19 PROCEDURE — 6370000000 HC RX 637 (ALT 250 FOR IP): Performed by: INTERNAL MEDICINE

## 2019-05-19 PROCEDURE — 6360000002 HC RX W HCPCS: Performed by: INTERNAL MEDICINE

## 2019-05-19 PROCEDURE — 6360000002 HC RX W HCPCS: Performed by: NURSE PRACTITIONER

## 2019-05-19 PROCEDURE — 2700000000 HC OXYGEN THERAPY PER DAY

## 2019-05-19 PROCEDURE — 94640 AIRWAY INHALATION TREATMENT: CPT

## 2019-05-19 PROCEDURE — 2580000003 HC RX 258: Performed by: NURSE PRACTITIONER

## 2019-05-19 PROCEDURE — C9113 INJ PANTOPRAZOLE SODIUM, VIA: HCPCS | Performed by: INTERNAL MEDICINE

## 2019-05-19 PROCEDURE — 85027 COMPLETE CBC AUTOMATED: CPT

## 2019-05-19 PROCEDURE — 94761 N-INVAS EAR/PLS OXIMETRY MLT: CPT

## 2019-05-19 PROCEDURE — 2060000000 HC ICU INTERMEDIATE R&B

## 2019-05-19 PROCEDURE — 80048 BASIC METABOLIC PNL TOTAL CA: CPT

## 2019-05-19 RX ORDER — LORAZEPAM 2 MG/ML
0.5 INJECTION INTRAMUSCULAR
Status: DISCONTINUED | OUTPATIENT
Start: 2019-05-19 | End: 2019-05-20 | Stop reason: HOSPADM

## 2019-05-19 RX ORDER — LORAZEPAM 2 MG/ML
1 CONCENTRATE ORAL
Qty: 30 ML | Refills: 0 | Status: SHIPPED | OUTPATIENT
Start: 2019-05-19 | End: 2019-06-02

## 2019-05-19 RX ORDER — MORPHINE SULFATE 100 MG/5ML
10 SOLUTION ORAL
Qty: 30 ML | Refills: 0 | Status: SHIPPED | OUTPATIENT
Start: 2019-05-19 | End: 2019-05-29

## 2019-05-19 RX ORDER — MORPHINE SULFATE 2 MG/ML
2 INJECTION, SOLUTION INTRAMUSCULAR; INTRAVENOUS
Status: DISCONTINUED | OUTPATIENT
Start: 2019-05-19 | End: 2019-05-20 | Stop reason: HOSPADM

## 2019-05-19 RX ORDER — LORAZEPAM 2 MG/ML
0.5 INJECTION INTRAMUSCULAR ONCE
Status: COMPLETED | OUTPATIENT
Start: 2019-05-19 | End: 2019-05-19

## 2019-05-19 RX ADMIN — IPRATROPIUM BROMIDE AND ALBUTEROL SULFATE 1 AMPULE: .5; 3 SOLUTION RESPIRATORY (INHALATION) at 11:53

## 2019-05-19 RX ADMIN — IPRATROPIUM BROMIDE AND ALBUTEROL SULFATE 1 AMPULE: .5; 3 SOLUTION RESPIRATORY (INHALATION) at 21:15

## 2019-05-19 RX ADMIN — Medication 10 ML: at 08:39

## 2019-05-19 RX ADMIN — MORPHINE SULFATE 2 MG: 2 INJECTION, SOLUTION INTRAMUSCULAR; INTRAVENOUS at 10:24

## 2019-05-19 RX ADMIN — INSULIN LISPRO 2 UNITS: 100 INJECTION, SOLUTION INTRAVENOUS; SUBCUTANEOUS at 09:04

## 2019-05-19 RX ADMIN — ATORVASTATIN CALCIUM 40 MG: 40 TABLET, FILM COATED ORAL at 22:21

## 2019-05-19 RX ADMIN — LORAZEPAM 0.5 MG: 2 INJECTION, SOLUTION INTRAMUSCULAR; INTRAVENOUS at 15:21

## 2019-05-19 RX ADMIN — Medication 250 MG: at 22:21

## 2019-05-19 RX ADMIN — LORAZEPAM 0.5 MG: 2 INJECTION, SOLUTION INTRAMUSCULAR; INTRAVENOUS at 00:44

## 2019-05-19 RX ADMIN — PIPERACILLIN SODIUM,TAZOBACTAM SODIUM 4.5 G: 4; .5 INJECTION, POWDER, FOR SOLUTION INTRAVENOUS at 08:38

## 2019-05-19 RX ADMIN — MICONAZOLE NITRATE: 2 POWDER TOPICAL at 22:22

## 2019-05-19 RX ADMIN — MORPHINE SULFATE 2 MG: 2 INJECTION, SOLUTION INTRAMUSCULAR; INTRAVENOUS at 12:52

## 2019-05-19 RX ADMIN — LORAZEPAM 0.5 MG: 2 INJECTION, SOLUTION INTRAMUSCULAR; INTRAVENOUS at 12:11

## 2019-05-19 RX ADMIN — PANTOPRAZOLE SODIUM 40 MG: 40 INJECTION, POWDER, FOR SOLUTION INTRAVENOUS at 08:39

## 2019-05-19 RX ADMIN — Medication 10 ML: at 21:50

## 2019-05-19 RX ADMIN — SODIUM CHLORIDE, PRESERVATIVE FREE 10 ML: 5 INJECTION INTRAVENOUS at 22:22

## 2019-05-19 RX ADMIN — IPRATROPIUM BROMIDE AND ALBUTEROL SULFATE 1 AMPULE: .5; 3 SOLUTION RESPIRATORY (INHALATION) at 16:03

## 2019-05-19 RX ADMIN — INSULIN LISPRO 2 UNITS: 100 INJECTION, SOLUTION INTRAVENOUS; SUBCUTANEOUS at 21:50

## 2019-05-19 RX ADMIN — MORPHINE SULFATE 2 MG: 2 INJECTION, SOLUTION INTRAMUSCULAR; INTRAVENOUS at 22:29

## 2019-05-19 RX ADMIN — SODIUM CHLORIDE, POTASSIUM CHLORIDE, SODIUM LACTATE AND CALCIUM CHLORIDE: 600; 310; 30; 20 INJECTION, SOLUTION INTRAVENOUS at 12:59

## 2019-05-19 RX ADMIN — AMPICILLIN AND SULBACTAM 1.5 G: 1; .5 INJECTION, POWDER, FOR SOLUTION INTRAMUSCULAR; INTRAVENOUS at 15:16

## 2019-05-19 RX ADMIN — ACETAMINOPHEN 650 MG: 650 SUPPOSITORY RECTAL at 08:38

## 2019-05-19 RX ADMIN — IPRATROPIUM BROMIDE AND ALBUTEROL SULFATE 1 AMPULE: .5; 3 SOLUTION RESPIRATORY (INHALATION) at 07:58

## 2019-05-19 RX ADMIN — PANTOPRAZOLE SODIUM 40 MG: 40 INJECTION, POWDER, FOR SOLUTION INTRAVENOUS at 22:21

## 2019-05-19 RX ADMIN — MICONAZOLE NITRATE: 2 POWDER TOPICAL at 08:39

## 2019-05-19 RX ADMIN — AMPICILLIN AND SULBACTAM 1.5 G: 1; .5 INJECTION, POWDER, FOR SOLUTION INTRAMUSCULAR; INTRAVENOUS at 21:50

## 2019-05-19 ASSESSMENT — PAIN SCALES - GENERAL
PAINLEVEL_OUTOF10: 4
PAINLEVEL_OUTOF10: 7
PAINLEVEL_OUTOF10: 6
PAINLEVEL_OUTOF10: 4
PAINLEVEL_OUTOF10: 8
PAINLEVEL_OUTOF10: 4
PAINLEVEL_OUTOF10: 7

## 2019-05-19 ASSESSMENT — PAIN SCALES - WONG BAKER: WONGBAKER_NUMERICALRESPONSE: 8

## 2019-05-19 NOTE — PROGRESS NOTES
Vitals:    05/19/19 0809   BP: (!) 174/79   Pulse: 104   Resp: 18   Temp: 100.2 °F (37.9 °C)   SpO2:      Pt resting quietly in bed alert and oriented. Pt had no acute events overnight. Pt non verbal but does not appear to be  having pain at this time or having further needs. Patient on 15L high flow nasal cannula at this time. Lactated ringers infusing at at 50 ml/hr. Patient in Bilateral Restraints at this time, range of motion exercises performed. Bed locked in lowest position, call light within reach, bedside table within reach.  Will continue to monitor

## 2019-05-19 NOTE — PROGRESS NOTES
Pulmonary & Critical Care Inpatient Progress Note   Favian Barreto MD     REASON FOR TODAY'S VISIT:  Assistance with critical care management    SUBJECTIVE:   Remains on 15 LPM of oxygen, intermittent desaturations  Hospice to see still     Scheduled Meds:   piperacillin-tazobactam  4.5 g Intravenous Q8H    insulin lispro  0-12 Units Subcutaneous Q4H    ipratropium-albuterol  1 ampule Inhalation Q4H    psyllium  1 packet Per NG tube Daily    pneumococcal 13-valent conjugate  0.5 mL Intramuscular Once    saccharomyces boulardii  250 mg Oral BID    atorvastatin  40 mg Oral Nightly    aspirin  325 mg Oral Daily    miconazole   Topical BID    pantoprazole  40 mg Intravenous BID    sodium chloride flush  10 mL Intravenous 2 times per day       Continuous Infusions:   lactated ringers 50 mL/hr at 05/18/19 1114    dextrose         PRN Meds:  morphine, prochlorperazine, glucose, dextrose, glucagon (rDNA), dextrose, magnesium sulfate, potassium chloride, acetaminophen, sodium chloride flush, magnesium hydroxide, acetaminophen    ALLERGIES:  Patient is allergic to aspirin. Objective:   PHYSICAL EXAM:  BP (!) 174/79   Pulse 109   Temp 100.2 °F (37.9 °C) (Axillary)   Resp 18   Ht 5' 1\" (1.549 m)   Wt 117 lb 11.6 oz (53.4 kg)   SpO2 98%   BMI 22.24 kg/m²    Physical Exam   Constitutional: She appears well-developed and well-nourished. No distress. HENT:   Head: Normocephalic and atraumatic. Mouth/Throat: Oropharynx is clear and moist. No oropharyngeal exudate. Neck: Neck supple. No JVD present. Cardiovascular: Normal heart sounds. Exam reveals no gallop and no friction rub. No murmur heard. Pulmonary/Chest: Effort normal. She has no wheezes. She has no rales. Equal chest rise and expansion bilaterally   Abdominal: Soft. Bowel sounds are normal. She exhibits no distension. There is no tenderness. Musculoskeletal: She exhibits no edema. Lymphadenopathy:     She has no cervical adenopathy. Neurological:   confused   Skin: Skin is warm and dry. No rash noted. She is not diaphoretic. Data Reviewed:   LABS:  CBC:  Recent Labs     05/17/19  0525 05/18/19  0550 05/19/19  0410   WBC 9.1 9.7 10.8   HGB 7.6* 7.1* 7.3*   HCT 22.9* 21.4* 21.9*   MCV 86.1 86.3 85.8   PLT 1,007* 1,027* 972*     BMP:  Recent Labs     05/18/19  0550 05/19/19  0410    141   K 3.9 3.9    101   CO2 28 25   BUN 12 10   CREATININE 0.6 0.6     LIVER PROFILE:   No results for input(s): AST, ALT, LIPASE, ALB, BILIDIR, BILITOT, ALKPHOS in the last 72 hours. Invalid input(s): AMYLASE  PT/INR:  No results for input(s): PROTIME, INR in the last 72 hours. APTT: No results for input(s): APTT in the last 72 hours. UA:  No results for input(s): NITRITE, COLORU, PHUR, LABCAST, WBCUA, RBCUA, MUCUS, TRICHOMONAS, YEAST, BACTERIA, CLARITYU, SPECGRAV, LEUKOCYTESUR, UROBILINOGEN, BILIRUBINUR, BLOODU, GLUCOSEU, AMORPHOUS in the last 72 hours. Invalid input(s): KETONESU  No results for input(s): PHART, JQR1MHE, PO2ART in the last 72 hours. CT chest personally reviewed, dense left sided infiltrate and small to moderate free flowing pleural effusions bilaterally        Assessment:     1. Acute resp failure, hypoxic              -left sided necrotizing pneumonia, MSSA   -  2. DM poorly controlled  3. Acute encephalopathy              -acute ischemic stroke, anoxic   4. Dysphagia  5. Critical illness myopathy  6. Acute gastritis/GI bleed, acute anemia    Plan:      -Currently limited code, declined repeat bronch, awaiting hospice input.   -Empiric Zosyn, has been on atb since admission 4/29; at this point I think her respiratory issues are ongoing aspiration as opposed to infectious pneumonia (BAL from 5/16 unremarkable).  Ok to stop all atb from our standpoint  -Agree with resuming tube feeds for nutritional status however likely to have worsening respiratory status with this due to increased oxygen demands of digestion and further aspiration/reflux.  Consider adding reglan to assist with motility  -Titrate FIO2 as tolerated, can consider vapotherm if she worsens as a last resort but hopefully hospice will be able to see and transition before this becomes an issue    Jesusita Sidhu MD

## 2019-05-19 NOTE — PLAN OF CARE
Problem: Falls - Risk of:  Goal: Will remain free from falls  Description  Bed in lowest locked positions, call light within reach, side rails up x 2, bed check in place. Instructed patient to call before getting out of bed. Patient verbalized understanding. Outcome: Ongoing  Note:   Pt high fall risk. Instructed to use call light before getting out of bed. Call light within reach. Bed in low position. Bed alarm on. Will continue to monitor. Problem: Serum Glucose Level - Abnormal:  Goal: Ability to maintain appropriate glucose levels will improve  Description  Ability to maintain appropriate glucose levels will improve  5/19/2019 0026 by Shai Robertson RN  Outcome: Ongoing  Goal: Ability to maintain appropriate glucose levels will improve to within specified parameters  Description  Ability to maintain appropriate glucose levels will improve to within specified parameters  Outcome: Ongoing  Note:   Diabetes education provided today:    Carbs: good carbs and bad carbs, importance of carb counting, incorporation of protein with each meal to reduce Glycemic index, importance of portions, Carb/insulin ratio. Fats: Good fats and bad fats, meal planning and supplements. Managing high and low sugar readings. Problem: Pain:  Goal: Control of acute pain  Description  Control of acute pain  Outcome: Ongoing  Note:   Pt non-verbal - using faces scale to evaluate pain score. Patient family understands this pain scale and believe patient  is being adequately treated and will reach out to report changes. Problem: Skin Integrity - Impaired:  Goal: Absence of new skin breakdown  Description  Absence of new skin breakdown  Outcome: Ongoing  Note:   Pt is a Q2H, educated pt on preventing skin breakdown.  See flowsheet for assessment      Problem: Injury - Risk of, Physical Injury:  Goal: Will remain free from falls  Description  Bed in lowest locked positions, call light within reach, side rails up x 2, bed check in place. Instructed patient to call before getting out of bed. Patient verbalized understanding. Outcome: Ongoing  Note:   Pt high fall risk. Instructed to use call light before getting out of bed. Call light within reach. Bed in low position. Bed alarm on. Will continue to monitor.

## 2019-05-19 NOTE — PROGRESS NOTES
Called Nutrition services regarding tube feel ( Diabetic 1.5 Glucerna to be sent. Was advised that it would be sent right away.  Awaiting tube feed to begin infusion

## 2019-05-19 NOTE — DISCHARGE SUMMARY
Hospital Medicine PROGRESS NOTE  Not discharged on this day because inpatient hospice doesn't have a bed available. They are hopeful for discharge tomorrow. Patient ID: Latia Brooks      Patient's PCP: Floridalma Salazar PA-C    Admit Date: 4/29/2019     Discharge Date:   05/19/19     Admitting Physician: Leon Nayak MD     Discharge Physician: Mariela Mann MD     Discharge Diagnoses: Active Hospital Problems    Diagnosis    Acute renal failure (ARF) ([facility] [address] [N17.9]     Priority: High     Class: Acute    Moderate malnutrition (HCC) [E44.0]    Mucus plugging of bronchi [J98.09]    Acute pulmonary edema (HCC) [J81.0]    Pulmonary infiltrate [R91.8]    Atelectasis of left lung [J98.11]    Staphylococcus aureus pneumonia (HCC) [J15.211]    Altered mental status [R41.82]    Non-traumatic rhabdomyolysis [M62.82]    Acute encephalopathy [G93.40]    Arterial ischemic stroke, ICA, left, acute (HCC) [I63.232]    Pneumonia due to organism [J18.9]    Acute respiratory failure (HCC) [J96.00]    Prolonged Q-T interval on ECG [R94.31]    Hypokalemia [E87.6]    Elevated troponin [R74.8]    DKA (diabetic ketoacidoses) ([facility] [address] [E13.10]    Acidosis [E87.2]    Cardiac arrest ([facility] [address] [I46.9]    DKA, type 2, not at goal [facility]) [E11.10]       The patient was seen and examined on day of discharge and this discharge summary is in conjunction with any daily progress note from day of discharge. Hospital Course:  \"76 yo female with h/o CAD, DM2, HTN, depression admitted after being found unresponsive with acute respiratory failure due to pneumonia, DKA, metabolic encephalopathy, LOPEZ, metabolic acidosis, acute GI bleed. \"      Ultimately this patient did not improve because she kept aspirating after the stroke. She lost capacity to understand her situation and her family decided that she would want only comfort care and hospice under these circumstances.   Plan is for inpatient hospice with gurgling breath sounds  Cardiovascular: Regular rate and rhythm with normal S1/S2   Abdomen: Soft, mild diffuse tenderness, non-distended with normal bowel sounds. PEG in place. Musculoskeletal: No clubbing, cyanosis or edema bilaterally. Neurologic:  she has expressive aphasia. No gag reflex. Diffuse weakness. Psychiatric:  drowsy, falls asleep easily. Nods yes or no to basic questions if you can keep her attention long enough. She does not demonstrate much insight. Capillary Refill: Brisk,< 3 seconds   Peripheral Pulses: +2 palpable, equal bilaterally         Labs: For convenience and continuity at follow-up the following most recent labs are provided:      CBC:    Lab Results   Component Value Date    WBC 10.8 05/19/2019    HGB 7.3 05/19/2019    HCT 21.9 05/19/2019     05/19/2019       Renal:    Lab Results   Component Value Date     05/19/2019    K 3.9 05/19/2019     05/19/2019    CO2 25 05/19/2019    BUN 10 05/19/2019    CREATININE 0.6 05/19/2019    CALCIUM 8.6 05/19/2019    PHOS 3.6 05/14/2019         Significant Diagnostic Studies    Radiology:   XR CHEST PORTABLE   Final Result   Right basilar infiltrate representing atelectasis versus pneumonia. Stable opacification of the left lung representing large effusion and/or   consolidation. XR CHEST PORTABLE   Final Result   New PICC line on the right terminates at the atrial caval junction. Increased airspace disease and effusion on the left. XR ABDOMEN (KUB) (SINGLE AP VIEW)   Final Result   No evidence of bowel obstruction         IR PICC WO SQ PORT/PUMP > 5 YEARS   Final Result   Successful placement of PICC line. CT CHEST WO CONTRAST   Final Result   Bilateral pleural effusions and scattered airspace disease which could   represent pulmonary edema, possibly with superimposed pneumonia.       Multiple nondisplaced left-sided anterior rib fractures and questionable   nondisplaced fracture of the XR CHEST PORTABLE   Final Result   Bilateral lower lobe airspace disease, left greater than right, increased   compared to prior         XR CHEST PORTABLE   Final Result   Stable appearance of the chest with dense opacification in the left mid and   lower lung zone. XR CHEST PORTABLE   Final Result   1. No significant interval change in the left-sided opacity reflecting   pneumonia better characterized on the CT of the thorax from 04/29/2019.   2. Support devices as described above. XR CHEST PORTABLE   Final Result   Catheter in good position. No postprocedure pneumothorax. XR CHEST PORTABLE   Final Result   Supportive devices are stable. Persisting consolidations in the left lung base, consistent with pneumonia. CT CHEST ABDOMEN PELVIS WO CONTRAST   Final Result   Dense consolidation within the inferior aspect of left upper lobe and   throughout the left lower lobe, most compatible with pneumonia and/or   aspiration. That should be followed to resolution, especially given the   nodular morphology within portions of the consolidation. Diffuse mural thickening of the esophagus. Correlate with clinical evidence   of esophagitis. The tip of the right femoral venous catheter is malpositioned within a sacral   vein. That should be repositioned for more optimal placement. CT CERVICAL SPINE WO CONTRAST   Final Result   Multilevel degenerative changes with no acute abnormality of the cervical   spine. CT HEAD WO CONTRAST   Final Result   Motion degraded study with no acute intracranial abnormality. XR CHEST PORTABLE   Final Result   Supportive tubing projects in normal position. Left basilar airspace disease, pneumonia versus aspiration pneumonitis. There may be a component of pleural effusion. XR CHEST PORTABLE   Final Result   Atelectasis at the left lung base. Question of small left pleural effusion. Consults:     IP CONSULT TO NEPHROLOGY  IP CONSULT TO HOSPITALIST  IP CONSULT TO CRITICAL CARE  IP CONSULT TO CARDIOLOGY  IP CONSULT TO PHARMACY  IP CONSULT TO GI  IP CONSULT TO DIETITIAN  IP CONSULT TO CARDIOLOGY  IP CONSULT TO NEUROLOGY  IP CONSULT TO PALLIATIVE CARE  IP CONSULT TO HOSPICE    Disposition:  Inpatient hospice with [address]     Condition at Discharge: Stable    Discharge Instructions/Follow-up:  Hospice care    Code Status:  Limited     Activity: activity as tolerated    Diet: would recommend NPO since she aspirates so easily. Comfort feeding would be high risk. Discharge Medications:     Current Discharge Medication List           Details   ondansetron (ZOFRAN ODT) 4 MG disintegrating tablet Take 1 tablet by mouth every 8 hours as needed for Nausea or Vomiting  Qty: 20 tablet, Refills: 0      omeprazole (PRILOSEC) 10 MG capsule Take 10 mg by mouth daily      lisinopril (PRINIVIL;ZESTRIL) 10 MG tablet Take 10 mg by mouth daily. metFORMIN (GLUCOPHAGE) 1000 MG tablet   Take 1,000 mg by mouth daily (with breakfast)       PARoxetine (PAXIL) 30 MG tablet Take 30 mg by mouth every morning. amitriptyline (ELAVIL) 50 MG tablet Take 25 mg by mouth nightly       atorvastatin (LIPITOR) 40 MG tablet Take 40 mg by mouth daily. insulin detemir (LEVEMIR) 100 UNIT/ML injection vial   Inject 63 Units into the skin nightly          THIS MED LIST WILL BE UPDATED WHEN THE PATIENT IS ACTUALLY DISCHARGED. Time Spent on discharge is more than 30 minutes in the examination, evaluation, counseling and review of medications and discharge plan. Signed:    Nehemias Kyle MD   5/19/2019      Thank you Klaus Galaviz PA-C for the opportunity to be involved in this patient's care. If you have any questions or concerns please feel free to contact me at [phone].

## 2019-05-20 VITALS
WEIGHT: 113.76 LBS | BODY MASS INDEX: 21.48 KG/M2 | HEIGHT: 61 IN | TEMPERATURE: 99.1 F | HEART RATE: 114 BPM | DIASTOLIC BLOOD PRESSURE: 78 MMHG | OXYGEN SATURATION: 96 % | SYSTOLIC BLOOD PRESSURE: 154 MMHG | RESPIRATION RATE: 18 BRPM

## 2019-05-20 LAB
ANION GAP SERPL CALCULATED.3IONS-SCNC: 17 MMOL/L (ref 3–16)
BUN BLDV-MCNC: 10 MG/DL (ref 7–20)
CALCIUM SERPL-MCNC: 8.2 MG/DL (ref 8.3–10.6)
CHLORIDE BLD-SCNC: 102 MMOL/L (ref 99–110)
CO2: 24 MMOL/L (ref 21–32)
CREAT SERPL-MCNC: 0.6 MG/DL (ref 0.6–1.2)
GFR AFRICAN AMERICAN: >60
GFR NON-AFRICAN AMERICAN: >60
GLUCOSE BLD-MCNC: 139 MG/DL (ref 70–99)
GLUCOSE BLD-MCNC: 154 MG/DL (ref 70–99)
GLUCOSE BLD-MCNC: 169 MG/DL (ref 70–99)
GLUCOSE BLD-MCNC: 177 MG/DL (ref 70–99)
GLUCOSE BLD-MCNC: 182 MG/DL (ref 70–99)
HCT VFR BLD CALC: 22.6 % (ref 36–48)
HEMATOLOGY PATH CONSULT: NO
HEMOGLOBIN: 7.2 G/DL (ref 12–16)
MCH RBC QN AUTO: 27.6 PG (ref 26–34)
MCHC RBC AUTO-ENTMCNC: 31.8 G/DL (ref 31–36)
MCV RBC AUTO: 86.7 FL (ref 80–100)
PDW BLD-RTO: 15.1 % (ref 12.4–15.4)
PERFORMED ON: ABNORMAL
PLATELET # BLD: 1017 K/UL (ref 135–450)
PMV BLD AUTO: 6.5 FL (ref 5–10.5)
POTASSIUM REFLEX MAGNESIUM: 3.8 MMOL/L (ref 3.5–5.1)
RBC # BLD: 2.6 M/UL (ref 4–5.2)
SODIUM BLD-SCNC: 143 MMOL/L (ref 136–145)
WBC # BLD: 12.7 K/UL (ref 4–11)

## 2019-05-20 PROCEDURE — 36592 COLLECT BLOOD FROM PICC: CPT

## 2019-05-20 PROCEDURE — 85027 COMPLETE CBC AUTOMATED: CPT

## 2019-05-20 PROCEDURE — 2580000003 HC RX 258: Performed by: INTERNAL MEDICINE

## 2019-05-20 PROCEDURE — 6370000000 HC RX 637 (ALT 250 FOR IP): Performed by: INTERNAL MEDICINE

## 2019-05-20 PROCEDURE — 2700000000 HC OXYGEN THERAPY PER DAY

## 2019-05-20 PROCEDURE — C9113 INJ PANTOPRAZOLE SODIUM, VIA: HCPCS | Performed by: INTERNAL MEDICINE

## 2019-05-20 PROCEDURE — 6370000000 HC RX 637 (ALT 250 FOR IP): Performed by: NURSE PRACTITIONER

## 2019-05-20 PROCEDURE — 80048 BASIC METABOLIC PNL TOTAL CA: CPT

## 2019-05-20 PROCEDURE — 6360000002 HC RX W HCPCS: Performed by: INTERNAL MEDICINE

## 2019-05-20 PROCEDURE — 94640 AIRWAY INHALATION TREATMENT: CPT

## 2019-05-20 PROCEDURE — 94761 N-INVAS EAR/PLS OXIMETRY MLT: CPT

## 2019-05-20 RX ORDER — ATROPINE SULFATE 10 MG/ML
1 SOLUTION/ DROPS OPHTHALMIC 3 TIMES DAILY PRN
Status: DISCONTINUED | OUTPATIENT
Start: 2019-05-20 | End: 2019-05-20 | Stop reason: HOSPADM

## 2019-05-20 RX ORDER — IPRATROPIUM BROMIDE AND ALBUTEROL SULFATE 2.5; .5 MG/3ML; MG/3ML
1 SOLUTION RESPIRATORY (INHALATION)
Status: DISCONTINUED | OUTPATIENT
Start: 2019-05-20 | End: 2019-05-20 | Stop reason: HOSPADM

## 2019-05-20 RX ADMIN — MORPHINE SULFATE 2 MG: 2 INJECTION, SOLUTION INTRAMUSCULAR; INTRAVENOUS at 08:59

## 2019-05-20 RX ADMIN — MORPHINE SULFATE 2 MG: 2 INJECTION, SOLUTION INTRAMUSCULAR; INTRAVENOUS at 04:07

## 2019-05-20 RX ADMIN — IPRATROPIUM BROMIDE AND ALBUTEROL SULFATE 1 AMPULE: .5; 3 SOLUTION RESPIRATORY (INHALATION) at 09:04

## 2019-05-20 RX ADMIN — MORPHINE SULFATE 2 MG: 2 INJECTION, SOLUTION INTRAMUSCULAR; INTRAVENOUS at 14:28

## 2019-05-20 RX ADMIN — LORAZEPAM 0.5 MG: 2 INJECTION, SOLUTION INTRAMUSCULAR; INTRAVENOUS at 13:26

## 2019-05-20 RX ADMIN — MORPHINE SULFATE 2 MG: 2 INJECTION, SOLUTION INTRAMUSCULAR; INTRAVENOUS at 17:22

## 2019-05-20 RX ADMIN — ATROPINE SULFATE 1 DROP: 10 SOLUTION OPHTHALMIC at 15:43

## 2019-05-20 RX ADMIN — LORAZEPAM 0.5 MG: 2 INJECTION, SOLUTION INTRAMUSCULAR; INTRAVENOUS at 15:43

## 2019-05-20 RX ADMIN — IPRATROPIUM BROMIDE AND ALBUTEROL SULFATE 1 AMPULE: .5; 3 SOLUTION RESPIRATORY (INHALATION) at 12:58

## 2019-05-20 RX ADMIN — AMPICILLIN AND SULBACTAM 1.5 G: 1; .5 INJECTION, POWDER, FOR SOLUTION INTRAMUSCULAR; INTRAVENOUS at 08:59

## 2019-05-20 RX ADMIN — Medication 10 ML: at 10:45

## 2019-05-20 RX ADMIN — MORPHINE SULFATE 2 MG: 2 INJECTION, SOLUTION INTRAMUSCULAR; INTRAVENOUS at 12:29

## 2019-05-20 RX ADMIN — MICONAZOLE NITRATE: 2 POWDER TOPICAL at 12:29

## 2019-05-20 RX ADMIN — INSULIN LISPRO 2 UNITS: 100 INJECTION, SOLUTION INTRAVENOUS; SUBCUTANEOUS at 09:12

## 2019-05-20 RX ADMIN — LORAZEPAM 0.5 MG: 2 INJECTION, SOLUTION INTRAMUSCULAR; INTRAVENOUS at 10:39

## 2019-05-20 RX ADMIN — PANTOPRAZOLE SODIUM 40 MG: 40 INJECTION, POWDER, FOR SOLUTION INTRAVENOUS at 08:59

## 2019-05-20 RX ADMIN — AMPICILLIN AND SULBACTAM 1.5 G: 1; .5 INJECTION, POWDER, FOR SOLUTION INTRAMUSCULAR; INTRAVENOUS at 04:07

## 2019-05-20 RX ADMIN — IPRATROPIUM BROMIDE AND ALBUTEROL SULFATE 1 AMPULE: .5; 3 SOLUTION RESPIRATORY (INHALATION) at 17:08

## 2019-05-20 RX ADMIN — INSULIN LISPRO 2 UNITS: 100 INJECTION, SOLUTION INTRAVENOUS; SUBCUTANEOUS at 04:21

## 2019-05-20 ASSESSMENT — PAIN SCALES - WONG BAKER: WONGBAKER_NUMERICALRESPONSE: 4

## 2019-05-20 ASSESSMENT — PAIN SCALES - GENERAL
PAINLEVEL_OUTOF10: 4

## 2019-05-20 NOTE — PROGRESS NOTES
Sorry to bother you, patient is being picked up at 1700 and there is no discharge order placed. Thank you so much!  Sent to Emi Chaparro NP

## 2019-05-20 NOTE — PROGRESS NOTES
Patient discharged to [facility] and is being transported by squad. Patient will be leaving with central line, rees, and peg tube per hospice RN. Patient belongings gathered and accounted for. AVS printed and sent with patient to facility along with prescriptions for comfort care medications. Tele box removed and returned.

## 2019-05-20 NOTE — DISCHARGE SUMMARY
[id number] [facility]       Patient ID: Margaret Noyola      Patient's PCP: Lori Amanda PA-C    Admit Date: 4/29/2019     Discharge Date:   05/20/19     Admitting Physician: Nishi Anguiano MD     Discharge Physician: RICH Hahn - CNP     Discharge Diagnoses: Active Hospital Problems    Diagnosis    Acute renal failure (ARF) ([facility] [address] [N17.9]     Priority: High     Class: Acute    Aspiration pneumonia ([address] [J69.0]    Moderate malnutrition (HCC) [E44.0]    Mucus plugging of bronchi [J98.09]    Acute pulmonary edema (HCC) [J81.0]    Pulmonary infiltrate [R91.8]    Atelectasis of left lung [J98.11]    Staphylococcus aureus pneumonia (HCC) [J15.211]    Altered mental status [R41.82]    Non-traumatic rhabdomyolysis [M62.82]    Acute encephalopathy [G93.40]    Arterial ischemic stroke, ICA, left, acute ([facility] [address] [I63.232]    Pneumonia due to organism [J18.9]    Acute respiratory failure (HCC) [J96.00]    Prolonged Q-T interval on ECG [R94.31]    Hypokalemia [E87.6]    Elevated troponin [R74.8]    DKA (diabetic ketoacidoses) ([facility] [address] [E13.10]    Acidosis [E87.2]    Cardiac arrest ([facility] [address] [I46.9]    DKA, type 2, not at goal [facility]) [E11.10]       The patient was seen and examined on day of discharge and this discharge summary is in conjunction with any daily progress note from day of discharge. Hospital Course:  \"76 yo female with h/o CAD, DM2, HTN, depression admitted after being found unresponsive with acute respiratory failure due to pneumonia, DKA, metabolic encephalopathy, LOPEZ, metabolic acidosis, acute GI bleed. \"      Ultimately this patient did not improve because she kept aspirating after the stroke. She lost capacity to understand her situation and her family decided that she would want only comfort care and hospice under these circumstances. Plan is for inpatient hospice with HOC. DKA  - s/p insulin gtt, IVFs, and serial labs per protocol.   Converted to [facility] insulin. Had not been on any meds for months, A1c was 16. No further treatment indicated, she isn't able to take in significant calories.     Necrotizing MSSA pneumonia, with septic shock and acute hypoxic respiratory failure present on admission  - broad spectrum abx for two weeks, then changed to augmentin then zosyn then unasyn. No further abx indicated once she formally transitions to hospice. - extubated, s/p many bronch's, most recently on 5/16, when NG tube feeds were suctioned out of her lungs and she didn't even require sedation because she had no gag reflex.     CVA's, probably embolic  - TTE, carotid dopplers, and prolonged telemetry without apparent source of emboli. Aspirin and statin no longer indicated based on goals of care.     UGIB, with hematemesis and ABLA  - monitor. Conservative management per GI. PPI continued with palliative intent. Noted mild esophagitis when PEG was placed 5/17.     LOPEZ  - resolved with IVFs and above treatments.       Acute metabolic encephalopathy  - due to all of the above issues, treated accordingly.       Dysphagia  - Aspirated badly with NG feeds. Discussed with GI, it was decided to proceed with PEG with plans for eventual extension into jejunum, but this will no longer be indicated. - family requested a low rate of comfort feeds. Discussed how this could worsen her respiratory symptoms. Physical Exam Performed:     BP (!) 154/78   Pulse 114   Temp 99.1 °F (37.3 °C) (Axillary)   Resp 18   Ht 5' 1\" (1.549 m)   Wt 113 lb 12.1 oz (51.6 kg)   SpO2 90%   BMI 21.49 kg/m²       General appearance: She is lying in bed, looks very weak and debilitated.    HEENT: Pupils equal, round, and reactive to light. Conjunctivae/corneas clear. Neck: Supple, with full range of motion. Trachea midline.   Respiratory:  Bilateral breath sounds, decreased at bases, intermittent gurgling breath sounds  Cardiovascular: Regular rate and rhythm with normal S1/S2 Abdomen: Soft, mild diffuse tenderness, non-distended with normal bowel sounds. PEG in place. Musculoskeletal: No clubbing, cyanosis or edema bilaterally. Neurologic:  she has expressive aphasia. No gag reflex. Diffuse weakness. Psychiatric:  drowsy, falls asleep easily. Nods yes or no to basic questions if you can keep her attention long enough. She does not demonstrate much insight. Capillary Refill: Brisk,< 3 seconds   Peripheral Pulses: +2 palpable, equal bilaterally         Labs: For convenience and continuity at follow-up the following most recent labs are provided:      CBC:    Lab Results   Component Value Date    WBC 12.7 05/20/2019    HGB 7.2 05/20/2019    HCT 22.6 05/20/2019    PLT 1,017 05/20/2019       Renal:    Lab Results   Component Value Date     05/20/2019    K 3.8 05/20/2019     05/20/2019    CO2 24 05/20/2019    BUN 10 05/20/2019    CREATININE 0.6 05/20/2019    CALCIUM 8.2 05/20/2019    PHOS 3.6 05/14/2019         Significant Diagnostic Studies    Radiology:   XR CHEST PORTABLE   Final Result   Right basilar infiltrate representing atelectasis versus pneumonia. Stable opacification of the left lung representing large effusion and/or   consolidation. XR CHEST PORTABLE   Final Result   New PICC line on the right terminates at the atrial caval junction. Increased airspace disease and effusion on the left. XR ABDOMEN (KUB) (SINGLE AP VIEW)   Final Result   No evidence of bowel obstruction         IR PICC WO SQ PORT/PUMP > 5 YEARS   Final Result   Successful placement of PICC line. CT CHEST WO CONTRAST   Final Result   Bilateral pleural effusions and scattered airspace disease which could   represent pulmonary edema, possibly with superimposed pneumonia. Multiple nondisplaced left-sided anterior rib fractures and questionable   nondisplaced fracture of the anterior cortex of the sternum. Correlate for   point tenderness.          XR CHEST greater than right, increased   compared to prior         XR CHEST PORTABLE   Final Result   Stable appearance of the chest with dense opacification in the left mid and   lower lung zone. XR CHEST PORTABLE   Final Result   1. No significant interval change in the left-sided opacity reflecting   pneumonia better characterized on the CT of the thorax from 04/29/2019.   2. Support devices as described above. XR CHEST PORTABLE   Final Result   Catheter in good position. No postprocedure pneumothorax. XR CHEST PORTABLE   Final Result   Supportive devices are stable. Persisting consolidations in the left lung base, consistent with pneumonia. CT CHEST ABDOMEN PELVIS WO CONTRAST   Final Result   Dense consolidation within the inferior aspect of left upper lobe and   throughout the left lower lobe, most compatible with pneumonia and/or   aspiration. That should be followed to resolution, especially given the   nodular morphology within portions of the consolidation. Diffuse mural thickening of the esophagus. Correlate with clinical evidence   of esophagitis. The tip of the right femoral venous catheter is malpositioned within a sacral   vein. That should be repositioned for more optimal placement. CT CERVICAL SPINE WO CONTRAST   Final Result   Multilevel degenerative changes with no acute abnormality of the cervical   spine. CT HEAD WO CONTRAST   Final Result   Motion degraded study with no acute intracranial abnormality. XR CHEST PORTABLE   Final Result   Supportive tubing projects in normal position. Left basilar airspace disease, pneumonia versus aspiration pneumonitis. There may be a component of pleural effusion. XR CHEST PORTABLE   Final Result   Atelectasis at the left lung base. Question of small left pleural effusion.                 Consults:     IP CONSULT TO NEPHROLOGY  IP CONSULT TO HOSPITALIST  IP CONSULT TO CRITICAL WHEN THE PATIENT IS ACTUALLY DISCHARGED. Time Spent on discharge is more than 30 minutes in the examination, evaluation, counseling and review of medications and discharge plan. Signed:    RICH Weber - CNP   5/20/2019      Thank you Isabell Cruz PA-C for the opportunity to be involved in this patient's care. If you have any questions or concerns please feel free to contact me at [phone].

## 2019-05-20 NOTE — PLAN OF CARE
perceptions  Description  Demonstrates accurate environmental perceptions  Outcome: Ongoing  Goal: Able to distinguish between reality-based and nonreality-based thinking  Description  Able to distinguish between reality-based and nonreality-based thinking  Outcome: Ongoing  Goal: Able to interrupt nonreality-based thinking  Description  Able to interrupt nonreality-based thinking  Outcome: Ongoing     Problem: Discharge Planning:  Goal: Participates in care planning  Description  Participates in care planning  Outcome: Ongoing  Goal: Discharged to appropriate level of care  Description  Discharged to appropriate level of care  Outcome: Ongoing  Goal: Ability to perform activities of daily living will improve  Description  Ability to perform activities of daily living will improve  Outcome: Ongoing     Problem: Airway Clearance - Ineffective:  Goal: Ability to maintain a clear airway will improve  Description  Ability to maintain a clear airway will improve  Outcome: Ongoing     Problem: Anxiety/Stress:  Goal: Level of anxiety will decrease  Description  Level of anxiety will decrease  Outcome: Ongoing     Problem: Aspiration:  Goal: Absence of aspiration  Description  Absence of aspiration  Outcome: Ongoing     Problem:  Bowel Function - Altered:  Goal: Bowel elimination is within specified parameters  Description  Bowel elimination is within specified parameters  Outcome: Ongoing     Problem: Cardiac Output - Decreased:  Goal: Hemodynamic stability will improve  Description  Hemodynamic stability will improve  Outcome: Ongoing     Problem: Fluid Volume - Imbalance:  Goal: Absence of imbalanced fluid volume signs and symptoms  Description  Absence of imbalanced fluid volume signs and symptoms  Outcome: Ongoing     Problem: Gas Exchange - Impaired:  Goal: Levels of oxygenation will improve  Description  Levels of oxygenation will improve  Outcome: Ongoing     Problem: Mental Status - Impaired:  Goal: Mental status will be restored to baseline  Description  Mental status will be restored to baseline  Outcome: Ongoing     Problem: Nutrition Deficit:  Goal: Ability to achieve adequate nutritional intake will improve  Description  Ability to achieve adequate nutritional intake will improve  Outcome: Ongoing     Problem: Pain:  Goal: Pain level will decrease  Description  Pain level will decrease  Outcome: Ongoing  Goal: Recognizes and communicates pain  Description  Recognizes and communicates pain  Outcome: Ongoing  Goal: Control of acute pain  Description  Control of acute pain  Outcome: Ongoing  Goal: Control of chronic pain  Description  Control of chronic pain  Outcome: Ongoing     Problem: Skin Integrity - Impaired:  Goal: Will show no infection signs and symptoms  Description  Will show no infection signs and symptoms  Outcome: Ongoing  Goal: Absence of new skin breakdown  Description  Absence of new skin breakdown  Outcome: Ongoing     Problem: Sleep Pattern Disturbance:  Goal: Appears well-rested  Description  Appears well-rested  Outcome: Ongoing     Problem: Tissue Perfusion, Altered:  Goal: Circulatory function within specified parameters  Description  Circulatory function within specified parameters  Outcome: Ongoing     Problem: Tissue Perfusion - Cardiopulmonary, Altered:  Goal: Absence of angina  Description  Absence of angina  Outcome: Ongoing  Goal: Hemodynamic stability will improve  Description  Hemodynamic stability will improve  Outcome: Ongoing     Problem: HEMODYNAMIC STATUS  Goal: Patient has stable vital signs and fluid balance  Outcome: Ongoing  Note:   Patient has a past medical history of Acute renal failure (ARF) ([facility] [address] Asthma, CAD (coronary artery disease), Diabetes mellitus ([facility] [address] Hypertension, Pneumonia, Seizures ([facility] [address] and Unspecified cerebral artery occlusion with cerebral infarction. Comorbidities and risk factors for stroke reviewed and education provided as appropriate.      Patient and/or family's stated goal of care: Comfort care measures only     Reviewed the Following Education with Patient and/or Family:   Signs and Symptoms of Stroke-Reviewed FAST   Facial Drooping   Arm Weakness   Speech Difficulty   Time to Call 911 and how to activate EMS (911)   Importance of Follow Up Appointment at Discharge   Importance of Compliance with Medications Prescribed at Discharge     Pt not appropriate for education at this time.      Family Present during Education: yes     Stroke Education Booklet at Bedside: yes     Total Education Time: 15 Minutes            Problem: ACTIVITY INTOLERANCE/IMPAIRED MOBILITY  Goal: Mobility/activity is maintained at optimum level for patient  Outcome: Ongoing     Problem: COMMUNICATION IMPAIRMENT  Goal: Ability to express needs and understand communication  Outcome: Ongoing     Problem: Tissue Perfusion - Renal, Altered:  Goal: Electrolytes within specified parameters  Description  Electrolytes within specified parameters  Outcome: Ongoing  Goal: Urine creatinine clearance will be within specified parameters  Description  Urine creatinine clearance will be within specified parameters  Outcome: Ongoing  Goal: Serum creatinine will be within specified parameters  Description  Serum creatinine will be within specified parameters  Outcome: Ongoing  Goal: Ability to achieve a balanced intake and output will improve  Description  Ability to achieve a balanced intake and output will improve  Outcome: Ongoing     Problem: Confusion - Acute:  Goal: Mental status will be restored to baseline  Description  Mental status will be restored to baseline  Outcome: Ongoing  Goal: Absence of continued neurological deterioration signs and symptoms  Description  Absence of continued neurological deterioration signs and symptoms  Outcome: Ongoing     Problem: Injury - Risk of, Physical Injury:  Goal: Will remain free from falls  Description  Bed in lowest locked positions, call light within reach, side rails up x 2, bed check in place. Instructed patient to call before getting out of bed. Patient verbalized understanding.       Outcome: Ongoing  Goal: Absence of physical injury  Description  Absence of physical injury  Outcome: Ongoing     Problem: Mood - Altered:  Goal: Mood stable  Description  Mood stable  Outcome: Ongoing  Goal: Absence of abusive behavior  Description  Absence of abusive behavior  Outcome: Ongoing  Goal: Verbalizations of feeling emotionally comfortable while being cared for will increase  Description  Verbalizations of feeling emotionally comfortable while being cared for will increase  Outcome: Ongoing     Problem: Psychomotor Activity - Altered:  Goal: Absence of psychomotor disturbance signs and symptoms  Description  Absence of psychomotor disturbance signs and symptoms  Outcome: Ongoing     Problem: Pain:  Goal: Pain level will decrease  Description  Pain level will decrease  Outcome: Ongoing  Goal: Control of acute pain  Description  Control of acute pain  Outcome: Ongoing  Goal: Control of chronic pain  Description  Control of chronic pain  Outcome: Ongoing

## 2019-05-20 NOTE — PROGRESS NOTES
Need new order for restraints please. Will trial her without restraints. Thanks!  Sent to Julio Bernal NP

## 2019-05-20 NOTE — PROGRESS NOTES
Patient's family has voiced that they do not want patient to be repositioned so as not to disturb her or cause pain. Patient was checked for soiling by PCA.

## 2019-05-20 NOTE — PROGRESS NOTES
RESPIRATORY THERAPY ASSESSMENT    Name:  Kenji Sosa Record Number:  [id number]  Age: 81 y.o. Gender: female  : 1950  Today's Date:  2019  Room:  3638/8915-56    Assessment     Is the patient being admitted for a COPD or Asthma exacerbation? No   (If yes the patient will be seen every 4 hours for the first 24 hours and then reassessed)    Patient Admission Diagnosis      Allergies  Allergies   Allergen Reactions    Aspirin Nausea And Vomiting       Minimum Predicted Vital Capacity:     760          Actual Vital Capacity:      N/A              Pulmonary History:Asthma  Home Oxygen Therapy:  room air  Home Respiratory Therapy:None   Current Respiratory Therapy:  Duoneb HHN Q4  Treatment Type: HHN  Medications: Albuterol/Ipratropium    Respiratory Severity Index(RSI)   Patients with orders for inhalation medications, oxygen, or any therapeutic treatment modality will be placed on Respiratory Protocol. They will be assessed with the first treatment and at least every 72 hours thereafter. The following severity scale will be used to determine frequency of treatment intervention.     Smoking History: No Smoking History = 0    Social History  Social History     Tobacco Use    Smoking status: Never Smoker    Smokeless tobacco: Never Used   Substance Use Topics    Alcohol use: No     Comment: occasional    Drug use: No       Recent Surgical History: None = 0  Past Surgical History  Past Surgical History:   Procedure Laterality Date    BLADDER REPAIR      BREAST LUMPECTOMY Left     BREAST SURGERY      BRONCHOSCOPY N/A 2019    BRONCHOSCOPY THERAPUTIC ASPIRATION INITIAL performed by Rachna Cortés MD at [address] [place] [address]  2019    BRONCHOSCOPY ALVEOLAR LAVAGE performed by Rachna Cortés MD at [address] [place] [address] N/A 2019    BRONCHOSCOPY THERAPUTIC ASPIRATION INITIAL performed by Rachna Cortés MD at [address] [place] [address] N/A 2019 BRONCHOSCOPY ALVEOLAR LAVAGE performed by Berta Montemayor MD at [address] [place] [address]  5/7/2019    BRONCHOSCOPY THERAPUTIC ASPIRATION INITIAL performed by Berta Montemayor MD at [address] [place] [address] N/A 5/9/2019    BRONCHOSCOPY THERAPUTIC ASPIRATION INITIAL performed by Berta Montemayor MD at [address] [place] [address] N/A 5/10/2019    BRONCHOSCOPY THERAPUTIC ASPIRATION INITIAL performed by Kyle Alfonso MD at [place] [address] N/A 5/17/2019    EGD PEG TUBE PLACEMENT performed by Leah Butler MD at [address] [place] [address]       Level of Consciousness: Disoriented and Uncooperative = 2    Level of Activity: Bedridden, unresponsive or quadriplegic = 4    Respiratory Pattern: Increased; RR 21-30 = 1    Breath Sounds: Absent bilaterally and/or with wheezes = 3    Sputum  Sputum Color: Tan, Red, Cloudy, Tenacity: Thick, Thin, Sputum How Obtained: None  Cough: Weak, non-productive = 3    Vital Signs   /71   Pulse 114   Temp 98.6 °F (37 °C) (Axillary)   Resp 24   Ht 5' 1\" (1.549 m)   Wt 117 lb 11.6 oz (53.4 kg)   SpO2 95%   BMI 22.24 kg/m²   SPO2 (COPD values may differ): Less than 86% on room air or greater than 92% on FiO2 greater than 50% = 4    Peak Flow (asthma only): not applicable = 0    RSI: 92-02 = Q4 (every four hours)        Plan       Goals: medication delivery, mobilize retained secretions, volume expansion and improve oxygenation    Patient/caregiver was educated on the proper method of use for Respiratory Care Devices:  Yes      Level of patient/caregiver understanding able to:   ? Verbalize understanding   ? Demonstrate understanding       ? Teach back        ? Needs reinforcement       ? No available caregiver               ? Other:     Response to education:  Poor    Is patient being placed on Home Treatment Regimen? No     Does the patient have everything they need prior to discharge?   NA     Comments: Evaluated pt and reviewed chart. Plan of Care: [facility] [id number]    Electronically signed by Melanie Sigala RCP on 5/20/2019 at 1:04 AM    Respiratory Protocol Guidelines     1. Assessment and treatment by Respiratory Therapy will be initiated for medication and therapeutic interventions upon initiation of aerosolized medication. 2. Physician will be contacted for respiratory rate (RR) greater than 35 breaths per minute. Therapy will be held for heart rate (HR) greater than 140 beats per minute, pending direction from physician. 3. Bronchodilators will be administered via Metered Dose Inhaler (MDI) with spacer when the following criteria are met:  a. Alert and cooperative     b. HR < 140 bpm  c. RR < 30 bpm                d. Can demonstrate a 2-3 second inspiratory hold  4. Bronchodilators will be administered via Hand Held Nebulizer [facility]) to patients when ANY of the following criteria are met  a. Incognizant or uncooperative          b. Patients treated with HHN at Home        c. Unable to demonstrate proper use of MDI with spacer     d. RR > 30 bpm   5. Bronchodilators will be delivered via Metered Dose Inhaler (MDI), HHN, Aerogen to intubated patients on mechanical ventilation. 6. Inhalation medication orders will be delivered and/or substituted as outlined below. Aerosolized Medications Ordering and Administration Guidelines:    1. All Medications will be ordered by a physician, and their frequency and/or modality will be adjusted as defined by the patients Respiratory Severity Index (RSI) score. 2. If the patient does not have documented COPD, consider discontinuing anticholinergics when RSI is less than 9.  3. If the bronchospasm worsens (increased RSI), then the bronchodilator frequency can be increased to a maximum of every 4 hours. If greater than every 4 hours is required, the physician will be contacted.   4. If the bronchospasm improves, the frequency of the bronchodilator can be decreased, based on the patient's RSI, but not less than home treatment regimen frequency. 5. Bronchodilator(s) will be discontinued if patient has a RSI less than 9 and has received no scheduled or as needed treatment for 72  Hrs. Patients Ordered on a Mucolytic Agent:    1. Must always be administered with a bronchodilator. 2. Discontinue if patient experiences worsened bronchospasm, or secretions have lessened to the point that the patient is able to clear them with a cough. Anti-inflammatory and Combination Medications:    1. If the patient lacks prior history of lung disease, is not using inhaled anti-inflammatory medication at home, and lacks wheezing by examination or by history for at least 24 hours, contact physician for possible discontinuation.

## 2019-06-03 LAB
FUNGUS (MYCOLOGY) CULTURE: ABNORMAL
FUNGUS (MYCOLOGY) CULTURE: ABNORMAL
FUNGUS STAIN: ABNORMAL
ORGANISM: ABNORMAL

## 2019-06-18 LAB
AFB CULTURE (MYCOBACTERIA): NORMAL
AFB SMEAR: NORMAL

## 2019-07-02 LAB
AFB CULTURE (MYCOBACTERIA): NORMAL
AFB SMEAR: NORMAL

## 2019-07-09 NOTE — PROGRESS NOTES
Does patient need new discharge orders? Thanks!  Sent to Antonio Dempsey NP Identified pt with two pt identifiers(name and ). Reviewed record in preparation for visit and have obtained necessary documentation. Chief Complaint   Patient presents with   [address]     sting, possibly by a wasp on left hand yesterday; pt c/o itching of left arm, as well as swelling of hand has increased overnight; has taken benadryl, with no relief        Health Maintenance Due   Topic    Shingrix Vaccine Age 50> (1 of 2)       Coordination of Care Questionnaire:  :   1) Have you been to an emergency room, urgent care, or hospitalized since your last visit? If yes, where when, and reason for visit? no       2. Have seen or consulted any other health care provider since your last visit? If yes, where when, and reason for visit? NO      Patient is accompanied by self I have received verbal consent from Gabrielle Hernandez to discuss any/all medical information while they are present in the room.

## 2021-06-16 NOTE — PROGRESS NOTES
Added [employer], palliative care nurse, @ [phone] 5/18/19 to pt's treatment team.-Cat Sue RN cannot approve Refill Request    RN can NOT refill this medication Protocol failed and NO refill given. Last office visit: 3/2/2020 Angus Rubio MD Last Physical: 7/29/2019 Last MTM visit: Visit date not found Last visit same specialty: 3/2/2020 Angus Rubio MD.  Next visit within 3 mo: Visit date not found  Next physical within 3 mo: Visit date not found      Shobha Hernandez, [facility] Triage/Med Refill 4/17/2021    Requested Prescriptions   Pending Prescriptions Disp Refills     finasteride (PROPECIA) 1 mg tablet [Pharmacy Med Name: Finasteride Oral Tablet 1 MG] 90 tablet 0     Sig: TAKE ONE TABLET BY MOUTH ONE TIME DAILY       There is no refill protocol information for this order

## 2025-04-07 NOTE — PROGRESS NOTES
4 Eyes Skin Assessment     The patient is being assess for   Admission    I agree that 2 RN's have performed a thorough Head to Toe Skin Assessment on the patient. ALL assessment sites listed below have been assessed. Areas assessed by both nurses:   [x]   Head, Face, and Ears   [x]   Shoulders, Back, and Chest, Abdomen  [x]   Arms, Elbows, and Hands   [x]   Coccyx, Sacrum, and Ischium  [x]   Legs, Feet, and Heels        Scattered bruises, prev mepilex to sacrum which has blanching redness. Connie anal area had some mild bleeding. **SHARE this note so that the co-signing nurse is able to place an eSignature**    Co-signer eSignature: {Esignature:[id number]}    Does the Patient have Skin Breakdown?   No          Robi Prevention initiated:  Yes   Wound Care Orders initiated:  NA      [facility] nurse consulted for Pressure Injury (Stage 3,4, Unstageable, DTI, NWPT, Complex wounds)and New or Established Ostomies:  NA      Primary Nurse eSignature: Electronically signed by Tabitha Brown RN on 4/29/19 at 11:52 PM Clear bilaterally, pupils equal, round and reactive to light.

## (undated) DEVICE — CONMED SCOPE SAVER BITE BLOCK, 20X27 MM: Brand: SCOPE SAVER

## (undated) DEVICE — CANNULA,OXY,ADULT,SUPERSOFT,W/7'TUB,SC: Brand: MEDLINE INDUSTRIES, INC.

## (undated) DEVICE — ADAPTER TBNG DIA15MM SWVL FBROPT BRONCHSCP TERM 2 AXIS PEEP

## (undated) DEVICE — PEG STANDARD SYSTEM: Brand: ENTAKE

## (undated) DEVICE — STERILE POLYISOPRENE POWDER-FREE SURGICAL GLOVES WITH EMOLLIENT COATING: Brand: PROTEXIS

## (undated) DEVICE — LENS EYEGLASS LTWT REPL DISP SAFEVIEW

## (undated) DEVICE — SYRINGE MED 10ML SLIP TIP BLNT FILL AND LUERLOCK DISP

## (undated) DEVICE — YANKAUER,BULB TIP,W/O VENT,RIGID,STERILE: Brand: MEDLINE

## (undated) DEVICE — FRAME EYEWR PROTCT ASST CLR DISP SAFEVIEW

## (undated) DEVICE — ENDO CARRY-ON PROCEDURE KIT INCLUDES SUCTION TUBING, LUBRICANT, GAUZE, BIOHAZARD STICKER, TRANSPORT PAD AND INTERCEPT BEDSIDE KIT.: Brand: ENDO CARRY-ON PROCEDURE KIT

## (undated) DEVICE — GAUZE,SPONGE,FLUFF,4"X4",12PLY,ST,10/TR: Brand: MEDLINE

## (undated) DEVICE — TUBING, SUCTION, 3/16" X 12', STRAIGHT: Brand: MEDLINE

## (undated) DEVICE — ELECTRODE,ECG,STRESS,FOAM,3PK: Brand: MEDLINE

## (undated) DEVICE — Z DISCONTINUED APPLICATOR SURG PREP 0.35OZ 2% CHG 70% ISO ALC W/ HI LT

## (undated) DEVICE — FORCEPS BX L115CM ALGTR JAW STP DISP

## (undated) DEVICE — SINGLE USE SUCTION VALVE MAJ-209: Brand: SINGLE USE SUCTION VALVE (STERILE)

## (undated) DEVICE — Z DISCONTINUED USE 2276105 GOWN PROTCT UNIV CHST W28IN L49IN SL 24IN BLU SPUNBOND FLM

## (undated) DEVICE — ENDO CARRY-ON PROCEDURE KIT: Brand: ENDO CARRY-ON PROCEDURE KIT

## (undated) DEVICE — BINDER ABD SM MED 30 IN - 45 IN HT 12 IN

## (undated) DEVICE — ELECTRODE ECG MONITR FOAM TEAR DROP ADLT RED

## (undated) DEVICE — TUBING, SUCTION, 3/16" X 6', STRAIGHT: Brand: MEDLINE

## (undated) DEVICE — SINGLE USE CYTOLOGY BRUSH V: Brand: SINGLE USE CYTOLOGY BRUSH V

## (undated) DEVICE — SINGLE USE BIOPSY VALVE MAJ-210: Brand: SINGLE USE BIOPSY VALVE (STERILE)

## (undated) DEVICE — SYRINGE MED 50ML LUERSLIP TIP

## (undated) DEVICE — TRAP,MUCUS SPECIMEN, 80CC: Brand: MEDLINE

## (undated) DEVICE — LINER,SOFT,SUCTION CANISTER,1500CC: Brand: MEDLINE

## (undated) DEVICE — RESTRAINT LIMB QUIK RELEASE CLOSURE WRST DETACHABLE WSH LF

## (undated) DEVICE — FORCEPS BX L100CM DIA1.8MM PULM STD CAP DISP RAD JAW 4

## (undated) DEVICE — BOWL MED M 16OZ PLAS CAP GRAD